# Patient Record
Sex: FEMALE | Race: BLACK OR AFRICAN AMERICAN | Employment: UNEMPLOYED | URBAN - METROPOLITAN AREA
[De-identification: names, ages, dates, MRNs, and addresses within clinical notes are randomized per-mention and may not be internally consistent; named-entity substitution may affect disease eponyms.]

---

## 2017-02-13 ENCOUNTER — TELEPHONE (OUTPATIENT)
Dept: FAMILY MEDICINE CLINIC | Age: 37
End: 2017-02-13

## 2017-02-13 NOTE — TELEPHONE ENCOUNTER
----- Message from Saloni Zhu sent at 2/13/2017  1:37 PM EST -----  Regarding: Dr. Parish Howell from [facility] would like a callback to check the status of an medical records request that was sent on 2/8/17. The best contact number is [phone] ext [phone].

## 2017-02-15 NOTE — TELEPHONE ENCOUNTER
Tried calling Luis Carlos Marte to inform her the information that she request have to come from the dept. Of medical records.  Which i have give to Yasmine #[phone]

## 2017-03-22 ENCOUNTER — TELEPHONE (OUTPATIENT)
Dept: FAMILY MEDICINE CLINIC | Age: 37
End: 2017-03-22

## 2017-03-24 NOTE — TELEPHONE ENCOUNTER
Spoke with patient. States her mother applied for life insurance and she was denied because of \"something\" from her visit on 9/16/17. Unsure why she would be denied. Vitamin D deficiency was only result of labs, unsure why she would have been denied. Patient to reach out to life insurance company for more clarification.

## 2017-04-13 ENCOUNTER — HOSPITAL ENCOUNTER (EMERGENCY)
Age: 37
Discharge: HOME | End: 2017-04-13
Attending: EMERGENCY MEDICINE

## 2017-04-13 ENCOUNTER — HOSPITAL ENCOUNTER (OUTPATIENT)
Dept: LAB | Age: 37
Discharge: HOME | End: 2017-04-13

## 2017-04-13 VITALS
HEIGHT: 67 IN | OXYGEN SATURATION: 99 % | BODY MASS INDEX: 25.11 KG/M2 | WEIGHT: 160 LBS | TEMPERATURE: 97.2 F | DIASTOLIC BLOOD PRESSURE: 102 MMHG | SYSTOLIC BLOOD PRESSURE: 176 MMHG | HEART RATE: 100 BPM | RESPIRATION RATE: 20 BRPM

## 2017-04-13 DIAGNOSIS — N30.01 ACUTE CYSTITIS WITH HEMATURIA: ICD-10-CM

## 2017-04-13 DIAGNOSIS — B96.89 BACTERIAL VAGINITIS: Primary | ICD-10-CM

## 2017-04-13 DIAGNOSIS — N76.0 BACTERIAL VAGINITIS: Primary | ICD-10-CM

## 2017-04-13 LAB
BILIRUB UR QL: NEGATIVE
CLUE CELLS VAG QL WET PREP: NORMAL
GLUCOSE UR QL STRIP.AUTO: NEGATIVE MG/DL
HCG UR QL: NEGATIVE
KETONES UR-MCNC: ABNORMAL MG/DL
KOH PREP SPEC: NORMAL
LEUKOCYTE ESTERASE UR QL STRIP: ABNORMAL
NITRITE UR QL: NEGATIVE
PH UR: 6 [PH] (ref 5–8)
PROT UR QL: 100 MG/DL
RBC # UR STRIP: ABNORMAL /UL
SERVICE CMNT-IMP: NORMAL
SP GR UR: 1.02 (ref 1–1.03)
T VAGINALIS VAG QL WET PREP: NORMAL
UROBILINOGEN UR QL: 0.2 EU/DL (ref 0.2–1)

## 2017-04-13 PROCEDURE — 87210 SMEAR WET MOUNT SALINE/INK: CPT | Performed by: EMERGENCY MEDICINE

## 2017-04-13 PROCEDURE — 87186 SC STD MICRODIL/AGAR DIL: CPT | Performed by: EMERGENCY MEDICINE

## 2017-04-13 PROCEDURE — 87086 URINE CULTURE/COLONY COUNT: CPT | Performed by: EMERGENCY MEDICINE

## 2017-04-13 PROCEDURE — 87491 CHLMYD TRACH DNA AMP PROBE: CPT | Performed by: EMERGENCY MEDICINE

## 2017-04-13 PROCEDURE — 87077 CULTURE AEROBIC IDENTIFY: CPT | Performed by: EMERGENCY MEDICINE

## 2017-04-13 RX ORDER — CEPHALEXIN 500 MG/1
500 CAPSULE ORAL 4 TIMES DAILY
Qty: 28 CAP | Refills: 0 | Status: SHIPPED | OUTPATIENT
Start: 2017-04-13 | End: 2017-04-20

## 2017-04-13 RX ORDER — METRONIDAZOLE 500 MG/1
500 TABLET ORAL 2 TIMES DAILY
Qty: 14 TAB | Refills: 0 | Status: SHIPPED | OUTPATIENT
Start: 2017-04-13 | End: 2017-04-20

## 2017-04-13 NOTE — DISCHARGE INSTRUCTIONS
Bacterial Vaginosis: Care Instructions  Your Care Instructions    Bacterial vaginosis is a type of vaginal infection. It is caused by excess growth of certain bacteria that are normally found in the vagina. Symptoms can include itching, swelling, pain when you urinate or have sex, and a gray or yellow discharge with a \"fishy\" odor. It is not considered an infection that is spread through sexual contact. Although symptoms can be annoying and uncomfortable, bacterial vaginosis does not usually cause other health problems. However, if you have it while you are pregnant, it can cause complications. While the infection may go away on its own, most doctors use antibiotics to treat it. You may have been prescribed pills or vaginal cream. With treatment, bacterial vaginosis usually clears up in 5 to 7 days. Follow-up care is a key part of your treatment and safety. Be sure to make and go to all appointments, and call your doctor if you are having problems. It's also a good idea to know your test results and keep a list of the medicines you take. How can you care for yourself at home? · Take your antibiotics as directed. Do not stop taking them just because you feel better. You need to take the full course of antibiotics. · Do not eat or drink anything that contains alcohol if you are taking metronidazole (Flagyl). · Keep using your medicine if you start your period. Use pads instead of tampons while using a vaginal cream or suppository. Tampons can absorb the medicine. · Wear loose cotton clothing. Do not wear nylon and other materials that hold body heat and moisture close to the skin. · Do not scratch. Relieve itching with a cold pack or a cool bath. · Do not wash your vaginal area more than once a day. Use plain water or a mild, unscented soap. Do not douche. When should you call for help?   Watch closely for changes in your health, and be sure to contact your doctor if:  · You have unexpected vaginal bleeding. · You have a fever. · You have new or increased pain in your vagina or pelvis. · You are not getting better after 1 week. · Your symptoms return after you finish the course of your medicine. Where can you learn more? Go to http://coleman-le.info/. Jordi Andrade in the search box to learn more about \"Bacterial Vaginosis: Care Instructions. \"  Current as of: October 13, 2016  Content Version: 11.2  © 1723-7546 [employer]. Care instructions adapted under license by [employer] (which disclaims liability or warranty for this information). If you have questions about a medical condition or this instruction, always ask your healthcare professional. [place] [address] any warranty or liability for your use of this information. Urinary Tract Infection in Women: Care Instructions  Your Care Instructions    A urinary tract infection, or UTI, is a general term for an infection anywhere between the kidneys and the urethra (where urine comes out). Most UTIs are bladder infections. They often cause pain or burning when you urinate. UTIs are caused by bacteria and can be cured with antibiotics. Be sure to complete your treatment so that the infection goes away. Follow-up care is a key part of your treatment and safety. Be sure to make and go to all appointments, and call your doctor if you are having problems. It's also a good idea to know your test results and keep a list of the medicines you take. How can you care for yourself at home? · Take your antibiotics as directed. Do not stop taking them just because you feel better. You need to take the full course of antibiotics. · Drink extra water and other fluids for the next day or two. This may help wash out the bacteria that are causing the infection.  (If you have kidney, heart, or liver disease and have to limit fluids, talk with your doctor before you increase your fluid intake.)  · Avoid drinks that are carbonated or have caffeine. They can irritate the bladder. · Urinate often. Try to empty your bladder each time. · To relieve pain, take a hot bath or lay a heating pad set on low over your lower belly or genital area. Never go to sleep with a heating pad in place. To prevent UTIs  · Drink plenty of water each day. This helps you urinate often, which clears bacteria from your system. (If you have kidney, heart, or liver disease and have to limit fluids, talk with your doctor before you increase your fluid intake.)  · Urinate when you need to. · Urinate right after you have sex. · Change sanitary pads often. · Avoid douches, bubble baths, feminine hygiene sprays, and other feminine hygiene products that have deodorants. · After going to the bathroom, wipe from front to back. When should you call for help? Call your doctor now or seek immediate medical care if:  · Symptoms such as fever, chills, nausea, or vomiting get worse or appear for the first time. · You have new pain in your back just below your rib cage. This is called flank pain. · There is new blood or pus in your urine. · You have any problems with your antibiotic medicine. Watch closely for changes in your health, and be sure to contact your doctor if:  · You are not getting better after taking an antibiotic for 2 days. · Your symptoms go away but then come back. Where can you learn more? Go to http://coleman-le.info/. Enter T643 in the search box to learn more about \"Urinary Tract Infection in Women: Care Instructions. \"  Current as of: November 28, 2016  Content Version: 11.2  © 3399-6166 [employer]. Care instructions adapted under license by [employer] (which disclaims liability or warranty for this information).  If you have questions about a medical condition or this instruction, always ask your healthcare professional. Kishor Vanegas disclaims any warranty or liability for your use of this information.

## 2017-04-13 NOTE — UC PROVIDER NOTE
Patient is a 29 y.o. female presenting with vaginal discharge. The history is provided by the patient. Vaginal Discharge    This is a new problem. The current episode started more than 1 week ago. The problem occurs daily. The problem has not changed since onset. The discharge occurs spontaneously. The discharge was thin. She is not pregnant. She has not missed her period. Associated symptoms include frequency and perineal odor. Pertinent negatives include no anorexia, no diaphoresis, no fever, no abdominal swelling, no abdominal pain, no constipation, no diarrhea, no nausea, no vomiting, no dyspareunia, no dysuria, no genital burning, no genital itching, no genital lesions, no perineal pain and no painful intercourse. She has tried nothing for the symptoms. The treatment provided no relief. Her past medical history does not include irregular periods, PID, STD, ectopic pregnancy, ovarian cysts or infertility. Past Medical History:   Diagnosis Date    Anemia     Asthma     Biliary colic 7/09/1274    Bulimia     Hypertension         Past Surgical History:   Procedure Laterality Date    HX CHOLECYSTECTOMY  2010    HX GYN  2010    vaginal delivery x1    HX WISDOM TEETH EXTRACTION           Family History   Problem Relation Age of Onset    Hypertension Mother     Cancer Mother      colon    Hypertension Father     Cancer Paternal Uncle     Cancer Paternal Uncle     Cancer Maternal Uncle     Stroke Paternal Aunt     Heart Disease Maternal Grandmother         Social History     Social History    Marital status: SINGLE     Spouse name: N/A    Number of children: N/A    Years of education: N/A     Occupational History    Not on file.      Social History Main Topics    Smoking status: Never Smoker    Smokeless tobacco: Never Used    Alcohol use Yes      Comment: socially    Drug use: No    Sexual activity: Yes     Partners: Male     Birth control/ protection: Condom     Other Topics Concern    Not on file     Social History Narrative                ALLERGIES: Amoxicillin    Review of Systems   Constitutional: Negative. Negative for diaphoresis and fever. Gastrointestinal: Negative. Negative for abdominal pain, anorexia, constipation, diarrhea, nausea and vomiting. Genitourinary: Positive for frequency and vaginal discharge. Negative for decreased urine volume, dyspareunia, dysuria, flank pain, genital sores, hematuria, pelvic pain, urgency and vaginal bleeding. Musculoskeletal: Negative. All other systems reviewed and are negative. Vitals:    04/13/17 1458 04/13/17 1459   BP:  (!) 176/102   Pulse:  100   Resp:  20   Temp:  97.2 °F (36.2 °C)   SpO2:  99%   Weight: 72.6 kg (160 lb)    Height: 5' 7\" (1.702 m)        Physical Exam   Constitutional: She is oriented to person, place, and time. She appears well-developed and well-nourished. HENT:   Head: Normocephalic and atraumatic. Mouth/Throat: Oropharynx is clear and moist. No oropharyngeal exudate. Eyes: Conjunctivae and EOM are normal. Pupils are equal, round, and reactive to light. Right eye exhibits no discharge. Left eye exhibits no discharge. No scleral icterus. Neck: Normal range of motion. No tracheal deviation present. No thyromegaly present. Cardiovascular: Normal rate, regular rhythm and normal heart sounds. No murmur heard. Pulmonary/Chest: Effort normal and breath sounds normal. No respiratory distress. She has no wheezes. She has no rales. She exhibits no tenderness. Abdominal: Soft. Bowel sounds are normal. She exhibits no distension. There is no tenderness. There is no rebound and no guarding. Genitourinary: Uterus normal. Vaginal discharge found. Genitourinary Comments: Vagina with thin grey copious discharge, cervix thick, os closed, no lesions, no cervical motion tenderness, no adnexal tenderness or fullness, uterus nontender   Musculoskeletal: Normal range of motion. She exhibits no edema or tenderness. Lymphadenopathy:     She has no cervical adenopathy. Neurological: She is alert and oriented to person, place, and time. No cranial nerve deficit. Coordination normal.   Skin: Skin is warm. No erythema. Psychiatric: She has a normal mood and affect. Her behavior is normal. Judgment and thought content normal.   Nursing note and vitals reviewed.       MDM     Differential Diagnosis; Clinical Impression; Plan:     Uti, bacterial vaginosis      Procedures

## 2017-04-14 LAB
C TRACH DNA SPEC QL NAA+PROBE: NEGATIVE
N GONORRHOEA DNA SPEC QL NAA+PROBE: NEGATIVE
SAMPLE TYPE: NORMAL
SERVICE CMNT-IMP: NORMAL
SPECIMEN SOURCE: NORMAL

## 2017-04-15 LAB
BACTERIA SPEC CULT: ABNORMAL
CC UR VC: ABNORMAL
SERVICE CMNT-IMP: ABNORMAL

## 2017-07-16 DIAGNOSIS — I10 BENIGN ESSENTIAL HTN: ICD-10-CM

## 2017-07-17 RX ORDER — LISINOPRIL 20 MG/1
TABLET ORAL
Qty: 30 TAB | Refills: 5 | Status: SHIPPED | OUTPATIENT
Start: 2017-07-17 | End: 2018-04-07

## 2018-04-07 ENCOUNTER — OFFICE VISIT (OUTPATIENT)
Dept: URGENT CARE | Age: 38
End: 2018-04-07

## 2018-04-07 VITALS
RESPIRATION RATE: 18 BRPM | HEIGHT: 67 IN | HEART RATE: 100 BPM | DIASTOLIC BLOOD PRESSURE: 102 MMHG | SYSTOLIC BLOOD PRESSURE: 140 MMHG | TEMPERATURE: 97.8 F | WEIGHT: 158 LBS | BODY MASS INDEX: 24.8 KG/M2 | OXYGEN SATURATION: 100 %

## 2018-04-07 DIAGNOSIS — R22.0 SWELLING OF UPPER LIP: Primary | ICD-10-CM

## 2018-04-07 RX ORDER — CETIRIZINE HCL 10 MG
10 TABLET ORAL 2 TIMES DAILY
Qty: 10 TAB | Refills: 0 | Status: SHIPPED | OUTPATIENT
Start: 2018-04-07 | End: 2018-04-12

## 2018-04-07 RX ORDER — HYDROCHLOROTHIAZIDE 12.5 MG/1
12.5 TABLET ORAL DAILY
Qty: 14 TAB | Refills: 0 | Status: SHIPPED | OUTPATIENT
Start: 2018-04-07 | End: 2018-04-21

## 2018-04-07 RX ORDER — SULFAMETHOXAZOLE AND TRIMETHOPRIM 800; 160 MG/1; MG/1
1 TABLET ORAL 2 TIMES DAILY
Qty: 14 TAB | Refills: 0 | Status: SHIPPED | OUTPATIENT
Start: 2018-04-07 | End: 2018-04-14

## 2018-04-07 RX ORDER — PREDNISONE 20 MG/1
TABLET ORAL
Qty: 8 TAB | Refills: 0 | Status: SHIPPED | OUTPATIENT
Start: 2018-04-07 | End: 2018-04-25 | Stop reason: ALTCHOICE

## 2018-04-07 NOTE — MR AVS SNAPSHOT
[facility] [address] 
[phone] Patient: Pricila Malik MRN: [id number] ADD:1/77/2359 Visit Information Date & Time Provider Department Dept. Phone Encounter #  
 4/7/2018  1:15 PM [facility] [phone] [id number] Upcoming Health Maintenance Date Due  
 PAP AKA CERVICAL CYTOLOGY 3/1/2017 Influenza Age 5 to Adult 8/1/2017 DTaP/Tdap/Td series (2 - Td) 9/16/2026 Allergies as of 4/7/2018  Review Complete On: 4/7/2018 By: Jessie Vaca RN Severity Noted Reaction Type Reactions Amoxicillin  05/21/2010    Hives Current Immunizations  Reviewed on 4/23/2015 No immunizations on file. Not reviewed this visit You Were Diagnosed With   
  
 Codes Comments Swelling of upper lip    -  Primary ICD-10-CM: R22.0 ICD-9-CM: 607. 2 Vitals BP Pulse Temp Resp Height(growth percentile) Weight(growth percentile) (!) 140/102 100 97.8 °F (36.6 °C) 18 5' 7\" (1.702 m) 158 lb (71.7 kg) SpO2 BMI OB Status Smoking Status 100% 24.75 kg/m2 Having regular periods Never Smoker BMI and BSA Data Body Mass Index Body Surface Area 24.75 kg/m 2 1.84 m 2 Preferred Pharmacy Pharmacy Name Phone [facility] . [address] [phone] Your Updated Medication List  
  
   
This list is accurate as of 4/7/18  3:00 PM.  Always use your most recent med list.  
  
  
  
  
 albuterol 90 mcg/actuation inhaler Commonly known as:  PROVENTIL HFA, VENTOLIN HFA, PROAIR HFA Take 2 Puffs by inhalation every four (4) hours as needed for Wheezing. cetirizine 10 mg tablet Commonly known as:  ZYRTEC Take 1 Tab by mouth two (2) times a day for 5 days. ferrous sulfate 325 mg (65 mg iron) tablet Take 325 mg by mouth Daily (before breakfast). Indications: IRON DEFICIENCY ANEMIA hydroCHLOROthiazide 12.5 mg tablet Commonly known as:  HYDRODIURIL Take 1 Tab by mouth daily for 14 days. mometasone 50 mcg/actuation nasal spray Commonly known as:  NASONEX  
2 Sprays by Both Nostrils route daily. predniSONE 20 mg tablet Commonly known as:  Leverne Eric Take 2 tabs by mouth one time daily with food for 4 days. trimethoprim-sulfamethoxazole 160-800 mg per tablet Commonly known as:  BACTRIM DS Take 1 Tab by mouth two (2) times a day for 7 days. valACYclovir 500 mg tablet Commonly known as:  VALTREX Prescriptions Sent to Pharmacy Refills  
 trimethoprim-sulfamethoxazole (BACTRIM DS) 160-800 mg per tablet 0 Sig: Take 1 Tab by mouth two (2) times a day for 7 days. Class: Normal  
 Pharmacy: [address] Ph #: [phone] Route: Oral  
 predniSONE (DELTASONE) 20 mg tablet 0 Sig: Take 2 tabs by mouth one time daily with food for 4 days. Class: Normal  
 Pharmacy: [facility] [id number] Ph #: [phone]  
 cetirizine (ZYRTEC) 10 mg tablet 0 Sig: Take 1 Tab by mouth two (2) times a day for 5 days. Class: Normal  
 Pharmacy: [address] Ph #: [phone] Route: Oral  
 hydroCHLOROthiazide (HYDRODIURIL) 12.5 mg tablet 0 Sig: Take 1 Tab by mouth daily for 14 days. Class: Normal  
 Pharmacy: [place] [address] [address] Ph #: [phone] Route: Oral  
  
Patient Instructions Stop your Lisinopril this could be cause of your lip swelling. I have sent you in a new BP medication that you should take daily. You need to see your PCP ASAP Monday or Tuesday. Angioedema: Care Instructions Your Care Instructions Angioedema is an allergic reaction.  It causes swelling and welts in the deep layers of the skin. Angioedema can sometimes occur along with hives. Hives are an allergic reaction in the outer layers of the skin. Angioedema can range from mild to severe. Painful welts can develop on the face. Angioedema can also occur on other parts of the body. In severe cases, the inside of the throat can swell and make it hard to breathe. Many things can cause this condition, including foods, insect bites, and medicines (such as aspirin and some blood pressure medicines). It also can run in families. Sometimes you may know what caused the reaction, but other times you may not know. Follow-up care is a key part of your treatment and safety. Be sure to make and go to all appointments, and call your doctor if you are having problems. It's also a good idea to know your test results and keep a list of the medicines you take. How can you care for yourself at home? · Take your medicines exactly as prescribed. Call your doctor if you think you are having a problem with your medicine. You will get more details on the specific medicines your doctor prescribes. Some medicines used to treat angioedema can make you too sleepy to drive safely. Do not drive if you take medicine that may make you sleepy. · Avoid foods or medicine that may have triggered the swelling. · For comfort: ¨ Try taking a cool bath. Or place a cool, wet towel on the swollen area. ¨ Avoid hot baths and showers. ¨ Wear loose clothing. · Your doctor may prescribe a shot of epinephrine to carry with you in case you have a severe reaction. Learn how to give yourself the shot and keep it with you at all times. Make sure it has not . When should you call for help? Give an epinephrine shot if: 
? · You think you are having a severe allergic reaction. ? After giving an epinephrine shot call 911, even if you feel better. ?Call 911 if: 
? · You have symptoms of a severe allergic reaction. These may include: ¨ Sudden raised, red areas (hives) all over your body. ¨ Swelling of the throat, mouth, lips, or tongue. ¨ Trouble breathing. ¨ Passing out (losing consciousness). Or you may feel very lightheaded or suddenly feel weak, confused, or restless. ? · You have been given an epinephrine shot, even if you feel better. ?Call your doctor now or seek immediate medical care if: 
? · You have symptoms of an allergic reaction, such as: ¨ A rash or hives (raised, red areas on the skin). ¨ Itching. ¨ Swelling. ¨ Belly pain, nausea, or vomiting. ? Watch closely for changes in your health, and be sure to contact your doctor if: 
? · You do not get better as expected. Where can you learn more? Go to http://coleman-le.info/. Enter Y922 in the search box to learn more about \"Angioedema: Care Instructions. \" Current as of: September 29, 2016 Content Version: 11.4 © 7784-0902 [employer]. Care instructions adapted under license by [email or web address] (which disclaims liability or warranty for this information). If you have questions about a medical condition or this instruction, always ask your healthcare professional. [place] [address] any warranty or liability for your use of this information. Introducing [facility]! Dear Татьяна Kothari: Thank you for requesting a Gazzang account. Our records indicate that you already have an active Gazzang account. You can access your account anytime at [email or web address] Did you know that you can access your hospital and ER discharge instructions at any time in Gazzang? You can also review all of your test results from your hospital stay or ER visit. Additional Information If you have questions, please visit the Frequently Asked Questions section of the Gazzang website at [email or web address] Remember, Gazzang is NOT to be used for urgent needs.  For medical emergencies, dial 911. Now available from your iPhone and Android! Please provide this summary of care documentation to your next provider. Your primary care clinician is listed as [address] Casper Rajan. If you have any questions after today's visit, please call [phone].

## 2018-04-07 NOTE — PATIENT INSTRUCTIONS
Stop your Lisinopril this could be cause of your lip swelling. I have sent you in a new BP medication that you should take daily. You need to see your PCP ASAP Monday or Tuesday         Angioedema: Care Instructions  Your Care Instructions    Angioedema is an allergic reaction. It causes swelling and welts in the deep layers of the skin. Angioedema can sometimes occur along with hives. Hives are an allergic reaction in the outer layers of the skin. Angioedema can range from mild to severe. Painful welts can develop on the face. Angioedema can also occur on other parts of the body. In severe cases, the inside of the throat can swell and make it hard to breathe. Many things can cause this condition, including foods, insect bites, and medicines (such as aspirin and some blood pressure medicines). It also can run in families. Sometimes you may know what caused the reaction, but other times you may not know. Follow-up care is a key part of your treatment and safety. Be sure to make and go to all appointments, and call your doctor if you are having problems. It's also a good idea to know your test results and keep a list of the medicines you take. How can you care for yourself at home? · Take your medicines exactly as prescribed. Call your doctor if you think you are having a problem with your medicine. You will get more details on the specific medicines your doctor prescribes. Some medicines used to treat angioedema can make you too sleepy to drive safely. Do not drive if you take medicine that may make you sleepy. · Avoid foods or medicine that may have triggered the swelling. · For comfort:  ¨ Try taking a cool bath. Or place a cool, wet towel on the swollen area. ¨ Avoid hot baths and showers. ¨ Wear loose clothing. · Your doctor may prescribe a shot of epinephrine to carry with you in case you have a severe reaction. Learn how to give yourself the shot and keep it with you at all times.  Make sure it has not . When should you call for help? Give an epinephrine shot if:  ? · You think you are having a severe allergic reaction. ? After giving an epinephrine shot call 911, even if you feel better. ?Call 911 if:  ? · You have symptoms of a severe allergic reaction. These may include:  ¨ Sudden raised, red areas (hives) all over your body. ¨ Swelling of the throat, mouth, lips, or tongue. ¨ Trouble breathing. ¨ Passing out (losing consciousness). Or you may feel very lightheaded or suddenly feel weak, confused, or restless. ? · You have been given an epinephrine shot, even if you feel better. ?Call your doctor now or seek immediate medical care if:  ? · You have symptoms of an allergic reaction, such as:  ¨ A rash or hives (raised, red areas on the skin). ¨ Itching. ¨ Swelling. ¨ Belly pain, nausea, or vomiting. ? Watch closely for changes in your health, and be sure to contact your doctor if:  ? · You do not get better as expected. Where can you learn more? Go to http://coleman-le.info/. Enter F193 in the search box to learn more about \"Angioedema: Care Instructions. \"  Current as of: 2016  Content Version: 11.4  © 3117-2829 [employer]. Care instructions adapted under license by [employer] (which disclaims liability or warranty for this information). If you have questions about a medical condition or this instruction, always ask your healthcare professional. [place] [address] any warranty or liability for your use of this information.

## 2018-04-07 NOTE — PROGRESS NOTES
HPI Comments:   Here for upper lip swelling that started yesterday without known exacerbating factor. Does mention that she had a bump inside her nose approx 1 month ago that she popped, unsure if it is related to symptoms today. Symptoms constant and a little worse today than yesterday. There has been no discharge. Lip is a little sore to touch. Hasnt tried any medications. No aggravating or alleviating factors. Denies any: SOB, globus sensation swallowing, throat tightening, tongue swelling, wheeze, SOB, nausea/vomiting/diarrhea. No oral lesions or known inset bites/sting      Patient is a 40 y.o. female presenting with skin problem. Skin Problem   Pertinent negatives include no headaches. Past Medical History:   Diagnosis Date    Anemia     Asthma     Biliary colic 7/52/2003    Bulimia     Hypertension         Past Surgical History:   Procedure Laterality Date    HX CHOLECYSTECTOMY  2010    HX GYN  2010    vaginal delivery x1    HX WISDOM TEETH EXTRACTION           Family History   Problem Relation Age of Onset    Hypertension Mother     Cancer Mother      colon    Hypertension Father     Cancer Paternal Uncle     Cancer Paternal Uncle     Cancer Maternal Uncle     Stroke Paternal Aunt     Heart Disease Maternal Grandmother         Social History     Social History    Marital status: SINGLE     Spouse name: N/A    Number of children: N/A    Years of education: N/A     Occupational History    Not on file. Social History Main Topics    Smoking status: Never Smoker    Smokeless tobacco: Never Used    Alcohol use Yes      Comment: socially    Drug use: No    Sexual activity: Yes     Partners: Male     Birth control/ protection: Condom     Other Topics Concern    Not on file     Social History Narrative                ALLERGIES: Amoxicillin    Review of Systems   Constitutional: Negative for chills, fatigue and fever. Eyes: Negative for visual disturbance. Gastrointestinal: Negative for nausea and vomiting. Neurological: Negative for dizziness, weakness and headaches. Vitals:    04/07/18 1418   BP: (!) 140/102   Pulse: 100   Resp: 18   Temp: 97.8 °F (36.6 °C)   SpO2: 100%   Weight: 158 lb (71.7 kg)   Height: 5' 7\" (1.702 m)       Physical Exam   Constitutional: She is oriented to person, place, and time. No distress. Appears well   HENT:   Head: Normocephalic and atraumatic. Nose: Nose normal.   Mouth/Throat: Oropharynx is clear and moist. Mucous membranes are not pale and not dry. No oral lesions. No trismus in the jaw. No dental abscesses, uvula swelling or dental caries. No oropharyngeal exudate or tonsillar abscesses. No dental abcess. No periorbital swelling. Eyes: Conjunctivae and EOM are normal. Pupils are equal, round, and reactive to light. Neck: Normal range of motion. Neck supple. No tracheal deviation present. No thyromegaly present. Cardiovascular: Normal rate, regular rhythm and normal heart sounds. Exam reveals no gallop and no friction rub. No murmur heard. Apical pulse 94bpm   Pulmonary/Chest: Effort normal and breath sounds normal. No stridor. No respiratory distress. She has no wheezes. She has no rales. No diminished breath sounds   Musculoskeletal:   No extremity edema   Lymphadenopathy:     She has no cervical adenopathy. Neurological: She is alert and oriented to person, place, and time. Skin:   See HENT face. Psychiatric: She has a normal mood and affect.  Her behavior is normal. Thought content normal.       [facility]     Differential Diagnosis; Clinical Impression; Plan:       CLINICAL IMPRESSION:  (R22.0) Swelling of upper lip  (primary encounter diagnosis)    Orders Placed This Encounter      trimethoprim-sulfamethoxazole (BACTRIM DS) 160-800 mg per tablet      predniSONE (DELTASONE) 20 mg tablet      cetirizine (ZYRTEC) 10 mg tablet      hydroCHLOROthiazide (HYDRODIURIL) 12.5 mg tablet      Plan:  Stop your Lisinopril this could be cause of your lip swelling; questionable cellulitis leaning more torward angioedema however promotes recent abscess in nose (not visible today)  I have sent you in a new BP medication that you should take daily. You need to see your PCP ASAP Monday or Tuesday  ED/911 immediately for any new or worsening    We have reviewed concerning signs/symptoms, normal vs abnormal progression of medical condition and when to seek immediate medical attention. Schedule with PCP or Urgent Care immediately for worsening or new symptoms.     Risk of Significant Complications, Morbidity, and/or Mortality:   Presenting problems:  Low  Diagnostic procedures:  Low  Management options:  Low  Progress:   Patient progress:  Stable      Procedures

## 2018-04-25 ENCOUNTER — OFFICE VISIT (OUTPATIENT)
Dept: FAMILY MEDICINE CLINIC | Age: 38
End: 2018-04-25

## 2018-04-25 VITALS
WEIGHT: 157.8 LBS | OXYGEN SATURATION: 98 % | HEIGHT: 67 IN | SYSTOLIC BLOOD PRESSURE: 150 MMHG | RESPIRATION RATE: 18 BRPM | DIASTOLIC BLOOD PRESSURE: 98 MMHG | HEART RATE: 104 BPM | BODY MASS INDEX: 24.77 KG/M2 | TEMPERATURE: 98.9 F

## 2018-04-25 DIAGNOSIS — E55.9 VITAMIN D DEFICIENCY: ICD-10-CM

## 2018-04-25 DIAGNOSIS — I10 BENIGN ESSENTIAL HTN: Primary | ICD-10-CM

## 2018-04-25 DIAGNOSIS — R00.2 HEART PALPITATIONS: ICD-10-CM

## 2018-04-25 RX ORDER — HYDROCHLOROTHIAZIDE 12.5 MG/1
12.5 TABLET ORAL DAILY
COMMUNITY
End: 2018-04-25 | Stop reason: ALTCHOICE

## 2018-04-25 RX ORDER — LISINOPRIL 20 MG/1
TABLET ORAL
Refills: 0 | COMMUNITY
Start: 2018-04-05 | End: 2018-04-25 | Stop reason: ALTCHOICE

## 2018-04-25 RX ORDER — METOPROLOL SUCCINATE 25 MG/1
25 TABLET, EXTENDED RELEASE ORAL DAILY
Qty: 30 TAB | Refills: 2 | Status: SHIPPED | OUTPATIENT
Start: 2018-04-25 | End: 2018-08-22 | Stop reason: SDUPTHER

## 2018-04-25 NOTE — PROGRESS NOTES
Chief Complaint   Patient presents with    Follow-up     Urgent care     Urgent care notes are in chart. The NP from urgent care advised pt to stop lisinopril immediately and start the hydrochlorazide.

## 2018-04-25 NOTE — MR AVS SNAPSHOT
[facility] 
 
 
 [address] [facility] [place] [id number] [phone] Patient: Migue Calvert MRN: [id number] Visit Information Date & Time Provider Department Dept. Phone Encounter #  
 2018  2:00 PM Hayley Heller [phone] [id number] Follow-up Instructions Return in about 6 weeks (around 2018). Upcoming Health Maintenance Date Due  
 PAP AKA CERVICAL CYTOLOGY 3/1/2017 Influenza Age 5 to Adult 2017 DTaP/Tdap/Td series (2 - Td) 2026 Allergies as of 2018  Review Complete On: 2018 By: Jamie Merchant LPN Severity Noted Reaction Type Reactions Amoxicillin  2010    Hives Current Immunizations  Reviewed on 2015 No immunizations on file. Not reviewed this visit You Were Diagnosed With   
  
 Codes Comments Benign essential HTN    -  Primary ICD-10-CM: I10 
ICD-9-CM: 401.1 Heart palpitations     ICD-10-CM: R00.2 ICD-9-CM: 785.1 Vitamin D deficiency     ICD-10-CM: E55.9 ICD-9-CM: 268.9 Vitals BP Pulse Temp Resp Height(growth percentile) Weight(growth percentile) (!) 161/103 (BP 1 Location: Right arm, BP Patient Position: Sitting) (!) 104 98.9 °F (37.2 °C) (Oral) 18 5' 7\" (1.702 m) 157 lb 12.8 oz (71.6 kg) LMP SpO2 BMI OB Status Smoking Status 04/10/2018 98% 24.71 kg/m2 Having regular periods Never Smoker BMI and BSA Data Body Mass Index Body Surface Area 24.71 kg/m 2 1.84 m 2 Preferred Pharmacy Pharmacy Name Phone [facility] [id number], [address] [phone] Your Updated Medication List  
  
   
This list is accurate as of 18  3:34 PM.  Always use your most recent med list.  
  
  
  
  
 albuterol 90 mcg/actuation inhaler Commonly known as:  PROVENTIL HFA, VENTOLIN HFA, PROAIR HFA  
 Take 2 Puffs by inhalation every four (4) hours as needed for Wheezing. metoprolol succinate 25 mg XL tablet Commonly known as:  TOPROL-XL Take 1 Tab by mouth daily. mometasone 50 mcg/actuation nasal spray Commonly known as:  NASONEX  
2 Sprays by Both Nostrils route daily. valACYclovir 500 mg tablet Commonly known as:  VALTREX Prescriptions Sent to Pharmacy Refills  
 metoprolol succinate (TOPROL-XL) 25 mg XL tablet 2 Sig: Take 1 Tab by mouth daily. Class: Normal  
 Pharmacy: [facility] [address] [place] [address] Ph #: [phone] Route: Oral  
  
We Performed the Following AMB POC EKG ROUTINE W/ 12 LEADS, INTER & REP [08224 CPT(R)] CBC WITH AUTOMATED DIFF [26488 CPT(R)] MAGNESIUM [id number] CPT(R)] METABOLIC PANEL, COMPREHENSIVE [40004 CPT(R)] TSH RFX ON ABNORMAL TO FREE T4 [[id number] Custom] VITAMIN D, 25 HYDROXY [id number] CPT(R)] Follow-up Instructions Return in about 6 weeks (around 6/6/2018). Patient Instructions DASH Diet: Care Instructions Your Care Instructions The DASH diet is an eating plan that can help lower your blood pressure. DASH stands for Dietary Approaches to Stop Hypertension. Hypertension is high blood pressure. The DASH diet focuses on eating foods that are high in calcium, potassium, and magnesium. These nutrients can lower blood pressure. The foods that are highest in these nutrients are fruits, vegetables, low-fat dairy products, nuts, seeds, and legumes. But taking calcium, potassium, and magnesium supplements instead of eating foods that are high in those nutrients does not have the same effect. The DASH diet also includes whole grains, fish, and poultry. The DASH diet is one of several lifestyle changes your doctor may recommend to lower your high blood pressure.  Your doctor may also want you to decrease the amount of sodium in your diet. Lowering sodium while following the DASH diet can lower blood pressure even further than just the DASH diet alone. Follow-up care is a key part of your treatment and safety. Be sure to make and go to all appointments, and call your doctor if you are having problems. It's also a good idea to know your test results and keep a list of the medicines you take. How can you care for yourself at home? Following the DASH diet · Eat 4 to 5 servings of fruit each day. A serving is 1 medium-sized piece of fruit, ½ cup chopped or canned fruit, 1/4 cup dried fruit, or 4 ounces (½ cup) of fruit juice. Choose fruit more often than fruit juice. · Eat 4 to 5 servings of vegetables each day. A serving is 1 cup of lettuce or raw leafy vegetables, ½ cup of chopped or cooked vegetables, or 4 ounces (½ cup) of vegetable juice. Choose vegetables more often than vegetable juice. · Get 2 to 3 servings of low-fat and fat-free dairy each day. A serving is 8 ounces of milk, 1 cup of yogurt, or 1 ½ ounces of cheese. · Eat 6 to 8 servings of grains each day. A serving is 1 slice of bread, 1 ounce of dry cereal, or ½ cup of cooked rice, pasta, or cooked cereal. Try to choose whole-grain products as much as possible. · Limit lean meat, poultry, and fish to 2 servings each day. A serving is 3 ounces, about the size of a deck of cards. · Eat 4 to 5 servings of nuts, seeds, and legumes (cooked dried beans, lentils, and split peas) each week. A serving is 1/3 cup of nuts, 2 tablespoons of seeds, or ½ cup of cooked beans or peas. · Limit fats and oils to 2 to 3 servings each day. A serving is 1 teaspoon of vegetable oil or 2 tablespoons of salad dressing. · Limit sweets and added sugars to 5 servings or less a week. A serving is 1 tablespoon jelly or jam, ½ cup sorbet, or 1 cup of lemonade. · Eat less than 2,300 milligrams (mg) of sodium a day.  If you limit your sodium to 1,500 mg a day, you can lower your blood pressure even more. Tips for success · Start small. Do not try to make dramatic changes to your diet all at once. You might feel that you are missing out on your favorite foods and then be more likely to not follow the plan. Make small changes, and stick with them. Once those changes become habit, add a few more changes. · Try some of the following: ¨ Make it a goal to eat a fruit or vegetable at every meal and at snacks. This will make it easy to get the recommended amount of fruits and vegetables each day. ¨ Try yogurt topped with fruit and nuts for a snack or healthy dessert. ¨ Add lettuce, tomato, cucumber, and onion to sandwiches. ¨ Combine a ready-made pizza crust with low-fat mozzarella cheese and lots of vegetable toppings. Try using tomatoes, squash, spinach, broccoli, carrots, cauliflower, and onions. ¨ Have a variety of cut-up vegetables with a low-fat dip as an appetizer instead of chips and dip. ¨ Sprinkle sunflower seeds or chopped almonds over salads. Or try adding chopped walnuts or almonds to cooked vegetables. ¨ Try some vegetarian meals using beans and peas. Add garbanzo or kidney beans to salads. Make burritos and tacos with mashed woodard beans or black beans. Where can you learn more? Go to http://coleman-le.info/. Enter U048 in the search box to learn more about \"DASH Diet: Care Instructions. \" Current as of: September 21, 2016 Content Version: 11.4 © 9786-6430 [employer]. Care instructions adapted under license by [employer] (which disclaims liability or warranty for this information). If you have questions about a medical condition or this instruction, always ask your healthcare professional. [place] [address] any warranty or liability for your use of this information. Low Sodium Diet (2,000 Milligram): Care Instructions Your Care Instructions Too much sodium causes your body to hold on to extra water. This can raise your blood pressure and force your heart and kidneys to work harder. In very serious cases, this could cause you to be put in the hospital. It might even be life-threatening. By limiting sodium, you will feel better and lower your risk of serious problems. The most common source of sodium is salt. People get most of the salt in their diet from canned, prepared, and packaged foods. Fast food and restaurant meals also are very high in sodium. Your doctor will probably limit your sodium to less than 2,000 milligrams (mg) a day. This limit counts all the sodium in prepared and packaged foods and any salt you add to your food. Follow-up care is a key part of your treatment and safety. Be sure to make and go to all appointments, and call your doctor if you are having problems. It's also a good idea to know your test results and keep a list of the medicines you take. How can you care for yourself at home? Read food labels · Read labels on cans and food packages. The labels tell you how much sodium is in each serving. Make sure that you look at the serving size. If you eat more than the serving size, you have eaten more sodium. · Food labels also tell you the Percent Daily Value for sodium. Choose products with low Percent Daily Values for sodium. · Be aware that sodium can come in forms other than salt, including monosodium glutamate (MSG), sodium citrate, and sodium bicarbonate (baking soda). MSG is often added to Asian food. When you eat out, you can sometimes ask for food without MSG or added salt. Buy low-sodium foods · Buy foods that are labeled \"unsalted\" (no salt added), \"sodium-free\" (less than 5 mg of sodium per serving), or \"low-sodium\" (less than 140 mg of sodium per serving). Foods labeled \"reduced-sodium\" and \"light sodium\" may still have too much sodium. Be sure to read the label to see how much sodium you are getting. · Buy fresh vegetables, or frozen vegetables without added sauces. Buy low-sodium versions of canned vegetables, soups, and other canned goods. Prepare low-sodium meals · Cut back on the amount of salt you use in cooking. This will help you adjust to the taste. Do not add salt after cooking. One teaspoon of salt has about 2,300 mg of sodium. · Take the salt shaker off the table. · Flavor your food with garlic, lemon juice, onion, vinegar, herbs, and spices. Do not use soy sauce, lite soy sauce, steak sauce, onion salt, garlic salt, celery salt, mustard, or ketchup on your food. · Use low-sodium salad dressings, sauces, and ketchup. Or make your own salad dressings and sauces without adding salt. · Use less salt (or none) when recipes call for it. You can often use half the salt a recipe calls for without losing flavor. Other foods such as rice, pasta, and grains do not need added salt. · Rinse canned vegetables, and cook them in fresh water. This removes some-but not all-of the salt. · Avoid water that is naturally high in sodium or that has been treated with water softeners, which add sodium. Call your local water company to find out the sodium content of your water supply. If you buy bottled water, read the label and choose a sodium-free brand. Avoid high-sodium foods · Avoid eating: ¨ Smoked, cured, salted, and canned meat, fish, and poultry. ¨ Ham, bennett, hot dogs, and luncheon meats. ¨ Regular, hard, and processed cheese and regular peanut butter. ¨ Crackers with salted tops, and other salted snack foods such as pretzels, chips, and salted popcorn. ¨ Frozen prepared meals, unless labeled low-sodium. ¨ Canned and dried soups, broths, and bouillon, unless labeled sodium-free or low-sodium. ¨ Canned vegetables, unless labeled sodium-free or low-sodium. ¨ [place] fries, pizza, tacos, and other fast foods.  
¨ Pickles, olives, ketchup, and other condiments, especially soy sauce, unless labeled sodium-free or low-sodium. Where can you learn more? Go to http://coleman-le.info/. Enter B946 in the search box to learn more about \"Low Sodium Diet (2,000 Milligram): Care Instructions. \" Current as of: May 12, 2017 Content Version: 11.4 © 6621-7601 [employer]. Care instructions adapted under license by [employer] (which disclaims liability or warranty for this information). If you have questions about a medical condition or this instruction, always ask your healthcare professional. [address] any warranty or liability for your use of this information. Introducing \[facility]\"" & HEALTH SERVICES! Dear Mima: Thank you for requesting a Vehrity account. Our records indicate that you already have an active Vehrity account. You can access your account anytime at [email or web address] Did you know that you can access your hospital and ER discharge instructions at any time in Vehrity? You can also review all of your test results from your hospital stay or ER visit. Additional Information If you have questions, please visit the Frequently Asked Questions section of the Vehrity website at [email or web address] Remember, Vehrity is NOT to be used for urgent needs. For medical emergencies, dial 911. Now available from your iPhone and Android! Please provide this summary of care documentation to your next provider. Your primary care clinician is listed as [address] [facility]per Rajan. If you have any questions after today's visit, please call [phone].

## 2018-04-25 NOTE — PROGRESS NOTES
HISTORY OF PRESENT ILLNESS  Kelsea Mcneal is a 40 y.o. female. HPI  Patient comes in today for follow up from urgent care. Was seen in urgent care for facial swelling. Thought related to lisinopril and angioedema, so patient was encouraged to stop lisinopril and was put on HCTZ. She was also given prednisone and placed on antibiotic to cover for infection since she had a nasal sore. Urgent care notes reviewed. States she only had 14d of medication of HCTZ, and she ran out 2 days ago. Complains of headaches, palpitations, dizziness. Denies chest pains, dyspnea, tingling in extremities, blurred vision. Urinating without difficulty. Noted an increase in urination with HCTZ. BMs normal..  Allergies   Allergen Reactions    Amoxicillin Hives       Past Medical History:   Diagnosis Date    Anemia     Asthma     Biliary colic 4/09/1877    Bulimia     Hypertension        Past Surgical History:   Procedure Laterality Date    HX CHOLECYSTECTOMY  2010    HX GYN  2010    vaginal delivery x1    HX WISDOM TEETH EXTRACTION         Social History     Social History    Marital status: SINGLE     Spouse name: N/A    Number of children: N/A    Years of education: N/A     Occupational History    Not on file.      Social History Main Topics    Smoking status: Never Smoker    Smokeless tobacco: Never Used    Alcohol use Yes      Comment: socially    Drug use: No    Sexual activity: Yes     Partners: Male     Birth control/ protection: Condom     Other Topics Concern    Not on file     Social History Narrative       Family History   Problem Relation Age of Onset    Hypertension Mother     Cancer Mother      colon    Hypertension Father     Cancer Paternal Uncle     Cancer Paternal Uncle     Cancer Maternal Uncle     Stroke Paternal Aunt     Heart Disease Maternal Grandmother        Current Outpatient Prescriptions   Medication Sig    hydroCHLOROthiazide (HYDRODIURIL) 12.5 mg tablet Take 12.5 mg by mouth daily.    valACYclovir (VALTREX) 500 mg tablet     mometasone (NASONEX) 50 mcg/actuation nasal spray 2 Sprays by Both Nostrils route daily.  albuterol (PROVENTIL HFA, VENTOLIN HFA, PROAIR HFA) 90 mcg/actuation inhaler Take 2 Puffs by inhalation every four (4) hours as needed for Wheezing.  lisinopril (PRINIVIL, ZESTRIL) 20 mg tablet     ferrous sulfate 325 mg (65 mg iron) tablet Take 325 mg by mouth Daily (before breakfast). Indications: IRON DEFICIENCY ANEMIA     No current facility-administered medications for this visit. Review of Systems   Constitutional: Negative for chills, diaphoresis, fever, malaise/fatigue and weight loss. HENT: Negative for congestion, ear pain, sore throat and tinnitus. Eyes: Negative for blurred vision and double vision. Respiratory: Negative for cough, sputum production, shortness of breath and wheezing. Cardiovascular: Positive for palpitations. Negative for chest pain and leg swelling. Gastrointestinal: Negative for abdominal pain, blood in stool, constipation, diarrhea, nausea and vomiting. Genitourinary: Negative for dysuria, flank pain, frequency, hematuria and urgency. Musculoskeletal: Negative for back pain, joint pain and myalgias. Skin: Negative. Neurological: Positive for dizziness and headaches. Negative for tingling, sensory change, speech change and focal weakness. Psychiatric/Behavioral: Negative for depression. The patient is not nervous/anxious and does not have insomnia. Vitals:    04/25/18 1420 04/25/18 1445   BP: (!) 161/103 (!) 150/98   Pulse: (!) 104    Resp: 18    Temp: 98.9 °F (37.2 °C)    TempSrc: Oral    SpO2: 98%    Weight: 157 lb 12.8 oz (71.6 kg)    Height: 5' 7\" (1.702 m)      Physical Exam   Constitutional: She is oriented to person, place, and time. Vital signs are normal. She appears well-developed and well-nourished. She is cooperative.    HENT:   Right Ear: Hearing, tympanic membrane, external ear and ear canal normal.   Left Ear: Hearing, tympanic membrane, external ear and ear canal normal.   Nose: Nose normal. Right sinus exhibits no maxillary sinus tenderness and no frontal sinus tenderness. Left sinus exhibits no maxillary sinus tenderness and no frontal sinus tenderness. Mouth/Throat: Uvula is midline, oropharynx is clear and moist and mucous membranes are normal. Mucous membranes are not pale and not dry. No oropharyngeal exudate, posterior oropharyngeal edema or posterior oropharyngeal erythema. Neck: No thyroid mass and no thyromegaly present. Cardiovascular: Normal rate, regular rhythm, S1 normal, S2 normal and normal heart sounds. No murmur heard. Pulses:       Radial pulses are 2+ on the right side, and 2+ on the left side. Dorsalis pedis pulses are 2+ on the right side, and 2+ on the left side. Posterior tibial pulses are 2+ on the right side, and 2+ on the left side. Pulmonary/Chest: Effort normal and breath sounds normal. She has no decreased breath sounds. She has no wheezes. She has no rhonchi. She has no rales. Abdominal: Soft. Normal appearance and bowel sounds are normal. There is no hepatosplenomegaly. There is no tenderness. There is no CVA tenderness. Lymphadenopathy:        Head (right side): No submental, no submandibular, no tonsillar, no preauricular and no posterior auricular adenopathy present. Head (left side): No submental, no submandibular, no tonsillar, no preauricular and no posterior auricular adenopathy present. She has no cervical adenopathy. Right: No supraclavicular adenopathy present. Left: No supraclavicular adenopathy present. Neurological: She is alert and oriented to person, place, and time. Skin: Skin is warm, dry and intact. Psychiatric: She has a normal mood and affect. Her speech is normal and behavior is normal. Thought content normal.   Vitals reviewed. ASSESSMENT and PLAN    ICD-10-CM ICD-9-CM    1.  Benign essential HTN I10 401.1 CBC WITH AUTOMATED DIFF      METABOLIC PANEL, COMPREHENSIVE      MAGNESIUM      metoprolol succinate (TOPROL-XL) 25 mg XL tablet   2. Heart palpitations R00.2 785.1 AMB POC EKG ROUTINE W/ 12 LEADS, INTER & REP      CBC WITH AUTOMATED DIFF      METABOLIC PANEL, COMPREHENSIVE      MAGNESIUM      TSH RFX ON ABNORMAL TO FREE T4      metoprolol succinate (TOPROL-XL) 25 mg XL tablet   3. Vitamin D deficiency E55.9 268.9 VITAMIN D, 25 HYDROXY     Encounter Diagnoses   Name Primary?  Benign essential HTN Yes    Heart palpitations     Vitamin D deficiency      Orders Placed This Encounter    CBC WITH AUTOMATED DIFF    METABOLIC PANEL, COMPREHENSIVE    MAGNESIUM    TSH RFX ON ABNORMAL TO FREE T4    VITAMIN D, 25 HYDROXY    AMB POC EKG ROUTINE W/ 12 LEADS, INTER & REP    DISCONTD: lisinopril (PRINIVIL, ZESTRIL) 20 mg tablet    DISCONTD: hydroCHLOROthiazide (HYDRODIURIL) 12.5 mg tablet    metoprolol succinate (TOPROL-XL) 25 mg XL tablet     Diagnoses and all orders for this visit:    1. Benign essential HTN - patient to stay off lisinopril, stop HCTZ, start metoprolol. Follow up in 6 weeks. -     CBC WITH AUTOMATED DIFF  -     METABOLIC PANEL, COMPREHENSIVE  -     MAGNESIUM  -     metoprolol succinate (TOPROL-XL) 25 mg XL tablet; Take 1 Tab by mouth daily. 2. Heart palpitations - EKG normal, ST. Will start metoprolol. Follow up in 6 weeks. -     AMB POC EKG ROUTINE W/ 12 LEADS, INTER & REP  -     CBC WITH AUTOMATED DIFF  -     METABOLIC PANEL, COMPREHENSIVE  -     MAGNESIUM  -     TSH RFX ON ABNORMAL TO FREE T4  -     metoprolol succinate (TOPROL-XL) 25 mg XL tablet; Take 1 Tab by mouth daily. 3. Vitamin D deficiency  -     VITAMIN D, 25 HYDROXY      Follow-up Disposition:  Return in about 6 weeks (around 6/6/2018).   lab results and schedule of future lab studies reviewed with patient  reviewed diet, exercise and weight control    I have reviewed the patient's allergies and made any necessary changes. Medical, procedural, social and family histories have been reviewed and updated as medically indicated. I have reconciled and/or revised patient medications in the EMR. I have discussed each diagnosis listed in this note with [place] Her and/or their family. I have discussed treatment options and the risk/benefit analysis of those options, including safe use of medications and possible medication side effects. Through the use of shared decision making we have agreed to the above plan. The patient has received an after-visit summary and questions were answered concerning future plans. Vale Jeffery, HERMELINDA-C    This note will not be viewable in [facility].

## 2018-04-25 NOTE — PATIENT INSTRUCTIONS
DASH Diet: Care Instructions  Your Care Instructions    The DASH diet is an eating plan that can help lower your blood pressure. DASH stands for Dietary Approaches to Stop Hypertension. Hypertension is high blood pressure. The DASH diet focuses on eating foods that are high in calcium, potassium, and magnesium. These nutrients can lower blood pressure. The foods that are highest in these nutrients are fruits, vegetables, low-fat dairy products, nuts, seeds, and legumes. But taking calcium, potassium, and magnesium supplements instead of eating foods that are high in those nutrients does not have the same effect. The DASH diet also includes whole grains, fish, and poultry. The DASH diet is one of several lifestyle changes your doctor may recommend to lower your high blood pressure. Your doctor may also want you to decrease the amount of sodium in your diet. Lowering sodium while following the DASH diet can lower blood pressure even further than just the DASH diet alone. Follow-up care is a key part of your treatment and safety. Be sure to make and go to all appointments, and call your doctor if you are having problems. It's also a good idea to know your test results and keep a list of the medicines you take. How can you care for yourself at home? Following the DASH diet  · Eat 4 to 5 servings of fruit each day. A serving is 1 medium-sized piece of fruit, ½ cup chopped or canned fruit, 1/4 cup dried fruit, or 4 ounces (½ cup) of fruit juice. Choose fruit more often than fruit juice. · Eat 4 to 5 servings of vegetables each day. A serving is 1 cup of lettuce or raw leafy vegetables, ½ cup of chopped or cooked vegetables, or 4 ounces (½ cup) of vegetable juice. Choose vegetables more often than vegetable juice. · Get 2 to 3 servings of low-fat and fat-free dairy each day. A serving is 8 ounces of milk, 1 cup of yogurt, or 1 ½ ounces of cheese. · Eat 6 to 8 servings of grains each day.  A serving is 1 slice of bread, 1 ounce of dry cereal, or ½ cup of cooked rice, pasta, or cooked cereal. Try to choose whole-grain products as much as possible. · Limit lean meat, poultry, and fish to 2 servings each day. A serving is 3 ounces, about the size of a deck of cards. · Eat 4 to 5 servings of nuts, seeds, and legumes (cooked dried beans, lentils, and split peas) each week. A serving is 1/3 cup of nuts, 2 tablespoons of seeds, or ½ cup of cooked beans or peas. · Limit fats and oils to 2 to 3 servings each day. A serving is 1 teaspoon of vegetable oil or 2 tablespoons of salad dressing. · Limit sweets and added sugars to 5 servings or less a week. A serving is 1 tablespoon jelly or jam, ½ cup sorbet, or 1 cup of lemonade. · Eat less than 2,300 milligrams (mg) of sodium a day. If you limit your sodium to 1,500 mg a day, you can lower your blood pressure even more. Tips for success  · Start small. Do not try to make dramatic changes to your diet all at once. You might feel that you are missing out on your favorite foods and then be more likely to not follow the plan. Make small changes, and stick with them. Once those changes become habit, add a few more changes. · Try some of the following:  ¨ Make it a goal to eat a fruit or vegetable at every meal and at snacks. This will make it easy to get the recommended amount of fruits and vegetables each day. ¨ Try yogurt topped with fruit and nuts for a snack or healthy dessert. ¨ Add lettuce, tomato, cucumber, and onion to sandwiches. ¨ Combine a ready-made pizza crust with low-fat mozzarella cheese and lots of vegetable toppings. Try using tomatoes, squash, spinach, broccoli, carrots, cauliflower, and onions. ¨ Have a variety of cut-up vegetables with a low-fat dip as an appetizer instead of chips and dip. ¨ Sprinkle sunflower seeds or chopped almonds over salads. Or try adding chopped walnuts or almonds to cooked vegetables.   ¨ Try some vegetarian meals using beans and peas. Add garbanzo or kidney beans to salads. Make burritos and tacos with mashed woodard beans or black beans. Where can you learn more? Go to http://coleman-le.info/. Enter S749 in the search box to learn more about \"DASH Diet: Care Instructions. \"  Current as of: September 21, 2016  Content Version: 11.4  © 1494-6143 [employer]. Care instructions adapted under license by [employer] (which disclaims liability or warranty for this information). If you have questions about a medical condition or this instruction, always ask your healthcare professional. [address] any warranty or liability for your use of this information. Low Sodium Diet (2,000 Milligram): Care Instructions  Your Care Instructions    Too much sodium causes your body to hold on to extra water. This can raise your blood pressure and force your heart and kidneys to work harder. In very serious cases, this could cause you to be put in the hospital. It might even be life-threatening. By limiting sodium, you will feel better and lower your risk of serious problems. The most common source of sodium is salt. People get most of the salt in their diet from canned, prepared, and packaged foods. Fast food and restaurant meals also are very high in sodium. Your doctor will probably limit your sodium to less than 2,000 milligrams (mg) a day. This limit counts all the sodium in prepared and packaged foods and any salt you add to your food. Follow-up care is a key part of your treatment and safety. Be sure to make and go to all appointments, and call your doctor if you are having problems. It's also a good idea to know your test results and keep a list of the medicines you take. How can you care for yourself at home? Read food labels  · Read labels on cans and food packages. The labels tell you how much sodium is in each serving. Make sure that you look at the serving size.  If you eat more than the serving size, you have eaten more sodium. · Food labels also tell you the Percent Daily Value for sodium. Choose products with low Percent Daily Values for sodium. · Be aware that sodium can come in forms other than salt, including monosodium glutamate (MSG), sodium citrate, and sodium bicarbonate (baking soda). MSG is often added to Asian food. When you eat out, you can sometimes ask for food without MSG or added salt. Buy low-sodium foods  · Buy foods that are labeled \"unsalted\" (no salt added), \"sodium-free\" (less than 5 mg of sodium per serving), or \"low-sodium\" (less than 140 mg of sodium per serving). Foods labeled \"reduced-sodium\" and \"light sodium\" may still have too much sodium. Be sure to read the label to see how much sodium you are getting. · Buy fresh vegetables, or frozen vegetables without added sauces. Buy low-sodium versions of canned vegetables, soups, and other canned goods. Prepare low-sodium meals  · Cut back on the amount of salt you use in cooking. This will help you adjust to the taste. Do not add salt after cooking. One teaspoon of salt has about 2,300 mg of sodium. · Take the salt shaker off the table. · Flavor your food with garlic, lemon juice, onion, vinegar, herbs, and spices. Do not use soy sauce, lite soy sauce, steak sauce, onion salt, garlic salt, celery salt, mustard, or ketchup on your food. · Use low-sodium salad dressings, sauces, and ketchup. Or make your own salad dressings and sauces without adding salt. · Use less salt (or none) when recipes call for it. You can often use half the salt a recipe calls for without losing flavor. Other foods such as rice, pasta, and grains do not need added salt. · Rinse canned vegetables, and cook them in fresh water. This removes some-but not all-of the salt. · Avoid water that is naturally high in sodium or that has been treated with water softeners, which add sodium.  Call your local water company to find out the sodium content of your water supply. If you buy bottled water, read the label and choose a sodium-free brand. Avoid high-sodium foods  · Avoid eating:  ¨ Smoked, cured, salted, and canned meat, fish, and poultry. ¨ Ham, bennett, hot dogs, and luncheon meats. ¨ Regular, hard, and processed cheese and regular peanut butter. ¨ Crackers with salted tops, and other salted snack foods such as pretzels, chips, and salted popcorn. ¨ Frozen prepared meals, unless labeled low-sodium. ¨ Canned and dried soups, broths, and bouillon, unless labeled sodium-free or low-sodium. ¨ Canned vegetables, unless labeled sodium-free or low-sodium. ¨ [place] fries, pizza, tacos, and other fast foods. ¨ Pickles, olives, ketchup, and other condiments, especially soy sauce, unless labeled sodium-free or low-sodium. Where can you learn more? Go to http://coleman-le.info/. Enter Z426 in the search box to learn more about \"Low Sodium Diet (2,000 Milligram): Care Instructions. \"  Current as of: May 12, 2017  Content Version: 11.4  © 2222-9872 [employer]. Care instructions adapted under license by [employer] (which disclaims liability or warranty for this information). If you have questions about a medical condition or this instruction, always ask your healthcare professional. [place] [address] any warranty or liability for your use of this information.

## 2018-04-26 LAB
25(OH)D3+25(OH)D2 SERPL-MCNC: NORMAL NG/ML
ALBUMIN SERPL-MCNC: 4.2 G/DL (ref 3.5–5.5)
ALBUMIN/GLOB SERPL: 1.7 {RATIO} (ref 1.2–2.2)
ALP SERPL-CCNC: 94 IU/L (ref 39–117)
ALT SERPL-CCNC: 23 IU/L (ref 0–32)
AST SERPL-CCNC: 39 IU/L (ref 0–40)
BASOPHILS # BLD AUTO: 0 X10E3/UL (ref 0–0.2)
BASOPHILS NFR BLD AUTO: 0 %
BILIRUB SERPL-MCNC: <0.2 MG/DL (ref 0–1.2)
BUN SERPL-MCNC: 10 MG/DL (ref 6–20)
BUN/CREAT SERPL: 13 (ref 9–23)
CALCIUM SERPL-MCNC: 9 MG/DL (ref 8.7–10.2)
CHLORIDE SERPL-SCNC: 98 MMOL/L (ref 96–106)
CO2 SERPL-SCNC: 23 MMOL/L (ref 18–29)
CREAT SERPL-MCNC: 0.75 MG/DL (ref 0.57–1)
EOSINOPHIL # BLD AUTO: 0.3 X10E3/UL (ref 0–0.4)
EOSINOPHIL NFR BLD AUTO: 3 %
ERYTHROCYTE [DISTWIDTH] IN BLOOD BY AUTOMATED COUNT: 19.1 % (ref 12.3–15.4)
GFR SERPLBLD CREATININE-BSD FMLA CKD-EPI: 103 ML/MIN/1.73
GFR SERPLBLD CREATININE-BSD FMLA CKD-EPI: 119 ML/MIN/1.73
GLOBULIN SER CALC-MCNC: 2.5 G/DL (ref 1.5–4.5)
GLUCOSE SERPL-MCNC: 82 MG/DL (ref 65–99)
HCT VFR BLD AUTO: 35 % (ref 34–46.6)
HGB BLD-MCNC: 12.2 G/DL (ref 11.1–15.9)
IMM GRANULOCYTES # BLD: 0 X10E3/UL (ref 0–0.1)
IMM GRANULOCYTES NFR BLD: 0 %
LYMPHOCYTES # BLD AUTO: 2.5 X10E3/UL (ref 0.7–3.1)
LYMPHOCYTES NFR BLD AUTO: 31 %
MAGNESIUM SERPL-MCNC: 2.2 MG/DL (ref 1.6–2.3)
MCH RBC QN AUTO: 31.3 PG (ref 26.6–33)
MCHC RBC AUTO-ENTMCNC: 34.9 G/DL (ref 31.5–35.7)
MCV RBC AUTO: 90 FL (ref 79–97)
MONOCYTES # BLD AUTO: 0.5 X10E3/UL (ref 0.1–0.9)
MONOCYTES NFR BLD AUTO: 6 %
NEUTROPHILS # BLD AUTO: 4.8 X10E3/UL (ref 1.4–7)
NEUTROPHILS NFR BLD AUTO: 60 %
PLATELET # BLD AUTO: 234 X10E3/UL (ref 150–379)
POTASSIUM SERPL-SCNC: 3.9 MMOL/L (ref 3.5–5.2)
PROT SERPL-MCNC: 6.7 G/DL (ref 6–8.5)
RBC # BLD AUTO: 3.9 X10E6/UL (ref 3.77–5.28)
SODIUM SERPL-SCNC: 138 MMOL/L (ref 134–144)
TSH SERPL DL<=0.005 MIU/L-ACNC: 1.06 UIU/ML (ref 0.45–4.5)
WBC # BLD AUTO: 8.1 X10E3/UL (ref 3.4–10.8)

## 2018-07-25 ENCOUNTER — LAB ONLY (OUTPATIENT)
Dept: FAMILY MEDICINE CLINIC | Age: 38
End: 2018-07-25

## 2018-07-25 DIAGNOSIS — I10 BENIGN ESSENTIAL HTN: ICD-10-CM

## 2018-07-25 DIAGNOSIS — E55.9 VITAMIN D DEFICIENCY: Primary | ICD-10-CM

## 2018-07-26 LAB
25(OH)D3+25(OH)D2 SERPL-MCNC: 22.7 NG/ML (ref 30–100)
CHOLEST SERPL-MCNC: 152 MG/DL (ref 100–199)
HDLC SERPL-MCNC: 58 MG/DL
INTERPRETATION, 910389: NORMAL
LDLC SERPL CALC-MCNC: ABNORMAL MG/DL (ref 0–99)
PDF IMAGE, 910387: NORMAL
TRIGL SERPL-MCNC: 437 MG/DL (ref 0–149)
VLDLC SERPL CALC-MCNC: ABNORMAL MG/DL (ref 5–40)

## 2018-08-01 NOTE — PROGRESS NOTES
RECOMMENDATIONS:  Your fasting cholesterol still shows elevated triglycerides. I would like for you to take a fish oil supplement daily to help lower your triglyceride level. Elevated triglycerides increases your risk of cardiovascular disease (stroke and heart attack), but also increases your risk of pancreatitis (inflammation of pancreas). Taking a fish oil supplement daily can lower the serum triglyceride concentration by as much as 50 percent or more. Plan to recheck in 6 weeks. Make sure you also make dietary adjustments (decrease alcohol, decrease sweets, decrease carbohydrates) and increase exercise (such as walking for 30 minutes daily 5 days per week). Vitamin D is a little low. You can take an over-the-counter Vitamin D supplement 1,000 units daily. I have enclosed information about Vitamin D deficiency for you to review.

## 2018-08-22 DIAGNOSIS — I10 BENIGN ESSENTIAL HTN: ICD-10-CM

## 2018-08-22 DIAGNOSIS — R00.2 HEART PALPITATIONS: ICD-10-CM

## 2018-08-23 RX ORDER — METOPROLOL SUCCINATE 25 MG/1
TABLET, EXTENDED RELEASE ORAL
Qty: 30 TAB | Refills: 0 | Status: SHIPPED | OUTPATIENT
Start: 2018-08-23 | End: 2018-08-23

## 2018-09-24 DIAGNOSIS — R00.2 HEART PALPITATIONS: ICD-10-CM

## 2018-09-24 DIAGNOSIS — I10 BENIGN ESSENTIAL HTN: ICD-10-CM

## 2018-09-24 RX ORDER — METOPROLOL SUCCINATE 25 MG/1
TABLET, EXTENDED RELEASE ORAL
Qty: 30 TAB | Refills: 0 | OUTPATIENT
Start: 2018-09-24

## 2018-09-25 RX ORDER — METOPROLOL SUCCINATE 25 MG/1
25 TABLET, EXTENDED RELEASE ORAL DAILY
Qty: 90 TAB | Refills: 2 | Status: SHIPPED | OUTPATIENT
Start: 2018-09-25 | End: 2019-05-23 | Stop reason: SDUPTHER

## 2018-11-01 ENCOUNTER — OFFICE VISIT (OUTPATIENT)
Dept: FAMILY MEDICINE CLINIC | Age: 38
End: 2018-11-01

## 2018-11-01 VITALS
HEART RATE: 88 BPM | DIASTOLIC BLOOD PRESSURE: 90 MMHG | RESPIRATION RATE: 18 BRPM | WEIGHT: 164.2 LBS | HEIGHT: 67 IN | BODY MASS INDEX: 25.77 KG/M2 | TEMPERATURE: 96.6 F | OXYGEN SATURATION: 98 % | SYSTOLIC BLOOD PRESSURE: 138 MMHG

## 2018-11-01 DIAGNOSIS — J45.21 MILD INTERMITTENT ASTHMATIC BRONCHITIS WITH ACUTE EXACERBATION: Primary | ICD-10-CM

## 2018-11-01 RX ORDER — ALBUTEROL SULFATE 90 UG/1
2 AEROSOL, METERED RESPIRATORY (INHALATION)
Qty: 1 INHALER | Refills: 11 | Status: SHIPPED | OUTPATIENT
Start: 2018-11-01 | End: 2020-02-28 | Stop reason: SDUPTHER

## 2018-11-01 RX ORDER — PREDNISONE 10 MG/1
TABLET ORAL
Qty: 21 TAB | Refills: 0 | Status: SHIPPED | OUTPATIENT
Start: 2018-11-01 | End: 2018-11-19

## 2018-11-01 RX ORDER — PROMETHAZINE HYDROCHLORIDE AND CODEINE PHOSPHATE 6.25; 1 MG/5ML; MG/5ML
5 SOLUTION ORAL
Qty: 120 ML | Refills: 0 | Status: SHIPPED | OUTPATIENT
Start: 2018-11-01 | End: 2018-11-19

## 2018-11-01 RX ORDER — LEVOFLOXACIN 500 MG/1
500 TABLET, FILM COATED ORAL DAILY
Qty: 10 TAB | Refills: 0 | Status: SHIPPED | OUTPATIENT
Start: 2018-11-01 | End: 2018-11-11

## 2018-11-01 NOTE — PROGRESS NOTES
HISTORY OF PRESENT ILLNESS Carlene Frazier is a 41 y.o. female. HPI  Patient comes in today for cough. This morning, she coughed up a little blood. States nighttime is worse due to wheezing and dyspnea. Ran out of the albuterol, using an  inhaler though. No fever, some chills, hot sweats. Denies N/V/D. Did have on episode of post tussive vomiting. Right ear ache. Hx of allergies. Not taking any OTC medications. No sick contacts. Allergies Allergen Reactions  Amoxicillin Hives  Lisinopril Angioedema Past Medical History:  
Diagnosis Date  Anemia  Asthma  Biliary colic   Bulimia  Hypertension Past Surgical History:  
Procedure Laterality Date  HX CHOLECYSTECTOMY    HX GYN  2010  
 vaginal delivery x1  
 HX WISDOM TEETH EXTRACTION Social History Socioeconomic History  Marital status: SINGLE Spouse name: Not on file  Number of children: Not on file  Years of education: Not on file  Highest education level: Not on file Social Needs  Financial resource strain: Not on file  Food insecurity - worry: Not on file  Food insecurity - inability: Not on file  Transportation needs - medical: Not on file  Transportation needs - non-medical: Not on file Occupational History  Not on file Tobacco Use  Smoking status: Never Smoker  Smokeless tobacco: Never Used Substance and Sexual Activity  Alcohol use: Yes Comment: socially  Drug use: No  
 Sexual activity: Yes  
  Partners: Male Birth control/protection: Condom Other Topics Concern  Not on file Social History Narrative  Not on file Family History Problem Relation Age of Onset  Hypertension Mother  Cancer Mother   
     colon  Hypertension Father  Cancer Paternal Uncle  Cancer Paternal Uncle  Cancer Maternal Uncle  Stroke Paternal Aunt  Heart Disease Maternal Grandmother Current Outpatient Medications Medication Sig  TRINESSA LO 0.18/0.215/0.25 mg-25 mcg tab  metoprolol succinate (TOPROL-XL) 25 mg XL tablet Take 1 Tab by mouth daily.  valACYclovir (VALTREX) 500 mg tablet Take 500 mg by mouth daily.  albuterol (PROVENTIL HFA, VENTOLIN HFA, PROAIR HFA) 90 mcg/actuation inhaler Take 2 Puffs by inhalation every four (4) hours as needed for Wheezing.  mometasone (NASONEX) 50 mcg/actuation nasal spray 2 Sprays by Both Nostrils route daily. (Patient not taking: Reported on 11/1/2018) No current facility-administered medications for this visit. Review of Systems Constitutional: Positive for chills, diaphoresis and malaise/fatigue. Negative for fever. HENT: Positive for congestion and sore throat. Negative for ear pain and tinnitus. Respiratory: Positive for cough, sputum production, shortness of breath and wheezing. Cardiovascular: Positive for chest pain (tightness). Negative for palpitations. Gastrointestinal: Negative for diarrhea, nausea and vomiting. Genitourinary: Negative for dysuria, frequency and urgency. Musculoskeletal: Positive for myalgias. Skin: Negative. Neurological: Negative for dizziness and headaches. Endo/Heme/Allergies: Positive for environmental allergies. Vitals:  
 11/01/18 0957 BP: 138/90 Pulse: 88 Resp: 18 Temp: 96.6 °F (35.9 °C) TempSrc: Oral  
SpO2: 98% Weight: 164 lb 3.2 oz (74.5 kg) Height: 5' 7\" (1.702 m) Physical Exam  
Constitutional: She is oriented to person, place, and time. Vital signs are normal. She appears well-developed and well-nourished. She is cooperative. HENT:  
Right Ear: Hearing, tympanic membrane, external ear and ear canal normal.  
Left Ear: Hearing, tympanic membrane, external ear and ear canal normal.  
Nose: Mucosal edema and rhinorrhea present. Right sinus exhibits no maxillary sinus tenderness and no frontal sinus tenderness.  Left sinus exhibits no maxillary sinus tenderness and no frontal sinus tenderness. Mouth/Throat: Uvula is midline, oropharynx is clear and moist and mucous membranes are normal. Mucous membranes are not pale and not dry. No oropharyngeal exudate, posterior oropharyngeal edema or posterior oropharyngeal erythema. Cardiovascular: Normal rate, regular rhythm, S1 normal, S2 normal and normal heart sounds. Pulmonary/Chest: Effort normal. She has no decreased breath sounds. She has wheezes (end exipratory wheezes). She has no rhonchi. She has no rales. Bronchospastic cough Lymphadenopathy:  
     Head (right side): No submental, no submandibular, no tonsillar, no preauricular and no posterior auricular adenopathy present. Head (left side): No submental, no submandibular, no tonsillar, no preauricular and no posterior auricular adenopathy present. She has no cervical adenopathy. Right: No supraclavicular adenopathy present. Left: No supraclavicular adenopathy present. Neurological: She is alert and oriented to person, place, and time. Skin: Skin is warm and dry. Vitals reviewed. ASSESSMENT and PLAN 
  ICD-10-CM ICD-9-CM 1. Mild intermittent asthmatic bronchitis with acute exacerbation J45.21 493.92 albuterol (PROVENTIL HFA, VENTOLIN HFA, PROAIR HFA) 90 mcg/actuation inhaler  
   levoFLOXacin (LEVAQUIN) 500 mg tablet  
   predniSONE (STERAPRED DS) 10 mg dose pack  
   promethazine-codeine (PHENERGAN WITH CODEINE) 6.25-10 mg/5 mL syrup Encounter Diagnoses Name Primary?  Mild intermittent asthmatic bronchitis with acute exacerbation Yes Orders Placed This Encounter  TRINESSA LO 0.18/0.215/0.25 mg-25 mcg tab  albuterol (PROVENTIL HFA, VENTOLIN HFA, PROAIR HFA) 90 mcg/actuation inhaler  levoFLOXacin (LEVAQUIN) 500 mg tablet  predniSONE (STERAPRED DS) 10 mg dose pack  promethazine-codeine (PHENERGAN WITH CODEINE) 6.25-10 mg/5 mL syrup Diagnoses and all orders for this visit: 
 
1. Mild intermittent asthmatic bronchitis with acute exacerbation - given Duoneb in office with good results. Will give abx, steroid taper, refill albuterol, cough suppressant. If no improvement in 3-4 days, will check CXR 
-     albuterol (PROVENTIL HFA, VENTOLIN HFA, PROAIR HFA) 90 mcg/actuation inhaler; Take 2 Puffs by inhalation every four (4) hours as needed for Wheezing. 
-     levoFLOXacin (LEVAQUIN) 500 mg tablet; Take 1 Tab by mouth daily for 10 days. -     predniSONE (STERAPRED DS) 10 mg dose pack; See administration instruction per 10mg dose pack -     promethazine-codeine (PHENERGAN WITH CODEINE) 6.25-10 mg/5 mL syrup; Take 5 mL by mouth four (4) times daily as needed for Cough. Max Daily Amount: 20 mL. Follow-up Disposition: 
Return if symptoms worsen or fail to improve. I have reviewed the patient's allergies and made any necessary changes. Medical, procedural, social and family histories have been reviewed and updated as medically indicated. I have reconciled and/or revised patient medications in the EMR. I have discussed each diagnosis listed in this note with Melanie Haas and/or their family. I have discussed treatment options and the risk/benefit analysis of those options, including safe use of medications and possible medication side effects. Through the use of shared decision making we have agreed to the above plan. The patient has received an after-visit summary and questions were answered concerning future plans. Vale Jeffery, HERMELINDA-C This note will not be viewable in [address]

## 2018-11-01 NOTE — PATIENT INSTRUCTIONS
Bronchitis: Care Instructions Your Care Instructions Bronchitis is inflammation of the bronchial tubes, which carry air to the lungs. The tubes swell and produce mucus, or phlegm. The mucus and inflamed bronchial tubes make you cough. You may have trouble breathing. Most cases of bronchitis are caused by viruses like those that cause colds. Antibiotics usually do not help and they may be harmful. Bronchitis usually develops rapidly and lasts about 2 to 3 weeks in otherwise healthy people. Follow-up care is a key part of your treatment and safety. Be sure to make and go to all appointments, and call your doctor if you are having problems. It's also a good idea to know your test results and keep a list of the medicines you take. How can you care for yourself at home? · Take all medicines exactly as prescribed. Call your doctor if you think you are having a problem with your medicine. · Get some extra rest. 
· Take an over-the-counter pain medicine, such as acetaminophen (Tylenol), ibuprofen (Advil, Motrin), or naproxen (Aleve) to reduce fever and relieve body aches. Read and follow all instructions on the label. · Do not take two or more pain medicines at the same time unless the doctor told you to. Many pain medicines have acetaminophen, which is Tylenol. Too much acetaminophen (Tylenol) can be harmful. · Take an over-the-counter cough medicine that contains dextromethorphan to help quiet a dry, hacking cough so that you can sleep. Avoid cough medicines that have more than one active ingredient. Read and follow all instructions on the label. · Breathe moist air from a humidifier, hot shower, or sink filled with hot water. The heat and moisture will thin mucus so you can cough it out. · Do not smoke. Smoking can make bronchitis worse. If you need help quitting, talk to your doctor about stop-smoking programs and medicines. These can increase your chances of quitting for good. When should you call for help? Call 911 anytime you think you may need emergency care. For example, call if: 
  · You have severe trouble breathing.  
 Call your doctor now or seek immediate medical care if: 
  · You have new or worse trouble breathing.  
  · You cough up dark brown or bloody mucus (sputum).  
  · You have a new or higher fever.  
  · You have a new rash.  
 Watch closely for changes in your health, and be sure to contact your doctor if: 
  · You cough more deeply or more often, especially if you notice more mucus or a change in the color of your mucus.  
  · You are not getting better as expected. Where can you learn more? Go to http://coleman-le.info/. Enter H333 in the search box to learn more about \"Bronchitis: Care Instructions. \" Current as of: December 6, 2017 Content Version: 11.8 © 9469-5856 Healthwise, [employer]. Care instructions adapted under license by [employer] (which disclaims liability or warranty for this information). If you have questions about a medical condition or this instruction, always ask your healthcare professional. [address] any warranty or liability for your use of this information.

## 2018-11-01 NOTE — PROGRESS NOTES
Chief Complaint Patient presents with  Cold Symptoms  
  x 1 & 1/2 week Pt states coughed up mucus with blood this morning and right ear started hurting last night. Pt states has used Mucinex DM. 1. Have you been to the ER, urgent care clinic since your last visit? Hospitalized since your last visit? No 
 
2. Have you seen or consulted any other health care providers outside of the [facility] since your last visit? Include any pap smears or colon screening. No 
 
Pap - 10/24/2018 Health Maintenance Due Topic Date Due  
 PAP AKA CERVICAL CYTOLOGY  03/01/2017  Influenza Age 5 to Adult  08/01/2018

## 2018-11-19 ENCOUNTER — HOSPITAL ENCOUNTER (OUTPATIENT)
Dept: PREADMISSION TESTING | Age: 38
Discharge: HOME | End: 2018-11-19
Payer: MEDICAID

## 2018-11-19 VITALS
WEIGHT: 164.9 LBS | DIASTOLIC BLOOD PRESSURE: 90 MMHG | BODY MASS INDEX: 25.88 KG/M2 | RESPIRATION RATE: 18 BRPM | OXYGEN SATURATION: 100 % | HEART RATE: 98 BPM | TEMPERATURE: 98.2 F | HEIGHT: 67 IN | SYSTOLIC BLOOD PRESSURE: 148 MMHG

## 2018-11-19 LAB
ABO + RH BLD: NORMAL
APPEARANCE UR: CLEAR
ATRIAL RATE: 88 BPM
BACTERIA URNS QL MICRO: ABNORMAL /HPF
BILIRUB UR QL: NEGATIVE
BLOOD GROUP ANTIBODIES SERPL: NORMAL
CALCULATED P AXIS, ECG09: 47 DEGREES
CALCULATED R AXIS, ECG10: 20 DEGREES
CALCULATED T AXIS, ECG11: 20 DEGREES
COLOR UR: ABNORMAL
DIAGNOSIS, 93000: NORMAL
EPITH CASTS URNS QL MICRO: ABNORMAL /LPF
ERYTHROCYTE [DISTWIDTH] IN BLOOD BY AUTOMATED COUNT: 15.7 % (ref 11.5–14.5)
GLUCOSE UR STRIP.AUTO-MCNC: NEGATIVE MG/DL
HCT VFR BLD AUTO: 34 % (ref 35–47)
HGB BLD-MCNC: 10.7 G/DL (ref 11.5–16)
HGB UR QL STRIP: ABNORMAL
KETONES UR QL STRIP.AUTO: NEGATIVE MG/DL
LEUKOCYTE ESTERASE UR QL STRIP.AUTO: NEGATIVE
MCH RBC QN AUTO: 28.2 PG (ref 26–34)
MCHC RBC AUTO-ENTMCNC: 31.5 G/DL (ref 30–36.5)
MCV RBC AUTO: 89.7 FL (ref 80–99)
NITRITE UR QL STRIP.AUTO: NEGATIVE
NRBC # BLD: 0 K/UL (ref 0–0.01)
NRBC BLD-RTO: 0 PER 100 WBC
P-R INTERVAL, ECG05: 154 MS
PH UR STRIP: 6 [PH] (ref 5–8)
PLATELET # BLD AUTO: 208 K/UL (ref 150–400)
PMV BLD AUTO: 10.6 FL (ref 8.9–12.9)
PROT UR STRIP-MCNC: NEGATIVE MG/DL
Q-T INTERVAL, ECG07: 394 MS
QRS DURATION, ECG06: 84 MS
QTC CALCULATION (BEZET), ECG08: 476 MS
RBC # BLD AUTO: 3.79 M/UL (ref 3.8–5.2)
RBC #/AREA URNS HPF: ABNORMAL /HPF (ref 0–5)
SP GR UR REFRACTOMETRY: 1.03 (ref 1–1.03)
SPECIMEN EXP DATE BLD: NORMAL
UROBILINOGEN UR QL STRIP.AUTO: 1 EU/DL (ref 0.2–1)
VENTRICULAR RATE, ECG03: 88 BPM
WBC # BLD AUTO: 7.6 K/UL (ref 3.6–11)
WBC URNS QL MICRO: ABNORMAL /HPF (ref 0–4)

## 2018-11-19 PROCEDURE — 93005 ELECTROCARDIOGRAM TRACING: CPT

## 2018-11-19 PROCEDURE — 87086 URINE CULTURE/COLONY COUNT: CPT

## 2018-11-19 PROCEDURE — 85027 COMPLETE CBC AUTOMATED: CPT

## 2018-11-19 PROCEDURE — 86901 BLOOD TYPING SEROLOGIC RH(D): CPT

## 2018-11-19 PROCEDURE — 81001 URINALYSIS AUTO W/SCOPE: CPT

## 2018-11-19 PROCEDURE — 36415 COLL VENOUS BLD VENIPUNCTURE: CPT

## 2018-11-19 RX ORDER — SODIUM CHLORIDE, SODIUM LACTATE, POTASSIUM CHLORIDE, CALCIUM CHLORIDE 600; 310; 30; 20 MG/100ML; MG/100ML; MG/100ML; MG/100ML
25 INJECTION, SOLUTION INTRAVENOUS CONTINUOUS
Status: CANCELLED | OUTPATIENT
Start: 2018-11-27

## 2018-11-19 RX ORDER — CLINDAMYCIN PHOSPHATE 900 MG/50ML
900 INJECTION INTRAVENOUS ONCE
Status: CANCELLED | OUTPATIENT
Start: 2018-11-27 | End: 2018-11-27

## 2018-11-19 RX ORDER — GABAPENTIN 300 MG/1
600 CAPSULE ORAL ONCE
Status: CANCELLED | OUTPATIENT
Start: 2018-11-27 | End: 2018-11-27

## 2018-11-19 RX ORDER — MELATONIN
1000 DAILY
COMMUNITY
End: 2019-01-16

## 2018-11-19 RX ORDER — ACETAMINOPHEN 500 MG
1000 TABLET ORAL ONCE
Status: CANCELLED | OUTPATIENT
Start: 2018-11-27 | End: 2018-11-27

## 2018-11-19 NOTE — PERIOP NOTES
[facility] Preoperative Instructions Surgery Date: Tuesday 11/27/18          Time of Arrival: 6:30 a.m. 
 
1. On the day of your surgery, please report to the Surgical Services Registration Desk and sign in at your designated time. The Surgery Center is located to the right of the Emergency Room. 2. You must have someone with you to drive you home. You should not drive a car for 24 hours following surgery. Please make arrangements for a friend or family member to stay with you for the first 24 hours after your surgery. 3. Do not have anything to eat or drink (including water, gum, mints, coffee, juice) after midnight. ?This may not apply to medications prescribed by your physician. ?(Please note below the special instructions with medications to take the morning of your procedure.) 4. We recommend you do not drink any alcoholic beverages for 24 hours before and after your surgery. 5. Contact your surgeons office for instructions on the following medications: non-steroidal anti-inflammatory drugs (i.e. Advil, Aleve), vitamins, and supplements. (Some surgeons will want you to stop these medications prior to surgery and others may allow you to take them) **If you are currently taking Plavix, Coumadin, Aspirin and/or other blood-thinning agents, contact your surgeon for instructions. ** Your surgeon will partner with the physician prescribing these medications to determine if it is safe to stop or if you need to continue taking. Please do not stop taking these medications without instructions from your surgeon 6. Wear comfortable clothes. Wear glasses instead of contacts. Do not bring any money or jewelry. Please bring picture ID, insurance card, and any prearranged co-payment or hospital payment. Do not wear make-up, particularly mascara the morning of your surgery. Do not wear nail polish, particularly if you are having foot /hand surgery.   Wear your hair loose or down, no ponytails, buns, inderjit pins or clips. All body piercings must be removed. Please shower with antibacterial soap for three consecutive days before and on the morning of surgery, but do not apply any lotions, powders or deodorants after the shower on the day of surgery. Please use a fresh towels after each shower. Please sleep in clean clothes and change bed linens the night before surgery. Please do not shave for 48 hours prior to surgery. Shaving of the face is acceptable. 7. You should understand that if you do not follow these instructions your surgery may be cancelled. If your physical condition changes (I.e. fever, cold or flu) please contact your surgeon as soon as possible. 8. It is important that you be on time. If a situation occurs where you may be late, please call [phone] (OR Holding Area). 9. If you have any questions and or problems, please call [phone] (Pre-admission Testing). 10. Your surgery time may be subject to change. You will receive a phone call the evening prior if your time changes. 11.  If having outpatient surgery, you must have someone to drive you here, stay with you during the duration of your stay, and to drive you home at time of discharge. 12.   In an effort to improve the efficiency, privacy, and safety for all of our Pre-op patients visitors are not allowed in the Holding area. Once you arrive and are registered your family/visitors will be asked to remain in the waiting room. The Pre-op staff will get you from the Surgical Waiting Area and will explain to you and your family/visitors that the Pre-op phase is beginning. The staff will answer any questions and provide instructions for tracking of the patient, by use of the existing tracking number and color-coded status board in the waiting room.   At this time the staff will also ask for your designated spokesperson information in the event that the physician or staff need to provide an update or obtain any pertinent information. The designated spokesperson will be notified if the physician needs to speak to family during the pre-operative phase. If at any time your family/visitors has questions or concerns they may approach the volunteer desk in the waiting area for assistance. Special Instructions: Use inhaler the morning of surgery and bring with you to the hospital. 
 
MEDICATIONS TO TAKE THE MORNING OF SURGERY WITH A SIP OF WATER: Albuterol inhaler. I understand a pre-operative phone call will be made to verify my surgery time. In the event that I am not available, I give permission for a message to be left on my answering service and/or with another person? Yes [phone] 
 
 
 ___________________      __________   _________ 
  (Signature of Patient)             (Witness)                (Date and Time)

## 2018-11-21 LAB
BACTERIA SPEC CULT: NORMAL
CC UR VC: NORMAL
SERVICE CMNT-IMP: NORMAL

## 2018-11-27 ENCOUNTER — ANESTHESIA (OUTPATIENT)
Dept: SURGERY | Age: 38
End: 2018-11-27
Payer: MEDICAID

## 2018-11-27 ENCOUNTER — HOSPITAL ENCOUNTER (OUTPATIENT)
Age: 38
Setting detail: OUTPATIENT SURGERY
Discharge: HOME | End: 2018-11-27
Attending: OBSTETRICS & GYNECOLOGY | Admitting: OBSTETRICS & GYNECOLOGY
Payer: MEDICAID

## 2018-11-27 ENCOUNTER — ANESTHESIA EVENT (OUTPATIENT)
Dept: SURGERY | Age: 38
End: 2018-11-27
Payer: MEDICAID

## 2018-11-27 VITALS
OXYGEN SATURATION: 95 % | TEMPERATURE: 98.5 F | SYSTOLIC BLOOD PRESSURE: 126 MMHG | BODY MASS INDEX: 25.72 KG/M2 | WEIGHT: 164.24 LBS | DIASTOLIC BLOOD PRESSURE: 79 MMHG | HEART RATE: 87 BPM | RESPIRATION RATE: 16 BRPM

## 2018-11-27 DIAGNOSIS — N83.201 CYST OF RIGHT OVARY: Primary | ICD-10-CM

## 2018-11-27 LAB — HCG UR QL: NEGATIVE

## 2018-11-27 PROCEDURE — 77030011640 HC PAD GRND REM COVD -A: Performed by: OBSTETRICS & GYNECOLOGY

## 2018-11-27 PROCEDURE — 77030019908 HC STETH ESOPH SIMS -A: Performed by: NURSE ANESTHETIST, CERTIFIED REGISTERED

## 2018-11-27 PROCEDURE — 74011000250 HC RX REV CODE- 250

## 2018-11-27 PROCEDURE — 74011250636 HC RX REV CODE- 250/636: Performed by: OBSTETRICS & GYNECOLOGY

## 2018-11-27 PROCEDURE — 77030034849: Performed by: OBSTETRICS & GYNECOLOGY

## 2018-11-27 PROCEDURE — 81025 URINE PREGNANCY TEST: CPT

## 2018-11-27 PROCEDURE — 77030003581 HC NDL INSUF VERES COVD -B: Performed by: OBSTETRICS & GYNECOLOGY

## 2018-11-27 PROCEDURE — 77030008756 HC TU IRR SUC STRY -B: Performed by: OBSTETRICS & GYNECOLOGY

## 2018-11-27 PROCEDURE — 76210000000 HC OR PH I REC 2 TO 2.5 HR: Performed by: OBSTETRICS & GYNECOLOGY

## 2018-11-27 PROCEDURE — 74011000258 HC RX REV CODE- 258: Performed by: OBSTETRICS & GYNECOLOGY

## 2018-11-27 PROCEDURE — 77030031492 HC PRT ACC BLNT AIRSEAL CNMD -B: Performed by: OBSTETRICS & GYNECOLOGY

## 2018-11-27 PROCEDURE — 77030027744 HC PWDR HEMSTAT ARISTA ABSRB 5GM BARD -D: Performed by: OBSTETRICS & GYNECOLOGY

## 2018-11-27 PROCEDURE — 76060000034 HC ANESTHESIA 1.5 TO 2 HR: Performed by: OBSTETRICS & GYNECOLOGY

## 2018-11-27 PROCEDURE — 77030032490 HC SLV COMPR SCD KNE COVD -B: Performed by: OBSTETRICS & GYNECOLOGY

## 2018-11-27 PROCEDURE — 77030009852 HC PCH RTVR ENDOSC COVD -B: Performed by: OBSTETRICS & GYNECOLOGY

## 2018-11-27 PROCEDURE — 76210000021 HC REC RM PH II 0.5 TO 1 HR: Performed by: OBSTETRICS & GYNECOLOGY

## 2018-11-27 PROCEDURE — 77030035029 HC NDL INSUF VERES DISP COVD -B: Performed by: OBSTETRICS & GYNECOLOGY

## 2018-11-27 PROCEDURE — 77030035277 HC OBTRTR BLDELSS DISP INTU -B: Performed by: OBSTETRICS & GYNECOLOGY

## 2018-11-27 PROCEDURE — 74011250636 HC RX REV CODE- 250/636: Performed by: ANESTHESIOLOGY

## 2018-11-27 PROCEDURE — 77030013079 HC BLNKT BAIR HGGR 3M -A: Performed by: NURSE ANESTHETIST, CERTIFIED REGISTERED

## 2018-11-27 PROCEDURE — 77030031139 HC SUT VCRL2 J&J -A: Performed by: OBSTETRICS & GYNECOLOGY

## 2018-11-27 PROCEDURE — 77030018836 HC SOL IRR NACL ICUM -A: Performed by: OBSTETRICS & GYNECOLOGY

## 2018-11-27 PROCEDURE — 74011250636 HC RX REV CODE- 250/636

## 2018-11-27 PROCEDURE — C1765 ADHESION BARRIER: HCPCS | Performed by: OBSTETRICS & GYNECOLOGY

## 2018-11-27 PROCEDURE — 74011000250 HC RX REV CODE- 250: Performed by: OBSTETRICS & GYNECOLOGY

## 2018-11-27 PROCEDURE — 77030026243 HC MANIP UTER VCAR LSIS -B: Performed by: OBSTETRICS & GYNECOLOGY

## 2018-11-27 PROCEDURE — 77030008684 HC TU ET CUF COVD -B: Performed by: NURSE ANESTHETIST, CERTIFIED REGISTERED

## 2018-11-27 PROCEDURE — 88307 TISSUE EXAM BY PATHOLOGIST: CPT

## 2018-11-27 PROCEDURE — 77030002933 HC SUT MCRYL J&J -A: Performed by: OBSTETRICS & GYNECOLOGY

## 2018-11-27 PROCEDURE — 76010000875 HC OR TIME 1.5 TO 2HR INTENSV - TIER 2: Performed by: OBSTETRICS & GYNECOLOGY

## 2018-11-27 PROCEDURE — 77030008771 HC TU NG SALEM SUMP -A: Performed by: NURSE ANESTHETIST, CERTIFIED REGISTERED

## 2018-11-27 PROCEDURE — 77030027743 HC APPL F/HEMSTAT BARD -B: Performed by: OBSTETRICS & GYNECOLOGY

## 2018-11-27 PROCEDURE — 74011250637 HC RX REV CODE- 250/637: Performed by: OBSTETRICS & GYNECOLOGY

## 2018-11-27 PROCEDURE — 77030029357 HC DEV CLSR FAC SYS EFX TELE -C: Performed by: OBSTETRICS & GYNECOLOGY

## 2018-11-27 PROCEDURE — 77030026438 HC STYL ET INTUB CARD -A: Performed by: NURSE ANESTHETIST, CERTIFIED REGISTERED

## 2018-11-27 PROCEDURE — 77030016151 HC PROTCTR LNS DFOG COVD -B: Performed by: OBSTETRICS & GYNECOLOGY

## 2018-11-27 PROCEDURE — 77030020703 HC SEAL CANN DISP INTU -B: Performed by: OBSTETRICS & GYNECOLOGY

## 2018-11-27 RX ORDER — CLINDAMYCIN PHOSPHATE 900 MG/50ML
INJECTION INTRAVENOUS AS NEEDED
Status: DISCONTINUED | OUTPATIENT
Start: 2018-11-27 | End: 2018-11-27 | Stop reason: HOSPADM

## 2018-11-27 RX ORDER — MIDAZOLAM HYDROCHLORIDE 1 MG/ML
INJECTION, SOLUTION INTRAMUSCULAR; INTRAVENOUS AS NEEDED
Status: DISCONTINUED | OUTPATIENT
Start: 2018-11-27 | End: 2018-11-27 | Stop reason: HOSPADM

## 2018-11-27 RX ORDER — SODIUM CHLORIDE, SODIUM LACTATE, POTASSIUM CHLORIDE, CALCIUM CHLORIDE 600; 310; 30; 20 MG/100ML; MG/100ML; MG/100ML; MG/100ML
25 INJECTION, SOLUTION INTRAVENOUS CONTINUOUS
Status: DISCONTINUED | OUTPATIENT
Start: 2018-11-27 | End: 2018-11-27 | Stop reason: HOSPADM

## 2018-11-27 RX ORDER — SUCCINYLCHOLINE CHLORIDE 20 MG/ML
INJECTION INTRAMUSCULAR; INTRAVENOUS AS NEEDED
Status: DISCONTINUED | OUTPATIENT
Start: 2018-11-27 | End: 2018-11-27 | Stop reason: HOSPADM

## 2018-11-27 RX ORDER — MORPHINE SULFATE 10 MG/ML
2 INJECTION, SOLUTION INTRAMUSCULAR; INTRAVENOUS
Status: DISCONTINUED | OUTPATIENT
Start: 2018-11-27 | End: 2018-11-27 | Stop reason: HOSPADM

## 2018-11-27 RX ORDER — FENTANYL CITRATE 50 UG/ML
INJECTION, SOLUTION INTRAMUSCULAR; INTRAVENOUS AS NEEDED
Status: DISCONTINUED | OUTPATIENT
Start: 2018-11-27 | End: 2018-11-27 | Stop reason: HOSPADM

## 2018-11-27 RX ORDER — BUPIVACAINE HYDROCHLORIDE AND EPINEPHRINE 5; 5 MG/ML; UG/ML
INJECTION, SOLUTION EPIDURAL; INTRACAUDAL; PERINEURAL AS NEEDED
Status: DISCONTINUED | OUTPATIENT
Start: 2018-11-27 | End: 2018-11-27 | Stop reason: HOSPADM

## 2018-11-27 RX ORDER — DEXAMETHASONE SODIUM PHOSPHATE 4 MG/ML
INJECTION, SOLUTION INTRA-ARTICULAR; INTRALESIONAL; INTRAMUSCULAR; INTRAVENOUS; SOFT TISSUE AS NEEDED
Status: DISCONTINUED | OUTPATIENT
Start: 2018-11-27 | End: 2018-11-27 | Stop reason: HOSPADM

## 2018-11-27 RX ORDER — ONDANSETRON 2 MG/ML
INJECTION INTRAMUSCULAR; INTRAVENOUS AS NEEDED
Status: DISCONTINUED | OUTPATIENT
Start: 2018-11-27 | End: 2018-11-27 | Stop reason: HOSPADM

## 2018-11-27 RX ORDER — HYDROMORPHONE HYDROCHLORIDE 1 MG/ML
.2-.5 INJECTION, SOLUTION INTRAMUSCULAR; INTRAVENOUS; SUBCUTANEOUS
Status: DISCONTINUED | OUTPATIENT
Start: 2018-11-27 | End: 2018-11-27 | Stop reason: HOSPADM

## 2018-11-27 RX ORDER — PROPOFOL 10 MG/ML
INJECTION, EMULSION INTRAVENOUS AS NEEDED
Status: DISCONTINUED | OUTPATIENT
Start: 2018-11-27 | End: 2018-11-27 | Stop reason: HOSPADM

## 2018-11-27 RX ORDER — KETOROLAC TROMETHAMINE 30 MG/ML
INJECTION, SOLUTION INTRAMUSCULAR; INTRAVENOUS AS NEEDED
Status: DISCONTINUED | OUTPATIENT
Start: 2018-11-27 | End: 2018-11-27 | Stop reason: HOSPADM

## 2018-11-27 RX ORDER — IBUPROFEN 200 MG
800 TABLET ORAL EVERY 6 HOURS
Qty: 60 TAB | Refills: 2 | Status: SHIPPED | OUTPATIENT
Start: 2018-11-27 | End: 2019-01-16

## 2018-11-27 RX ORDER — ACETAMINOPHEN 10 MG/ML
INJECTION, SOLUTION INTRAVENOUS AS NEEDED
Status: DISCONTINUED | OUTPATIENT
Start: 2018-11-27 | End: 2018-11-27 | Stop reason: HOSPADM

## 2018-11-27 RX ORDER — FENTANYL CITRATE 50 UG/ML
25 INJECTION, SOLUTION INTRAMUSCULAR; INTRAVENOUS
Status: DISCONTINUED | OUTPATIENT
Start: 2018-11-27 | End: 2018-11-27 | Stop reason: HOSPADM

## 2018-11-27 RX ORDER — SODIUM CHLORIDE 0.9 % (FLUSH) 0.9 %
5-10 SYRINGE (ML) INJECTION AS NEEDED
Status: DISCONTINUED | OUTPATIENT
Start: 2018-11-27 | End: 2018-11-27 | Stop reason: HOSPADM

## 2018-11-27 RX ORDER — SENNOSIDES 8.6 MG/1
2 TABLET ORAL DAILY
Qty: 30 TAB | Refills: 2 | Status: SHIPPED | OUTPATIENT
Start: 2018-11-27 | End: 2019-01-16

## 2018-11-27 RX ORDER — SODIUM CHLORIDE 0.9 % (FLUSH) 0.9 %
5-10 SYRINGE (ML) INJECTION EVERY 8 HOURS
Status: DISCONTINUED | OUTPATIENT
Start: 2018-11-27 | End: 2018-11-27 | Stop reason: HOSPADM

## 2018-11-27 RX ORDER — OXYCODONE AND ACETAMINOPHEN 5; 325 MG/1; MG/1
1 TABLET ORAL
Qty: 15 TAB | Refills: 0 | Status: SHIPPED | OUTPATIENT
Start: 2018-11-27 | End: 2019-01-16

## 2018-11-27 RX ORDER — OXYCODONE AND ACETAMINOPHEN 5; 325 MG/1; MG/1
1 TABLET ORAL ONCE
Status: COMPLETED | OUTPATIENT
Start: 2018-11-27 | End: 2018-11-27

## 2018-11-27 RX ORDER — SODIUM CHLORIDE, SODIUM LACTATE, POTASSIUM CHLORIDE, CALCIUM CHLORIDE 600; 310; 30; 20 MG/100ML; MG/100ML; MG/100ML; MG/100ML
INJECTION, SOLUTION INTRAVENOUS
Status: DISCONTINUED | OUTPATIENT
Start: 2018-11-27 | End: 2018-11-27 | Stop reason: HOSPADM

## 2018-11-27 RX ORDER — HYDROMORPHONE HYDROCHLORIDE 2 MG/ML
INJECTION, SOLUTION INTRAMUSCULAR; INTRAVENOUS; SUBCUTANEOUS AS NEEDED
Status: DISCONTINUED | OUTPATIENT
Start: 2018-11-27 | End: 2018-11-27 | Stop reason: HOSPADM

## 2018-11-27 RX ORDER — DIPHENHYDRAMINE HYDROCHLORIDE 50 MG/ML
12.5 INJECTION, SOLUTION INTRAMUSCULAR; INTRAVENOUS AS NEEDED
Status: DISCONTINUED | OUTPATIENT
Start: 2018-11-27 | End: 2018-11-27 | Stop reason: HOSPADM

## 2018-11-27 RX ORDER — ONDANSETRON 2 MG/ML
4 INJECTION INTRAMUSCULAR; INTRAVENOUS AS NEEDED
Status: DISCONTINUED | OUTPATIENT
Start: 2018-11-27 | End: 2018-11-27 | Stop reason: HOSPADM

## 2018-11-27 RX ORDER — LIDOCAINE HYDROCHLORIDE 20 MG/ML
INJECTION, SOLUTION EPIDURAL; INFILTRATION; INTRACAUDAL; PERINEURAL AS NEEDED
Status: DISCONTINUED | OUTPATIENT
Start: 2018-11-27 | End: 2018-11-27 | Stop reason: HOSPADM

## 2018-11-27 RX ORDER — ROCURONIUM BROMIDE 10 MG/ML
INJECTION, SOLUTION INTRAVENOUS AS NEEDED
Status: DISCONTINUED | OUTPATIENT
Start: 2018-11-27 | End: 2018-11-27 | Stop reason: HOSPADM

## 2018-11-27 RX ORDER — OXYCODONE AND ACETAMINOPHEN 5; 325 MG/1; MG/1
1 TABLET ORAL
Status: DISCONTINUED | OUTPATIENT
Start: 2018-11-27 | End: 2018-11-27 | Stop reason: HOSPADM

## 2018-11-27 RX ADMIN — HYDROMORPHONE HYDROCHLORIDE 0.4 MG: 2 INJECTION, SOLUTION INTRAMUSCULAR; INTRAVENOUS; SUBCUTANEOUS at 09:13

## 2018-11-27 RX ADMIN — CLINDAMYCIN PHOSPHATE 900 MG: 900 INJECTION INTRAVENOUS at 08:55

## 2018-11-27 RX ADMIN — LIDOCAINE HYDROCHLORIDE 80 MG: 20 INJECTION, SOLUTION EPIDURAL; INFILTRATION; INTRACAUDAL; PERINEURAL at 08:28

## 2018-11-27 RX ADMIN — ROCURONIUM BROMIDE 45 MG: 10 INJECTION, SOLUTION INTRAVENOUS at 08:39

## 2018-11-27 RX ADMIN — DEXAMETHASONE SODIUM PHOSPHATE 10 MG: 4 INJECTION, SOLUTION INTRA-ARTICULAR; INTRALESIONAL; INTRAMUSCULAR; INTRAVENOUS; SOFT TISSUE at 09:01

## 2018-11-27 RX ADMIN — FENTANYL CITRATE 50 MCG: 50 INJECTION, SOLUTION INTRAMUSCULAR; INTRAVENOUS at 08:56

## 2018-11-27 RX ADMIN — PROPOFOL 200 MG: 10 INJECTION, EMULSION INTRAVENOUS at 08:28

## 2018-11-27 RX ADMIN — FENTANYL CITRATE 25 MCG: 50 INJECTION, SOLUTION INTRAMUSCULAR; INTRAVENOUS at 10:45

## 2018-11-27 RX ADMIN — ACETAMINOPHEN 1000 MG: 10 INJECTION, SOLUTION INTRAVENOUS at 09:15

## 2018-11-27 RX ADMIN — ONDANSETRON 4 MG: 2 INJECTION INTRAMUSCULAR; INTRAVENOUS at 09:40

## 2018-11-27 RX ADMIN — FENTANYL CITRATE 25 MCG: 50 INJECTION, SOLUTION INTRAMUSCULAR; INTRAVENOUS at 10:29

## 2018-11-27 RX ADMIN — SUCCINYLCHOLINE CHLORIDE 140 MG: 20 INJECTION INTRAMUSCULAR; INTRAVENOUS at 08:28

## 2018-11-27 RX ADMIN — MIDAZOLAM HYDROCHLORIDE 2 MG: 1 INJECTION, SOLUTION INTRAMUSCULAR; INTRAVENOUS at 08:19

## 2018-11-27 RX ADMIN — SODIUM CHLORIDE, SODIUM LACTATE, POTASSIUM CHLORIDE, CALCIUM CHLORIDE: 600; 310; 30; 20 INJECTION, SOLUTION INTRAVENOUS at 08:05

## 2018-11-27 RX ADMIN — KETOROLAC TROMETHAMINE 30 MG: 30 INJECTION, SOLUTION INTRAMUSCULAR; INTRAVENOUS at 09:40

## 2018-11-27 RX ADMIN — OXYCODONE HYDROCHLORIDE AND ACETAMINOPHEN 1 TABLET: 5; 325 TABLET ORAL at 11:50

## 2018-11-27 RX ADMIN — FENTANYL CITRATE 50 MCG: 50 INJECTION, SOLUTION INTRAMUSCULAR; INTRAVENOUS at 08:28

## 2018-11-27 RX ADMIN — GENTAMICIN SULFATE 330 MG: 40 INJECTION, SOLUTION INTRAMUSCULAR; INTRAVENOUS at 08:45

## 2018-11-27 RX ADMIN — FENTANYL CITRATE 25 MCG: 50 INJECTION, SOLUTION INTRAMUSCULAR; INTRAVENOUS at 10:41

## 2018-11-27 RX ADMIN — ROCURONIUM BROMIDE 5 MG: 10 INJECTION, SOLUTION INTRAVENOUS at 08:28

## 2018-11-27 NOTE — PERIOP NOTES
For dc home. Reports pain as 4/10 which is tolerable per pt. Denies nausea. dsgs to abd d&i. Went over [employer] instructions w/pt and mother including Rx and f/up. Verbalized understanding. dc'd home.

## 2018-11-27 NOTE — PERIOP NOTES
Handoff Report from Operating Room to PACU Report received from Hailee Howell RN and Gale Louis CRNA regarding Royer Montelongo. Surgeon(s): 
Stephanie Murphy MD  And Procedure(s) (LRB): 
ROBOTIC RIGHT OVARIAN CYSTECTOMY (Right)  confirmed  
with allergies and dressings discussed. Anesthesia type, drugs, patient history, complications, estimated blood loss, vital signs, intake and output, and last pain medication, lines, reversal medications and temperature were reviewed.

## 2018-11-27 NOTE — ANESTHESIA POSTPROCEDURE EVALUATION
Procedure(s): 
ROBOTIC RIGHT OVARIAN CYSTECTOMY. Anesthesia Post Evaluation Patient location during evaluation: PACU Note status: Adequate. Level of consciousness: responsive to verbal stimuli and sleepy but conscious Pain management: satisfactory to patient Airway patency: patent Anesthetic complications: no 
Cardiovascular status: acceptable Respiratory status: acceptable Hydration status: acceptable Comments: +Post-Anesthesia Evaluation and Assessment Patient: Reema Peralta MRN: [de-identified]  SSN: [id number] YOB: 1980  Age: 41 y.o. Sex: female Cardiovascular Function/Vital Signs /83 (BP 1 Location: Right arm, BP Patient Position: At rest)   Pulse 89   Temp 36.9 °C (98.4 °F)   Resp 15   Wt 74.5 kg (164 lb 3.9 oz)   SpO2 93%   BMI 25.72 kg/m² Patient is status post Procedure(s): 
ROBOTIC RIGHT OVARIAN CYSTECTOMY. Nausea/Vomiting: Controlled. Postoperative hydration reviewed and adequate. Pain: 
Pain Scale 1: Numeric (0 - 10) (11/27/18 1100) Pain Intensity 1: 3 (11/27/18 1100) Managed. Neurological Status:  
Neuro (WDL): Exceptions to WDL (11/27/18 1000) At baseline. Mental Status and Level of Consciousness: Arousable. Pulmonary Status:  
O2 Device: Room air (11/27/18 1100) Adequate oxygenation and airway patent. Complications related to anesthesia: None Post-anesthesia assessment completed. No concerns. Signed By: Debby Salguero MD  
 11/27/2018 Post anesthesia nausea and vomiting:  controlled Visit Vitals /83 (BP 1 Location: Right arm, BP Patient Position: At rest) Pulse 89 Temp 36.9 °C (98.4 °F) Resp 15 Wt 74.5 kg (164 lb 3.9 oz) SpO2 93% BMI 25.72 kg/m²

## 2018-11-27 NOTE — BRIEF OP NOTE
BRIEF OPERATIVE NOTE Date of Procedure: 11/27/2018 Preoperative Diagnosis: RIGHT OVARIAN CYST Postoperative Diagnosis: RIGHT OVARIAN CYST Procedure(s): 
ROBOTIC RIGHT OVARIAN CYSTECTOMY Surgeon(s) and Role: * Van Reynolds MD - Primary Surgical Assistant: none Surgical Staff: 
Circ-1: Yrn Marmolejo Scrub Tech-1: Foirella Lute Surg Asst-1: Merari Nuñez Float Staff: Romilda Denver Event Time In Time Out Incision Start 5667 Incision Close 8915 Anesthesia: General  
Estimated Blood Loss: 10 mL Specimens:  
ID Type Source Tests Collected by Time Destination 1 : RIGHT OVARIAN CYST Preservative Ovary  Van Reynolds MD 11/27/2018 4140 Pathology Findings: 4cm and 2cm simple cysts in right ovary. Otherwise normal gyn anatomy. No pelvic adhesions Complications: none Implants: * No implants in log *

## 2018-11-27 NOTE — OP NOTES
[address] REPORT    Nasim Evans  MR#: [de-identified]  : 1980  ACCOUNT #: [de-identified]   DATE OF SERVICE: 2018    PREOPERATIVE DIAGNOSIS:  Persistent 6 cm right ovarian cystic mass. POSTOPERATIVE DIAGNOSIS:  Persistent 6 cm right ovarian cystic mass. PROCEDURE PERFORMED:  Robotic-assisted right ovarian cystectomy. SURGEON:  Ancelmo Buchanan MD    ASSISTANT:  None. ANESTHESIA:  General endotracheal.    ESTIMATED BLOOD LOSS:  10 mL. COMPLICATIONS:  none    IV FLUIDS:  See anesthesia record. URINE OUTPUT:  See anesthesia record. SPECIMENS REMOVED:  Right ovarian cyst    IMPLANTS:  none    INDICATIONS:  The patient is a 64-year-old who desires future fertility who has a persistent 6 cm right ovarian cystic mass despite being on oral contraceptives and despite conservative observation. FINDINGS:  Exam under anesthesia revealed normal vulva, vagina and cervix. Intraoperative findings revealed a normal-appearing uterus with some small subserosal fibroids. Left ovary and fallopian tube appeared normal.  The right ovary had a 4 cm and 2 cm separate cystic masses which were easily removed and have simple appearance. The right fallopian tube appeared normal.  There was no adhesive disease in the pelvis. SPECIMENS REMOVED:  Right ovarian cyst.    COMPLICATIONS:  None. IMPLANTS:  None. CONDITION:  Stable to PACU. PROCEDURE:  The patient was taken to the operating room where she was placed under general anesthesia without difficulty. She was then prepared and draped in the normal sterile fashion in the dorsal lithotomy position. A Valerio catheter was placed in the bladder. A bivalve speculum was placed in the vagina. The anterior lip of the cervix was grasped with a single tooth tenaculum. The cervix was dilated and the diagnostic VCare uterine manipulator was inserted. Attention was then turned to the abdomen.   After injection of local anesthesia, an 8 mm supraumbilical skin incision was made in the midline. The Veress needle was introduced. Proper placement was confirmed. The abdomen was insufflated to a pressure of 15 mmHg. The Veress needle was removed and an 8 mm robotic trocar was inserted. Proper placement was confirmed. After injection of local anesthesia, 8 mm skin incisions were made in the left upper quadrant and right lateral abdomen and a 12 mm skin incision was made in the right upper quadrant for the 12 AirSeal assistant trocar. All these trocars were placed under direct visualization. The patient was placed in Trendelenburg. The bowel was retracted out of the pelvis. The [employer] robot was docked and the instruments were introduced. The right ovary was elevated. The ovarian stroma was scored and incised and the 4 cm and 2 cm simple cysts were easily extracted from the surrounding ovarian stroma, placed in EndoCatch bag and removed through the assistant trocar. The base of the ovarian stroma was coagulated with good hemostasis. Karsten was placed at the bed of the ovarian stroma and Interceed was placed around the right adnexa. Adequate hemostasis was confirmed. The instruments were removed. The [employer] robot was undocked. Using the Weck fascial closure device, a right upper quadrant fascial incision was closed with an interrupted suture of 0 Vicryl under direct laparoscopic visualization. Insufflation was then removed from the abdomen. All trocars were removed from the abdomen. All skin incisions were closed with 4-0 Monocryl and a subcuticular stitch. Sponge, lap and needle counts were correct x2. The patient tolerated the procedure well and was taken to the PACU in stable condition. Shoulder Tap.  Carlos Sen MD       [address] [place] [address] / [place]  D: 11/27/2018 09:57     T: 11/27/2018 12:18  JOB #: [id number]  CC: Davis Hicks MD  CC: Brandon Austin NP

## 2018-11-27 NOTE — ANESTHESIA PREPROCEDURE EVALUATION
Anesthetic History No history of anesthetic complications Review of Systems / Medical History Patient summary reviewed, nursing notes reviewed and pertinent labs reviewed Pulmonary Asthma Neuro/Psych Within defined limits Cardiovascular Hypertension Exercise tolerance: >4 METS 
  
GI/Hepatic/Renal 
Within defined limits Endo/Other Within defined limits Other Findings Comments: Bulimia (F50.2) Physical Exam 
 
Airway Mallampati: I 
TM Distance: 4 - 6 cm Neck ROM: normal range of motion Mouth opening: Normal 
 
 Cardiovascular Regular rate and rhythm,  S1 and S2 normal,  no murmur, click, rub, or gallop Dental 
No notable dental hx Pulmonary Breath sounds clear to auscultation Abdominal 
GI exam deferred Other Findings Anesthetic Plan ASA: 2 Anesthesia type: general 
 
 
 
 
 
Anesthetic plan and risks discussed with: Patient Neg preg

## 2018-11-27 NOTE — H&P
Paper H&P updated, signed, and placed in chart. Briefly, 40 yo with persistent 6cm right ovarian cystic mass, desiring future fertility, admitted for scheduled robotic right ovarian cystectomy.   
 
Chata Mcduffie MD

## 2018-11-27 NOTE — DISCHARGE INSTRUCTIONS
No lifting >10 lbs x 6 weeks  Nothing in the vagina x 6 weeks  No driving for 8-23 days or until off narcotic pain meds and can hit the brakes in an emergency  Maryanne Anchors to shower tomorrow, no baths until incisions have healed  Take off band-aids tomorrow, keep steri-strips on 7-10 days (until they start to peel off)  Take motrin 800 mg every 6 hrs and tylenol 650 mg every 6 hrs (both over the counter). Take percocet additionally if needed for pain. To treat or prevent constipation take (over the counter) senna 2 tabs each night, and additionally milk of magnesia if needed. FOLLOW UP VISIT Appointment in: One Week      DISCHARGE SUMMARY from Nurse    PATIENT INSTRUCTIONS:    After general anesthesia or intravenous sedation, for 24 hours or while taking prescription Narcotics:  · Limit your activities  · Do not drive and operate hazardous machinery  · Do not make important personal or business decisions  · Do  not drink alcoholic beverages  · If you have not urinated within 8 hours after discharge, please contact your surgeon on call. Report the following to your surgeon:  · Excessive pain, swelling, redness or odor of or around the surgical area  · Temperature over 100.5  · Nausea and vomiting lasting longer than 4 hours or if unable to take medications  · Any signs of decreased circulation or nerve impairment to extremity: change in color, persistent  numbness, tingling, coldness or increase pain  · Any questions    What to do at Home:    *  Please give a list of your current medications to your Primary Care Provider. *  Please update this list whenever your medications are discontinued, doses are      changed, or new medications (including over-the-counter products) are added. *  Please carry medication information at all times in case of emergency situations.     These are general instructions for a healthy lifestyle:    No smoking/ No tobacco products/ Avoid exposure to second hand smoke  Surgeon Veronica Calvert Warning:  Quitting smoking now greatly reduces serious risk to your health. Obesity, smoking, and sedentary lifestyle greatly increases your risk for illness    A healthy diet, regular physical exercise & weight monitoring are important for maintaining a healthy lifestyle    You may be retaining fluid if you have a history of heart failure or if you experience any of the following symptoms:  Weight gain of 3 pounds or more overnight or 5 pounds in a week, increased swelling in our hands or feet or shortness of breath while lying flat in bed. Please call your doctor as soon as you notice any of these symptoms; do not wait until your next office visit. Recognize signs and symptoms of STROKE:    F-face looks uneven    A-arms unable to move or move unevenly    S-speech slurred or non-existent    T-time-call 911 as soon as signs and symptoms begin-DO NOT go       Back to bed or wait to see if you get better-TIME IS BRAIN. Warning Signs of HEART ATTACK     Call 911 if you have these symptoms:   Chest discomfort. Most heart attacks involve discomfort in the center of the chest that lasts more than a few minutes, or that goes away and comes back. It can feel like uncomfortable pressure, squeezing, fullness, or pain.  Discomfort in other areas of the upper body. Symptoms can include pain or discomfort in one or both arms, the back, neck, jaw, or stomach.  Shortness of breath with or without chest discomfort.  Other signs may include breaking out in a cold sweat, nausea, or lightheadedness. Don't wait more than five minutes to call 911 - MINUTES MATTER! Fast action can save your life. Calling 911 is almost always the fastest way to get lifesaving treatment. Emergency Medical Services staff can begin treatment when they arrive -- up to an hour sooner than if someone gets to the hospital by car. The discharge information has been reviewed with the patient and caregiver.   The patient and caregiver verbalized understanding. Discharge medications reviewed with the patient and caregiver and appropriate educational materials and side effects teaching were provided. ___________________________________________________________________________________________________________________________________    A common side effect of anesthesia following surgery is nausea and/or vomiting. In order to decrease symptoms, it is wise to avoid foods that are high in fat, greasy foods, milk products, and spicy foods for the first 24 hours. Acceptable foods for the first 24 hours following surgery include but are not limited to:     soup   broth    toast    crackers    applesauce    bananas    mashed potatoes,   soft or scrambled eggs   oatmeal    jello    It is important to eat when taking your pain medication. This will help to prevent nausea. If possible, please try to time your meals with your medications. It is very important to stay hydrated following surgery. Sip fluids frequently while awake. Avoid acidic drinks such as citrus juices and soda for 24 hours. Carbonated beverages may cause bloating and gas. Acceptable fluids include:    - water (flavor packets may add variety)  - coffee or tea (in moderation)  - Gatorade  - Stas-aid  - apple juice  - cranberry juice    You are encouraged to cough and deep breathe every hour when awake. This will help to prevent respiratory complications following anesthesia. You may want to hug a pillow when coughing and sneezing to add additional support to the surgical area and to decrease discomfort if you had abdominal or chest surgery. If you are discharged home with support stockings, you may remove them after 24 hours. Support stockings are used to help prevent blood clots in the legs following surgery. Please take time to review all of your Home Care Instructions and Medication Information sheets provided in your discharge packet.  If you have any questions, please contact your surgeons office. Thank you. Narcotic-Analgesic/Acetaminophen (By mouth)   Relieves pain. Brand Name(s): Capital w/Codeine, [place], [place], [place], Lorcet HD, Lorcet Plus, Lortab 10/325, Lortab 5/325, Lortab 7.5/325, Lortab Elixir, [place], [place], Cox, Trezix, Tylenol With Codeine No. 4   There may be other brand names for this medicine. When This Medicine Should Not Be Used: You should not use this medicine if you have had an allergic reaction to acetaminophen, codeine, hydrocodone, propoxyphene, or sulfites. You should not use this medicine if you have had an allergic reaction to any other opioid pain medicine. How to Use This Medicine:   Liquid, Tablet, Capsule  · Your doctor will tell you how much medicine to use. Do not use more than directed. · This medicine contains acetaminophen. Read the labels of all other medicines you are using to see if they also contain acetaminophen, or ask your doctor or pharmacist. Sherren Littleeda not use more than 4 grams (4,000 milligrams) total of acetaminophen in one day. · Drink plenty of liquids to help avoid constipation. If a dose is missed:   · Some of these medicines need to be used on a fixed schedule. If you miss a dose or forget to use your medicine, call your doctor pharmacist for instructions. Do not use extra medicine to make up for a missed dose. How to Store and Dispose of This Medicine:   · Store the medicine in a closed container at room temperature, away from heat, moisture, and direct light. Do not refrigerate or freeze the medicine. · Ask your pharmacist, doctor, or health caregiver about the best way to dispose of any outdated medicine or medicine no longer needed. · Keep all medicine out of the reach of children. Never share your medicine with anyone. Drugs and Foods to Avoid:   Ask your doctor or pharmacist before using any other medicine, including over-the-counter medicines, vitamins, and herbal products.   · Make sure your doctor knows if you are using a monoamine oxidase inhibitor (MAOI) medicine, such as Eldepryl®, Marplan®, [place], or Parnate®. Make sure your doctor knows if you are also using a medicine to treat depression, such as amitriptyline, doxepin, nortriptyline, Elavil®, Pamelor®, or Sinequan®. Make sure your doctor knows if you are taking an anticholinergic medicine, such as atropine, methscopolamine, or scopolamine. · Tell your doctor if you use anything else that makes you sleepy. Some examples are allergy medicine, narcotic pain medicine, and alcohol. · Do not drink alcohol while you are using this medicine. Warnings While Using This Medicine:   · Make sure your doctor knows if you are pregnant or breast feeding, or if you have a head injury, or other conditions that may cause an increase in intercranial pressure (pressure inside your head). Make sure your doctor knows if you have severe kidney problems or severe liver problems, or if you have hypothyroidism (lack of thyroid function). Make sure your doctor knows if you have Elliott's disease (adrenal gland disease), or if you have enlarged prostate or urethral stricture. Make sure your doctor knows if you have any abdominal problems, or if you have lung disease or asthma. · This medicine may make you dizzy or drowsy. Avoid driving, using machines, or anything else that could be dangerous if you are not alert. · This medicine can be habit-forming. Do not use more than your prescribed dose. Call your doctor if you think your medicine is not working. · Tell any doctor or dentist who treats you that you are using this medicine. This medicine may affect certain medical test results. · This medicine may cause constipation, especially with long-term use. Ask your doctor if you should use a laxative to prevent and treat constipation.   · When a mother is breastfeeding and takes codeine, there is a very small chance that this medicine could cause serious side effects in the baby. This is because codeine works differently in a few women, so their breast milk contains too much medicine. If you take codeine, be alert for these signs of overdose in your nursing baby: sleeping more than usual, trouble breastfeeding, trouble breathing, or being limp and weak. Call the baby's doctor right away if you think there is a problem. If you cannot talk to the doctor, take the baby to the emergency room or call 911. Possible Side Effects While Using This Medicine:   Call your doctor right away if you notice any of these side effects:  · Allergic reaction: Itching or hives, swelling in your face or hands, swelling or tingling in your mouth or throat, chest tightness, trouble breathing  · Dizziness. · Seeing or hearing things that are not there. · Very slow heartbeat. If you notice these less serious side effects, talk with your doctor:   · Change in how much or how often you urinate. · Cold, clammy skin. · Feeling unusually anxious, excited, fearful, or tired. · Nausea, vomiting, constipation, stomach pain or upset, or heartburn. · Skin rash. · Vision changes. If you notice other side effects that you think are caused by this medicine, tell your doctor. Call your doctor for medical advice about side effects. You may report side effects to FDA at 0-922-ZLV-4653  © 2017 [facility] Information is for End User's use only and may not be sold, redistributed or otherwise used for commercial purposes. The above information is an  only. It is not intended as medical advice for individual conditions or treatments. Talk to your doctor, nurse or pharmacist before following any medical regimen to see if it is safe and effective for you. Laxative, Stimulant Combination (By mouth)   Treats constipation by helping you have a bowel movement.    Brand Name(s): Colace, Doc-Q-Lax, Dok Plus, [facility] Stool Softener & Laxative, Good Sense Stimulant Laxative Plus, Laxacin, Medi-Laxx, Tigist-Colace, [employer] Laxative and Stool Softener, [employer] P Col-Rite, Senexon-S, Senna-S, Senna-Time S, SennaLax-S, SennaPrompt   There may be other brand names for this medicine. When This Medicine Should Not Be Used: You should not use this medicine if you have had an allergic reaction to senna, sennosides, docusate, casanthranol, or psyllium. Some brand names for these medicines are Correctol® stool softener, Ex-Lax®, Colace®, or Metamucil®. Make sure your doctor knows if you are allergic to any other laxative medicines. How to Use This Medicine:   Tablet, Liquid Filled Capsule, Liquid, Granule, Capsule, Powder for Suspension  · Your doctor will tell you how much medicine to use. Do not use more than directed. · If you have had a sudden change in your bowel movements in the past two weeks, ask your doctor before using this medicine. · Follow the instructions on the medicine label if you are using this medicine without a prescription. · Drink a full glass of water when you take this medicine. One full glass of water is about 8 ounces or 1 cup. Drinking 6 to 8 full glasses of water every day will help keep your bowel movements soft. · You might need to mix the granules or powder with water before you take each dose. Drink this mixture right away. Do not swallow the granules or powder dry unless the directions say you can. · Measure the oral liquid medicine with a marked measuring spoon, oral syringe, or medicine cup. · Use this medicine at bedtime, unless your doctor tells you otherwise. If a dose is missed:   · Take a dose as soon as you remember. If it is almost time for your next dose, wait until then and take a regular dose. Do not take extra medicine to make up for a missed dose. How to Store and Dispose of This Medicine:   · Store the medicine in a closed container at room temperature, away from heat, moisture, and direct light.   · Ask your pharmacist, doctor, or health caregiver about the best way to dispose of any outdated medicine or medicine no longer needed. · Keep all medicine out of the reach of children. Never share your medicine with anyone. Drugs and Foods to Avoid:   Ask your doctor or pharmacist before using any other medicine, including over-the-counter medicines, vitamins, and herbal products. · Make sure your doctor knows if you are also using mineral oil. · Some laxatives need to be taken 2 hours before or 2 hours after other medicines. If you need to take any other medicine, ask your health caregiver if you need to follow a special schedule. Warnings While Using This Medicine:   · Make sure your doctor knows if you are pregnant or breast feeding. · Do not use this medicine if you have stomach pain, nausea, or vomiting, unless your health caregiver tells you to use it. · If you do not have a bowel movement after using this medicine, talk to your doctor. Most people will have a bowel movement within 6 to 12 hours after using this laxative. · You should not use this laxative for more than 1 week unless your doctor says it is okay. Laxatives may be habit-forming and can harm your bowels if you use them too long. Possible Side Effects While Using This Medicine:   Call your doctor right away if you notice any of these side effects:  · Bleeding from your rectum. · Skin rash. · Urine turns a different color. If you notice these less serious side effects, talk with your doctor:   · Diarrhea, cramps, nausea, burping. If you notice other side effects that you think are caused by this medicine, tell your doctor. Call your doctor for medical advice about side effects. You may report side effects to FDA at 5-971-FDA-3630  © [facility] Information is for End User's use only and may not be sold, redistributed or otherwise used for commercial purposes. The above information is an  only.  It is not intended as medical advice for individual conditions or treatments. Talk to your doctor, nurse or pharmacist before following any medical regimen to see if it is safe and effective for you. Ibuprofen (By mouth)   Ibuprofen (eye-bue-PROE-fen)  Treats pain and fever. This medicine is an NSAID. Brand Name(s): Advil, Advil Children's, Advil Liqui-Gels, Advil Migraine, All-Purpose First Aid Kit, Children's Ibuprofen, Children's Motrin, Comfort Pac, Concentrated Motrin Infants' Drops, Equate Ibuprofen Jd Strength, Genpril, Good Neighbor Ibuprofen Infants', [facility] Children's Ibuprofen, [facility] Ibuprofen, [facility] Ibuprofen Jd Strength   There may be other brand names for this medicine. When This Medicine Should Not Be Used: This medicine is not right for everyone. Do not use if you had an allergic reaction (including asthma) to ibuprofen, aspirin, or another NSAID, or right before or after heart surgery. How to Use This Medicine:   Capsule, Liquid Filled Capsule, Suspension, Tablet, Chewable Tablet  · Your doctor will tell you how much medicine to use. Do not use more than directed. · Prescription ibuprofen should come with a Medication Guide. Ask your pharmacist for the Medication Guide if you do not have one. · Follow the instructions on the medicine label if you are using this medicine without a prescription. · Take this medicine with food or milk if it upsets your stomach. · Oral liquid: Shake well just before using. Measure with a marked measuring spoon, oral syringe, or medicine cup. · Chewable tablet: Chew completely before you swallow it. Then drink some water to make sure you swallow all of the medicine. · For Children: Ask your pharmacist if you are not sure how much medicine to give a child. The dose is usually based on weight, not age. Never give more medicine than directed.   · For Adults: Do not take more than 6 pills in 1 day (24 hours) unless your doctor tells you to.  · Missed dose: If you take this medicine on a regular basis and miss a dose, take it as soon as you can. If it is almost time for your next dose, wait until then to use the medicine and skip the missed dose. Do not use extra medicine to make up for a missed dose. · Store the medicine in a closed container at room temperature, away from heat, moisture, and direct light. Do not freeze the oral liquid. Drugs and Foods to Avoid:   Ask your doctor or pharmacist before using any other medicine, including over-the-counter medicines, vitamins, and herbal products. · Some foods and medicine can affect how ibuprofen works. Tell your doctor if you are also using lithium, methotrexate, a blood thinner (such as warfarin), a steroid medicine (such as hydrocortisone, prednisolone, prednisone), a diuretic (water pill), or an ACE inhibitor blood pressure medicine. · Do not use any other NSAID medicine unless your doctor says it is okay. Some other NSAIDs are aspirin, diclofenac, naproxen, or celecoxib. · Do not drink alcohol while you are using this medicine. Warnings While Using This Medicine:   · Tell your doctor if you are pregnant or breastfeeding. Do not use this medicine during the later part of pregnancy. · Tell your doctor if you have kidney disease, liver disease, asthma, lupus or a similar connective tissue disease, or a history of ulcers or other digestion problems. Tell your doctor if you smoke or have heart or blood circulation problems, including high blood pressure, heart failure (CHF), or bleeding problems. · This medicine may cause the following problems:  ¨ Bleeding and ulcers in the stomach or intestines  ¨ Higher risk of heart attack or stroke  ¨ Liver damage  ¨ Kidney damage  ¨ Vision problems  · Call your doctor if symptoms get worse, pain lasts more than 10 days, or fever lasts more than 3 days. · This medicine might contain sugar or phenylalanine (aspartame).   · Tell any doctor or dentist who treats you that you are using this medicine. · Keep all medicine out of the reach of children. Never share your medicine with anyone. Possible Side Effects While Using This Medicine:   Call your doctor right away if you notice any of these side effects:  · Allergic reaction: Itching or hives, swelling in your face or hands, swelling or tingling in your mouth or throat, chest tightness, trouble breathing  · Blistering, peeling, or red skin rash  · Change in how much or how often you urinate  · Chest pain that may spread to your arms, jaw, back, or neck, trouble breathing, nausea, unusual sweating, faintness  · Chest pain, trouble breathing, weakness on one side of your body, severe headache, trouble seeing or talking, pain in your lower leg  · Dark urine or pale stools, nausea, vomiting, loss of appetite, stomach pain, yellow skin or eyes  · Fever, neck pain, stiff neck  · Severe stomach pain, vomiting blood, bloody or black, tarry stools  · Swelling in your hands, ankles, or feet, rapid weight gain  · Trouble seeing, blind spots, change in how you see colors  · Unusual bleeding, bruising, or weakness  If you notice these less serious side effects, talk with your doctor:   · Constipation, diarrhea, gas, mild upset stomach  · Dizziness, headache, ringing in the ears  If you notice other side effects that you think are caused by this medicine, tell your doctor. Call your doctor for medical advice about side effects. You may report side effects to FDA at 5-300-SNQ-7911  © 2017 [facility] Information is for End User's use only and may not be sold, redistributed or otherwise used for commercial purposes. The above information is an  only. It is not intended as medical advice for individual conditions or treatments. Talk to your doctor, nurse or pharmacist before following any medical regimen to see if it is safe and effective for you.

## 2018-11-27 NOTE — PERIOP NOTES
TRANSFER - OUT REPORT: 
 
Verbal report given to Ester Hill RN (name) on [place]  being transferred to Phase II (unit) for routine progression of care Report consisted of patients Situation, Background, Assessment and  
Recommendations(SBAR). Information from the following report(s) SBAR, OR Summary, MAR and Cardiac Rhythm NSR was reviewed with the receiving nurse. Opportunity for questions and clarification was provided. Patient transported with: 
 Registered Nurse

## 2019-01-16 ENCOUNTER — HOSPITAL ENCOUNTER (OUTPATIENT)
Dept: PREADMISSION TESTING | Age: 39
Discharge: HOME | End: 2019-01-16
Payer: MEDICAID

## 2019-01-16 VITALS
OXYGEN SATURATION: 100 % | WEIGHT: 157.19 LBS | HEIGHT: 67 IN | RESPIRATION RATE: 16 BRPM | TEMPERATURE: 98.4 F | DIASTOLIC BLOOD PRESSURE: 86 MMHG | BODY MASS INDEX: 24.67 KG/M2 | HEART RATE: 94 BPM | SYSTOLIC BLOOD PRESSURE: 156 MMHG

## 2019-01-16 LAB
APPEARANCE UR: CLEAR
BACTERIA URNS QL MICRO: NEGATIVE /HPF
BILIRUB UR QL: NEGATIVE
COLOR UR: ABNORMAL
EPITH CASTS URNS QL MICRO: ABNORMAL /LPF
ERYTHROCYTE [DISTWIDTH] IN BLOOD BY AUTOMATED COUNT: 17 % (ref 11.5–14.5)
GLUCOSE UR STRIP.AUTO-MCNC: NEGATIVE MG/DL
HCT VFR BLD AUTO: 35.8 % (ref 35–47)
HGB BLD-MCNC: 11.6 G/DL (ref 11.5–16)
HGB UR QL STRIP: NEGATIVE
HYALINE CASTS URNS QL MICRO: ABNORMAL /LPF (ref 0–5)
KETONES UR QL STRIP.AUTO: ABNORMAL MG/DL
LEUKOCYTE ESTERASE UR QL STRIP.AUTO: NEGATIVE
MCH RBC QN AUTO: 27.4 PG (ref 26–34)
MCHC RBC AUTO-ENTMCNC: 32.4 G/DL (ref 30–36.5)
MCV RBC AUTO: 84.6 FL (ref 80–99)
NITRITE UR QL STRIP.AUTO: NEGATIVE
NRBC # BLD: 0 K/UL (ref 0–0.01)
NRBC BLD-RTO: 0 PER 100 WBC
PH UR STRIP: 5.5 [PH] (ref 5–8)
PLATELET # BLD AUTO: 198 K/UL (ref 150–400)
PMV BLD AUTO: 9.9 FL (ref 8.9–12.9)
PROT UR STRIP-MCNC: NEGATIVE MG/DL
RBC # BLD AUTO: 4.23 M/UL (ref 3.8–5.2)
RBC #/AREA URNS HPF: ABNORMAL /HPF (ref 0–5)
SP GR UR REFRACTOMETRY: 1.03 (ref 1–1.03)
UROBILINOGEN UR QL STRIP.AUTO: 0.2 EU/DL (ref 0.2–1)
WBC # BLD AUTO: 4.4 K/UL (ref 3.6–11)
WBC URNS QL MICRO: ABNORMAL /HPF (ref 0–4)

## 2019-01-16 PROCEDURE — 87186 SC STD MICRODIL/AGAR DIL: CPT

## 2019-01-16 PROCEDURE — 86905 BLOOD TYPING RBC ANTIGENS: CPT

## 2019-01-16 PROCEDURE — 86921 COMPATIBILITY TEST INCUBATE: CPT

## 2019-01-16 PROCEDURE — 86922 COMPATIBILITY TEST ANTIGLOB: CPT

## 2019-01-16 PROCEDURE — 87086 URINE CULTURE/COLONY COUNT: CPT

## 2019-01-16 PROCEDURE — 85027 COMPLETE CBC AUTOMATED: CPT

## 2019-01-16 PROCEDURE — 86900 BLOOD TYPING SEROLOGIC ABO: CPT

## 2019-01-16 PROCEDURE — 81001 URINALYSIS AUTO W/SCOPE: CPT

## 2019-01-16 PROCEDURE — 86902 BLOOD TYPE ANTIGEN DONOR EA: CPT

## 2019-01-16 PROCEDURE — 36415 COLL VENOUS BLD VENIPUNCTURE: CPT

## 2019-01-16 PROCEDURE — 86870 RBC ANTIBODY IDENTIFICATION: CPT

## 2019-01-16 PROCEDURE — 86920 COMPATIBILITY TEST SPIN: CPT

## 2019-01-16 PROCEDURE — 87077 CULTURE AEROBIC IDENTIFY: CPT

## 2019-01-16 RX ORDER — GABAPENTIN 300 MG/1
600 CAPSULE ORAL ONCE
Status: CANCELLED | OUTPATIENT
Start: 2019-01-16 | End: 2019-01-17

## 2019-01-16 RX ORDER — ACETAMINOPHEN 325 MG/1
TABLET ORAL
COMMUNITY
End: 2020-02-19

## 2019-01-16 RX ORDER — CLINDAMYCIN PHOSPHATE 900 MG/50ML
900 INJECTION INTRAVENOUS ONCE
Status: CANCELLED | OUTPATIENT
Start: 2019-01-22 | End: 2019-01-22

## 2019-01-16 RX ORDER — ACETAMINOPHEN 500 MG
1000 TABLET ORAL ONCE
Status: CANCELLED | OUTPATIENT
Start: 2019-01-22 | End: 2019-01-22

## 2019-01-16 RX ORDER — SODIUM CHLORIDE, SODIUM LACTATE, POTASSIUM CHLORIDE, CALCIUM CHLORIDE 600; 310; 30; 20 MG/100ML; MG/100ML; MG/100ML; MG/100ML
25 INJECTION, SOLUTION INTRAVENOUS CONTINUOUS
Status: CANCELLED | OUTPATIENT
Start: 2019-01-22

## 2019-01-16 NOTE — PERIOP NOTES
[facility] Preoperative Instructions Surgery Date 1/22/19          Time of Arrival 0545 am   Contact # [phone] 1. On the day of your surgery, please report to the Surgical Services Registration Desk and sign in at your designated time. The Surgery Center is located to the right of the Emergency Room. 2. You must have someone with you to drive you home. You should not drive a car for 24 hours following surgery. Please make arrangements for a friend or family member to stay with you for the first 24 hours after your surgery. 3. Do not have anything to eat or drink (including water, gum, mints, coffee, juice) after midnight ?1/21/19 . ? This may not apply to medications prescribed by your physician. ?(Please note below the special instructions with medications to take the morning of your procedure.) 4. We recommend you do not drink any alcoholic beverages for 24 hours before and after your surgery. 5. Contact your surgeons office for instructions on the following medications: non-steroidal anti-inflammatory drugs (i.e. Advil, Aleve), vitamins, and supplements. (Some surgeons will want you to stop these medications prior to surgery and others may allow you to take them) **If you are currently taking Plavix, Coumadin, Aspirin and/or other blood-thinning agents, contact your surgeon for instructions. ** Your surgeon will partner with the physician prescribing these medications to determine if it is safe to stop or if you need to continue taking. Please do not stop taking these medications without instructions from your surgeon 6. Wear comfortable clothes. Wear glasses instead of contacts. Do not bring any money or jewelry. Please bring picture ID, insurance card, and any prearranged co-payment or hospital payment. Do not wear make-up, particularly mascara the morning of your surgery.   Do not wear nail polish, particularly if you are having foot /hand surgery. Wear your hair loose or down, no ponytails, buns, inderjit pins or clips. All body piercings must be removed. Please shower with antibacterial soap for three consecutive days before and on the morning of surgery, but do not apply any lotions, powders or deodorants after the shower on the day of surgery. Please use a fresh towels after each shower. Please sleep in clean clothes and change bed linens the night before surgery. Please do not shave for 48 hours prior to surgery. Shaving of the face is acceptable. 7. You should understand that if you do not follow these instructions your surgery may be cancelled. If your physical condition changes (I.e. fever, cold or flu) please contact your surgeon as soon as possible. 8. It is important that you be on time. If a situation occurs where you may be late, please call [phone] (OR Holding Area). 9. If you have any questions and or problems, please call [phone] (Pre-admission Testing). 10. Your surgery time may be subject to change. You will receive a phone call the evening prior if your time changes. 11.  If having outpatient surgery, you must have someone to drive you here, stay with you during the duration of your stay, and to drive you home at time of discharge. 12.   In an effort to improve the efficiency, privacy, and safety for all of our Pre-op patients visitors are not allowed in the Holding area. Once you arrive and are registered your family/visitors will be asked to remain in the waiting room. The Pre-op staff will get you from the Surgical Waiting Area and will explain to you and your family/visitors that the Pre-op phase is beginning. The staff will answer any questions and provide instructions for tracking of the patient, by use of the existing tracking number and color-coded status board in the waiting room.   At this time the staff will also ask for your designated spokesperson information in the event that the physician or staff need to provide an update or obtain any pertinent information. The designated spokesperson will be notified if the physician needs to speak to family during the pre-operative phase. If at any time your family/visitors has questions or concerns they may approach the volunteer desk in the waiting area for assistance. Special Instructions: MEDICATIONS TO TAKE THE MORNING OF SURGERY WITH A SIP OF WATER: Inhaler if needed, Tylenol if needed I understand a pre-operative phone call will be made to verify my surgery time. In the event that I am not available, I give permission for a message to be left on my answering service and/or with another person? yes 
 
 
 ___________________      __________   _________ 
  (Signature of Patient)             (Witness)                (Date and Time)

## 2019-01-19 LAB
BACTERIA SPEC CULT: ABNORMAL
BACTERIA SPEC CULT: ABNORMAL
CC UR VC: ABNORMAL
SERVICE CMNT-IMP: ABNORMAL

## 2019-01-21 NOTE — PERIOP NOTES
Called and spoke to [facility] in the pharmacy to release the Gentamicin order.

## 2019-01-21 NOTE — PERIOP NOTES
Faxed urine culture results to Dr. Lydia Zarate office for review and treatment. Fax confirmed. Follow-up call to Shea Robertson at 1200 pm and Ana Luisa at [phone]. Left messages with call back requested. [phone] Refaxed to [phone]. Fax confirmed.

## 2019-01-21 NOTE — PERIOP NOTES
Paged and spoke to Dr. Ofelia Smiley regarding urine culture results. No treatment noted. Will re-collect another urine specimen via martinez in OR per Dr. Ofelia Smiley. Order placed in computer.

## 2019-01-22 ENCOUNTER — ANESTHESIA EVENT (OUTPATIENT)
Dept: SURGERY | Age: 39
End: 2019-01-22
Payer: MEDICAID

## 2019-01-22 ENCOUNTER — ANESTHESIA (OUTPATIENT)
Dept: SURGERY | Age: 39
End: 2019-01-22
Payer: MEDICAID

## 2019-01-22 ENCOUNTER — HOSPITAL ENCOUNTER (OUTPATIENT)
Age: 39
Setting detail: OUTPATIENT SURGERY
Discharge: HOME | End: 2019-01-22
Attending: OBSTETRICS & GYNECOLOGY | Admitting: OBSTETRICS & GYNECOLOGY
Payer: MEDICAID

## 2019-01-22 VITALS
DIASTOLIC BLOOD PRESSURE: 73 MMHG | BODY MASS INDEX: 24.53 KG/M2 | TEMPERATURE: 97.7 F | SYSTOLIC BLOOD PRESSURE: 131 MMHG | HEART RATE: 82 BPM | HEIGHT: 67 IN | WEIGHT: 156.31 LBS | RESPIRATION RATE: 18 BRPM | OXYGEN SATURATION: 100 %

## 2019-01-22 DIAGNOSIS — D39.10 BORDERLINE EPITHELIAL NEOPLASM OF OVARY: Primary | ICD-10-CM

## 2019-01-22 LAB
APPEARANCE UR: CLEAR
BACTERIA URNS QL MICRO: NEGATIVE /HPF
BILIRUB UR QL: NEGATIVE
COLOR UR: ABNORMAL
EPITH CASTS URNS QL MICRO: ABNORMAL /LPF
GLUCOSE UR STRIP.AUTO-MCNC: NEGATIVE MG/DL
HCG UR QL: NEGATIVE
HGB UR QL STRIP: NEGATIVE
HYALINE CASTS URNS QL MICRO: ABNORMAL /LPF (ref 0–5)
KETONES UR QL STRIP.AUTO: NEGATIVE MG/DL
LEUKOCYTE ESTERASE UR QL STRIP.AUTO: NEGATIVE
NITRITE UR QL STRIP.AUTO: NEGATIVE
PH UR STRIP: 7 [PH] (ref 5–8)
PROT UR STRIP-MCNC: ABNORMAL MG/DL
RBC #/AREA URNS HPF: ABNORMAL /HPF (ref 0–5)
SP GR UR REFRACTOMETRY: 1.02 (ref 1–1.03)
UA: UC IF INDICATED,UAUC: ABNORMAL
UROBILINOGEN UR QL STRIP.AUTO: 1 EU/DL (ref 0.2–1)
WBC URNS QL MICRO: ABNORMAL /HPF (ref 0–4)

## 2019-01-22 PROCEDURE — 88307 TISSUE EXAM BY PATHOLOGIST: CPT

## 2019-01-22 PROCEDURE — 74011000258 HC RX REV CODE- 258: Performed by: OBSTETRICS & GYNECOLOGY

## 2019-01-22 PROCEDURE — 76060000036 HC ANESTHESIA 2.5 TO 3 HR: Performed by: OBSTETRICS & GYNECOLOGY

## 2019-01-22 PROCEDURE — 77030019908 HC STETH ESOPH SIMS -A: Performed by: NURSE ANESTHETIST, CERTIFIED REGISTERED

## 2019-01-22 PROCEDURE — 77030034849: Performed by: OBSTETRICS & GYNECOLOGY

## 2019-01-22 PROCEDURE — 77030037878 HC DRSG MEPILEX >48IN BORD MOLN -B

## 2019-01-22 PROCEDURE — 77030032490 HC SLV COMPR SCD KNE COVD -B: Performed by: OBSTETRICS & GYNECOLOGY

## 2019-01-22 PROCEDURE — 77030035277 HC OBTRTR BLDELSS DISP INTU -B: Performed by: OBSTETRICS & GYNECOLOGY

## 2019-01-22 PROCEDURE — 88309 TISSUE EXAM BY PATHOLOGIST: CPT

## 2019-01-22 PROCEDURE — 74011250636 HC RX REV CODE- 250/636

## 2019-01-22 PROCEDURE — 74011250636 HC RX REV CODE- 250/636: Performed by: ANESTHESIOLOGY

## 2019-01-22 PROCEDURE — 81025 URINE PREGNANCY TEST: CPT

## 2019-01-22 PROCEDURE — 77030029357 HC DEV CLSR FAC SYS EFX TELE -C: Performed by: OBSTETRICS & GYNECOLOGY

## 2019-01-22 PROCEDURE — 74011000250 HC RX REV CODE- 250: Performed by: OBSTETRICS & GYNECOLOGY

## 2019-01-22 PROCEDURE — 77030018836 HC SOL IRR NACL ICUM -A: Performed by: OBSTETRICS & GYNECOLOGY

## 2019-01-22 PROCEDURE — 76210000016 HC OR PH I REC 1 TO 1.5 HR: Performed by: OBSTETRICS & GYNECOLOGY

## 2019-01-22 PROCEDURE — 74011000258 HC RX REV CODE- 258

## 2019-01-22 PROCEDURE — 76010000172 HC OR TIME 2.5 TO 3 HR INTENSV-TIER 1: Performed by: OBSTETRICS & GYNECOLOGY

## 2019-01-22 PROCEDURE — 77030013079 HC BLNKT BAIR HGGR 3M -A: Performed by: NURSE ANESTHETIST, CERTIFIED REGISTERED

## 2019-01-22 PROCEDURE — 77030031139 HC SUT VCRL2 J&J -A: Performed by: OBSTETRICS & GYNECOLOGY

## 2019-01-22 PROCEDURE — 77030035029 HC NDL INSUF VERES DISP COVD -B: Performed by: OBSTETRICS & GYNECOLOGY

## 2019-01-22 PROCEDURE — 77030026438 HC STYL ET INTUB CARD -A: Performed by: NURSE ANESTHETIST, CERTIFIED REGISTERED

## 2019-01-22 PROCEDURE — 77030009852 HC PCH RTVR ENDOSC COVD -B: Performed by: OBSTETRICS & GYNECOLOGY

## 2019-01-22 PROCEDURE — 77030003581 HC NDL INSUF VERES COVD -B: Performed by: OBSTETRICS & GYNECOLOGY

## 2019-01-22 PROCEDURE — 74011250636 HC RX REV CODE- 250/636: Performed by: OBSTETRICS & GYNECOLOGY

## 2019-01-22 PROCEDURE — 87086 URINE CULTURE/COLONY COUNT: CPT

## 2019-01-22 PROCEDURE — 74011000250 HC RX REV CODE- 250

## 2019-01-22 PROCEDURE — 88112 CYTOPATH CELL ENHANCE TECH: CPT

## 2019-01-22 PROCEDURE — 76010000877 HC OR TIME 2.5 TO 3HR INTENSV - TIER 2: Performed by: OBSTETRICS & GYNECOLOGY

## 2019-01-22 PROCEDURE — 77030031492 HC PRT ACC BLNT AIRSEAL CNMD -B: Performed by: OBSTETRICS & GYNECOLOGY

## 2019-01-22 PROCEDURE — 76210000021 HC REC RM PH II 0.5 TO 1 HR: Performed by: OBSTETRICS & GYNECOLOGY

## 2019-01-22 PROCEDURE — 77030008756 HC TU IRR SUC STRY -B: Performed by: OBSTETRICS & GYNECOLOGY

## 2019-01-22 PROCEDURE — 77030016151 HC PROTCTR LNS DFOG COVD -B: Performed by: OBSTETRICS & GYNECOLOGY

## 2019-01-22 PROCEDURE — 77030020782 HC GWN BAIR PAWS FLX 3M -B

## 2019-01-22 PROCEDURE — 77030020703 HC SEAL CANN DISP INTU -B: Performed by: OBSTETRICS & GYNECOLOGY

## 2019-01-22 PROCEDURE — 77030002933 HC SUT MCRYL J&J -A: Performed by: OBSTETRICS & GYNECOLOGY

## 2019-01-22 PROCEDURE — 77030008684 HC TU ET CUF COVD -B: Performed by: NURSE ANESTHETIST, CERTIFIED REGISTERED

## 2019-01-22 PROCEDURE — 88305 TISSUE EXAM BY PATHOLOGIST: CPT

## 2019-01-22 PROCEDURE — 81001 URINALYSIS AUTO W/SCOPE: CPT

## 2019-01-22 PROCEDURE — 77030011640 HC PAD GRND REM COVD -A: Performed by: OBSTETRICS & GYNECOLOGY

## 2019-01-22 PROCEDURE — 77030008771 HC TU NG SALEM SUMP -A: Performed by: NURSE ANESTHETIST, CERTIFIED REGISTERED

## 2019-01-22 PROCEDURE — 74011250637 HC RX REV CODE- 250/637: Performed by: OBSTETRICS & GYNECOLOGY

## 2019-01-22 RX ORDER — GLYCOPYRROLATE 0.2 MG/ML
INJECTION INTRAMUSCULAR; INTRAVENOUS AS NEEDED
Status: DISCONTINUED | OUTPATIENT
Start: 2019-01-22 | End: 2019-01-22 | Stop reason: HOSPADM

## 2019-01-22 RX ORDER — FENTANYL CITRATE 50 UG/ML
50 INJECTION, SOLUTION INTRAMUSCULAR; INTRAVENOUS AS NEEDED
Status: DISCONTINUED | OUTPATIENT
Start: 2019-01-22 | End: 2019-01-22 | Stop reason: HOSPADM

## 2019-01-22 RX ORDER — DEXAMETHASONE SODIUM PHOSPHATE 4 MG/ML
INJECTION, SOLUTION INTRA-ARTICULAR; INTRALESIONAL; INTRAMUSCULAR; INTRAVENOUS; SOFT TISSUE AS NEEDED
Status: DISCONTINUED | OUTPATIENT
Start: 2019-01-22 | End: 2019-01-22 | Stop reason: HOSPADM

## 2019-01-22 RX ORDER — DIPHENHYDRAMINE HYDROCHLORIDE 50 MG/ML
12.5 INJECTION, SOLUTION INTRAMUSCULAR; INTRAVENOUS AS NEEDED
Status: DISCONTINUED | OUTPATIENT
Start: 2019-01-22 | End: 2019-01-22 | Stop reason: HOSPADM

## 2019-01-22 RX ORDER — LIDOCAINE HYDROCHLORIDE 20 MG/ML
INJECTION, SOLUTION EPIDURAL; INFILTRATION; INTRACAUDAL; PERINEURAL AS NEEDED
Status: DISCONTINUED | OUTPATIENT
Start: 2019-01-22 | End: 2019-01-22 | Stop reason: HOSPADM

## 2019-01-22 RX ORDER — SODIUM CHLORIDE 0.9 % (FLUSH) 0.9 %
5-40 SYRINGE (ML) INJECTION EVERY 8 HOURS
Status: DISCONTINUED | OUTPATIENT
Start: 2019-01-22 | End: 2019-01-22 | Stop reason: HOSPADM

## 2019-01-22 RX ORDER — GABAPENTIN 300 MG/1
600 CAPSULE ORAL ONCE
Status: COMPLETED | OUTPATIENT
Start: 2019-01-22 | End: 2019-01-22

## 2019-01-22 RX ORDER — IBUPROFEN 200 MG
800 TABLET ORAL EVERY 6 HOURS
Qty: 60 TAB | Refills: 2 | Status: SHIPPED | OUTPATIENT
Start: 2019-01-22 | End: 2019-05-23 | Stop reason: ALTCHOICE

## 2019-01-22 RX ORDER — FENTANYL CITRATE 50 UG/ML
INJECTION, SOLUTION INTRAMUSCULAR; INTRAVENOUS AS NEEDED
Status: DISCONTINUED | OUTPATIENT
Start: 2019-01-22 | End: 2019-01-22 | Stop reason: HOSPADM

## 2019-01-22 RX ORDER — KETOROLAC TROMETHAMINE 30 MG/ML
INJECTION, SOLUTION INTRAMUSCULAR; INTRAVENOUS AS NEEDED
Status: DISCONTINUED | OUTPATIENT
Start: 2019-01-22 | End: 2019-01-22 | Stop reason: HOSPADM

## 2019-01-22 RX ORDER — ACETAMINOPHEN 10 MG/ML
INJECTION, SOLUTION INTRAVENOUS AS NEEDED
Status: DISCONTINUED | OUTPATIENT
Start: 2019-01-22 | End: 2019-01-22 | Stop reason: HOSPADM

## 2019-01-22 RX ORDER — OXYCODONE HYDROCHLORIDE 5 MG/1
5 TABLET ORAL
Status: COMPLETED | OUTPATIENT
Start: 2019-01-22 | End: 2019-01-22

## 2019-01-22 RX ORDER — HYDROMORPHONE HYDROCHLORIDE 1 MG/ML
.2-.5 INJECTION, SOLUTION INTRAMUSCULAR; INTRAVENOUS; SUBCUTANEOUS
Status: DISCONTINUED | OUTPATIENT
Start: 2019-01-22 | End: 2019-01-22 | Stop reason: HOSPADM

## 2019-01-22 RX ORDER — HYDROMORPHONE HYDROCHLORIDE 2 MG/ML
INJECTION, SOLUTION INTRAMUSCULAR; INTRAVENOUS; SUBCUTANEOUS AS NEEDED
Status: DISCONTINUED | OUTPATIENT
Start: 2019-01-22 | End: 2019-01-22 | Stop reason: HOSPADM

## 2019-01-22 RX ORDER — MORPHINE SULFATE 10 MG/ML
2 INJECTION, SOLUTION INTRAMUSCULAR; INTRAVENOUS
Status: DISCONTINUED | OUTPATIENT
Start: 2019-01-22 | End: 2019-01-22 | Stop reason: HOSPADM

## 2019-01-22 RX ORDER — SODIUM CHLORIDE, SODIUM LACTATE, POTASSIUM CHLORIDE, CALCIUM CHLORIDE 600; 310; 30; 20 MG/100ML; MG/100ML; MG/100ML; MG/100ML
25 INJECTION, SOLUTION INTRAVENOUS CONTINUOUS
Status: DISCONTINUED | OUTPATIENT
Start: 2019-01-22 | End: 2019-01-22 | Stop reason: HOSPADM

## 2019-01-22 RX ORDER — CLINDAMYCIN PHOSPHATE 900 MG/50ML
900 INJECTION INTRAVENOUS ONCE
Status: COMPLETED | OUTPATIENT
Start: 2019-01-22 | End: 2019-01-22

## 2019-01-22 RX ORDER — MIDAZOLAM HYDROCHLORIDE 1 MG/ML
INJECTION, SOLUTION INTRAMUSCULAR; INTRAVENOUS AS NEEDED
Status: DISCONTINUED | OUTPATIENT
Start: 2019-01-22 | End: 2019-01-22 | Stop reason: HOSPADM

## 2019-01-22 RX ORDER — ACETAMINOPHEN 500 MG
1000 TABLET ORAL ONCE
Status: COMPLETED | OUTPATIENT
Start: 2019-01-22 | End: 2019-01-22

## 2019-01-22 RX ORDER — FENTANYL CITRATE 50 UG/ML
25 INJECTION, SOLUTION INTRAMUSCULAR; INTRAVENOUS
Status: DISCONTINUED | OUTPATIENT
Start: 2019-01-22 | End: 2019-01-22 | Stop reason: HOSPADM

## 2019-01-22 RX ORDER — ONDANSETRON 2 MG/ML
INJECTION INTRAMUSCULAR; INTRAVENOUS AS NEEDED
Status: DISCONTINUED | OUTPATIENT
Start: 2019-01-22 | End: 2019-01-22 | Stop reason: HOSPADM

## 2019-01-22 RX ORDER — SODIUM CHLORIDE 0.9 % (FLUSH) 0.9 %
5-40 SYRINGE (ML) INJECTION AS NEEDED
Status: DISCONTINUED | OUTPATIENT
Start: 2019-01-22 | End: 2019-01-22 | Stop reason: HOSPADM

## 2019-01-22 RX ORDER — OXYCODONE AND ACETAMINOPHEN 5; 325 MG/1; MG/1
1 TABLET ORAL
Qty: 20 TAB | Refills: 0 | Status: SHIPPED | OUTPATIENT
Start: 2019-01-22 | End: 2019-05-23 | Stop reason: ALTCHOICE

## 2019-01-22 RX ORDER — MIDAZOLAM HYDROCHLORIDE 1 MG/ML
0.5 INJECTION, SOLUTION INTRAMUSCULAR; INTRAVENOUS
Status: DISCONTINUED | OUTPATIENT
Start: 2019-01-22 | End: 2019-01-22 | Stop reason: HOSPADM

## 2019-01-22 RX ORDER — ROCURONIUM BROMIDE 10 MG/ML
INJECTION, SOLUTION INTRAVENOUS AS NEEDED
Status: DISCONTINUED | OUTPATIENT
Start: 2019-01-22 | End: 2019-01-22 | Stop reason: HOSPADM

## 2019-01-22 RX ORDER — PHENYLEPHRINE HCL IN 0.9% NACL 0.4MG/10ML
SYRINGE (ML) INTRAVENOUS AS NEEDED
Status: DISCONTINUED | OUTPATIENT
Start: 2019-01-22 | End: 2019-01-22 | Stop reason: HOSPADM

## 2019-01-22 RX ORDER — SENNOSIDES 8.6 MG/1
2 TABLET ORAL DAILY
Qty: 30 TAB | Refills: 2 | Status: SHIPPED | OUTPATIENT
Start: 2019-01-22 | End: 2019-05-23 | Stop reason: ALTCHOICE

## 2019-01-22 RX ORDER — PROPOFOL 10 MG/ML
INJECTION, EMULSION INTRAVENOUS AS NEEDED
Status: DISCONTINUED | OUTPATIENT
Start: 2019-01-22 | End: 2019-01-22 | Stop reason: HOSPADM

## 2019-01-22 RX ORDER — LIDOCAINE HYDROCHLORIDE 10 MG/ML
0.1 INJECTION, SOLUTION EPIDURAL; INFILTRATION; INTRACAUDAL; PERINEURAL AS NEEDED
Status: DISCONTINUED | OUTPATIENT
Start: 2019-01-22 | End: 2019-01-22 | Stop reason: HOSPADM

## 2019-01-22 RX ORDER — ONDANSETRON 2 MG/ML
4 INJECTION INTRAMUSCULAR; INTRAVENOUS AS NEEDED
Status: DISCONTINUED | OUTPATIENT
Start: 2019-01-22 | End: 2019-01-22 | Stop reason: HOSPADM

## 2019-01-22 RX ORDER — NEOSTIGMINE METHYLSULFATE 1 MG/ML
INJECTION INTRAVENOUS AS NEEDED
Status: DISCONTINUED | OUTPATIENT
Start: 2019-01-22 | End: 2019-01-22 | Stop reason: HOSPADM

## 2019-01-22 RX ORDER — EPHEDRINE SULFATE 50 MG/ML
INJECTION, SOLUTION INTRAVENOUS AS NEEDED
Status: DISCONTINUED | OUTPATIENT
Start: 2019-01-22 | End: 2019-01-22 | Stop reason: HOSPADM

## 2019-01-22 RX ORDER — BUPIVACAINE HYDROCHLORIDE AND EPINEPHRINE 5; 5 MG/ML; UG/ML
INJECTION, SOLUTION EPIDURAL; INTRACAUDAL; PERINEURAL AS NEEDED
Status: DISCONTINUED | OUTPATIENT
Start: 2019-01-22 | End: 2019-01-22 | Stop reason: HOSPADM

## 2019-01-22 RX ADMIN — ROCURONIUM BROMIDE 50 MG: 10 INJECTION, SOLUTION INTRAVENOUS at 07:32

## 2019-01-22 RX ADMIN — Medication 120 MCG: at 09:19

## 2019-01-22 RX ADMIN — OXYCODONE HYDROCHLORIDE 5 MG: 5 TABLET ORAL at 11:30

## 2019-01-22 RX ADMIN — Medication 120 MCG: at 08:14

## 2019-01-22 RX ADMIN — HYDROMORPHONE HYDROCHLORIDE 0.5 MG: 2 INJECTION, SOLUTION INTRAMUSCULAR; INTRAVENOUS; SUBCUTANEOUS at 09:08

## 2019-01-22 RX ADMIN — ACETAMINOPHEN 1000 MG: 500 TABLET ORAL at 06:50

## 2019-01-22 RX ADMIN — EPHEDRINE SULFATE 10 MG: 50 INJECTION, SOLUTION INTRAVENOUS at 09:53

## 2019-01-22 RX ADMIN — MIDAZOLAM HYDROCHLORIDE 2 MG: 1 INJECTION, SOLUTION INTRAMUSCULAR; INTRAVENOUS at 07:28

## 2019-01-22 RX ADMIN — Medication 80 MCG: at 08:00

## 2019-01-22 RX ADMIN — PROPOFOL 200 MG: 10 INJECTION, EMULSION INTRAVENOUS at 07:32

## 2019-01-22 RX ADMIN — Medication 80 MCG: at 07:58

## 2019-01-22 RX ADMIN — ONDANSETRON 4 MG: 2 INJECTION INTRAMUSCULAR; INTRAVENOUS at 09:54

## 2019-01-22 RX ADMIN — KETOROLAC TROMETHAMINE 30 MG: 30 INJECTION, SOLUTION INTRAMUSCULAR; INTRAVENOUS at 09:54

## 2019-01-22 RX ADMIN — FENTANYL CITRATE 100 MCG: 50 INJECTION, SOLUTION INTRAMUSCULAR; INTRAVENOUS at 07:32

## 2019-01-22 RX ADMIN — Medication 120 MCG: at 09:53

## 2019-01-22 RX ADMIN — NEOSTIGMINE METHYLSULFATE 3 MG: 1 INJECTION INTRAVENOUS at 10:03

## 2019-01-22 RX ADMIN — GLYCOPYRROLATE 0.6 MG: 0.2 INJECTION INTRAMUSCULAR; INTRAVENOUS at 10:03

## 2019-01-22 RX ADMIN — DEXAMETHASONE SODIUM PHOSPHATE 6 MG: 4 INJECTION, SOLUTION INTRA-ARTICULAR; INTRALESIONAL; INTRAMUSCULAR; INTRAVENOUS; SOFT TISSUE at 07:57

## 2019-01-22 RX ADMIN — SODIUM CHLORIDE, SODIUM LACTATE, POTASSIUM CHLORIDE, AND CALCIUM CHLORIDE 25 ML/HR: 600; 310; 30; 20 INJECTION, SOLUTION INTRAVENOUS at 06:59

## 2019-01-22 RX ADMIN — GABAPENTIN 600 MG: 300 CAPSULE ORAL at 06:50

## 2019-01-22 RX ADMIN — LIDOCAINE HYDROCHLORIDE 60 MG: 20 INJECTION, SOLUTION EPIDURAL; INFILTRATION; INTRACAUDAL; PERINEURAL at 07:32

## 2019-01-22 RX ADMIN — ACETAMINOPHEN 1000 MG: 10 INJECTION, SOLUTION INTRAVENOUS at 08:29

## 2019-01-22 RX ADMIN — CLINDAMYCIN PHOSPHATE 900 MG: 18 INJECTION, SOLUTION INTRAVENOUS at 07:51

## 2019-01-22 RX ADMIN — GENTAMICIN SULFATE 327.6 MG: 40 INJECTION, SOLUTION INTRAMUSCULAR; INTRAVENOUS at 07:38

## 2019-01-22 RX ADMIN — Medication 120 MCG: at 09:29

## 2019-01-22 RX ADMIN — SODIUM CHLORIDE, SODIUM LACTATE, POTASSIUM CHLORIDE, AND CALCIUM CHLORIDE: 600; 310; 30; 20 INJECTION, SOLUTION INTRAVENOUS at 09:10

## 2019-01-22 NOTE — DISCHARGE INSTRUCTIONS
No lifting >10 lbs x 6 weeks  Nothing in the vagina x 6 weeks  No driving for 7-27 days or until off narcotic pain meds and can hit the brakes in an emergency  [facility] to shower tomorrow, no baths until incisions have healed  Can take off band-aids tomorrow, keep steri-strips on 7-10 days (until they start to peel off)  Take motrin 800 mg every 6 hrs and tylenol 650 mg every 6 hrs (both over the counter). Take percocet additionally if needed for pain. To treat or prevent constipation take (over the counter) senna 2 tabs each night, and additionally milk of magnesia if needed. FOLLOW UP VISIT Appointment in: One Week      DISCHARGE SUMMARY from Nurse    PATIENT INSTRUCTIONS:    After general anesthesia or intravenous sedation, for 24 hours or while taking prescription Narcotics:  · Limit your activities  · Do not drive and operate hazardous machinery  · Do not make important personal or business decisions  · Do  not drink alcoholic beverages  · If you have not urinated within 8 hours after discharge, please contact your surgeon on call. Report the following to your surgeon:  · Excessive pain, swelling, redness or odor of or around the surgical area  · Temperature over 100.5  · Nausea and vomiting lasting longer than 4 hours or if unable to take medications  · Any signs of decreased circulation or nerve impairment to extremity: change in color, persistent  numbness, tingling, coldness or increase pain  · Any questions    What to do at Home:    *  Please give a list of your current medications to your Primary Care Provider. *  Please update this list whenever your medications are discontinued, doses are      changed, or new medications (including over-the-counter products) are added. *  Please carry medication information at all times in case of emergency situations.     These are general instructions for a healthy lifestyle:    No smoking/ No tobacco products/ Avoid exposure to second hand smoke  Surgeon General's Warning:  Quitting smoking now greatly reduces serious risk to your health. Obesity, smoking, and sedentary lifestyle greatly increases your risk for illness    A healthy diet, regular physical exercise & weight monitoring are important for maintaining a healthy lifestyle    You may be retaining fluid if you have a history of heart failure or if you experience any of the following symptoms:  Weight gain of 3 pounds or more overnight or 5 pounds in a week, increased swelling in our hands or feet or shortness of breath while lying flat in bed. Please call your doctor as soon as you notice any of these symptoms; do not wait until your next office visit. Recognize signs and symptoms of STROKE:    F-face looks uneven    A-arms unable to move or move unevenly    S-speech slurred or non-existent    T-time-call 911 as soon as signs and symptoms begin-DO NOT go       Back to bed or wait to see if you get better-TIME IS BRAIN. Warning Signs of HEART ATTACK     Call 911 if you have these symptoms:   Chest discomfort. Most heart attacks involve discomfort in the center of the chest that lasts more than a few minutes, or that goes away and comes back. It can feel like uncomfortable pressure, squeezing, fullness, or pain.  Discomfort in other areas of the upper body. Symptoms can include pain or discomfort in one or both arms, the back, neck, jaw, or stomach.  Shortness of breath with or without chest discomfort.  Other signs may include breaking out in a cold sweat, nausea, or lightheadedness. Don't wait more than five minutes to call 911 - MINUTES MATTER! Fast action can save your life. Calling 911 is almost always the fastest way to get lifesaving treatment. Emergency Medical Services staff can begin treatment when they arrive -- up to an hour sooner than if someone gets to the hospital by car. The discharge information has been reviewed with the patient and caregiver.   The patient and caregiver verbalized understanding. Discharge medications reviewed with the patient and caregiver and appropriate educational materials and side effects teaching were provided. ___________________________________________________________________________________________________________________________________    A common side effect of anesthesia following surgery is nausea and/or vomiting. In order to decrease symptoms, it is wise to avoid foods that are high in fat, greasy foods, milk products, and spicy foods for the first 24 hours. Acceptable foods for the first 24 hours following surgery include but are not limited to:     soup   broth    toast    crackers    applesauce    bananas    mashed potatoes,   soft or scrambled eggs   oatmeal    jello    It is important to eat when taking your pain medication. This will help to prevent nausea. If possible, please try to time your meals with your medications. It is very important to stay hydrated following surgery. Sip fluids frequently while awake. Avoid acidic drinks such as citrus juices and soda for 24 hours. Carbonated beverages may cause bloating and gas. Acceptable fluids include:    - water (flavor packets may add variety)  - coffee or tea (in moderation)  - Gatorade  - Stas-aid  - apple juice  - cranberry juice    You are encouraged to cough and deep breathe every hour when awake. This will help to prevent respiratory complications following anesthesia. You may want to hug a pillow when coughing and sneezing to add additional support to the surgical area and to decrease discomfort if you had abdominal or chest surgery. If you are discharged home with support stockings, you may remove them after 24 hours. Support stockings are used to help prevent blood clots in the legs following surgery. Please take time to review all of your Home Care Instructions and Medication Information sheets provided in your discharge packet.  If you have any questions, please contact your surgeons office. Thank you. Patient Education   Ibuprofen (By mouth)   Ibuprofen (eye-bue-PROE-fen)  Treats pain and fever. This medicine is an NSAID. Brand Name(s): Advil, Advil Children's, Advil Liqui-Gels, Advil Migraine, All-Purpose First Aid Kit, Children's Ibuprofen, Children's Motrin, Comfort Pac, Concentrated Motrin Infants' Drops, Equate Ibuprofen Jd Strength, Genpril, [facility] Ibuprofen Infants', [facility] Children's Ibuprofen, [facility] Ibuprofen, [facility] Ibuprofen Jd Strength   There may be other brand names for this medicine. When This Medicine Should Not Be Used: This medicine is not right for everyone. Do not use if you had an allergic reaction (including asthma) to ibuprofen, aspirin, or another NSAID, or right before or after heart surgery. How to Use This Medicine:   Capsule, Liquid Filled Capsule, Suspension, Tablet, Chewable Tablet  · Your doctor will tell you how much medicine to use. Do not use more than directed. · Prescription ibuprofen should come with a Medication Guide. Ask your pharmacist for the Medication Guide if you do not have one. · Follow the instructions on the medicine label if you are using this medicine without a prescription. · Take this medicine with food or milk if it upsets your stomach. · Oral liquid: Shake well just before using. Measure with a marked measuring spoon, oral syringe, or medicine cup. · Chewable tablet: Chew completely before you swallow it. Then drink some water to make sure you swallow all of the medicine. · For Children: Ask your pharmacist if you are not sure how much medicine to give a child. The dose is usually based on weight, not age. Never give more medicine than directed. · For Adults: Do not take more than 6 pills in 1 day (24 hours) unless your doctor tells you to. · Missed dose:  If you take this medicine on a regular basis and miss a dose, take it as soon as you can. If it is almost time for your next dose, wait until then to use the medicine and skip the missed dose. Do not use extra medicine to make up for a missed dose. · Store the medicine in a closed container at room temperature, away from heat, moisture, and direct light. Do not freeze the oral liquid. Drugs and Foods to Avoid:   Ask your doctor or pharmacist before using any other medicine, including over-the-counter medicines, vitamins, and herbal products. · Some foods and medicine can affect how ibuprofen works. Tell your doctor if you are also using lithium, methotrexate, a blood thinner (such as warfarin), a steroid medicine (such as hydrocortisone, prednisolone, prednisone), a diuretic (water pill), or an ACE inhibitor blood pressure medicine. · Do not use any other NSAID medicine unless your doctor says it is okay. Some other NSAIDs are aspirin, diclofenac, naproxen, or celecoxib. · Do not drink alcohol while you are using this medicine. Warnings While Using This Medicine:   · Tell your doctor if you are pregnant or breastfeeding. Do not use this medicine during the later part of pregnancy. · Tell your doctor if you have kidney disease, liver disease, asthma, lupus or a similar connective tissue disease, or a history of ulcers or other digestion problems. Tell your doctor if you smoke or have heart or blood circulation problems, including high blood pressure, heart failure (CHF), or bleeding problems. · This medicine may cause the following problems:  ¨ Bleeding and ulcers in the stomach or intestines  ¨ Higher risk of heart attack or stroke  ¨ Liver damage  ¨ Kidney damage  ¨ Vision problems  · Call your doctor if symptoms get worse, pain lasts more than 10 days, or fever lasts more than 3 days. · This medicine might contain sugar or phenylalanine (aspartame). · Tell any doctor or dentist who treats you that you are using this medicine. · Keep all medicine out of the reach of children. Never share your medicine with anyone. Possible Side Effects While Using This Medicine:   Call your doctor right away if you notice any of these side effects:  · Allergic reaction: Itching or hives, swelling in your face or hands, swelling or tingling in your mouth or throat, chest tightness, trouble breathing  · Blistering, peeling, or red skin rash  · Change in how much or how often you urinate  · Chest pain that may spread to your arms, jaw, back, or neck, trouble breathing, nausea, unusual sweating, faintness  · Chest pain, trouble breathing, weakness on one side of your body, severe headache, trouble seeing or talking, pain in your lower leg  · Dark urine or pale stools, nausea, vomiting, loss of appetite, stomach pain, yellow skin or eyes  · Fever, neck pain, stiff neck  · Severe stomach pain, vomiting blood, bloody or black, tarry stools  · Swelling in your hands, ankles, or feet, rapid weight gain  · Trouble seeing, blind spots, change in how you see colors  · Unusual bleeding, bruising, or weakness  If you notice these less serious side effects, talk with your doctor:   · Constipation, diarrhea, gas, mild upset stomach  · Dizziness, headache, ringing in the ears  If you notice other side effects that you think are caused by this medicine, tell your doctor. Call your doctor for medical advice about side effects. You may report side effects to FDA at 8-921-FDA-1838  © 2017 [facility] Information is for End User's use only and may not be sold, redistributed or otherwise used for commercial purposes. The above information is an  only. It is not intended as medical advice for individual conditions or treatments. Talk to your doctor, nurse or pharmacist before following any medical regimen to see if it is safe and effective for you. Patient Education   Senna (By mouth)   Senna (SEN-a)  Relieves occasional constipation.    Brand Name(s):   There may be other brand names for this medicine. When This Medicine Should Not Be Used: This medicine is generally considered safe for most people. Talk to your doctor if you have concerns. How to Use This Medicine:   Liquid, Powder, Tablet, Chewable Tablet  · Your doctor will tell you how much medicine to use. Do not use more than directed. This medicine causes bowel movement in 6 to 12 hours. · Oral liquid: Measure the oral liquid medicine with a marked measuring spoon, oral syringe, or medicine cup. · Follow the instructions on the medicine label if you are using this medicine without a prescription. · Store the medicine in a closed container at room temperature, away from heat, moisture, and direct light. Drugs and Foods to Avoid:      Ask your doctor or pharmacist before using any other medicine, including over-the-counter medicines, vitamins, and herbal products. Warnings While Using This Medicine:   · Tell your doctor if you are pregnant or breastfeeding, or if you have kidney disease or liver disease. · Before you use this medicine, tell your doctor if you have stomach pain, nausea, vomiting. Tell him if you have already used a laxative for more than 1 week, or if you have had changes in your bowel movements recently. · Keep all medicine out of the reach of children. Never share your medicine with anyone. Possible Side Effects While Using This Medicine:   Call your doctor right away if you notice any of these side effects:  · Allergic reaction: Itching or hives, swelling in your face or hands, swelling or tingling in your mouth or throat, chest tightness, trouble breathing  · Black, tarry stools  · Stomach pain, nausea or vomiting  If you notice other side effects that you think are caused by this medicine, tell your doctor. Call your doctor for medical advice about side effects.  You may report side effects to FDA at 2-446-FDA-4825  © 2017 [address] Information is for End User's use only and may not be sold, redistributed or otherwise used for commercial purposes. The above information is an  only. It is not intended as medical advice for individual conditions or treatments. Talk to your doctor, nurse or pharmacist before following any medical regimen to see if it is safe and effective for you. Patient Education   Narcotic-Analgesic/Acetaminophen (By mouth)   Relieves pain. Brand Name(s): Capital w/Codeine, [place], [place], [place], Lorcet HD, Lorcet Plus, Lortab 10/325, Lortab 5/325, Lortab 7.5/325, Lortab Elixir, [place], [place], Cox, Trezix, Tylenol With Codeine No. 4   There may be other brand names for this medicine. When This Medicine Should Not Be Used: You should not use this medicine if you have had an allergic reaction to acetaminophen, codeine, hydrocodone, propoxyphene, or sulfites. You should not use this medicine if you have had an allergic reaction to any other opioid pain medicine. How to Use This Medicine:   Liquid, Tablet, Capsule  · Your doctor will tell you how much medicine to use. Do not use more than directed. · This medicine contains acetaminophen. Read the labels of all other medicines you are using to see if they also contain acetaminophen, or ask your doctor or pharmacist. Tammy Yolette not use more than 4 grams (4,000 milligrams) total of acetaminophen in one day. · Drink plenty of liquids to help avoid constipation. If a dose is missed:   · Some of these medicines need to be used on a fixed schedule. If you miss a dose or forget to use your medicine, call your doctor pharmacist for instructions. Do not use extra medicine to make up for a missed dose. How to Store and Dispose of This Medicine:   · Store the medicine in a closed container at room temperature, away from heat, moisture, and direct light. Do not refrigerate or freeze the medicine.   · Ask your pharmacist, doctor, or health caregiver about the best way to dispose of any outdated medicine or medicine no longer needed. · Keep all medicine out of the reach of children. Never share your medicine with anyone. Drugs and Foods to Avoid:   Ask your doctor or pharmacist before using any other medicine, including over-the-counter medicines, vitamins, and herbal products. · Make sure your doctor knows if you are using a monoamine oxidase inhibitor (MAOI) medicine, such as Eldepryl®, Marplan®, [place], or Parnate®. Make sure your doctor knows if you are also using a medicine to treat depression, such as amitriptyline, doxepin, nortriptyline, Elavil®, Pamelor®, or Sinequan®. Make sure your doctor knows if you are taking an anticholinergic medicine, such as atropine, methscopolamine, or scopolamine. · Tell your doctor if you use anything else that makes you sleepy. Some examples are allergy medicine, narcotic pain medicine, and alcohol. · Do not drink alcohol while you are using this medicine. Warnings While Using This Medicine:   · Make sure your doctor knows if you are pregnant or breast feeding, or if you have a head injury, or other conditions that may cause an increase in intercranial pressure (pressure inside your head). Make sure your doctor knows if you have severe kidney problems or severe liver problems, or if you have hypothyroidism (lack of thyroid function). Make sure your doctor knows if you have Louisa's disease (adrenal gland disease), or if you have enlarged prostate or urethral stricture. Make sure your doctor knows if you have any abdominal problems, or if you have lung disease or asthma. · This medicine may make you dizzy or drowsy. Avoid driving, using machines, or anything else that could be dangerous if you are not alert. · This medicine can be habit-forming. Do not use more than your prescribed dose. Call your doctor if you think your medicine is not working. · Tell any doctor or dentist who treats you that you are using this medicine.  This medicine may affect certain medical test results. · This medicine may cause constipation, especially with long-term use. Ask your doctor if you should use a laxative to prevent and treat constipation. · When a mother is breastfeeding and takes codeine, there is a very small chance that this medicine could cause serious side effects in the baby. This is because codeine works differently in a few women, so their breast milk contains too much medicine. If you take codeine, be alert for these signs of overdose in your nursing baby: sleeping more than usual, trouble breastfeeding, trouble breathing, or being limp and weak. Call the baby's doctor right away if you think there is a problem. If you cannot talk to the doctor, take the baby to the emergency room or call 911. Possible Side Effects While Using This Medicine:   Call your doctor right away if you notice any of these side effects:  · Allergic reaction: Itching or hives, swelling in your face or hands, swelling or tingling in your mouth or throat, chest tightness, trouble breathing  · Dizziness. · Seeing or hearing things that are not there. · Very slow heartbeat. If you notice these less serious side effects, talk with your doctor:   · Change in how much or how often you urinate. · Cold, clammy skin. · Feeling unusually anxious, excited, fearful, or tired. · Nausea, vomiting, constipation, stomach pain or upset, or heartburn. · Skin rash. · Vision changes. If you notice other side effects that you think are caused by this medicine, tell your doctor. Call your doctor for medical advice about side effects. You may report side effects to FDA at 3-160-OWH-2237  © 2017 [facility] Information is for End User's use only and may not be sold, redistributed or otherwise used for commercial purposes. The above information is an  only. It is not intended as medical advice for individual conditions or treatments.  Talk to your doctor, nurse or pharmacist before following any medical regimen to see if it is safe and effective for you.

## 2019-01-22 NOTE — PERIOP NOTES
For dc home. Vswnl. Reports pain as 3/10 which is tolerable per pt.  vomited small amt bile colored fluid. After resting x 15 minutes w/out further nausea, pt requested dc. Denies nausea at this time. dsgs to abd d&i. Went over [employer] instructions w/pt and mother including Rx and f/up. Verbalized understanding. dc'd home.

## 2019-01-22 NOTE — OP NOTES
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 PROCEDURE PERFORMED:  Robotic-assisted right salpingo-oophorectomy, bilateral pelvic and periaortic lymph node dissection, peritoneal biopsies and omentectomy for staging. SURGEON:  Eli Edmond MD    ASSISTANT:  Cecilia Rutherford    ANESTHESIA:  General endotracheal.    IMPLANTS:  none    ESTIMATED BLOOD LOSS:  20 mL. IV FLUIDS:  See anesthesia record. URINE OUTPUT:  See anesthesia record. INDICATIONS: The patient is a 36-year-old who had a persistent right ovarian cyst who underwent a robotic right ovarian cystectomy in 2018. Unfortunately, final pathology revealed a borderline serous tumor of the ovary and she required at least a salpingo-oophorectomy and staging at the same time. FINDINGS:  Exam under anesthesia revealed a normal vulva, vagina and cervix. Intraoperative findings revealed a normal uterus with some small subserosal fibroids. Normal appearing left ovary and fallopian tube, grossly normal appearing right ovary and fallopian tube with adhesions to the sigmoid colon. The peritoneum was smooth. The upper abdomen including the liver edge and bilateral hemidiaphragms were smooth. The omentum appeared normal.  There was no gross lymphadenopathy. Overall, there were no concerning findings. SPECIMENS REMOVED:  1. Pelvic washings. 2.  Peritoneal biopsies of the bladder, cul-de-sac, bilateral pelvis, bilateral pericolic gutters, right iliac lymph nodes. 3.  Right obturator lymph nodes. 4.  Right perirectal lymph nodes. 5.  Left iliac lymph nodes. 6.  Left obturator lymph nodes. 7.  Omentum.   8.  Right ovary and fallopian tubes. COMPLICATIONS:  None. CONDITION:  Stable to PACU. PROCEDURE:  The patient was taken to the operating room. She was placed under general anesthesia without difficulty. She was then prepared and draped in normal sterile fashion in the dorsal lithotomy position. A Valerio catheter was placed in the bladder. A sponge stick was placed in the vagina and attention was then turned to the abdomen. After injection of local anesthesia at the site of her prior scars with an additional incision at the left lateral abdomen. Local anesthesia was injected. An 8 mm supraumbilical skin incision was made in the midline. The Veress needle was introduced. Proper placement was confirmed. The abdomen was insufflated to a pressure of 15 mmHg and the Veress needle was removed and the robotic camera trocar was inserted. Proper placement was confirmed. Pelvic washings were obtained and sent for cytology. The patient was placed in steep Trendelenburg. The [employer] robot was docked and instruments were introduced. After a right upper quadrant skin incision and 12 AirSeal assistant trocar was placed, the right lateral abdomen had an 8 robotic trocar, a left upper quadrant was an 8 robotic trocar and the left lateral abdomen was an 8 robotic trocar. Attention was turned to the peritoneal biopsies which were obtained  over the bladder, the cul-de-sac, the bilateral pelvis and the bilateral pericolic gutters. These were all handed off and labeled as separate specimens. Attention was then turned to the lymph node dissection along the right side the peritoneum along the right lateral pelvic sidewall was incised, a retroperitoneum was developed. The right obturator space was developed. The superior vesicle artery was retracted medially.   The lymph nodes overlying the right external iliac vessels were dissected between the borders of the right circumflex iliac vein caudad, the bifurcation of the right common iliac artery cephalad, the mid portion of the psoas muscle laterally where the genitofemoral nerve was identified and spared and the ureter medially. These lymph nodes were placed in EndoCatch bag and handed off for pathology. In the obturator space the obturator nerve was easily identified and the lymph nodes overlying this were dissected out,   placed in EndoCatch bag and handed off for pathology. Attention was then turned to the periaortic region. The peritoneum overlying the right common iliac artery was incised. The right ureter was identified and traced cephalad and retracted laterally out of the area of dissection. The lymph nodes overlying the inferior vena cava between the borders of the midportion of the right common iliac artery, caudad and the inferior mesenteric artery cephalad were carefully dissected out with adequate hemostasis,  placed in EndoCatch bag and handed off for pathology. The retroperitoneal dissection along the left side for the periaortic region proved difficult. The left ureter could not easily be identified. The benefits of this dissection  proved minimal due to her pathology and cyst being on the right side; therefore, the left periaortic lymph nodes were not dissected out. Along the left pelvic region the peritoneum was incised along the left external iliac vessels. The left obturator space was developed. The superior vesicle artery was retracted medially, the ureter was retracted medially. The lymph nodes overlying the left external iliac vessels were dissected between the borders of the left common iliac artery caudad. The bifurcation of the left common iliac artery cephalad, the mid portion of the psoas muscle laterally where the genitofemoral nerve was identified and spared and the ureter medially. These lymph nodes were placed in EndoCatch bag and handed off for pathology. The left obturator space was developed.   The obturator nerve was easily identified. The lymph nodes overlying this were dissected out, placed in EndoCatch bag and a handed off for pathology. Adequate hemostasis was confirmed. Attention was then turned to the right   salpingo-oophorectomy. The peritoneum of the posterior leaf of the broad ligament was incised after clear identification of the right ureter. The right ovarian vessels were then isolated, coagulated and transected. Along the right uterine cornua, the fallopian tube and the uteroovarian ligaments were isolated, coagulated and transected with adequate hemostasis. The right ovary, fallopian tube placed in EndoCatch bag and removed through the right upper quadrant assistant port, completely within the bag and handed off for pathology. The port was then reintroduced. The omentum was brought down to the pelvis. An infracolic omentectomy was then performed, keeping the transverse colon into view the whole time. The vascular pedicles and omentum were coagulated and transected with the bipolar cautery. The omentum was then brought down into the pelvis. The abdomen was then carefully explored. Hemostasis was confirmed of all the lymph node dissection. Karsten was placed in the beds of the lymph node dissection. At this point, then the omentum was brought into an EndoCatch bag. The [employer] were removed. The [employer] robot was undocked. The right upper quadrant port was removed. The bag was brought up to the skin incision and the omentum was brought out entirely within the bag the whole time. At this point, the laparoscope was reintroduced after gas was reintroduced into the abdomen and the Weck fascial closure device was used to close the right upper quadrant fascial incision with a single interrupted suture of 0 Vicryl. Insufflation was then removed from the abdomen. All trocars were removed from the abdomen. All skin incisions were closed with 4-0 Monocryl subcuticular stitch.   The sponge stick was removed from the vagina. The Valerio catheter was removed from the bladder. Patient was awakened and taken to the PACU in stable condition. Joi An.  MD Kim Moore / ARTURO  D: 01/22/2019 10:28     T: 01/22/2019 11:14  JOB #: [id number]  CC: Carlie Jacobson MD  CC: Danyelle Dodd NP

## 2019-01-22 NOTE — PERIOP NOTES
Handoff Report from Operating Room to PACU Report received from [facility] Dwayne Lunavard JAYME regarding Racheal Teixeira. Surgeon(s): 
Mimi Wood MD  And Procedure(s) (LRB): 
ROBOTIC RIGHT SALPINGO OOPHORECTOMY STAGING (REQUEST XI) (Right)  confirmed  
with allergies, dressings and local anesthetic discussed. Anesthesia type, drugs, patient history, complications, estimated blood loss, vital signs, intake and output, and last pain medication, lines, reversal medications and temperature were reviewed.

## 2019-01-22 NOTE — ANESTHESIA POSTPROCEDURE EVALUATION
Procedure(s): 
ROBOTIC RIGHT SALPINGO OOPHORECTOMY STAGING (REQUEST XI). Anesthesia Post Evaluation Patient location during evaluation: PACU Note status: Adequate. Level of consciousness: responsive to verbal stimuli and sleepy but conscious Pain management: satisfactory to patient Airway patency: patent Anesthetic complications: no 
Cardiovascular status: acceptable Respiratory status: acceptable Hydration status: acceptable Comments: +Post-Anesthesia Evaluation and Assessment Patient: Adilson Jaffe MRN: [de-identified]  SSN: [id number] YOB: 1980  Age: 41 y.o. Sex: female Cardiovascular Function/Vital Signs /87   Pulse 83   Temp 36.4 °C (97.6 °F)   Resp 23   Ht 5' 7\" (1.702 m)   Wt 70.9 kg (156 lb 4.9 oz)   SpO2 99%   BMI 24.48 kg/m² Patient is status post Procedure(s): 
ROBOTIC RIGHT SALPINGO OOPHORECTOMY STAGING (REQUEST XI). Nausea/Vomiting: Controlled. Postoperative hydration reviewed and adequate. Pain: 
Pain Scale 1: Numeric (0 - 10) (01/22/19 1120) Pain Intensity 1: 2 (01/22/19 1120) Managed. Neurological Status:  
Neuro (WDL): Exceptions to WDL (01/22/19 1045) At baseline. Mental Status and Level of Consciousness: Arousable. Pulmonary Status:  
O2 Device: Room air (01/22/19 1120) Adequate oxygenation and airway patent. Complications related to anesthesia: None Post-anesthesia assessment completed. No concerns. Signed By: Fidelia Jimenez MD  
 1/22/2019 Post anesthesia nausea and vomiting:  controlled Visit Vitals /87 Pulse 83 Temp 36.4 °C (97.6 °F) Resp 23 Ht 5' 7\" (1.702 m) Wt 70.9 kg (156 lb 4.9 oz) SpO2 99% BMI 24.48 kg/m²

## 2019-01-22 NOTE — H&P
History and Physical 
 
Patient: Donal Orellana MRN: [de-identified]  SSN: [id number] YOB: 1980  Age: 41 y.o. Sex: female Subjective:  
  
Donal Orellana is a 41 y.o. female who has borderline serous tumor of right ovary, s/p robotic right ovarian cystectomy in 2018. She is scheduled to undergo completion oophorectomy and staging today. She is overall feeling well. Has a change in OCP and hasn't been on OCP x 2 weeks. Otherwise no changes since my office with her in Dec 2018. Past Medical History:  
Diagnosis Date  Anemia  Asthma   
 uses inhaler 2-3 times per week  Biliary colic   Hypertension  Ill-defined condition   
 murmur Past Surgical History:  
Procedure Laterality Date  HX CHOLECYSTECTOMY    HX GYN    
 vaginal delivery x1  
 HX GYN  2018  
 cyst removal  
 HX GYN    
   HX OTHER SURGICAL    
 lipoma removal from right shoulder  HX WISDOM TEETH EXTRACTION Family History Problem Relation Age of Onset  Hypertension Mother  Cancer Mother   
     colon, in remission  Clotting Disorder Mother   
     blood clot post surgery  Hypertension Father  Colon Cancer Paternal Uncle  Pancreatic Cancer Paternal Uncle  Cancer Paternal Uncle  Cancer Maternal Uncle  Stroke Paternal Aunt  Heart Disease Maternal Grandmother Social History Tobacco Use  Smoking status: Never Smoker  Smokeless tobacco: Never Used Substance Use Topics  Alcohol use: Yes Alcohol/week: 1.8 oz Types: 3 Cans of beer per week Comment: socially Prior to Admission medications Medication Sig Start Date End Date Taking? Authorizing Provider  
acetaminophen (TYLENOL) 325 mg tablet Take  by mouth every four (4) hours as needed for Pain.    Yes Provider, Historical  
albuterol (PROVENTIL HFA, VENTOLIN HFA, PROAIR HFA) 90 mcg/actuation inhaler Take 2 Puffs by inhalation every four (4) hours as needed for Wheezing. 11/1/18  Yes Rachael Jeffery NP  
metoprolol succinate (TOPROL-XL) 25 mg XL tablet Take 1 Tab by mouth daily. Patient taking differently: Take 25 mg by mouth nightly. 9/25/18  Yes Rachael Jeffery NP  
valACYclovir (VALTREX) 500 mg tablet Take 500 mg by mouth every evening. 2/4/16  Yes Provider, Historical  
TRINESSA LO 0.18/0.215/0.25 mg-25 mcg tab Take 1 Tab by mouth nightly. 11/27/18   Laura Safe., MD  
  
 
Allergies Allergen Reactions  Amoxicillin Hives  Chlorhexidine Rash  Lisinopril Angioedema Review of Systems: A comprehensive review of systems was negative. Objective:  
 
Vitals:  
 01/22/19 2686 BP: (!) 167/108 Pulse: 94 Resp: 20 Temp: 97.8 °F (36.6 °C) SpO2: 99% Weight: 70.9 kg (156 lb 4.9 oz) Height: 5' 7\" (1.702 m) Physical Exam: 
GENERAL: alert, cooperative, no distress, appears stated age LUNG: clear to auscultation bilaterally HEART: regular rate and rhythm, S1, S2 normal, no murmur, click, rub or gallop ABDOMEN: soft, non-tender. Bowel sounds normal. No masses,  no organomegaly EXTREMITIES:  extremities normal, atraumatic, no cyanosis or edema SKIN: Normal. 
NEUROLOGIC: negative PSYCHIATRIC: non focal 
 
Assessment:  
 
Hospital Problems  Date Reviewed: 1/22/2019 None Plan:  
 
Borderline serous tumor of right ovary. 
-To OR for robotic right salpingo-oophorectomy and staging (washings, peritoneal biopsies, lymph node dissection) -Will recheck UCx with martinez sample in OR 
-Same day discharge 
-Restart OCP in 2 weeks. Signed By: Laureen Arnett MD   
 January 22, 2019

## 2019-01-22 NOTE — DISCHARGE SUMMARY
Discharge Summary     Patient: Luis Gonzalez MRN: [de-identified]  SSN: [id number]    YOB: 1980  Age: 41 y.o. Sex: female       Admit Date: 1/22/2019    Discharge Date: 1/22/2019      Admission Diagnoses: OVARIAN CYST, BORDERLINE OVARIAN TUMOR OF RIGHT OVARY     Discharge Diagnoses: Same  Problem List as of 1/22/2019 Date Reviewed: 1/22/2019          Codes Class Noted - Resolved    Vitamin D deficiency ICD-10-CM: E55.9  ICD-9-CM: 268.9  9/20/2016 - Present        Lump of skin ICD-10-CM: R22.9  ICD-9-CM: 782.2  1/2/2014 - Present        Cellulitis of great toe ICD-10-CM: L03.039  ICD-9-CM: 681.10  1/2/2014 - Present        Benign essential HTN ICD-10-CM: I10  ICD-9-CM: 401.1  2/21/2013 - Present        HSV (herpes simplex virus) infection ICD-10-CM: B00.9  ICD-9-CM: 054.9  2/21/2013 - Present        Acute bronchitis ICD-10-CM: J20.9  ICD-9-CM: 466.0  11/23/2011 - Present        Biliary colic HBQ-34-GE: H17.44  ICD-9-CM: 574.20  5/21/2010 - Present        RESOLVED: Elevated blood pressure ICD-10-CM: MTB7580  ICD-9-CM: Drew Khannaenson  1/25/2013 - 2/21/2013               Discharge Condition: Good    Hospital Course: 42 yo with borderline serous tumor of the right ovary s/p right ovarian cystectomy in 11/2018, admitted for scheduled completion right salpingo-oophorectomy and staging. Procedure and recovery were uncomplicated. She was discharged home in stable condition. Consults: None    Significant Diagnostic Studies: none    Disposition: home    Discharge Medications:   Current Discharge Medication List      START taking these medications    Details   oxyCODONE-acetaminophen (PERCOCET) 5-325 mg per tablet Take 1 Tab by mouth every four (4) hours as needed for Pain. Max Daily Amount: 6 Tabs. Qty: 20 Tab, Refills: 0    Associated Diagnoses: Borderline epithelial neoplasm of ovary      ibuprofen (MOTRIN IB) 200 mg tablet Take 4 Tabs by mouth every six (6) hours.   Qty: 60 Tab, Refills: 2      senna (SENNA) 8.6 mg tablet Take 2 Tabs by mouth daily. To treat/prevent constipation  Qty: 30 Tab, Refills: 2         CONTINUE these medications which have NOT CHANGED    Details   acetaminophen (TYLENOL) 325 mg tablet Take  by mouth every four (4) hours as needed for Pain. albuterol (PROVENTIL HFA, VENTOLIN HFA, PROAIR HFA) 90 mcg/actuation inhaler Take 2 Puffs by inhalation every four (4) hours as needed for Wheezing. Qty: 1 Inhaler, Refills: 11    Associated Diagnoses: Mild intermittent asthmatic bronchitis with acute exacerbation      metoprolol succinate (TOPROL-XL) 25 mg XL tablet Take 1 Tab by mouth daily. Qty: 90 Tab, Refills: 2    Associated Diagnoses: Benign essential HTN; Heart palpitations      valACYclovir (VALTREX) 500 mg tablet Take 500 mg by mouth every evening. Refills: 1      TRINESSA LO 0.18/0.215/0.25 mg-25 mcg tab Take 1 Tab by mouth nightly. Qty: 1 Package, Refills: 11             Activity: See surgical instructions  Diet: Regular Diet  Wound Care: see surgical instructions    Follow-up Appointments   Procedures    FOLLOW UP VISIT Appointment in: One Week     Standing Status:   Standing     Number of Occurrences:   1     Order Specific Question:   Appointment in     Answer:    One Week       Signed By: Angel Salgado MD     January 22, 2019

## 2019-01-22 NOTE — PERIOP NOTES
06:00= states she had a rash last time she used CHG wipes for surgery, she itched and had a rash; wrote allergy to CHG on consent and placed on allergy list. 
 
06:35= Larisa waqasgger applied to pt for warmth. 06:47= mepilex dsg applied to sacrum; no erythema or skin breakdown noted.

## 2019-01-22 NOTE — ANESTHESIA PREPROCEDURE EVALUATION
Anesthetic History No history of anesthetic complications Review of Systems / Medical History Patient summary reviewed, nursing notes reviewed and pertinent labs reviewed Pulmonary Asthma Neuro/Psych Within defined limits Cardiovascular Hypertension Exercise tolerance: >4 METS 
  
GI/Hepatic/Renal 
Within defined limits Endo/Other Within defined limits Other Findings Comments: OVARIAN CYST Hx Bulimia Physical Exam 
 
Airway Mallampati: I 
TM Distance: 4 - 6 cm Neck ROM: normal range of motion Mouth opening: Normal 
 
 Cardiovascular Regular rate and rhythm,  S1 and S2 normal,  no murmur, click, rub, or gallop Dental 
No notable dental hx Pulmonary Breath sounds clear to auscultation Abdominal 
GI exam deferred Other Findings Anesthetic Plan ASA: 2 Anesthesia type: general 
 
Monitoring Plan: BIS Induction: Intravenous Anesthetic plan and risks discussed with: Patient

## 2019-01-22 NOTE — BRIEF OP NOTE
BRIEF OPERATIVE NOTE Date of Procedure: 1/22/2019 Preoperative Diagnosis: BORDERLINE TUMOR OF THE RIGHT OVARY Postoperative Diagnosis:    BORDERLINE TUMOR OF THE RIGHT OVARY Procedure(s): 
ROBOTIC RIGHT SALPINGO OOPHORECTOMY STAGING (REQUEST XI) Surgeon(s) and Role: * Hilaria Zhou MD - Primary Surgical Assistant: Lott Aase Surgical Staff: 
Circ-1: Guera Huntley Scrub Tech-1: Fitz Antonio Surg Asst-1: Oz Feldman Float Staff: Ellis Hannon RN Event Time In Time Out Incision Start 9018 Incision Close 1009 Anesthesia: General  
Estimated Blood Loss: 20 
Specimens:  
ID Type Source Tests Collected by Time Destination 1 : BLADDER PERITONEUM Preservative Pelvis  Hilaria MD Abelardo 1/22/2019 5877 Pathology 2 : RIGHT PELVIC PERITONEUM Preservative Pelvis  Hilaria MD Abelardo 1/22/2019 4145 Pathology 3 : RIGHT PERICOLIC GUTTER Preservative Pelvis  Hilaria MD Abelardo 1/22/2019 1762 Pathology 4 : LEFT PERICOLIC GUTTER Preservative Pelvis  Hilaria MD Abelardo 1/22/2019 0524 Pathology 5 : LEFT PELVIC PERITONEUM  Preservative Pelvis  Hilaria MD Abelardo 1/22/2019 6068 Pathology 6 : [address] Pelvis  Hilaria MD Abelardo 1/22/2019 5742 Pathology 7 : SIGMOID ADHESIONS Preservative Pelvis  Hilaria MD Abelardo 1/22/2019 1473 Pathology 8 : RIGHT ILIAC LYMPH NODES Preservative Pelvis  Hilaria MD Abelardo 1/22/2019 0845 Pathology 9 : RIGHT OBTURATOR LYMPH NODES Preservative Pelvis  Hilaria MD Abelardo 1/22/2019 4652 Pathology 10 : RIGHT PERIAORTIC LYMPH NODES Preservative Pelvis  Hilaria MD Abelardo 1/22/2019 7126 Pathology 11 : LEFT ILIAC LYMPH NODES Preservative Pelvis  Hilaria MD Abelardo 1/22/2019 5553 Pathology 12 : LEFT OBTURATOR LYMPH NODES Preservative Pelvis  Hilaria MD Abelardo 1/22/2019 5814 Pathology 13 : RIGHT OVARY AND FALLOPIAN TUBE Preservative Ovary  Hilaria MD Abelardo 1/22/2019 4541 Pathology 14 : OMENTUM Preservative Abdomen  Henrietta Cruz MD 1/22/2019 7034 Pathology 1 : URINE Urine Valerio Specimen URINE C&S Henrietta Cruz MD 1/22/2019 1798 Microbiology 1 : PELVIC WASHINGS Fresh Pelvis  Henrietta Cruz MD 1/22/2019 1927 Cytology Findings: normal findings, including peritoneum, upper abdomen, bilateral hemidiaphragms, omentum, no lymphadenopathy, right ovary with adhesions to sigmoid Complications: none Implants: * No implants in log *

## 2019-01-24 LAB
BACTERIA SPEC CULT: NORMAL
CC UR VC: NORMAL
SERVICE CMNT-IMP: NORMAL

## 2019-01-26 LAB
ABO + RH BLD: NORMAL
ANTIGENS PRESENT BLD: NORMAL
ANTIGENS PRESENT RBC DONR: NORMAL
ANTIGENS PRESENT RBC DONR: NORMAL
BLD PROD TYP BPU: NORMAL
BLD PROD TYP BPU: NORMAL
BLOOD GROUP ANTIBODIES SERPL: NORMAL
BLOOD GROUP ANTIBODIES SERPL: NORMAL
BPU ID: NORMAL
BPU ID: NORMAL
CROSSMATCH RESULT,%XM: NORMAL
CROSSMATCH RESULT,%XM: NORMAL
SPECIMEN EXP DATE BLD: NORMAL
STATUS OF UNIT,%ST: NORMAL
STATUS OF UNIT,%ST: NORMAL
UNIT DIVISION, %UDIV: 0
UNIT DIVISION, %UDIV: 0

## 2019-05-22 ENCOUNTER — TELEPHONE (OUTPATIENT)
Dept: FAMILY MEDICINE CLINIC | Age: 39
End: 2019-05-22

## 2019-05-22 NOTE — TELEPHONE ENCOUNTER
Spoke with pt and informed to call back for same day to be seen by Julio Bustamante NP. Pt verbalized understanding.

## 2019-05-22 NOTE — TELEPHONE ENCOUNTER
----- Message from Tamica Steel sent at 5/22/2019  7:26 AM EDT -----  Regarding: Np, KENISHA/ [address]: [phone]  Patient is requesting to come in this afternoon because her hair is falling out. In the front of her head the scalp is very dry and spots are thing and falling out. She would like to come in this afternoon to see Estefany Jeffery, if possible. Patient needs a call this morning regarding and appointment.  Patient's best contact #[phone]

## 2019-05-23 ENCOUNTER — OFFICE VISIT (OUTPATIENT)
Dept: FAMILY MEDICINE CLINIC | Age: 39
End: 2019-05-23

## 2019-05-23 VITALS
HEART RATE: 96 BPM | WEIGHT: 150.2 LBS | HEIGHT: 67 IN | SYSTOLIC BLOOD PRESSURE: 147 MMHG | TEMPERATURE: 97.3 F | BODY MASS INDEX: 23.57 KG/M2 | DIASTOLIC BLOOD PRESSURE: 97 MMHG | RESPIRATION RATE: 18 BRPM | OXYGEN SATURATION: 100 %

## 2019-05-23 DIAGNOSIS — L65.9 PATCHY LOSS OF HAIR: Primary | ICD-10-CM

## 2019-05-23 DIAGNOSIS — R25.2 MUSCLE CRAMPS: ICD-10-CM

## 2019-05-23 DIAGNOSIS — R23.3 EASY BRUISING: ICD-10-CM

## 2019-05-23 DIAGNOSIS — I10 BENIGN ESSENTIAL HTN: ICD-10-CM

## 2019-05-23 LAB — HBA1C MFR BLD HPLC: 5 %

## 2019-05-23 RX ORDER — METOPROLOL SUCCINATE 50 MG/1
50 TABLET, EXTENDED RELEASE ORAL DAILY
Qty: 90 TAB | Refills: 1 | Status: SHIPPED | OUTPATIENT
Start: 2019-05-23 | End: 2019-11-25 | Stop reason: SDUPTHER

## 2019-05-23 NOTE — PATIENT INSTRUCTIONS
Protein is important to strengthen hair and promote growth. The recommended daily amount is about two to three 3-ounce servings of meat or a combination of four to five servings of dairy and beans - include nuts and seeds, eggs, and fish in diet. All are important sources of omega-3 fatty acids, which help lower inflammation and create a healthier scalp. It's best to avoid a rut; eat a variety of foods every day - eat six to 10 servings of various vegetables daily, two to four fruits, and an assortment of grains and legumes and lean meat products. Rubber bands, dyes, perms, straightening irons, and curling wands can be hard on your hair. If your hair is thinning, you don't want it to break as well. Be gentle with your hair -- don't overdo brushing or washing Stress (physical and emotional) can sometimes increase hair loss - make sure you are exercising, may try meditation as well to help with stress levels. You can try zinc and biotin supplements as well. Muscle Cramps: Care Instructions Your Care Instructions A muscle cramp occurs when a muscle tightens up suddenly. A cramp often happens in the legs. A muscle cramp is also called a muscle spasm or a charley horse. Muscle cramps usually last less than a minute. However, the pain may last for several minutes. Leg cramps that occur at night may wake you up. Heavy exercise, dehydration, and being overweight can increase your risk of getting cramps. An imbalance of certain chemicals in your blood, called electrolytes, can also lead to muscle cramps. Pregnant women sometimes get muscle cramps during sleep. Muscle cramps can be treated by stretching and massaging the muscle. If cramps keep coming back, your doctor may prescribe medicine that relaxes your muscles. Follow-up care is a key part of your treatment and safety.  Be sure to make and go to all appointments, and call your doctor if you are having problems. It's also a good idea to know your test results and keep a list of the medicines you take. How can you care for yourself at home? · Drink plenty of fluids to prevent dehydration. Choose water and other caffeine-free clear liquids until you feel better. If you have kidney, heart, or liver disease and have to limit fluids, talk with your doctor before you increase the amount of fluids you drink. · Stretch your muscles every day, especially before and after exercise and at bedtime. Regular stretching can relax your muscles and may prevent cramps. · Do not suddenly increase the amount of exercise you get. Increase your exercise a little each week. · When you get a cramp, stretch and massage the muscle. You can also take a warm shower or bath to relax the muscle. A heating pad placed on the muscle can also help. · Take a daily multivitamin supplement. · Ask your doctor if you can take an over-the-counter pain medicine, such as acetaminophen (Tylenol), ibuprofen (Advil, Motrin), or naproxen (Aleve). Be safe with medicines. Read and follow all instructions on the label. When should you call for help? Watch closely for changes in your health, and be sure to contact your doctor if: 
  · You get muscle cramps often that do not go away after home treatment.  
  · Your muscle cramps often wake you up at night.  
  · You do not get better as expected. Where can you learn more? Go to http://coleman-le.info/. Enter F874 in the search box to learn more about \"Muscle Cramps: Care Instructions. \" Current as of: September 20, 2018 Content Version: 11.9 © 1456-5604 [employer]. Care instructions adapted under license by [employer] (which disclaims liability or warranty for this information).  If you have questions about a medical condition or this instruction, always ask your healthcare professional. [employer] disclaims any warranty or liability for your use of this information.
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
## 2019-05-23 NOTE — PROGRESS NOTES
Chief Complaint   Patient presents with    Alopecia    Hair/Scalp Problem     1. Have you been to the ER, urgent care clinic since your last visit? Hospitalized since your last visit? No    2. Have you seen or consulted any other health care providers outside of the [address] [place] [address] since your last visit? Include any pap smears or colon screening.  No    Health Maintenance Due   Topic Date Due    Pneumococcal 0-64 years (1 of 1 - PPSV23) 05/31/1987    PAP AKA CERVICAL CYTOLOGY  03/01/2017

## 2019-05-24 LAB
25(OH)D3+25(OH)D2 SERPL-MCNC: NORMAL NG/ML
ALBUMIN SERPL-MCNC: 4.4 G/DL (ref 3.5–5.5)
ALBUMIN/GLOB SERPL: 1.7 {RATIO} (ref 1.2–2.2)
ALP SERPL-CCNC: 169 IU/L (ref 39–117)
ALT SERPL-CCNC: 87 IU/L (ref 0–32)
APTT PPP: 25 SEC (ref 24–33)
AST SERPL-CCNC: 161 IU/L (ref 0–40)
BASOPHILS # BLD AUTO: 0 X10E3/UL (ref 0–0.2)
BASOPHILS NFR BLD AUTO: 1 %
BILIRUB SERPL-MCNC: 0.3 MG/DL (ref 0–1.2)
BUN SERPL-MCNC: 9 MG/DL (ref 6–20)
BUN/CREAT SERPL: 12 (ref 9–23)
CALCIUM SERPL-MCNC: 9.9 MG/DL (ref 8.7–10.2)
CHLORIDE SERPL-SCNC: 101 MMOL/L (ref 96–106)
CO2 SERPL-SCNC: 20 MMOL/L (ref 20–29)
CREAT SERPL-MCNC: 0.75 MG/DL (ref 0.57–1)
EOSINOPHIL # BLD AUTO: 0.3 X10E3/UL (ref 0–0.4)
EOSINOPHIL NFR BLD AUTO: 4 %
ERYTHROCYTE [DISTWIDTH] IN BLOOD BY AUTOMATED COUNT: 20.9 % (ref 12.3–15.4)
FERRITIN SERPL-MCNC: 42 NG/ML (ref 15–150)
FOLATE SERPL-MCNC: 8.4 NG/ML
GLOBULIN SER CALC-MCNC: 2.6 G/DL (ref 1.5–4.5)
GLUCOSE SERPL-MCNC: 96 MG/DL (ref 65–99)
HCT VFR BLD AUTO: 34.1 % (ref 34–46.6)
HGB BLD-MCNC: 11.6 G/DL (ref 11.1–15.9)
IMM GRANULOCYTES # BLD AUTO: 0 X10E3/UL (ref 0–0.1)
IMM GRANULOCYTES NFR BLD AUTO: 0 %
INR PPP: 1 (ref 0.8–1.2)
IRON SATN MFR SERPL: 11 % (ref 15–55)
IRON SERPL-MCNC: 40 UG/DL (ref 27–159)
LYMPHOCYTES # BLD AUTO: 1.6 X10E3/UL (ref 0.7–3.1)
LYMPHOCYTES NFR BLD AUTO: 25 %
MAGNESIUM SERPL-MCNC: 2.1 MG/DL (ref 1.6–2.3)
MCH RBC QN AUTO: 29.4 PG (ref 26.6–33)
MCHC RBC AUTO-ENTMCNC: 34 G/DL (ref 31.5–35.7)
MCV RBC AUTO: 87 FL (ref 79–97)
MONOCYTES # BLD AUTO: 0.3 X10E3/UL (ref 0.1–0.9)
MONOCYTES NFR BLD AUTO: 5 %
NEUTROPHILS # BLD AUTO: 4 X10E3/UL (ref 1.4–7)
NEUTROPHILS NFR BLD AUTO: 65 %
PLATELET # BLD AUTO: 148 X10E3/UL (ref 150–450)
POTASSIUM SERPL-SCNC: 4.6 MMOL/L (ref 3.5–5.2)
PROT SERPL-MCNC: 7 G/DL (ref 6–8.5)
PROTHROMBIN TIME: 10.4 SEC (ref 9.1–12)
RBC # BLD AUTO: 3.94 X10E6/UL (ref 3.77–5.28)
RETICS/RBC NFR AUTO: 2.3 % (ref 0.6–2.6)
SODIUM SERPL-SCNC: 138 MMOL/L (ref 134–144)
TIBC SERPL-MCNC: 362 UG/DL (ref 250–450)
TSH SERPL DL<=0.005 MIU/L-ACNC: 1.14 UIU/ML (ref 0.45–4.5)
UIBC SERPL-MCNC: 322 UG/DL (ref 131–425)
VIT B12 SERPL-MCNC: 316 PG/ML (ref 232–1245)
WBC # BLD AUTO: 6.2 X10E3/UL (ref 3.4–10.8)

## 2019-05-28 NOTE — PROGRESS NOTES
RECOMMENDATIONS:  Thyroid and diabetes screening normal.  Kidney function normal.  Iron is a little low, you can take a prenatal vitamin with iron every day. Vitamin D could not be calculated because your blood was high in cholesterol. We need to repeat your vitamin D and cholesterol when you are fasting (no food, only water) for 8-10 hours before labs. Your liver function tests (AST, ALT and alkaline phophatase) are elevated. I need to check couple other labs to see what is causing this. Please schedule a lab only visit this week to recheck. Come fasting so I can check your cholesterol and vitamin D too.

## 2019-06-04 ENCOUNTER — LAB ONLY (OUTPATIENT)
Dept: FAMILY MEDICINE CLINIC | Age: 39
End: 2019-06-04

## 2019-06-04 DIAGNOSIS — E55.9 VITAMIN D DEFICIENCY: ICD-10-CM

## 2019-06-04 DIAGNOSIS — R79.89 ELEVATED LFTS: ICD-10-CM

## 2019-06-04 DIAGNOSIS — E78.1 HYPERTRIGLYCERIDEMIA: Primary | ICD-10-CM

## 2019-06-04 NOTE — PROGRESS NOTES
Patient comes in today for lab only      ICD-10-CM ICD-9-CM    1. Hypertriglyceridemia E78.1 272.1 LIPID PANEL   2. Elevated LFTs R94.5 790.6 HEPATIC FUNCTION PANEL      HEPATITIS PANEL, ACUTE   3.  Vitamin D deficiency E55.9 268.9 VITAMIN D, 25 HYDROXY     Orders Placed This Encounter    VITAMIN D, 25 HYDROXY    LIPID PANEL    HEPATIC FUNCTION PANEL    HEPATITIS PANEL, ACUTE

## 2019-06-05 ENCOUNTER — PATIENT MESSAGE (OUTPATIENT)
Dept: FAMILY MEDICINE CLINIC | Age: 39
End: 2019-06-05

## 2019-06-05 DIAGNOSIS — E78.1 HYPERTRIGLYCERIDEMIA: Primary | ICD-10-CM

## 2019-06-05 DIAGNOSIS — E55.9 VITAMIN D DEFICIENCY: ICD-10-CM

## 2019-06-05 DIAGNOSIS — L65.9 HAIR LOSS: Primary | ICD-10-CM

## 2019-06-05 LAB
25(OH)D3+25(OH)D2 SERPL-MCNC: 15.8 NG/ML (ref 30–100)
ALBUMIN SERPL-MCNC: 4.4 G/DL (ref 3.5–5.5)
ALP SERPL-CCNC: 124 IU/L (ref 39–117)
ALT SERPL-CCNC: 29 IU/L (ref 0–32)
AST SERPL-CCNC: 42 IU/L (ref 0–40)
BILIRUB DIRECT SERPL-MCNC: 0.09 MG/DL (ref 0–0.4)
BILIRUB SERPL-MCNC: 0.2 MG/DL (ref 0–1.2)
CHOLEST SERPL-MCNC: 184 MG/DL (ref 100–199)
HAV IGM SERPL QL IA: NEGATIVE
HBV CORE IGM SERPL QL IA: NEGATIVE
HBV SURFACE AG SERPL QL IA: NEGATIVE
HCV AB S/CO SERPL IA: <0.1 S/CO RATIO (ref 0–0.9)
HDLC SERPL-MCNC: 44 MG/DL
INTERPRETATION, 910389: NORMAL
LDLC SERPL CALC-MCNC: ABNORMAL MG/DL (ref 0–99)
PDF IMAGE, 910387: NORMAL
PROT SERPL-MCNC: 6.8 G/DL (ref 6–8.5)
TRIGL SERPL-MCNC: 774 MG/DL (ref 0–149)
VLDLC SERPL CALC-MCNC: ABNORMAL MG/DL (ref 5–40)

## 2019-06-05 RX ORDER — FENOFIBRATE 145 MG/1
145 TABLET, COATED ORAL DAILY
Qty: 30 TAB | Refills: 5 | Status: SHIPPED | OUTPATIENT
Start: 2019-06-05 | End: 2020-02-26

## 2019-06-05 RX ORDER — ERGOCALCIFEROL 1.25 MG/1
50000 CAPSULE ORAL
Qty: 12 CAP | Refills: 0 | Status: SHIPPED | OUTPATIENT
Start: 2019-06-05 | End: 2019-08-13 | Stop reason: SDUPTHER

## 2019-06-05 NOTE — PROGRESS NOTES
RECOMMENDATIONS:  Vitamin D is low. Please fill the enclosed prescription for Vitamin D to take once weekly for 12 weeks. Once you have completed prescription, take an over-the-counter Vitamin D supplement 1,000 units daily. I have enclosed some information on Vitamin D deficiency. Triglycerides are still too high. Would like for you to start fenofibrate to help lower this number. Plan to recheck in 2-3 months    Liver function studies has normalized. Hepatitis screening negative.

## 2019-06-06 NOTE — TELEPHONE ENCOUNTER
From: Chapis Ann Board  To: Robert Forte  Sent: 6/5/2019 10:22 AM EDT  Subject: Lab Results    Good Morning Juan José Duarte has received your most recent labs her review is as stated;     Vitamin D is low. Please fill the enclosed prescription for Vitamin D to take once weekly for 12 weeks. Once you have completed prescription, take an over-the-counter Vitamin D supplement 1,000 units daily. I have enclosed some information on Vitamin D deficiency.     Triglycerides are still too high. Would like for you to start fenofibrate to help lower this number.      Liver function studies has normalized. Hepatitis screening negative.       Both prescriptions have been sent to your pharmacy.

## 2019-07-12 DIAGNOSIS — E78.1 HYPERTRIGLYCERIDEMIA: ICD-10-CM

## 2019-07-15 RX ORDER — FENOFIBRATE 145 MG/1
145 TABLET, COATED ORAL DAILY
Qty: 30 TAB | Refills: 5 | OUTPATIENT
Start: 2019-07-15

## 2019-08-13 DIAGNOSIS — E55.9 VITAMIN D DEFICIENCY: ICD-10-CM

## 2019-08-13 RX ORDER — ERGOCALCIFEROL 1.25 MG/1
50000 CAPSULE ORAL
Qty: 12 CAP | Refills: 0 | Status: SHIPPED | OUTPATIENT
Start: 2019-08-13 | End: 2019-12-04 | Stop reason: SDUPTHER

## 2019-08-13 NOTE — TELEPHONE ENCOUNTER
Last Visit: 05/23/2019 with ANDRIY Jeffery  Next Appointment: none  Previous Refill Encounter(s): 06/05/2019 per NP Jose D #[id number]    Requested Prescriptions     Pending Prescriptions Disp Refills    ergocalciferol (ERGOCALCIFEROL) 50,000 unit capsule 12 Cap 0     Sig: Take 1 Cap by mouth every seven (7) days.

## 2019-11-25 DIAGNOSIS — I10 BENIGN ESSENTIAL HTN: ICD-10-CM

## 2019-11-25 RX ORDER — METOPROLOL SUCCINATE 50 MG/1
50 TABLET, EXTENDED RELEASE ORAL DAILY
Qty: 90 TAB | Refills: 2 | Status: SHIPPED | OUTPATIENT
Start: 2019-11-25 | End: 2020-03-03

## 2019-11-25 NOTE — TELEPHONE ENCOUNTER
Last visit:5/23/19  Next visit: not scheduled at this time  Previous refill: 5/23/19( 90 +1R)    Requested Prescriptions     Pending Prescriptions Disp Refills    metoprolol succinate (TOPROL-XL) 50 mg XL tablet 90 Tab 1     Sig: Take 1 Tab by mouth daily.

## 2019-12-04 DIAGNOSIS — E55.9 VITAMIN D DEFICIENCY: ICD-10-CM

## 2019-12-04 RX ORDER — ERGOCALCIFEROL 1.25 MG/1
50000 CAPSULE ORAL
Qty: 12 CAP | Refills: 2 | Status: SHIPPED | OUTPATIENT
Start: 2019-12-04 | End: 2020-12-08 | Stop reason: SDUPTHER

## 2019-12-04 NOTE — TELEPHONE ENCOUNTER
Last visit:5/23/19  Next visit:not scheduled at this time  Previous refill 8/13/19(12cap+0R)    Requested Prescriptions     Pending Prescriptions Disp Refills    ergocalciferol (ERGOCALCIFEROL) 50,000 unit capsule 12 Cap 0     Sig: Take 1 Cap by mouth every seven (7) days.

## 2020-02-17 RX ORDER — AZITHROMYCIN 250 MG/1
TABLET, FILM COATED ORAL
Qty: 6 TAB | Refills: 0 | Status: SHIPPED | OUTPATIENT
Start: 2020-02-17 | End: 2020-02-19

## 2020-02-17 NOTE — TELEPHONE ENCOUNTER
Called patient, no answer, c/o coughing, chills, sweating, hard to breathe. left a messsage asking her if she was using her inhaler.    She has used Zithromax

## 2020-02-17 NOTE — TELEPHONE ENCOUNTER
----- Message from Viet Callejas sent at 2/17/2020  3:05 PM EST -----  Regarding: NP Jose D/Telephone    Level 1/Escalated Issue      Caller's first and last name and relationship (if not the patient):      Best contact number(s):  [phone]    What are the symptoms:  Cold sweats, cough,breathing hard, sore     Transfer successful - yes/no (include outcome):  No, no answer     Transfer declined - yes/no (include reason):  No, no answer     Was caller advised to seek appropriate level of care - yes/no:  Yes    Details to clarify the request:  Fears she may have bronchitis, and states this is affecting her asthma.         Viet Callejas

## 2020-02-19 ENCOUNTER — APPOINTMENT (OUTPATIENT)
Dept: CT IMAGING | Age: 40
DRG: 720 | End: 2020-02-19
Attending: EMERGENCY MEDICINE
Payer: MEDICAID

## 2020-02-19 ENCOUNTER — APPOINTMENT (OUTPATIENT)
Dept: GENERAL RADIOLOGY | Age: 40
DRG: 720 | End: 2020-02-19
Attending: EMERGENCY MEDICINE
Payer: MEDICAID

## 2020-02-19 ENCOUNTER — OFFICE VISIT (OUTPATIENT)
Dept: URGENT CARE | Age: 40
End: 2020-02-19

## 2020-02-19 ENCOUNTER — APPOINTMENT (OUTPATIENT)
Dept: VASCULAR SURGERY | Age: 40
DRG: 720 | End: 2020-02-19
Attending: EMERGENCY MEDICINE
Payer: MEDICAID

## 2020-02-19 ENCOUNTER — HOSPITAL ENCOUNTER (INPATIENT)
Age: 40
LOS: 7 days | Discharge: HOME | DRG: 720 | End: 2020-02-26
Attending: EMERGENCY MEDICINE | Admitting: INTERNAL MEDICINE
Payer: MEDICAID

## 2020-02-19 ENCOUNTER — APPOINTMENT (OUTPATIENT)
Dept: ULTRASOUND IMAGING | Age: 40
DRG: 720 | End: 2020-02-19
Attending: INTERNAL MEDICINE
Payer: MEDICAID

## 2020-02-19 VITALS
BODY MASS INDEX: 24.8 KG/M2 | OXYGEN SATURATION: 100 % | DIASTOLIC BLOOD PRESSURE: 50 MMHG | WEIGHT: 158 LBS | RESPIRATION RATE: 18 BRPM | HEART RATE: 71 BPM | HEIGHT: 67 IN | TEMPERATURE: 97.8 F | SYSTOLIC BLOOD PRESSURE: 79 MMHG

## 2020-02-19 DIAGNOSIS — R10.84 GENERALIZED ABDOMINAL PAIN: ICD-10-CM

## 2020-02-19 DIAGNOSIS — R65.21: ICD-10-CM

## 2020-02-19 DIAGNOSIS — K72.00: ICD-10-CM

## 2020-02-19 DIAGNOSIS — R06.09 DYSPNEA ON EXERTION: ICD-10-CM

## 2020-02-19 DIAGNOSIS — R74.8 ELEVATED LIVER ENZYMES: ICD-10-CM

## 2020-02-19 DIAGNOSIS — R20.2 NUMBNESS AND TINGLING IN BOTH HANDS: ICD-10-CM

## 2020-02-19 DIAGNOSIS — D69.6 THROMBOCYTOPENIA (HCC): ICD-10-CM

## 2020-02-19 DIAGNOSIS — D64.9 ANEMIA, UNSPECIFIED TYPE: ICD-10-CM

## 2020-02-19 DIAGNOSIS — N17.9 ACUTE RENAL FAILURE, UNSPECIFIED ACUTE RENAL FAILURE TYPE (HCC): Primary | ICD-10-CM

## 2020-02-19 DIAGNOSIS — K72.00 SHOCK LIVER: ICD-10-CM

## 2020-02-19 DIAGNOSIS — D68.9 COAGULOPATHY (HCC): ICD-10-CM

## 2020-02-19 DIAGNOSIS — K75.9 HEPATITIS: ICD-10-CM

## 2020-02-19 DIAGNOSIS — I95.9 HYPOTENSION, UNSPECIFIED HYPOTENSION TYPE: Primary | ICD-10-CM

## 2020-02-19 DIAGNOSIS — R06.02 SOB (SHORTNESS OF BREATH): ICD-10-CM

## 2020-02-19 DIAGNOSIS — R65.10 SIRS (SYSTEMIC INFLAMMATORY RESPONSE SYNDROME) (HCC): ICD-10-CM

## 2020-02-19 DIAGNOSIS — A40.9: ICD-10-CM

## 2020-02-19 DIAGNOSIS — R20.0 NUMBNESS AND TINGLING IN BOTH HANDS: ICD-10-CM

## 2020-02-19 DIAGNOSIS — D65 DIC (DISSEMINATED INTRAVASCULAR COAGULATION) (HCC): ICD-10-CM

## 2020-02-19 DIAGNOSIS — I95.9 HYPOTENSION, UNSPECIFIED HYPOTENSION TYPE: ICD-10-CM

## 2020-02-19 PROBLEM — K72.90 LIVER FAILURE (HCC): Status: ACTIVE | Noted: 2020-02-19

## 2020-02-19 LAB
ALBUMIN SERPL-MCNC: 3.1 G/DL (ref 3.5–5)
ALBUMIN SERPL-MCNC: 3.4 G/DL (ref 3.5–5)
ALBUMIN/GLOB SERPL: 1.1 {RATIO} (ref 1.1–2.2)
ALBUMIN/GLOB SERPL: 1.5 {RATIO} (ref 1.1–2.2)
ALP SERPL-CCNC: 184 U/L (ref 45–117)
ALP SERPL-CCNC: 230 U/L (ref 45–117)
ALT SERPL-CCNC: 3244 U/L (ref 12–78)
ALT SERPL-CCNC: >3500 U/L (ref 12–78)
AMMONIA PLAS-SCNC: 32 UMOL/L
ANION GAP SERPL CALC-SCNC: 16 MMOL/L (ref 5–15)
ANION GAP SERPL CALC-SCNC: 16 MMOL/L (ref 5–15)
APAP SERPL-MCNC: 3 UG/ML (ref 10–30)
APPEARANCE UR: CLEAR
ARTERIAL PATENCY WRIST A: YES
AST SERPL-CCNC: >2000 U/L (ref 15–37)
AST SERPL-CCNC: >2000 U/L (ref 15–37)
BACTERIA URNS QL MICRO: ABNORMAL /HPF
BASE DEFICIT BLD-SCNC: 14 MMOL/L
BASOPHILS # BLD: 0 K/UL (ref 0–0.1)
BASOPHILS NFR BLD: 0 % (ref 0–1)
BDY SITE: ABNORMAL
BILIRUB SERPL-MCNC: 5.9 MG/DL (ref 0.2–1)
BILIRUB SERPL-MCNC: 7.3 MG/DL (ref 0.2–1)
BILIRUB UR QL CFM: POSITIVE
BUN SERPL-MCNC: 34 MG/DL (ref 6–20)
BUN SERPL-MCNC: 34 MG/DL (ref 6–20)
BUN/CREAT SERPL: 7 (ref 12–20)
BUN/CREAT SERPL: 7 (ref 12–20)
CA-I BLD-SCNC: 1.12 MMOL/L (ref 1.12–1.32)
CALCIUM SERPL-MCNC: 8 MG/DL (ref 8.5–10.1)
CALCIUM SERPL-MCNC: 9.1 MG/DL (ref 8.5–10.1)
CHLORIDE SERPL-SCNC: 100 MMOL/L (ref 97–108)
CHLORIDE SERPL-SCNC: 98 MMOL/L (ref 97–108)
CO2 SERPL-SCNC: 15 MMOL/L (ref 21–32)
CO2 SERPL-SCNC: 15 MMOL/L (ref 21–32)
COLOR UR: ABNORMAL
COMMENT, HOLDF: NORMAL
CREAT SERPL-MCNC: 4.62 MG/DL (ref 0.55–1.02)
CREAT SERPL-MCNC: 4.94 MG/DL (ref 0.55–1.02)
CRP SERPL-MCNC: 9.95 MG/DL (ref 0–0.6)
D DIMER PPP FEU-MCNC: <0.19 MG/L FEU (ref 0–0.65)
DIFFERENTIAL METHOD BLD: ABNORMAL
EOSINOPHIL # BLD: 0.1 K/UL (ref 0–0.4)
EOSINOPHIL NFR BLD: 1 % (ref 0–7)
EPITH CASTS URNS QL MICRO: ABNORMAL /LPF
ERYTHROCYTE [DISTWIDTH] IN BLOOD BY AUTOMATED COUNT: 14.7 % (ref 11.5–14.5)
ERYTHROCYTE [SEDIMENTATION RATE] IN BLOOD: 15 MM/HR (ref 0–20)
ETHANOL SERPL-MCNC: <10 MG/DL
FIBRINOGEN PPP-MCNC: 330 MG/DL (ref 200–475)
GAS FLOW.O2 O2 DELIVERY SYS: ABNORMAL L/MIN
GLOBULIN SER CALC-MCNC: 2.1 G/DL (ref 2–4)
GLOBULIN SER CALC-MCNC: 3.1 G/DL (ref 2–4)
GLUCOSE SERPL-MCNC: 68 MG/DL (ref 65–100)
GLUCOSE SERPL-MCNC: 77 MG/DL (ref 65–100)
GLUCOSE UR STRIP.AUTO-MCNC: 100 MG/DL
HAPTOGLOB SERPL-MCNC: 29 MG/DL (ref 30–200)
HAV IGM SER QL: NONREACTIVE
HBV CORE IGM SER QL: NONREACTIVE
HBV SURFACE AG SER QL: <0.1 INDEX
HBV SURFACE AG SER QL: NEGATIVE
HCG SERPL QL: NEGATIVE
HCO3 BLD-SCNC: 12.9 MMOL/L (ref 22–26)
HCT VFR BLD AUTO: 39.2 % (ref 35–47)
HCV AB SERPL QL IA: NONREACTIVE
HCV COMMENT,HCGAC: NORMAL
HGB BLD-MCNC: 12.7 G/DL (ref 11.5–16)
HGB UR QL STRIP: ABNORMAL
IMM GRANULOCYTES # BLD AUTO: 0 K/UL
IMM GRANULOCYTES NFR BLD AUTO: 0 %
INR PPP: 1 (ref 0.9–1.1)
INR PPP: 2 (ref 0.9–1.1)
IRON SATN MFR SERPL: 95 % (ref 20–50)
IRON SERPL-MCNC: 242 UG/DL (ref 35–150)
KETONES UR QL STRIP.AUTO: 15 MG/DL
LACTATE SERPL-SCNC: 6 MMOL/L (ref 0.4–2)
LACTATE SERPL-SCNC: 7.1 MMOL/L (ref 0.4–2)
LEUKOCYTE ESTERASE UR QL STRIP.AUTO: ABNORMAL
LYMPHOCYTES # BLD: 0.4 K/UL (ref 0.8–3.5)
LYMPHOCYTES NFR BLD: 8 % (ref 12–49)
MAGNESIUM SERPL-MCNC: 2 MG/DL (ref 1.6–2.4)
MCH RBC QN AUTO: 32.2 PG (ref 26–34)
MCHC RBC AUTO-ENTMCNC: 32.4 G/DL (ref 30–36.5)
MCV RBC AUTO: 99.2 FL (ref 80–99)
MONOCYTES # BLD: 0.3 K/UL (ref 0–1)
MONOCYTES NFR BLD: 5 % (ref 5–13)
NEUTS BAND NFR BLD MANUAL: 1 % (ref 0–6)
NEUTS SEG # BLD: 4.2 K/UL (ref 1.8–8)
NEUTS SEG NFR BLD: 85 % (ref 32–75)
NITRITE UR QL STRIP.AUTO: POSITIVE
NRBC # BLD: 0.04 K/UL (ref 0–0.01)
NRBC BLD-RTO: 0.8 PER 100 WBC
O2/TOTAL GAS SETTING VFR VENT: 21 %
PCO2 BLD: 26.7 MMHG (ref 35–45)
PH BLD: 7.29 [PH] (ref 7.35–7.45)
PH UR STRIP: 6.5 [PH] (ref 5–8)
PLATELET # BLD AUTO: 89 K/UL (ref 150–400)
PLATELET COMMENTS,PCOM: ABNORMAL
PO2 BLD: 77 MMHG (ref 80–100)
POTASSIUM SERPL-SCNC: 4.5 MMOL/L (ref 3.5–5.1)
POTASSIUM SERPL-SCNC: 4.9 MMOL/L (ref 3.5–5.1)
PROT SERPL-MCNC: 5.2 G/DL (ref 6.4–8.2)
PROT SERPL-MCNC: 6.5 G/DL (ref 6.4–8.2)
PROT UR STRIP-MCNC: 100 MG/DL
PROTHROMBIN TIME: 19.4 SEC (ref 9–11.1)
PROTHROMBIN TIME: 9.7 SEC (ref 9–11.1)
RBC # BLD AUTO: 3.95 M/UL (ref 3.8–5.2)
RBC #/AREA URNS HPF: ABNORMAL /HPF (ref 0–5)
RBC MORPH BLD: ABNORMAL
SALICYLATES SERPL-MCNC: <1.7 MG/DL (ref 2.8–20)
SAMPLES BEING HELD,HOLD: NORMAL
SAO2 % BLD: 94 % (ref 92–97)
SODIUM SERPL-SCNC: 129 MMOL/L (ref 136–145)
SODIUM SERPL-SCNC: 131 MMOL/L (ref 136–145)
SP GR UR REFRACTOMETRY: 1.02 (ref 1–1.03)
SP1: NORMAL
SP2: NORMAL
SP3: NORMAL
SPECIMEN TYPE: ABNORMAL
TIBC SERPL-MCNC: 254 UG/DL (ref 250–450)
TOTAL RESP. RATE, ITRR: 19
TROPONIN I SERPL-MCNC: <0.05 NG/ML
TSH SERPL DL<=0.05 MIU/L-ACNC: 0.19 UIU/ML (ref 0.36–3.74)
UROBILINOGEN UR QL STRIP.AUTO: 2 EU/DL (ref 0.2–1)
WBC # BLD AUTO: 5 K/UL (ref 3.6–11)
WBC MORPH BLD: ABNORMAL
WBC URNS QL MICRO: ABNORMAL /HPF (ref 0–4)
YEAST BUDDING URNS QL: PRESENT

## 2020-02-19 PROCEDURE — 82803 BLOOD GASES ANY COMBINATION: CPT

## 2020-02-19 PROCEDURE — 36415 COLL VENOUS BLD VENIPUNCTURE: CPT

## 2020-02-19 PROCEDURE — 83735 ASSAY OF MAGNESIUM: CPT

## 2020-02-19 PROCEDURE — 84439 ASSAY OF FREE THYROXINE: CPT

## 2020-02-19 PROCEDURE — 87040 BLOOD CULTURE FOR BACTERIA: CPT

## 2020-02-19 PROCEDURE — 84703 CHORIONIC GONADOTROPIN ASSAY: CPT

## 2020-02-19 PROCEDURE — 36600 WITHDRAWAL OF ARTERIAL BLOOD: CPT

## 2020-02-19 PROCEDURE — 0100U RESPIRATORY PANEL,PCR,NASOPHARYNGEAL: CPT

## 2020-02-19 PROCEDURE — 94664 DEMO&/EVAL PT USE INHALER: CPT

## 2020-02-19 PROCEDURE — 80320 DRUG SCREEN QUANTALCOHOLS: CPT

## 2020-02-19 PROCEDURE — 82140 ASSAY OF AMMONIA: CPT

## 2020-02-19 PROCEDURE — 74011250636 HC RX REV CODE- 250/636: Performed by: EMERGENCY MEDICINE

## 2020-02-19 PROCEDURE — 74011000258 HC RX REV CODE- 258: Performed by: INTERNAL MEDICINE

## 2020-02-19 PROCEDURE — 87186 SC STD MICRODIL/AGAR DIL: CPT

## 2020-02-19 PROCEDURE — 74011000250 HC RX REV CODE- 250: Performed by: EMERGENCY MEDICINE

## 2020-02-19 PROCEDURE — 74011250637 HC RX REV CODE- 250/637: Performed by: EMERGENCY MEDICINE

## 2020-02-19 PROCEDURE — 74011250636 HC RX REV CODE- 250/636: Performed by: INTERNAL MEDICINE

## 2020-02-19 PROCEDURE — 74176 CT ABD & PELVIS W/O CONTRAST: CPT

## 2020-02-19 PROCEDURE — 85384 FIBRINOGEN ACTIVITY: CPT

## 2020-02-19 PROCEDURE — 83540 ASSAY OF IRON: CPT

## 2020-02-19 PROCEDURE — 83605 ASSAY OF LACTIC ACID: CPT

## 2020-02-19 PROCEDURE — 84443 ASSAY THYROID STIM HORMONE: CPT

## 2020-02-19 PROCEDURE — 99285 EMERGENCY DEPT VISIT HI MDM: CPT

## 2020-02-19 PROCEDURE — 85025 COMPLETE CBC W/AUTO DIFF WBC: CPT

## 2020-02-19 PROCEDURE — 94640 AIRWAY INHALATION TREATMENT: CPT

## 2020-02-19 PROCEDURE — 80074 ACUTE HEPATITIS PANEL: CPT

## 2020-02-19 PROCEDURE — 81001 URINALYSIS AUTO W/SCOPE: CPT

## 2020-02-19 PROCEDURE — 86235 NUCLEAR ANTIGEN ANTIBODY: CPT

## 2020-02-19 PROCEDURE — 71046 X-RAY EXAM CHEST 2 VIEWS: CPT

## 2020-02-19 PROCEDURE — 85379 FIBRIN DEGRADATION QUANT: CPT

## 2020-02-19 PROCEDURE — 82525 ASSAY OF COPPER: CPT

## 2020-02-19 PROCEDURE — 85652 RBC SED RATE AUTOMATED: CPT

## 2020-02-19 PROCEDURE — 65610000006 HC RM INTENSIVE CARE

## 2020-02-19 PROCEDURE — 76705 ECHO EXAM OF ABDOMEN: CPT

## 2020-02-19 PROCEDURE — 93005 ELECTROCARDIOGRAM TRACING: CPT

## 2020-02-19 PROCEDURE — 80053 COMPREHEN METABOLIC PANEL: CPT

## 2020-02-19 PROCEDURE — 86140 C-REACTIVE PROTEIN: CPT

## 2020-02-19 PROCEDURE — 85610 PROTHROMBIN TIME: CPT

## 2020-02-19 PROCEDURE — 93970 EXTREMITY STUDY: CPT

## 2020-02-19 PROCEDURE — 80307 DRUG TEST PRSMV CHEM ANLYZR: CPT

## 2020-02-19 PROCEDURE — 83010 ASSAY OF HAPTOGLOBIN QUANT: CPT

## 2020-02-19 PROCEDURE — 84484 ASSAY OF TROPONIN QUANT: CPT

## 2020-02-19 RX ORDER — SODIUM CHLORIDE 9 MG/ML
150 INJECTION, SOLUTION INTRAVENOUS CONTINUOUS
Status: DISCONTINUED | OUTPATIENT
Start: 2020-02-19 | End: 2020-02-20

## 2020-02-19 RX ORDER — SODIUM CHLORIDE 0.9 % (FLUSH) 0.9 %
5-40 SYRINGE (ML) INJECTION AS NEEDED
Status: DISCONTINUED | OUTPATIENT
Start: 2020-02-19 | End: 2020-02-26 | Stop reason: HOSPADM

## 2020-02-19 RX ORDER — ONDANSETRON 2 MG/ML
4 INJECTION INTRAMUSCULAR; INTRAVENOUS
Status: DISCONTINUED | OUTPATIENT
Start: 2020-02-19 | End: 2020-02-26 | Stop reason: HOSPADM

## 2020-02-19 RX ORDER — IPRATROPIUM BROMIDE AND ALBUTEROL SULFATE 2.5; .5 MG/3ML; MG/3ML
3 SOLUTION RESPIRATORY (INHALATION)
Status: COMPLETED | OUTPATIENT
Start: 2020-02-19 | End: 2020-02-19

## 2020-02-19 RX ORDER — SODIUM CHLORIDE 0.9 % (FLUSH) 0.9 %
5-10 SYRINGE (ML) INJECTION AS NEEDED
Status: DISCONTINUED | OUTPATIENT
Start: 2020-02-19 | End: 2020-02-26 | Stop reason: HOSPADM

## 2020-02-19 RX ORDER — SODIUM CHLORIDE 0.9 % (FLUSH) 0.9 %
5-40 SYRINGE (ML) INJECTION EVERY 8 HOURS
Status: DISCONTINUED | OUTPATIENT
Start: 2020-02-19 | End: 2020-02-26 | Stop reason: HOSPADM

## 2020-02-19 RX ORDER — OXYCODONE HYDROCHLORIDE 5 MG/1
5 TABLET ORAL
Status: COMPLETED | OUTPATIENT
Start: 2020-02-19 | End: 2020-02-19

## 2020-02-19 RX ORDER — AZITHROMYCIN 250 MG/1
250 TABLET, FILM COATED ORAL DAILY
COMMUNITY
End: 2020-02-26

## 2020-02-19 RX ORDER — PREDNISOLONE SODIUM PHOSPHATE 15 MG/5ML
40 SOLUTION ORAL DAILY
Status: DISCONTINUED | OUTPATIENT
Start: 2020-02-20 | End: 2020-02-23

## 2020-02-19 RX ORDER — SODIUM CHLORIDE 0.9 % (FLUSH) 0.9 %
10 SYRINGE (ML) INJECTION
Status: DISCONTINUED | OUTPATIENT
Start: 2020-02-19 | End: 2020-02-19

## 2020-02-19 RX ADMIN — ACETYLCYSTEINE 3580 MG: 200 INJECTION, SOLUTION INTRAVENOUS at 23:59

## 2020-02-19 RX ADMIN — SODIUM CHLORIDE 1000 ML: 900 INJECTION, SOLUTION INTRAVENOUS at 17:02

## 2020-02-19 RX ADMIN — OXYCODONE 5 MG: 5 TABLET ORAL at 20:22

## 2020-02-19 RX ADMIN — IPRATROPIUM BROMIDE AND ALBUTEROL SULFATE 3 ML: .5; 3 SOLUTION RESPIRATORY (INHALATION) at 17:19

## 2020-02-19 RX ADMIN — ACETYLCYSTEINE 10760 MG: 200 INJECTION, SOLUTION INTRAVENOUS at 21:59

## 2020-02-19 RX ADMIN — SODIUM CHLORIDE 150 ML/HR: 900 INJECTION, SOLUTION INTRAVENOUS at 21:58

## 2020-02-19 RX ADMIN — METHYLPREDNISOLONE SODIUM SUCCINATE 125 MG: 125 INJECTION, POWDER, FOR SOLUTION INTRAMUSCULAR; INTRAVENOUS at 17:02

## 2020-02-19 NOTE — PROGRESS NOTES
45 yo female here for dizziness  Feels like she is going to faint in triage and having trouble walking  She feels weak. Having trouble breathing. Preceding this notes SOB, cough, abdominal pain over past couple days and episode of vomiting. Denies illicit drug use or ingestion of any substances recently  Dana Aguayo any recent bleeding  Has a past medical history of Anemia, Asthma, Biliary colic (), Hypertension, and Ill-defined condition. Past Medical History:   Diagnosis Date    Anemia     Asthma     uses inhaler 2-3 times per week    Biliary colic     Hypertension     Ill-defined condition     murmur        Past Surgical History:   Procedure Laterality Date    HX CHOLECYSTECTOMY      HX GYN      vaginal delivery x1    HX GYN  2018    cyst removal    HX GYN          HX OTHER SURGICAL      lipoma removal from right shoulder    HX WISDOM TEETH EXTRACTION           Family History   Problem Relation Age of Onset    Hypertension Mother     Cancer Mother         colon, in remission    Clotting Disorder Mother         blood clot post surgery    Hypertension Father     Colon Cancer Paternal Uncle     Pancreatic Cancer Paternal Uncle     Cancer Paternal Uncle     Cancer Maternal Uncle     Stroke Paternal Aunt     Heart Disease Maternal Grandmother         Social History     Socioeconomic History    Marital status: SINGLE     Spouse name: Not on file    Number of children: Not on file    Years of education: Not on file    Highest education level: Not on file   Occupational History    Not on file   Social Needs    Financial resource strain: Not on file    Food insecurity:     Worry: Not on file     Inability: Not on file    Transportation needs:     Medical: Not on file     Non-medical: Not on file   Tobacco Use    Smoking status: Never Smoker    Smokeless tobacco: Never Used   Substance and Sexual Activity    Alcohol use:  Yes     Alcohol/week: 3.0 standard drinks     Types: 3 Cans of beer per week    Drug use: No    Sexual activity: Yes     Partners: Male     Birth control/protection: Condom   Lifestyle    Physical activity:     Days per week: Not on file     Minutes per session: Not on file    Stress: Not on file   Relationships    Social connections:     Talks on phone: Not on file     Gets together: Not on file     Attends Mormon service: Not on file     Active member of club or organization: Not on file     Attends meetings of clubs or organizations: Not on file     Relationship status: Not on file    Intimate partner violence:     Fear of current or ex partner: Not on file     Emotionally abused: Not on file     Physically abused: Not on file     Forced sexual activity: Not on file   Other Topics Concern    Not on file   Social History Narrative    Not on file                ALLERGIES: Amoxicillin; Chlorhexidine; and Lisinopril    Review of Systems   Respiratory: Positive for shortness of breath. Gastrointestinal: Positive for abdominal pain and vomiting. Neurological: Positive for dizziness and weakness. All other systems reviewed and are negative. Vitals:    02/19/20 1504 02/19/20 1532   BP: (!) 61/43 (!) 79/50   Pulse: 92 71   Resp: 18    Temp: 97.8 °F (36.6 °C)    SpO2: 98% 100%   Weight: 158 lb (71.7 kg)    Height: 5' 7\" (1.702 m)        Physical Exam  Vitals signs reviewed. HENT:      Head: Normocephalic. Mouth/Throat:      Mouth: Mucous membranes are moist.   Eyes:      General: Scleral icterus present. Cardiovascular:      Rate and Rhythm: Normal rate and regular rhythm. Pulmonary:      Effort: Pulmonary effort is normal. No respiratory distress. Breath sounds: Normal breath sounds. No stridor. No wheezing or rhonchi. Abdominal:       Skin:     Capillary Refill: Capillary refill takes less than 2 seconds. Coloration: Skin is pale. Comments: Poor coloration. Lips look bluish grey. Hands cold. Neurological:      Mental Status: She is alert and oriented to person, place, and time. Motor: Weakness present. Gait: Gait abnormal.   Psychiatric:      Comments: + confusion         MDM     Differential Diagnosis; Clinical Impression; Plan:       CLINICAL IMPRESSION:  (I95.9) Hypotension, unspecified hypotension type  (primary encounter diagnosis)  (R06.02) SOB (shortness of breath)  (R10.84) Generalized abdominal pain  (R20.0,  R20.2) Numbness and tingling in both hands    Orders Placed This Encounter      sodium chloride 0.9 % bolus infusion 1,000 mL      Plan:  PB 61/43 triage, NS bolus initiated. EMS activated for SOB, hypotension, weakness.   + tender abdomen with guarding  ddx includes but not limited to: GI bleed, sepsis, PE, pneumonia, drug overdose                Procedures

## 2020-02-19 NOTE — ED TRIAGE NOTES
TRIAGE NOTE:  Patient arrives by EMS from urgent care for low BP. Patient went to urgent care for generalized body aches, cough, wheezing.   Patient received 1 L at urgent care, and is on 2nd Liter on arrival.

## 2020-02-19 NOTE — ED PROVIDER NOTES
46 y.o. female with past medical history significant for HTN, biliary colic, anemia, and asthma who presents from urgent care via EMS with chief complaint of low blood pressure. Per EMS, pt is coming into the ED from urgent care with low blood pressure. Pt states she had the onset of a cough on Thursday night (2020). Then, pt says she woke up the next morning and noticed whenever she would stand up she would get lightheaded, feel pressure in her ears, and overall just felt disoriented like she was going to pass out. She shares that she usually suffers from insomnia but as of lately she has felt so fatigued that she closes her eyes and goes to sleep with no problem. In addition, she also notes she has had an intermittent fever and says it comes and goes, she says on  it was 103.5 degrees. Pt also adds she's had \"sharp\" abdominal pain, \"cottonmouth\", and 2-3 episodes of diarrhea a day. Pt specifically affirms: SOB, cough, fatigue, dry mouth, fever, lightheadedness, abdominal pain, and diarrhea. There are no other acute medical concerns at this time. Social hx: Active alcohol use. PCP: Sorin Lawrence NP      Note written by Jai Brasher, as dictated by Naz Mccann MD 4:18 PM       The history is provided by the patient. No  was used.         Past Medical History:   Diagnosis Date    Anemia     Asthma     uses inhaler 2-3 times per week    Biliary colic     Hypertension     Ill-defined condition     murmur       Past Surgical History:   Procedure Laterality Date    HX CHOLECYSTECTOMY      HX GYN      vaginal delivery x1    HX GYN  2018    cyst removal    HX GYN          HX OTHER SURGICAL      lipoma removal from right shoulder    HX WISDOM TEETH EXTRACTION           Family History:   Problem Relation Age of Onset    Hypertension Mother     Cancer Mother         colon, in remission    Clotting Disorder Mother         blood clot post surgery    Hypertension Father     Colon Cancer Paternal Uncle     Pancreatic Cancer Paternal Uncle     Cancer Paternal Uncle     Cancer Maternal Uncle     Stroke Paternal Aunt     Heart Disease Maternal Grandmother        Social History     Socioeconomic History    Marital status: SINGLE     Spouse name: Not on file    Number of children: Not on file    Years of education: Not on file    Highest education level: Not on file   Occupational History    Not on file   Social Needs    Financial resource strain: Not on file    Food insecurity:     Worry: Not on file     Inability: Not on file    Transportation needs:     Medical: Not on file     Non-medical: Not on file   Tobacco Use    Smoking status: Never Smoker    Smokeless tobacco: Never Used   Substance and Sexual Activity    Alcohol use: Yes     Alcohol/week: 3.0 standard drinks     Types: 3 Cans of beer per week    Drug use: No    Sexual activity: Yes     Partners: Male     Birth control/protection: Condom   Lifestyle    Physical activity:     Days per week: Not on file     Minutes per session: Not on file    Stress: Not on file   Relationships    Social connections:     Talks on phone: Not on file     Gets together: Not on file     Attends Advent service: Not on file     Active member of club or organization: Not on file     Attends meetings of clubs or organizations: Not on file     Relationship status: Not on file    Intimate partner violence:     Fear of current or ex partner: Not on file     Emotionally abused: Not on file     Physically abused: Not on file     Forced sexual activity: Not on file   Other Topics Concern    Not on file   Social History Narrative    Not on file         ALLERGIES: Amoxicillin; Chlorhexidine; and Lisinopril    Review of Systems   Constitutional: Positive for fatigue and fever. Negative for chills. HENT:        Dry mouth. Ear pressure. Respiratory: Positive for cough and shortness of breath. Cardiovascular: Negative for chest pain. Gastrointestinal: Positive for abdominal pain and diarrhea. Negative for constipation and vomiting. Neurological: Positive for light-headedness. Negative for dizziness. All other systems reviewed and are negative. Vitals:    02/19/20 1620   BP: (!) 88/73   Temp: 97.8 °F (36.6 °C)            Physical Exam  Vitals signs and nursing note reviewed. Constitutional:       Appearance: She is well-developed. HENT:      Head: Normocephalic and atraumatic. Eyes:      Pupils: Pupils are equal, round, and reactive to light. Neck:      Musculoskeletal: Normal range of motion and neck supple. Cardiovascular:      Rate and Rhythm: Normal rate and regular rhythm. Pulmonary:      Effort: Pulmonary effort is normal.      Breath sounds: Examination of the left-upper field reveals rhonchi. Examination of the left-middle field reveals rhonchi. Examination of the left-lower field reveals rhonchi. Wheezing (diffuse) and rhonchi present. Abdominal:      General: There is no distension. Palpations: Abdomen is soft. Tenderness: There is no abdominal tenderness. Skin:     General: Skin is warm and dry. Capillary Refill: Capillary refill takes less than 2 seconds. Neurological:      Mental Status: She is alert and oriented to person, place, and time.    Psychiatric:         Behavior: Behavior normal.     Note written by Jai Valdez, as dictated by Kvng Mcnamara MD 4:18 PM       MDM  Number of Diagnoses or Management Options  Acute renal failure, unspecified acute renal failure type [facility]): new and requires workup  Dyspnea on exertion: new and requires workup  Hepatitis: new and requires workup  Thrombocytopenia [facility]): new and requires workup  Diagnosis management comments: DDX: toxic alcohol, tylenol poisoning, hepatorenal syndrome, sepsis, PNA, DIC, HUS, PE, CHF, pulmonary edema, liver failure, kidney failure, shock         Procedures    ED EKG interpretation:  Rhythm: normal sinus rhythm; and regular . Rate (approx.): 90; Prolonged QT interval; ST/T wave: No ST wave changes and non-specificT wave abnormalities. Note written by Jai Hartman, as dictated by Guillermo Martino MD 5:08 PM      PROGRESS NOTE:  6:24 PM  Spoke with intensivist from ICU who says he will come evaluate pt for admission. He recommended the pt have a lower extremity doppler, a VQ scan, and pregnancy test. If pregnancy test positive then follow with a pelvic ultrasound. If negative then CT of abdomen. Otherwise he agrees that maybe the pt is in DIC. PROGRESS NOTE:  6:58 PM  Dr. Hang Garland approved the echo. Evelio Bonilla MD has spent 60 minutes of critical care time involved in lab review, consultations with specialist, family decision-making, and documentation. During this entire length of time I was immediately available to the patient. Critical Care: The reason for providing this level of medical care for this critically ill patient was due a critical illness that impaired one or more vital organ systems such that there was a high probability of imminent or life threatening deterioration in the patients condition. This care involved high complexity decision making to assess, manipulate, and support vital system functions, to treat this degreee vital organ system failure and to prevent further life threatening deterioration of the patients condition.

## 2020-02-19 NOTE — ED NOTES
Patient attempted to give urine sample. Patient reports unable to void at this time. Fluids now infusing through 18 IV to left AC.     2145-Patient attempted to use bathroom again. Patient reports voided 3 times, but still reports \"I feel like I need to have a bowel movement\". 2210-Patient reports numbness and tingling to fingers gone, sensation intact and reports \"I have feeling in my fingertips again\".

## 2020-02-20 ENCOUNTER — APPOINTMENT (OUTPATIENT)
Dept: NON INVASIVE DIAGNOSTICS | Age: 40
DRG: 720 | End: 2020-02-20
Attending: EMERGENCY MEDICINE
Payer: MEDICAID

## 2020-02-20 LAB
ACETONE,ACETX: NEGATIVE MG/L
ALBUMIN SERPL-MCNC: 2.2 G/DL (ref 3.5–5)
ALBUMIN SERPL-MCNC: 2.2 G/DL (ref 3.5–5)
ALBUMIN SERPL-MCNC: 2.3 G/DL (ref 3.5–5)
ALBUMIN SERPL-MCNC: 2.6 G/DL (ref 3.5–5)
ALBUMIN SERPL-MCNC: 2.7 G/DL (ref 3.5–5)
ALBUMIN/GLOB SERPL: 0.8 {RATIO} (ref 1.1–2.2)
ALBUMIN/GLOB SERPL: 0.9 {RATIO} (ref 1.1–2.2)
ALBUMIN/GLOB SERPL: 0.9 {RATIO} (ref 1.1–2.2)
ALBUMIN/GLOB SERPL: 1 {RATIO} (ref 1.1–2.2)
ALP SERPL-CCNC: 123 U/L (ref 45–117)
ALP SERPL-CCNC: 123 U/L (ref 45–117)
ALP SERPL-CCNC: 128 U/L (ref 45–117)
ALP SERPL-CCNC: 142 U/L (ref 45–117)
ALT SERPL-CCNC: 2049 U/L (ref 12–78)
ALT SERPL-CCNC: 2056 U/L (ref 12–78)
ALT SERPL-CCNC: 2229 U/L (ref 12–78)
ALT SERPL-CCNC: 2985 U/L (ref 12–78)
AMPHET UR QL SCN: NEGATIVE
ANION GAP SERPL CALC-SCNC: 14 MMOL/L (ref 5–15)
ANION GAP SERPL CALC-SCNC: 14 MMOL/L (ref 5–15)
ANION GAP SERPL CALC-SCNC: 15 MMOL/L (ref 5–15)
ANION GAP SERPL CALC-SCNC: 16 MMOL/L (ref 5–15)
AST SERPL-CCNC: >2000 U/L (ref 15–37)
ATRIAL RATE: 90 BPM
AV PEAK GRADIENT: 86.08 MMHG
AV VELOCITY RATIO: 0.65
B PERT DNA SPEC QL NAA+PROBE: NOT DETECTED
BARBITURATES UR QL SCN: NEGATIVE
BASOPHILS # BLD: 0 K/UL (ref 0–0.1)
BASOPHILS NFR BLD: 1 % (ref 0–1)
BENZODIAZ UR QL: NEGATIVE
BILIRUB DIRECT SERPL-MCNC: 4.1 MG/DL (ref 0–0.2)
BILIRUB SERPL-MCNC: 6.5 MG/DL (ref 0.2–1)
BILIRUB SERPL-MCNC: 7 MG/DL (ref 0.2–1)
BILIRUB SERPL-MCNC: 7.5 MG/DL (ref 0.2–1)
BILIRUB SERPL-MCNC: 7.6 MG/DL (ref 0.2–1)
BORDETELLA PARAPERTUSSIS PCR, BORPAR: NOT DETECTED
BUN SERPL-MCNC: 39 MG/DL (ref 6–20)
BUN SERPL-MCNC: 43 MG/DL (ref 6–20)
BUN SERPL-MCNC: 44 MG/DL (ref 6–20)
BUN SERPL-MCNC: 44 MG/DL (ref 6–20)
BUN/CREAT SERPL: 11 (ref 12–20)
BUN/CREAT SERPL: 13 (ref 12–20)
BUN/CREAT SERPL: 14 (ref 12–20)
BUN/CREAT SERPL: 8 (ref 12–20)
C PNEUM DNA SPEC QL NAA+PROBE: NOT DETECTED
CALCIUM SERPL-MCNC: 7.4 MG/DL (ref 8.5–10.1)
CALCIUM SERPL-MCNC: 7.6 MG/DL (ref 8.5–10.1)
CALCIUM SERPL-MCNC: 7.8 MG/DL (ref 8.5–10.1)
CALCIUM SERPL-MCNC: 8 MG/DL (ref 8.5–10.1)
CALCULATED P AXIS, ECG09: 45 DEGREES
CALCULATED R AXIS, ECG10: 29 DEGREES
CALCULATED T AXIS, ECG11: 21 DEGREES
CANNABINOIDS UR QL SCN: NEGATIVE
CHAIN OF CUSTODY,CHC: NO
CHLORIDE SERPL-SCNC: 103 MMOL/L (ref 97–108)
CHLORIDE SERPL-SCNC: 98 MMOL/L (ref 97–108)
CK SERPL-CCNC: 182 U/L (ref 26–192)
CO2 SERPL-SCNC: 14 MMOL/L (ref 21–32)
CO2 SERPL-SCNC: 16 MMOL/L (ref 21–32)
CO2 SERPL-SCNC: 19 MMOL/L (ref 21–32)
CO2 SERPL-SCNC: 20 MMOL/L (ref 21–32)
COCAINE UR QL SCN: NEGATIVE
CREAT SERPL-MCNC: 3.25 MG/DL (ref 0.55–1.02)
CREAT SERPL-MCNC: 3.33 MG/DL (ref 0.55–1.02)
CREAT SERPL-MCNC: 3.79 MG/DL (ref 0.55–1.02)
CREAT SERPL-MCNC: 4.63 MG/DL (ref 0.55–1.02)
D DIMER PPP FEU-MCNC: 10.85 MG/L FEU (ref 0–0.65)
DIAGNOSIS, 93000: NORMAL
DIFFERENTIAL METHOD BLD: ABNORMAL
DRUG SCRN COMMENT,DRGCM: NORMAL
ECHO AV AREA PEAK VELOCITY: 2.1 CM2
ECHO AV CUSP MM: 0 CM
ECHO AV PEAK GRADIENT: 14.1 MMHG
ECHO AV PEAK VELOCITY: 187.53 CM/S
ECHO AV REGURGITANT PHT: 348.3 CM
ECHO LA AREA 4C: 15.6 CM2
ECHO LA MAJOR AXIS: 2.97 CM
ECHO LA VOL 4C: 35.02 ML (ref 22–52)
ECHO LA VOLUME INDEX A4C: 18.83 ML/M2 (ref 16–28)
ECHO LV E' LATERAL VELOCITY: 8.14 CM/S
ECHO LV E' SEPTAL VELOCITY: 8.03 CM/S
ECHO LV INTERNAL DIMENSION DIASTOLIC: 4.7 CM (ref 3.9–5.3)
ECHO LV INTERNAL DIMENSION SYSTOLIC: 3.34 CM
ECHO LV IVSD: 1.21 CM (ref 0.6–0.9)
ECHO LV MASS 2D: 241.3 G (ref 67–162)
ECHO LV MASS INDEX 2D: 129.8 G/M2 (ref 43–95)
ECHO LV POSTERIOR WALL DIASTOLIC: 1.12 CM (ref 0.6–0.9)
ECHO LVOT DIAM: 2.05 CM
ECHO LVOT PEAK GRADIENT: 5.9 MMHG
ECHO LVOT PEAK VELOCITY: 121.69 CM/S
ECHO MV A VELOCITY: 91.2 CM/S
ECHO MV E VELOCITY: 73.52 CM/S
ECHO MV E/A RATIO: 0.81
ECHO MV E/E' LATERAL: 9.03
ECHO MV E/E' RATIO (AVERAGED): 9.09
ECHO MV E/E' SEPTAL: 9.16
ECHO MV REGURGITANT RADIUS PISA: 0.52 CM
ECHO PULMONARY ARTERY SYSTOLIC PRESSURE (PASP): 35 MMHG
ECHO PV MAX VELOCITY: 257.57 CM/S
ECHO PV PEAK GRADIENT: 26.5 MMHG
ECHO RV INTERNAL DIMENSION: 4.21 CM
ECHO RV TAPSE: 2.73 CM (ref 1.5–2)
ECHO RVOT PEAK VELOCITY: 85.36 CM/S
ECHO TV REGURGITANT MAX VELOCITY: 262.9 CM/S
ECHO TV REGURGITANT PEAK GRADIENT: 27.6 MMHG
EOSINOPHIL # BLD: 0 K/UL (ref 0–0.4)
EOSINOPHIL NFR BLD: 1 % (ref 0–7)
ERYTHROCYTE [DISTWIDTH] IN BLOOD BY AUTOMATED COUNT: 14.8 % (ref 11.5–14.5)
ETHANOL,ETHX: NEGATIVE MG/L
FERRITIN SERPL-MCNC: 2358 NG/ML (ref 26–388)
FIBRINOGEN PPP-MCNC: 286 MG/DL (ref 200–475)
FLUAV H1 2009 PAND RNA SPEC QL NAA+PROBE: NOT DETECTED
FLUAV H1 RNA SPEC QL NAA+PROBE: NOT DETECTED
FLUAV H3 RNA SPEC QL NAA+PROBE: NOT DETECTED
FLUAV SUBTYP SPEC NAA+PROBE: NOT DETECTED
FLUBV RNA SPEC QL NAA+PROBE: DETECTED
GLOBULIN SER CALC-MCNC: 2.5 G/DL (ref 2–4)
GLOBULIN SER CALC-MCNC: 2.5 G/DL (ref 2–4)
GLOBULIN SER CALC-MCNC: 2.6 G/DL (ref 2–4)
GLOBULIN SER CALC-MCNC: 2.7 G/DL (ref 2–4)
GLUCOSE SERPL-MCNC: 157 MG/DL (ref 65–100)
GLUCOSE SERPL-MCNC: 172 MG/DL (ref 65–100)
GLUCOSE SERPL-MCNC: 200 MG/DL (ref 65–100)
GLUCOSE SERPL-MCNC: 203 MG/DL (ref 65–100)
HADV DNA SPEC QL NAA+PROBE: NOT DETECTED
HCOV 229E RNA SPEC QL NAA+PROBE: NOT DETECTED
HCOV HKU1 RNA SPEC QL NAA+PROBE: NOT DETECTED
HCOV NL63 RNA SPEC QL NAA+PROBE: NOT DETECTED
HCOV OC43 RNA SPEC QL NAA+PROBE: NOT DETECTED
HCT VFR BLD AUTO: 33.3 % (ref 35–47)
HGB BLD-MCNC: 11.2 G/DL (ref 11.5–16)
HMPV RNA SPEC QL NAA+PROBE: NOT DETECTED
HPIV1 RNA SPEC QL NAA+PROBE: NOT DETECTED
HPIV2 RNA SPEC QL NAA+PROBE: NOT DETECTED
HPIV3 RNA SPEC QL NAA+PROBE: NOT DETECTED
HPIV4 RNA SPEC QL NAA+PROBE: NOT DETECTED
IMM GRANULOCYTES # BLD AUTO: 0 K/UL
IMM GRANULOCYTES NFR BLD AUTO: 0 %
INR PPP: 1.5 (ref 0.9–1.1)
INR PPP: 1.8 (ref 0.9–1.1)
INR PPP: 2 (ref 0.9–1.1)
ISOPROPANOL,ISOPX: NEGATIVE MG/L
LACTATE SERPL-SCNC: 2.7 MMOL/L (ref 0.4–2)
LACTATE SERPL-SCNC: 3.1 MMOL/L (ref 0.4–2)
LACTATE SERPL-SCNC: 3.7 MMOL/L (ref 0.4–2)
LDH SERPL L TO P-CCNC: 717 U/L (ref 81–246)
LVFS 2D: 29 %
LYMPHOCYTES # BLD: 0.7 K/UL (ref 0.8–3.5)
LYMPHOCYTES NFR BLD: 17 % (ref 12–49)
M PNEUMO DNA SPEC QL NAA+PROBE: NOT DETECTED
MCH RBC QN AUTO: 32.9 PG (ref 26–34)
MCHC RBC AUTO-ENTMCNC: 33.6 G/DL (ref 30–36.5)
MCV RBC AUTO: 97.9 FL (ref 80–99)
METAMYELOCYTES NFR BLD MANUAL: 1 %
METHADONE UR QL: NEGATIVE
METHANOL,METHX: NEGATIVE MG/L
MONOCYTES # BLD: 0.1 K/UL (ref 0–1)
MONOCYTES NFR BLD: 3 % (ref 5–13)
MYELOCYTES NFR BLD MANUAL: 1 %
NEUTS BAND NFR BLD MANUAL: 6 % (ref 0–6)
NEUTS SEG # BLD: 3 K/UL (ref 1.8–8)
NEUTS SEG NFR BLD: 70 % (ref 32–75)
NRBC # BLD: 0.03 K/UL (ref 0–0.01)
NRBC BLD-RTO: 0.8 PER 100 WBC
OPIATES UR QL: NEGATIVE
P-R INTERVAL, ECG05: 130 MS
PCP UR QL: NEGATIVE
PERIPHERAL SMEAR,PSM: NORMAL
PHOSPHATE SERPL-MCNC: 3 MG/DL (ref 2.6–4.7)
PISA AR MAX VEL: 463.9 CM/S
PLATELET # BLD AUTO: 105 K/UL (ref 150–400)
PLATELET COMMENTS,PCOM: ABNORMAL
PMV BLD AUTO: 11.3 FL (ref 8.9–12.9)
POTASSIUM SERPL-SCNC: 3 MMOL/L (ref 3.5–5.1)
POTASSIUM SERPL-SCNC: 3.1 MMOL/L (ref 3.5–5.1)
POTASSIUM SERPL-SCNC: 3.6 MMOL/L (ref 3.5–5.1)
POTASSIUM SERPL-SCNC: 4 MMOL/L (ref 3.5–5.1)
PROCALCITONIN SERPL-MCNC: 192.41 NG/ML
PROT SERPL-MCNC: 4.7 G/DL (ref 6.4–8.2)
PROT SERPL-MCNC: 4.8 G/DL (ref 6.4–8.2)
PROT SERPL-MCNC: 4.8 G/DL (ref 6.4–8.2)
PROT SERPL-MCNC: 5.4 G/DL (ref 6.4–8.2)
PROTHROMBIN TIME: 14.9 SEC (ref 9–11.1)
PROTHROMBIN TIME: 17.4 SEC (ref 9–11.1)
PROTHROMBIN TIME: 19.8 SEC (ref 9–11.1)
Q-T INTERVAL, ECG07: 410 MS
QRS DURATION, ECG06: 98 MS
QTC CALCULATION (BEZET), ECG08: 501 MS
RBC # BLD AUTO: 3.4 M/UL (ref 3.8–5.2)
RBC MORPH BLD: ABNORMAL
REPORT STATUS,RSTSX: NORMAL
RSV RNA SPEC QL NAA+PROBE: NOT DETECTED
RV+EV RNA SPEC QL NAA+PROBE: NOT DETECTED
SODIUM SERPL-SCNC: 130 MMOL/L (ref 136–145)
SODIUM SERPL-SCNC: 131 MMOL/L (ref 136–145)
SODIUM SERPL-SCNC: 132 MMOL/L (ref 136–145)
SODIUM SERPL-SCNC: 132 MMOL/L (ref 136–145)
SPECIMEN SOURCE: NORMAL
T4 FREE SERPL-MCNC: 1.3 NG/DL (ref 0.8–1.5)
TROPONIN I SERPL-MCNC: <0.05 NG/ML
VENTRICULAR RATE, ECG03: 90 BPM
WBC # BLD AUTO: 4 K/UL (ref 3.6–11)

## 2020-02-20 PROCEDURE — 80307 DRUG TEST PRSMV CHEM ANLYZR: CPT

## 2020-02-20 PROCEDURE — 85025 COMPLETE CBC W/AUTO DIFF WBC: CPT

## 2020-02-20 PROCEDURE — 74011250636 HC RX REV CODE- 250/636: Performed by: INTERNAL MEDICINE

## 2020-02-20 PROCEDURE — 74011250637 HC RX REV CODE- 250/637: Performed by: HOSPITALIST

## 2020-02-20 PROCEDURE — 94640 AIRWAY INHALATION TREATMENT: CPT

## 2020-02-20 PROCEDURE — 74011250637 HC RX REV CODE- 250/637: Performed by: NURSE PRACTITIONER

## 2020-02-20 PROCEDURE — 86038 ANTINUCLEAR ANTIBODIES: CPT

## 2020-02-20 PROCEDURE — 80069 RENAL FUNCTION PANEL: CPT

## 2020-02-20 PROCEDURE — 83615 LACTATE (LD) (LDH) ENZYME: CPT

## 2020-02-20 PROCEDURE — 74011000250 HC RX REV CODE- 250: Performed by: HOSPITALIST

## 2020-02-20 PROCEDURE — 83520 IMMUNOASSAY QUANT NOS NONAB: CPT

## 2020-02-20 PROCEDURE — 84145 PROCALCITONIN (PCT): CPT

## 2020-02-20 PROCEDURE — 65610000006 HC RM INTENSIVE CARE

## 2020-02-20 PROCEDURE — 74011000258 HC RX REV CODE- 258: Performed by: HOSPITALIST

## 2020-02-20 PROCEDURE — 85384 FIBRINOGEN ACTIVITY: CPT

## 2020-02-20 PROCEDURE — 85610 PROTHROMBIN TIME: CPT

## 2020-02-20 PROCEDURE — 82525 ASSAY OF COPPER: CPT

## 2020-02-20 PROCEDURE — 87389 HIV-1 AG W/HIV-1&-2 AB AG IA: CPT

## 2020-02-20 PROCEDURE — 83605 ASSAY OF LACTIC ACID: CPT

## 2020-02-20 PROCEDURE — 83516 IMMUNOASSAY NONANTIBODY: CPT

## 2020-02-20 PROCEDURE — 87899 AGENT NOS ASSAY W/OPTIC: CPT

## 2020-02-20 PROCEDURE — 74011000258 HC RX REV CODE- 258: Performed by: INTERNAL MEDICINE

## 2020-02-20 PROCEDURE — 87040 BLOOD CULTURE FOR BACTERIA: CPT

## 2020-02-20 PROCEDURE — 87449 NOS EACH ORGANISM AG IA: CPT

## 2020-02-20 PROCEDURE — 74011000250 HC RX REV CODE- 250: Performed by: INTERNAL MEDICINE

## 2020-02-20 PROCEDURE — 86160 COMPLEMENT ANTIGEN: CPT

## 2020-02-20 PROCEDURE — 86225 DNA ANTIBODY NATIVE: CPT

## 2020-02-20 PROCEDURE — 84484 ASSAY OF TROPONIN QUANT: CPT

## 2020-02-20 PROCEDURE — 85379 FIBRIN DEGRADATION QUANT: CPT

## 2020-02-20 PROCEDURE — 93306 TTE W/DOPPLER COMPLETE: CPT

## 2020-02-20 PROCEDURE — 87086 URINE CULTURE/COLONY COUNT: CPT

## 2020-02-20 PROCEDURE — 36415 COLL VENOUS BLD VENIPUNCTURE: CPT

## 2020-02-20 PROCEDURE — 80076 HEPATIC FUNCTION PANEL: CPT

## 2020-02-20 PROCEDURE — 82550 ASSAY OF CK (CPK): CPT

## 2020-02-20 PROCEDURE — 86162 COMPLEMENT TOTAL (CH50): CPT

## 2020-02-20 PROCEDURE — 74011000250 HC RX REV CODE- 250: Performed by: NURSE PRACTITIONER

## 2020-02-20 PROCEDURE — 94664 DEMO&/EVAL PT USE INHALER: CPT

## 2020-02-20 PROCEDURE — 74011250636 HC RX REV CODE- 250/636: Performed by: HOSPITALIST

## 2020-02-20 PROCEDURE — 85613 RUSSELL VIPER VENOM DILUTED: CPT

## 2020-02-20 PROCEDURE — 74011250636 HC RX REV CODE- 250/636: Performed by: NURSE PRACTITIONER

## 2020-02-20 PROCEDURE — 82390 ASSAY OF CERULOPLASMIN: CPT

## 2020-02-20 PROCEDURE — 80053 COMPREHEN METABOLIC PANEL: CPT

## 2020-02-20 PROCEDURE — 87106 FUNGI IDENTIFICATION YEAST: CPT

## 2020-02-20 PROCEDURE — 82728 ASSAY OF FERRITIN: CPT

## 2020-02-20 PROCEDURE — 74011636637 HC RX REV CODE- 636/637: Performed by: NURSE PRACTITIONER

## 2020-02-20 PROCEDURE — 74011000258 HC RX REV CODE- 258: Performed by: NURSE PRACTITIONER

## 2020-02-20 RX ORDER — FOLIC ACID 1 MG/1
1 TABLET ORAL DAILY
Status: DISCONTINUED | OUTPATIENT
Start: 2020-02-20 | End: 2020-02-26 | Stop reason: HOSPADM

## 2020-02-20 RX ORDER — FAMOTIDINE 20 MG/1
20 TABLET, FILM COATED ORAL DAILY
Status: DISCONTINUED | OUTPATIENT
Start: 2020-02-21 | End: 2020-02-23 | Stop reason: DRUGHIGH

## 2020-02-20 RX ORDER — SODIUM BICARBONATE 1 MEQ/ML
100 SYRINGE (ML) INTRAVENOUS ONCE
Status: COMPLETED | OUTPATIENT
Start: 2020-02-20 | End: 2020-02-20

## 2020-02-20 RX ORDER — THERA TABS 400 MCG
1 TAB ORAL DAILY
Status: DISCONTINUED | OUTPATIENT
Start: 2020-02-20 | End: 2020-02-26 | Stop reason: HOSPADM

## 2020-02-20 RX ORDER — SODIUM BICARBONATE IN D5W 150/1000ML
PLASTIC BAG, INJECTION (ML) INTRAVENOUS CONTINUOUS
Status: DISCONTINUED | OUTPATIENT
Start: 2020-02-20 | End: 2020-02-21

## 2020-02-20 RX ORDER — OSELTAMIVIR PHOSPHATE 30 MG/1
30 CAPSULE ORAL DAILY
Status: DISCONTINUED | OUTPATIENT
Start: 2020-02-20 | End: 2020-02-21

## 2020-02-20 RX ORDER — ALBUTEROL SULFATE 0.83 MG/ML
2.5 SOLUTION RESPIRATORY (INHALATION)
Status: DISCONTINUED | OUTPATIENT
Start: 2020-02-20 | End: 2020-02-20

## 2020-02-20 RX ORDER — DOCUSATE SODIUM 50 MG/5ML
100 LIQUID ORAL
Status: DISCONTINUED | OUTPATIENT
Start: 2020-02-20 | End: 2020-02-26 | Stop reason: HOSPADM

## 2020-02-20 RX ORDER — VANCOMYCIN 1.75 GRAM/500 ML IN 0.9 % SODIUM CHLORIDE INTRAVENOUS
1750 ONCE
Status: COMPLETED | OUTPATIENT
Start: 2020-02-20 | End: 2020-02-20

## 2020-02-20 RX ORDER — BUDESONIDE 0.5 MG/2ML
500 INHALANT ORAL
Status: DISCONTINUED | OUTPATIENT
Start: 2020-02-20 | End: 2020-02-26 | Stop reason: HOSPADM

## 2020-02-20 RX ORDER — POTASSIUM CHLORIDE 750 MG/1
40 TABLET, FILM COATED, EXTENDED RELEASE ORAL
Status: COMPLETED | OUTPATIENT
Start: 2020-02-20 | End: 2020-02-20

## 2020-02-20 RX ORDER — LANOLIN ALCOHOL/MO/W.PET/CERES
100 CREAM (GRAM) TOPICAL DAILY
Status: DISCONTINUED | OUTPATIENT
Start: 2020-02-20 | End: 2020-02-26 | Stop reason: HOSPADM

## 2020-02-20 RX ORDER — IPRATROPIUM BROMIDE AND ALBUTEROL SULFATE 2.5; .5 MG/3ML; MG/3ML
3 SOLUTION RESPIRATORY (INHALATION)
Status: DISCONTINUED | OUTPATIENT
Start: 2020-02-20 | End: 2020-02-24

## 2020-02-20 RX ADMIN — ACETYLCYSTEINE 7460 MG: 200 INJECTION, SOLUTION INTRAVENOUS at 21:38

## 2020-02-20 RX ADMIN — PREDNISOLONE SODIUM PHOSPHATE 40 MG: 15 SOLUTION ORAL at 09:21

## 2020-02-20 RX ADMIN — Medication 10 ML: at 00:01

## 2020-02-20 RX ADMIN — ACETYLCYSTEINE 7180 MG: 200 INJECTION, SOLUTION INTRAVENOUS at 04:13

## 2020-02-20 RX ADMIN — SODIUM CHLORIDE 150 ML/HR: 900 INJECTION, SOLUTION INTRAVENOUS at 05:29

## 2020-02-20 RX ADMIN — POTASSIUM CHLORIDE 40 MEQ: 750 TABLET, FILM COATED, EXTENDED RELEASE ORAL at 20:00

## 2020-02-20 RX ADMIN — THERA TABS 1 TABLET: TAB at 11:53

## 2020-02-20 RX ADMIN — Medication 10 ML: at 22:00

## 2020-02-20 RX ADMIN — VANCOMYCIN HYDROCHLORIDE 1750 MG: 10 INJECTION, POWDER, LYOPHILIZED, FOR SOLUTION INTRAVENOUS at 04:15

## 2020-02-20 RX ADMIN — PHYTONADIONE 10 MG: 10 INJECTION, EMULSION INTRAMUSCULAR; INTRAVENOUS; SUBCUTANEOUS at 00:22

## 2020-02-20 RX ADMIN — BUDESONIDE INHALATION 500 MCG: 0.5 SUSPENSION RESPIRATORY (INHALATION) at 21:29

## 2020-02-20 RX ADMIN — FAMOTIDINE 20 MG: 10 INJECTION, SOLUTION INTRAVENOUS at 02:00

## 2020-02-20 RX ADMIN — ONDANSETRON 4 MG: 2 INJECTION INTRAMUSCULAR; INTRAVENOUS at 20:00

## 2020-02-20 RX ADMIN — AZITHROMYCIN MONOHYDRATE 500 MG: 500 INJECTION, POWDER, LYOPHILIZED, FOR SOLUTION INTRAVENOUS at 18:11

## 2020-02-20 RX ADMIN — CEFTRIAXONE 2 G: 2 INJECTION, POWDER, FOR SOLUTION INTRAMUSCULAR; INTRAVENOUS at 17:35

## 2020-02-20 RX ADMIN — FOLIC ACID 1 MG: 1 TABLET ORAL at 11:53

## 2020-02-20 RX ADMIN — PHYTONADIONE 10 MG: 10 INJECTION, EMULSION INTRAMUSCULAR; INTRAVENOUS; SUBCUTANEOUS at 08:57

## 2020-02-20 RX ADMIN — Medication 10 ML: at 14:00

## 2020-02-20 RX ADMIN — OSELTAMIVIR PHOSPHATE 30 MG: 30 CAPSULE ORAL at 04:12

## 2020-02-20 RX ADMIN — FAMOTIDINE 20 MG: 10 INJECTION, SOLUTION INTRAVENOUS at 09:19

## 2020-02-20 RX ADMIN — Medication 100 MG: at 11:53

## 2020-02-20 RX ADMIN — SODIUM BICARBONATE 100 MEQ: 84 INJECTION INTRAVENOUS at 17:29

## 2020-02-20 RX ADMIN — SODIUM BICARBONATE 150 MEQ/1,000 ML IN DEXTROSE 5 % INTRAVENOUS: SOLUTION at 10:50

## 2020-02-20 RX ADMIN — SODIUM BICARBONATE 150 MEQ/1,000 ML IN DEXTROSE 5 % INTRAVENOUS: SOLUTION at 18:12

## 2020-02-20 RX ADMIN — IPRATROPIUM BROMIDE AND ALBUTEROL SULFATE 3 ML: .5; 3 SOLUTION RESPIRATORY (INHALATION) at 21:29

## 2020-02-20 NOTE — CONSULTS
..                     [address] 1937  YOB: 1980     Assessment & Plan:   1. JANICE  - IN SETTING OF HEMODYNAMIC COMPROMISE => IN SETTING OF SEPSIS/PNA/INFLUENZA B  - NO INDICATION FOR RRT AT THIS TIME  - REPEATING LABS NOW  - NO OBSTRUCTION. PT HAS A WORRELL WHICH CAN LIKELY BE REMOVED SOON  - I WOULD CHECK A CPK LEVEL AS SHE MAY HAVE RABDO (UA NOTED)  - OTHER SEROLOGIES HAVE BEEN SENT  2. ANION GAP METABOLIC ACIDOSIS  - REPEAT BICARB NOW  - NO RESP DISTRESS   - IF NOT IMPROVING ADD BICARB GTT  3. INFLUENZA B WITH 2ND PNA  - NEED TO ADD VANCOMYCIN FOR STAPH/STREP COVERAGE  - NO CEFEPIME 2ND TO PCN ALLERGY WITH HIVES  - LEVOFLOXACIN FOR PSEUDOMONAS COVERAGE HOWEVER PATIENTS QTC IS PROLONGED AND HER LAST DOSE OF AZITHROMYCIN WAS YESTERDAY WHICH MEANS WE DEF COULD GET INTO TROUBLE. LOW SUSP FOR PSEUDOMONAL INFECTION  4. ACUTE LIVER DYSFUNCTION  - IS THIS JUST SHOCK LIVER IN SETTING OF SOMEONE WITH DIFFUSE HEPATIC STEATOSIS  - COULD BE MED INDUCED FROM DEC CLEARANCE   - FOLLOW LABS FOR NOW  - LIKELY ALL ASSOCIATED WITH HER PRIMARY ISSUE  5. HYPONATREMIA  6. ACUTE DELIRIUM: LIKELY METABOLIC INDUCED   7. MY COLLEAGUES WILL FOLLOW UP WITH PT IN AM. WE WILL DEFER FURTHER ABX DOSING TO THE CRITICAL CARE TEAM  THANK YOU FOR THIS CONSULT                    Subjective:   CHIEF COMPLAINT: JANICE  HPI:  MS. PEREZ IS A 47 YO AAF WITH A PMH SIG FOR HTN (NOT ON ACEI/ARB/MRB/DIURETIC) WHO WE ARE ASKED TO SEE FOR JANICE. NO PRIOR HX OF KIDNEY ISSUES. ONE WEEK AGO SHE HAD A \"COLD. \" SX IMPROVED. HOWEVER INTAKE WAS POOR OVER THE WEEKEND. ON Monday A ZPACK WAS ORDERED AND SHE STARTED TAKING IT. HOWEVER SHE STARTING GET WORSE AGAIN WITH PROGRESSIVE SOB, MYALGIAS, CP, FEVER, GI SX. NO HA, DYSPHAGIA. NO DYSURIA, HEMATURIA, DIFF PASSING URINE, DECREASED UO. SHE WAS L/D WITH LOW BP. SHE WENT TO URGENT CARE AND THEN ED. SHE HAD CT SCAN THAT SHOWED EVIDENCE TO SUPPORT PNA IN SETTING OF INFLUENZA B +.   HER LAST DOSE OF HER ZPACK WAS YESTERDAY. CURRENTLY SHE IS A BIT CONFUSED. HER MOTHER IS AT THE BEDSIDE. SHE IS ABLE TO TRY AND ANSWER QUESTIONS BUT HER MOTHER HAS TO ANSWER COMPLETE THEM BECAUSE HER MOTHER SAYS THEY AREN'T CORRECT. Review of Systems  SEE HPI. LIMITED 2ND TO PT DELIRIUM     Past Medical History:   Diagnosis Date    Anemia     Asthma     uses inhaler 2-3 times per week    Biliary colic 4373    Hypertension     Ill-defined condition     murmur      Past Surgical History:   Procedure Laterality Date    HX CHOLECYSTECTOMY      HX GYN      vaginal delivery x1    HX GYN  2018    cyst removal    HX GYN          HX OTHER SURGICAL      lipoma removal from right shoulder    HX WISDOM TEETH EXTRACTION         Social History     Socioeconomic History    Marital status: SINGLE     Spouse name: Not on file    Number of children: Not on file    Years of education: Not on file    Highest education level: Not on file   Occupational History    Not on file   Social Needs    Financial resource strain: Not on file    Food insecurity:     Worry: Not on file     Inability: Not on file    Transportation needs:     Medical: Not on file     Non-medical: Not on file   Tobacco Use    Smoking status: Never Smoker    Smokeless tobacco: Never Used   Substance and Sexual Activity    Alcohol use:  Yes     Alcohol/week: 3.0 standard drinks     Types: 3 Cans of beer per week    Drug use: No    Sexual activity: Yes     Partners: Male     Birth control/protection: Condom   Lifestyle    Physical activity:     Days per week: Not on file     Minutes per session: Not on file    Stress: Not on file   Relationships    Social connections:     Talks on phone: Not on file     Gets together: Not on file     Attends Roman Catholic service: Not on file     Active member of club or organization: Not on file     Attends meetings of clubs or organizations: Not on file     Relationship status: Not on file  Intimate partner violence:     Fear of current or ex partner: Not on file     Emotionally abused: Not on file     Physically abused: Not on file     Forced sexual activity: Not on file   Other Topics Concern    Not on file   Social History Narrative    Not on file      Family History   Problem Relation Age of Onset    Hypertension Mother     Cancer Mother         colon, in remission    Clotting Disorder Mother         blood clot post surgery    Hypertension Father     Colon Cancer Paternal Uncle     Pancreatic Cancer Paternal Uncle     Cancer Paternal Uncle     Cancer Maternal Uncle     Stroke Paternal Aunt     Heart Disease Maternal Grandmother       Prior to Admission medications    Medication Sig Start Date End Date Taking? Authorizing Provider   azithromycin (ZITHROMAX Z-PETER) 250 mg tablet Take 250 mg by mouth daily. 500 mg on day 1, followed by 250 mg daily for 4 days   Yes Provider, Historical   ergocalciferol (ERGOCALCIFEROL) 50,000 unit capsule Take 1 Cap by mouth every seven (7) days. 19  Yes Allgood, Corbett Siemens, ANDRIY   metoprolol succinate (TOPROL-XL) 50 mg XL tablet Take 1 Tab by mouth daily. 19  Yes Allgood, Corbett Siemens, NP   fenofibrate nanocrystallized (TRICOR) 145 mg tablet Take 1 Tab by mouth daily. 19  Yes Allgood, Corbett Siemens, NP   albuterol (PROVENTIL HFA, VENTOLIN HFA, PROAIR HFA) 90 mcg/actuation inhaler Take 2 Puffs by inhalation every four (4) hours as needed for Wheezing. 18  Yes Allgood, Corbett Siemens, NP   valACYclovir (VALTREX) 500 mg tablet Take 500 mg by mouth every evening. 16  Yes Provider, Historical     Allergies   Allergen Reactions    Amoxicillin Hives    Chlorhexidine Rash    Lisinopril Angioedema       Objective:     Vitals:  Blood pressure 127/76, pulse (!) 101, temperature 98.7 °F (37.1 °C), resp. rate 21, last menstrual period 02/15/2020, SpO2 96 %.   Temp (24hrs), Av.1 °F (36.7 °C), Min:97.8 °F (36.6 °C), Max:98.7 °F (37.1 °C)      Intake and Output:  02/19 1901 - 02/20 0700  In: 1301.5 [I.V.:1301.5]  Out: -   02/18 0701 - 02/19 1900  In: 1000 [I.V.:1000]  Out: -     Physical Exam:                Patient is intubated:  NO    Physical Examination:   GENERAL ASSESSMENT: NAD  SKIN: NO RASH  HEAD: NC  CHEST: SYMMETRIC EXP. NO DISTRESS  HEART: TACHYCARDIC   ABDOMEN:  SOFT, NO DIST  :Valerio: YES  EXTREMITY: NO EDEMA  NEURO: CONFUSED       ECG/rhythm[de-identified] Rev:YES  Xray/CT/US/MRI REV:YES  Data Review   Recent Results (from the past 72 hour(s))   CBC WITH AUTOMATED DIFF    Collection Time: 02/19/20  4:48 PM   Result Value Ref Range    WBC 5.0 3.6 - 11.0 K/uL    RBC 3.95 3.80 - 5.20 M/uL    HGB 12.7 11.5 - 16.0 g/dL    HCT 39.2 35.0 - 47.0 %    MCV 99.2 (H) 80.0 - 99.0 FL    MCH 32.2 26.0 - 34.0 PG    MCHC 32.4 30.0 - 36.5 g/dL    RDW 14.7 (H) 11.5 - 14.5 %    PLATELET 89 (L) 396 - 400 K/uL    NRBC 0.8 (H) 0  WBC    ABSOLUTE NRBC 0.04 (H) 0.00 - 0.01 K/uL    NEUTROPHILS 85 (H) 32 - 75 %    BAND NEUTROPHILS 1 0 - 6 %    LYMPHOCYTES 8 (L) 12 - 49 %    MONOCYTES 5 5 - 13 %    EOSINOPHILS 1 0 - 7 %    BASOPHILS 0 0 - 1 %    IMMATURE GRANULOCYTES 0 %    ABS. NEUTROPHILS 4.2 1.8 - 8.0 K/UL    ABS. LYMPHOCYTES 0.4 (L) 0.8 - 3.5 K/UL    ABS. MONOCYTES 0.3 0.0 - 1.0 K/UL    ABS. EOSINOPHILS 0.1 0.0 - 0.4 K/UL    ABS. BASOPHILS 0.0 0.0 - 0.1 K/UL    ABS. IMM.  GRANS. 0.0 K/UL    DF MANUAL      PLATELET COMMENTS Large Platelets      RBC COMMENTS ANISOCYTOSIS  1+        WBC COMMENTS REACTIVE LYMPHS     METABOLIC PANEL, COMPREHENSIVE    Collection Time: 02/19/20  4:48 PM   Result Value Ref Range    Sodium 129 (L) 136 - 145 mmol/L    Potassium 4.9 3.5 - 5.1 mmol/L    Chloride 98 97 - 108 mmol/L    CO2 15 (LL) 21 - 32 mmol/L    Anion gap 16 (H) 5 - 15 mmol/L    Glucose 68 65 - 100 mg/dL    BUN 34 (H) 6 - 20 MG/DL    Creatinine 4.94 (H) 0.55 - 1.02 MG/DL    BUN/Creatinine ratio 7 (L) 12 - 20      GFR est AA 12 (L) >60 ml/min/1.73m2    GFR est non-AA 10 (L) >60 ml/min/1.73m2 Calcium 9.1 8.5 - 10.1 MG/DL    Bilirubin, total 7.3 (H) 0.2 - 1.0 MG/DL    ALT (SGPT) >3,500 (H) 12 - 78 U/L    AST (SGOT) >2,000 (H) 15 - 37 U/L    Alk. phosphatase 230 (H) 45 - 117 U/L    Protein, total 6.5 6.4 - 8.2 g/dL    Albumin 3.4 (L) 3.5 - 5.0 g/dL    Globulin 3.1 2.0 - 4.0 g/dL    A-G Ratio 1.1 1.1 - 2.2     MAGNESIUM    Collection Time: 02/19/20  4:48 PM   Result Value Ref Range    Magnesium 2.0 1.6 - 2.4 mg/dL   LACTIC ACID    Collection Time: 02/19/20  4:48 PM   Result Value Ref Range    Lactic acid 7.1 (HH) 0.4 - 2.0 MMOL/L   SAMPLES BEING HELD    Collection Time: 02/19/20  4:48 PM   Result Value Ref Range    SAMPLES BEING HELD 1RED     COMMENT        Add-on orders for these samples will be processed based on acceptable specimen integrity and analyte stability, which may vary by analyte.    EKG, 12 LEAD, INITIAL    Collection Time: 02/19/20  4:59 PM   Result Value Ref Range    Ventricular Rate 90 BPM    Atrial Rate 90 BPM    P-R Interval 130 ms    QRS Duration 98 ms    Q-T Interval 410 ms    QTC Calculation (Bezet) 501 ms    Calculated P Axis 45 degrees    Calculated R Axis 29 degrees    Calculated T Axis 21 degrees    Diagnosis       Normal sinus rhythm  Prolonged QT  When compared with ECG of 19-NOV-2018 14:45,  No significant change was found     LACTIC ACID    Collection Time: 02/19/20  6:18 PM   Result Value Ref Range    Lactic acid 6.0 (HH) 0.4 - 2.0 MMOL/L   METABOLIC PANEL, COMPREHENSIVE    Collection Time: 02/19/20  6:19 PM   Result Value Ref Range    Sodium 131 (L) 136 - 145 mmol/L    Potassium 4.5 3.5 - 5.1 mmol/L    Chloride 100 97 - 108 mmol/L    CO2 15 (LL) 21 - 32 mmol/L    Anion gap 16 (H) 5 - 15 mmol/L    Glucose 77 65 - 100 mg/dL    BUN 34 (H) 6 - 20 MG/DL    Creatinine 4.62 (H) 0.55 - 1.02 MG/DL    BUN/Creatinine ratio 7 (L) 12 - 20      GFR est AA 13 (L) >60 ml/min/1.73m2    GFR est non-AA 11 (L) >60 ml/min/1.73m2    Calcium 8.0 (L) 8.5 - 10.1 MG/DL    Bilirubin, total 5.9 (H) 0.2 - 1.0 MG/DL    ALT (SGPT) 3,244 (H) 12 - 78 U/L    AST (SGOT) >2,000 (H) 15 - 37 U/L    Alk. phosphatase 184 (H) 45 - 117 U/L    Protein, total 5.2 (L) 6.4 - 8.2 g/dL    Albumin 3.1 (L) 3.5 - 5.0 g/dL    Globulin 2.1 2.0 - 4.0 g/dL    A-G Ratio 1.5 1.1 - 2.2     HCG QL SERUM    Collection Time: 02/19/20  6:30 PM   Result Value Ref Range    HCG, Ql. NEGATIVE  NEG     POC EG7    Collection Time: 02/19/20  7:32 PM   Result Value Ref Range    Calcium, ionized (POC) 1.12 1.12 - 1.32 mmol/L    FIO2 (POC) 21 %    pH (POC) 7.294 (L) 7.35 - 7.45      pCO2 (POC) 26.7 (L) 35.0 - 45.0 MMHG    pO2 (POC) 77 (L) 80 - 100 MMHG    HCO3 (POC) 12.9 (L) 22 - 26 MMOL/L    Base deficit (POC) 14 mmol/L    sO2 (POC) 94 92 - 97 %    Site LEFT RADIAL      Device: ROOM AIR      Allens test (POC) YES      Specimen type (POC) ARTERIAL      Total resp.  rate 19     FIBRINOGEN    Collection Time: 02/19/20  7:33 PM   Result Value Ref Range    Fibrinogen 330 200 - 475 mg/dL   D DIMER    Collection Time: 02/19/20  7:33 PM   Result Value Ref Range    D-dimer <0.19 0.00 - 0.65 mg/L FEU   PROTHROMBIN TIME + INR    Collection Time: 02/19/20  7:33 PM   Result Value Ref Range    INR 1.0 0.9 - 1.1      Prothrombin time 9.7 9.0 - 11.1 sec   PROTHROMBIN TIME + INR    Collection Time: 02/19/20  9:00 PM   Result Value Ref Range    INR 2.0 (H) 0.9 - 1.1      Prothrombin time 19.4 (H) 9.0 - 11.1 sec   SED RATE (ESR)    Collection Time: 02/19/20  9:00 PM   Result Value Ref Range    Sed rate, automated 15 0 - 20 mm/hr   C REACTIVE PROTEIN, QT    Collection Time: 02/19/20  9:00 PM   Result Value Ref Range    C-Reactive protein 9.95 (H) 0.00 - 0.60 mg/dL   TROPONIN I    Collection Time: 02/19/20  9:00 PM   Result Value Ref Range    Troponin-I, Qt. <0.05 <0.05 ng/mL   ACETAMINOPHEN    Collection Time: 02/19/20  9:00 PM   Result Value Ref Range    Acetaminophen level 3 (L) 10 - 30 ug/mL   IRON PROFILE    Collection Time: 02/19/20  9:00 PM   Result Value Ref Range Iron 242 (H) 35 - 150 ug/dL    TIBC 254 250 - 450 ug/dL    Iron % saturation 95 (H) 20 - 50 %   AMMONIA    Collection Time: 02/19/20  9:00 PM   Result Value Ref Range    Ammonia 32 (H) <32 UMOL/L   VOLATILES SCREEN    Collection Time: 02/19/20  9:00 PM   Result Value Ref Range    Specimen: BLOOD      Chain of custody? NO      REPORT STATUS FINAL REPORT      Methanol NEGATIVE  NEG mg/L    Ethanol NEGATIVE  NEG mg/L    Isopropanol NEGATIVE  NEG mg/L    Acetone NEGATIVE  NEG mg/L   ETHYL ALCOHOL    Collection Time: 02/19/20  9:00 PM   Result Value Ref Range    ALCOHOL(ETHYL),SERUM <78 <38 MG/DL   SALICYLATE    Collection Time: 02/19/20  9:00 PM   Result Value Ref Range    Salicylate level <1.3 (L) 2.8 - 20.0 MG/DL   HAPTOGLOBIN    Collection Time: 02/19/20  9:00 PM   Result Value Ref Range    Haptoglobin 29 (L) 30 - 200 mg/dL   HEPATITIS PANEL, ACUTE    Collection Time: 02/19/20  9:00 PM   Result Value Ref Range    Hepatitis A, IgM NONREACTIVE NR      __          Hepatitis B surface Ag <0.10 Index    Hep B surface Ag Interp. NEGATIVE  NEG      __          Hepatitis B core, IgM NONREACTIVE NR      __          Hep C  virus Ab Interp.  NONREACTIVE NR      Hep C  virus Ab comment Method used is Siemens Advia Centaur     TSH 3RD GENERATION    Collection Time: 02/19/20  9:00 PM   Result Value Ref Range    TSH 0.19 (L) 0.36 - 3.74 uIU/mL   URINALYSIS W/MICROSCOPIC    Collection Time: 02/19/20 10:11 PM   Result Value Ref Range    Color BROWN      Appearance CLEAR CLEAR      Specific gravity 1.025 1.003 - 1.030      pH (UA) 6.5 5.0 - 8.0      Protein 100 (A) NEG mg/dL    Glucose 100 (A) NEG mg/dL    Ketone 15 (A) NEG mg/dL    Blood LARGE (A) NEG      Urobilinogen 2.0 (H) 0.2 - 1.0 EU/dL    Nitrites POSITIVE (A) NEG      Leukocyte Esterase TRACE (A) NEG      WBC 0-4 0 - 4 /hpf    RBC 20-50 0 - 5 /hpf    Epithelial cells MODERATE (A) FEW /lpf    Bacteria 1+ (A) NEG /hpf    Budding yeast PRESENT (A) NEG     RESPIRATORY PANEL,PCR,NASOPHARYNGEAL    Collection Time: 02/19/20 10:11 PM   Result Value Ref Range    Adenovirus NOT DETECTED NOTD      Coronavirus 229E NOT DETECTED NOTD      Coronavirus HKU1 NOT DETECTED NOTD      Coronavirus CVNL63 NOT DETECTED NOTD      Coronavirus OC43 NOT DETECTED NOTD      Metapneumovirus NOT DETECTED NOTD      Rhinovirus and Enterovirus NOT DETECTED NOTD      Influenza A NOT DETECTED NOTD      Influenza A, subtype H1 NOT DETECTED NOTD      Influenza A, subtype H3 NOT DETECTED NOTD      INFLUENZA A H1N1 PCR NOT DETECTED NOTD      Influenza B DETECTED (A) NOTD      Parainfluenza 1 NOT DETECTED NOTD      Parainfluenza 2 NOT DETECTED NOTD      Parainfluenza 3 NOT DETECTED NOTD      Parainfluenza virus 4 NOT DETECTED NOTD      RSV by PCR NOT DETECTED NOTD      B. parapertussis, PCR NOT DETECTED NOTD      Bordetella pertussis - PCR NOT DETECTED NOTD      Chlamydophila pneumoniae DNA, QL, PCR NOT DETECTED NOTD      Mycoplasma pneumoniae DNA, QL, PCR NOT DETECTED NOTD     BILIRUBIN, CONFIRM    Collection Time: 02/19/20 10:11 PM   Result Value Ref Range    Bilirubin UA, confirm POSITIVE (A) NEG         Discussed with:    PT, MOTHER, CEASAR HIDALGO NP, AND NURSING   Thank you so much to allow us to participate in this patient's care. We will follow.  : Bronson Barragan MD  2/20/2020      [facility]:  [email or web address]  [place] office:  [address] [place] [address], [address] [place] [address] [address]  [place], [address] [place] [address]  Phone: [phone]  Fax :     [phone]    [place] office:  [address] [facility], [address]  Phone - [phone]  Fax - [phone]

## 2020-02-20 NOTE — PROGRESS NOTES
[id number] [facility] Adult  Hospitalist Group                                                                                          Critical Care Progress Note  Ester Carl MD  Answering service: [phone] from in house phone        Date of Service:  2020  NAME:  Alejandro Goncalves  :  1980  MRN:  [id number]      Interval history / Subjective:    Patient reports feeling the same as yesterday. Still reports pleuritic chest pain, SOB and cough. Assessment & Plan:     -Sever sepsis. From multifocal pneumonia. -Influenza B pneumonia. -?UTI.  -Acute liver failure. Unclear etiology. ? Shock liver, she had hypotension on presentation, did not require vasopressors. ? Alcoholic hepatitis - although patient denies heavy alcohol abuse. Viral hep panel neg.  -Acute renal failure. Likely pre renal/ATN in setting of sever sepsis. -Metabolic acidosis. -Coagulopathy. Likely from acute liver failure. Vit K given overnight      Plan:  -Started on vanc, rocephin and tamiflu empirically.  -On NAC therapy for acute hapatitis. -Patient's sister has h/o lupus, although ESR is normal. SWAPNIL, dsDNA, complement levels pending.  -Continue duoneb Q4hrs.  -She is on prednisone rx for suspected alcoholic hepatitis. -Monitor liver function.  -Continue bicarb gtt. -Monitor I/O. -Monitor coag panel.  -Monitor DIC labs. -Closely monitor hemodynamics.  -She is critically ill and has very high chances of decompensation.  -Plan discussed with patient in detail. Code status: Full  DVT prophylaxis: SCD. Care Plan discussed with: Patient/Family, Nurse and   Disposition: Continue care in ICU. 60 minutes of critical care time spent excluding procedures.        Hospital Problems  Date Reviewed: 2019          Codes Class Noted POA    Liver failure [facility]) ICD-10-CM: K72.90  ICD-9-CM: 572.8  2020 Unknown                Review of Systems:   A comprehensive review of systems was negative except for that written in the HPI. Vital Signs:    Last 24hrs VS reviewed since prior progress note. Most recent are:  Visit Vitals  /84   Pulse (!) 107   Temp 98.7 °F (37.1 °C)   Resp 29   Ht 5' 7\" (1.702 m)   Wt 74.5 kg (164 lb 3.9 oz)   SpO2 94%   BMI 25.72 kg/m²         Intake/Output Summary (Last 24 hours) at 2/20/2020 1642  Last data filed at 2/20/2020 1500  Gross per 24 hour   Intake 5506.69 ml   Output 818 ml   Net 4688.69 ml        Physical Examination:             Constitutional:  No acute distress, cooperative, pleasant    ENT:  Oral mucous moist, oropharynx benign. Resp:  wheezing and rhonchi kit. No accessory muscle use   CV:  Regular rhythm, normal rate, no murmurs, gallops, rubs    GI:  Soft, non distended, non tender. normoactive bowel sounds, no hepatosplenomegaly     Musculoskeletal:  No edema, warm, 2+ pulses throughout    Neurologic:  Moves all extremities.   AAOx3, CN II-XII reviewed           Data Review:    Review and/or order of clinical lab test      Labs:     Recent Labs     02/20/20  0208 02/19/20  1648   WBC 4.0 5.0   HGB 11.2* 12.7   HCT 33.3* 39.2   * 89*     Recent Labs     02/20/20  1028 02/20/20  0208 02/19/20  1819 02/19/20  1648   * 130* 131* 129*   K 3.6 4.0 4.5 4.9    98 100 98   CO2 14* 16* 15* 15*   BUN 43* 39* 34* 34*   CREA 3.79* 4.63* 4.62* 4.94*   * 157* 77 68   CA 7.4* 7.6* 8.0* 9.1   MG  --   --   --  2.0   PHOS  --  3.0  --   --      Recent Labs     02/20/20  1028 02/20/20  0208 02/19/20  1819   SGOT >2,000* >2,000* >2,000*   ALT 2,229* 2,985* 3,244*   * 142* 184*   TBILI 7.0* 6.5* 5.9*   TP 4.7* 5.4* 5.2*   ALB 2.2* 2.7*  2.6* 3.1*   GLOB 2.5 2.7 2.1     Recent Labs     02/20/20  1025 02/20/20  0209 02/19/20 2100   INR 1.8* 2.0* 2.0*   PTP 17.4* 19.8* 19.4*      Recent Labs     02/19/20 2100   TIBC 254   PSAT 95*      Lab Results   Component Value Date/Time    Folate 8.4 05/23/2019 04:05 PM      No results for input(s): PH, PCO2, PO2 in the last 72 hours.   Recent Labs     02/20/20  1028 02/20/20  0208 02/19/20  2100   CPK  --  182  --    TROIQ <0.05  --  <0.05     Lab Results   Component Value Date/Time    Cholesterol, total 184 06/04/2019 10:17 AM    HDL Cholesterol 44 06/04/2019 10:17 AM    LDL, calculated Comment 06/04/2019 10:17 AM    Triglyceride 774 (HH) 06/04/2019 10:17 AM     No results found for: [facility]  Lab Results   Component Value Date/Time    Color BROWN 02/19/2020 10:11 PM    Appearance CLEAR 02/19/2020 10:11 PM    Specific gravity 1.025 02/19/2020 10:11 PM    Specific gravity 1.023 01/22/2019 08:21 AM    pH (UA) 6.5 02/19/2020 10:11 PM    Protein 100 (A) 02/19/2020 10:11 PM    Glucose 100 (A) 02/19/2020 10:11 PM    Ketone 15 (A) 02/19/2020 10:11 PM    Bilirubin NEGATIVE  01/22/2019 08:21 AM    Urobilinogen 2.0 (H) 02/19/2020 10:11 PM    Nitrites POSITIVE (A) 02/19/2020 10:11 PM    Leukocyte Esterase TRACE (A) 02/19/2020 10:11 PM    Epithelial cells MODERATE (A) 02/19/2020 10:11 PM    Bacteria 1+ (A) 02/19/2020 10:11 PM    WBC 0-4 02/19/2020 10:11 PM    RBC 20-50 02/19/2020 10:11 PM         Medications Reviewed:     Current Facility-Administered Medications   Medication Dose Route Frequency    albuterol (PROVENTIL VENTOLIN) nebulizer solution 2.5 mg  2.5 mg Nebulization Q6H PRN    docusate (COLACE) 50 mg/5 mL oral liquid 100 mg  100 mg Oral BID PRN    oseltamivir (TAMIFLU) capsule 30 mg  30 mg Oral DAILY    Vancomycin Pharmacy Dosing   Other Rx Dosing/Monitoring    phytonadione (vitamin K1) (AQUA-MEPHYTON) 10 mg in 0.9% sodium chloride 50 mL IVPB  10 mg IntraVENous DAILY    sodium bicarbonate 150 mEq/1000 mL D5W (premix)   IntraVENous CONTINUOUS    folic acid (FOLVITE) tablet 1 mg  1 mg Oral DAILY    thiamine HCL (B-1) tablet 100 mg  100 mg Oral DAILY    therapeutic multivitamin (THERAGRAN) tablet 1 Tab  1 Tab Oral DAILY    [START ON 2/21/2020] famotidine (PEPCID) tablet 20 mg  20 mg Oral DAILY    [START ON 2/21/2020] Vancomycin 24-hr level - due 2/21 @ 0500. Thanks!    Other ONCE    cefTRIAXone (ROCEPHIN) 2 g in 0.9% sodium chloride (MBP/ADV) 50 mL  2 g IntraVENous Q24H    azithromycin (ZITHROMAX) 500 mg in 0.9% sodium chloride (MBP/ADV) 250 mL  500 mg IntraVENous Q24H    sodium bicarbonate 8.4 % (1 mEq/mL) injection 100 mEq  100 mEq IntraVENous ONCE    sodium chloride (NS) flush 5-10 mL  5-10 mL IntraVENous PRN    acetylcysteine (ACETADOTE) 7,180 mg in dextrose 5% 1,000 mL infusion  100 mg/kg IntraVENous ONCE    sodium chloride (NS) flush 5-40 mL  5-40 mL IntraVENous Q8H    sodium chloride (NS) flush 5-40 mL  5-40 mL IntraVENous PRN    ondansetron (ZOFRAN) injection 4 mg  4 mg IntraVENous Q4H PRN    prednisoLONE (ORAPRED) 15 mg/5 mL (3 mg/mL) solution 40 mg  40 mg Oral DAILY     ______________________________________________________________________  EXPECTED LENGTH OF STAY: 4d 19h  ACTUAL LENGTH OF STAY:          1                 Ester Carl MD

## 2020-02-20 NOTE — PROGRESS NOTES
Pharmacist Note - Vancomycin Dosing    Consult provided for this 46 y.o. female for indication of CAP. Antibiotic regimen(s): Vancomycin, Tamiflu  Patient on vancomycin PTA? NO     Recent Labs     20  0208 20  1819 20  1648   WBC 4.0  --  5.0   CREA 4.63* 4.62* 4.94*   BUN 39* 34* 34*     Frequency of BMP: daily  Height: 170 cm  Weight: 74.5 kg  Est CrCl: 17 ml/min  Temp (24hrs), Av.1 °F (36.7 °C), Min:97.8 °F (36.6 °C), Max:98.7 °F (37.1 °C)    Cultures: blood and respiratory      Goal trough = 15 - 20 mcg/mL    Therapy will be initiated with a loading dose of 1750 mg IV x 1. Pharmacy to follow patient daily and order levels / make dose adjustments as appropriate.

## 2020-02-20 NOTE — PROGRESS NOTES
Admission Medication Reconciliation:    Information obtained from:  Patient  RxQuery data available¹:  YES    Comments/Recommendations: Spoke with patient regarding use of PTA medications including prescription/OTC, vitamins/supplements, inhaled, topical, nasal, otic and ophthalmic medications. Updated PTA meds/reviewed patient's allergies. 1)  Of note, patient just started taking an azithromycin 250 mg dose peter (5 day course) on Monday. States she has taken the first three days as prescribed, most recent dose earlier today. Patient takes ergocalciferol every Wednesday. 2)  Medication changes (since last review): Added  - Azithromycin    Adjusted  - None    Removed  - Sodium chloride infusion     ¹RxQuery pharmacy benefit data reflects medications filled and processed through the patient's insurance, however   this data does NOT capture whether the medication was picked up or is currently being taken by the patient. Allergies:  Amoxicillin; Chlorhexidine; and Lisinopril    Significant PMH/Disease States:   Past Medical History:   Diagnosis Date    Anemia     Asthma     uses inhaler 2-3 times per week    Biliary colic 6/85/2505    Hypertension     Ill-defined condition     murmur     Chief Complaint for this Admission:    Chief Complaint   Patient presents with    Hypotension     Prior to Admission Medications:   Prior to Admission Medications   Prescriptions Last Dose Informant Taking? albuterol (PROVENTIL HFA, VENTOLIN HFA, PROAIR HFA) 90 mcg/actuation inhaler   Yes   Sig: Take 2 Puffs by inhalation every four (4) hours as needed for Wheezing. azithromycin (ZITHROMAX Z-PETER) 250 mg tablet 2/19/2020 at Unknown time  Yes   Sig: Take 250 mg by mouth daily. 500 mg on day 1, followed by 250 mg daily for 4 days   ergocalciferol (ERGOCALCIFEROL) 50,000 unit capsule 2/19/2020 at Unknown time  Yes   Sig: Take 1 Cap by mouth every seven (7) days.    fenofibrate nanocrystallized (TRICOR) 145 mg tablet 2/19/2020 at Unknown time  Yes   Sig: Take 1 Tab by mouth daily. metoprolol succinate (TOPROL-XL) 50 mg XL tablet 2/19/2020 at Unknown time  Yes   Sig: Take 1 Tab by mouth daily. valACYclovir (VALTREX) 500 mg tablet 2/19/2020 at Unknown time  Yes   Sig: Take 500 mg by mouth every evening. Facility-Administered Medications: None       Please contact the main inpatient pharmacy with any questions or concerns at [phone] and we will direct you to the clinical pharmacist covering this patient's care while in-house.    GERRY Love

## 2020-02-20 NOTE — PROGRESS NOTES
[facility]   [address] [facility], [address] [place] [address], [facility]  Phone: [phone]   [phone]       Nephrology Progress Note  Lucy Thorne     1980     [de-identified]  Date of Admission : 2/19/2020 02/20/20    CC: Follow up for ARF    Assessment and Plan   JANICE :  - AKIN stage III ==> likely has ATN. Not suspecting TTP   - Oliguric w/ severe degree ARF  - Microscopic Hematuria w/ Proteinuria, family hx of Lupus ==> r/o Acute GN   - Ordered SWAPNIL, ANCA, Complements. Negative Hepatitis serologies   - continue IVF -- changed to Bicarb gtt   - No emergent need for HD  - avoid all potential Nephrotoxins   - Keep Valerio for now     Combined AG+ NAG Metabolic acidosis   - volatile alcohol screen negative   - changed IVF to Bicarb gtt     Hyponatremia :  - 2/2 Chronic alcoholism vs Hypovolemia vs ARF   - urine lytes pending     Multilobar PNA   Influenza B +ve   - Legionella pending   - mx per CCM     Shock Liver   Hepatic Steatosis   Mild Thrombocytopenia   Anemia       D/w pt and ICU staff      Interval History:  Seen and examined   UOP 75 cc in the last hr   Somewhat confused, restless. Denies any joint pain, abdominal pain, nausea, vomiting     Review of Systems: Pertinent items are noted in HPI.     Current Medications:   Current Facility-Administered Medications   Medication Dose Route Frequency    albuterol (PROVENTIL VENTOLIN) nebulizer solution 2.5 mg  2.5 mg Nebulization Q6H PRN    famotidine (PF) (PEPCID) 20 mg in 0.9% sodium chloride 10 mL injection  20 mg IntraVENous Q12H    docusate (COLACE) 50 mg/5 mL oral liquid 100 mg  100 mg Oral BID PRN    oseltamivir (TAMIFLU) capsule 30 mg  30 mg Oral DAILY    Vancomycin Pharmacy Dosing   Other Rx Dosing/Monitoring    phytonadione (vitamin K1) (AQUA-MEPHYTON) 10 mg in 0.9% sodium chloride 50 mL IVPB  10 mg IntraVENous DAILY    sodium bicarbonate 150 mEq/1000 mL D5W (premix)   IntraVENous CONTINUOUS    sodium chloride (NS) flush 5-10 mL 5-10 mL IntraVENous PRN    acetylcysteine (ACETADOTE) 7,180 mg in dextrose 5% 1,000 mL infusion  100 mg/kg IntraVENous ONCE    sodium chloride (NS) flush 5-40 mL  5-40 mL IntraVENous Q8H    sodium chloride (NS) flush 5-40 mL  5-40 mL IntraVENous PRN    ondansetron (ZOFRAN) injection 4 mg  4 mg IntraVENous Q4H PRN    prednisoLONE (ORAPRED) 15 mg/5 mL (3 mg/mL) solution 40 mg  40 mg Oral DAILY      Allergies   Allergen Reactions    Amoxicillin Hives    Chlorhexidine Rash    Lisinopril Angioedema       Objective:  Vitals:    Vitals:    02/20/20 0630 02/20/20 0715 02/20/20 0800 02/20/20 0900   BP: 130/83 115/68 123/80 135/87   Pulse: 100 (!) 108 100 (!) 104   Resp: 28 21 30 27   Temp:   99.2 °F (37.3 °C)    SpO2: 94% 98% 94% 91%   Weight:         Intake and Output:  02/20 0701 - 02/20 1900  In: -   Out: 175 [Urine:175]  02/18 1901 - 02/20 0700  In: 2301.5 [I.V.:2301.5]  Out: 243 [Urine:243]    Physical Examination:  Pt intubated    no  General: restless  Neck:  Supple, no mass  Resp:  DB/L bronchial BS, rales +  CV:  Tachy, no murmur   GI:  Soft, NT, + BS  Neurologic:  Non focal  Psych:             Unable to assess  Skin:  No Rash  :  Valerio +    []    High complexity decision making was performed  []    Patient is at high-risk of decompensation with multiple organ involvement    Lab Data Personally Reviewed: I have reviewed all the pertinent labs, microbiology data and radiology studies during assessment.     Recent Labs     02/20/20  0209 02/20/20  0208 02/19/20  2100 02/19/20  1933 02/19/20  1819 02/19/20  1648   NA  --  130*  --   --  131* 129*   K  --  4.0  --   --  4.5 4.9   CL  --  98  --   --  100 98   CO2  --  16*  --   --  15* 15*   GLU  --  157*  --   --  77 68   BUN  --  39*  --   --  34* 34*   CREA  --  4.63*  --   --  4.62* 4.94*   CA  --  7.6*  --   --  8.0* 9.1   MG  --   --   --   --   --  2.0   PHOS  --  3.0  --   --   --   --    ALB  --  2.7*  2.6*  --   --  3.1* 3.4*   SGOT  --  >2,000*  -- --  >2,000* >2,000*   ALT  --  2,985*  --   --  3,244* >3,500*   INR 2.0*  --  2.0* 1.0  --   --      Recent Labs     02/20/20  0208 02/19/20  1648   WBC 4.0 5.0   HGB 11.2* 12.7   HCT 33.3* 39.2   * 89*     Lab Results   Component Value Date/Time    Specimen Description: URINE 04/09/2010 09:43 PM     Lab Results   Component Value Date/Time    Culture result: (A) 02/19/2020 04:48 PM     GRAM POSITIVE COCCI IN CHAINS GROWING IN 1 OF 2 BOTTLES DRAWN (SITE = [facility] )    Culture result:  02/19/2020 04:48 PM     CALLED TO AND READ BACK BY  CARLITOS Andres R.N. BY  02/20/20 AT 0701      Culture result: NO GROWTH AFTER 12 HOURS 02/19/2020 04:47 PM    Culture result: NO GROWTH 2 DAYS 01/22/2019 10:45 AM    Culture result: STREPTOCOCCUS BOVIS (A) 01/16/2019 03:08 PM    Culture result: MIXED UROGENITAL NASRIN ISOLATED 01/16/2019 03:08 PM     Recent Results (from the past 24 hour(s))   CULTURE, BLOOD    Collection Time: 02/19/20  4:47 PM   Result Value Ref Range    Special Requests: NO SPECIAL REQUESTS      Culture result: NO GROWTH AFTER 12 HOURS     CBC WITH AUTOMATED DIFF    Collection Time: 02/19/20  4:48 PM   Result Value Ref Range    WBC 5.0 3.6 - 11.0 K/uL    RBC 3.95 3.80 - 5.20 M/uL    HGB 12.7 11.5 - 16.0 g/dL    HCT 39.2 35.0 - 47.0 %    MCV 99.2 (H) 80.0 - 99.0 FL    MCH 32.2 26.0 - 34.0 PG    MCHC 32.4 30.0 - 36.5 g/dL    RDW 14.7 (H) 11.5 - 14.5 %    PLATELET 89 (L) 845 - 400 K/uL    NRBC 0.8 (H) 0  WBC    ABSOLUTE NRBC 0.04 (H) 0.00 - 0.01 K/uL    NEUTROPHILS 85 (H) 32 - 75 %    BAND NEUTROPHILS 1 0 - 6 %    LYMPHOCYTES 8 (L) 12 - 49 %    MONOCYTES 5 5 - 13 %    EOSINOPHILS 1 0 - 7 %    BASOPHILS 0 0 - 1 %    IMMATURE GRANULOCYTES 0 %    ABS. NEUTROPHILS 4.2 1.8 - 8.0 K/UL    ABS. LYMPHOCYTES 0.4 (L) 0.8 - 3.5 K/UL    ABS. MONOCYTES 0.3 0.0 - 1.0 K/UL    ABS. EOSINOPHILS 0.1 0.0 - 0.4 K/UL    ABS. BASOPHILS 0.0 0.0 - 0.1 K/UL    ABS. IMM.  GRANS. 0.0 K/UL    DF MANUAL      PLATELET COMMENTS Large Platelets      RBC COMMENTS ANISOCYTOSIS  1+        WBC COMMENTS REACTIVE LYMPHS     METABOLIC PANEL, COMPREHENSIVE    Collection Time: 02/19/20  4:48 PM   Result Value Ref Range    Sodium 129 (L) 136 - 145 mmol/L    Potassium 4.9 3.5 - 5.1 mmol/L    Chloride 98 97 - 108 mmol/L    CO2 15 (LL) 21 - 32 mmol/L    Anion gap 16 (H) 5 - 15 mmol/L    Glucose 68 65 - 100 mg/dL    BUN 34 (H) 6 - 20 MG/DL    Creatinine 4.94 (H) 0.55 - 1.02 MG/DL    BUN/Creatinine ratio 7 (L) 12 - 20      GFR est AA 12 (L) >60 ml/min/1.73m2    GFR est non-AA 10 (L) >60 ml/min/1.73m2    Calcium 9.1 8.5 - 10.1 MG/DL    Bilirubin, total 7.3 (H) 0.2 - 1.0 MG/DL    ALT (SGPT) >3,500 (H) 12 - 78 U/L    AST (SGOT) >2,000 (H) 15 - 37 U/L    Alk. phosphatase 230 (H) 45 - 117 U/L    Protein, total 6.5 6.4 - 8.2 g/dL    Albumin 3.4 (L) 3.5 - 5.0 g/dL    Globulin 3.1 2.0 - 4.0 g/dL    A-G Ratio 1.1 1.1 - 2.2     MAGNESIUM    Collection Time: 02/19/20  4:48 PM   Result Value Ref Range    Magnesium 2.0 1.6 - 2.4 mg/dL   CULTURE, BLOOD    Collection Time: 02/19/20  4:48 PM   Result Value Ref Range    Special Requests: NO SPECIAL REQUESTS      Culture result: (A)       GRAM POSITIVE COCCI IN CHAINS GROWING IN 1 OF 2 BOTTLES DRAWN (SITE = [facility] )    Culture result:        CALLED TO AND READ BACK BY  CARLITOS Andres R.N. BY HR 02/20/20 AT 0701     LACTIC ACID    Collection Time: 02/19/20  4:48 PM   Result Value Ref Range    Lactic acid 7.1 (HH) 0.4 - 2.0 MMOL/L   SAMPLES BEING HELD    Collection Time: 02/19/20  4:48 PM   Result Value Ref Range    SAMPLES BEING HELD 1RED     COMMENT        Add-on orders for these samples will be processed based on acceptable specimen integrity and analyte stability, which may vary by analyte.    EKG, 12 LEAD, INITIAL    Collection Time: 02/19/20  4:59 PM   Result Value Ref Range    Ventricular Rate 90 BPM    Atrial Rate 90 BPM    P-R Interval 130 ms    QRS Duration 98 ms    Q-T Interval 410 ms    QTC Calculation (Bezet) 501 ms Calculated P Axis 45 degrees    Calculated R Axis 29 degrees    Calculated T Axis 21 degrees    Diagnosis       Normal sinus rhythm  Nonspecific ST abnormality    When compared with ECG of 19-NOV-2018 14:45,  No significant change was found  Confirmed by Ryan Reyes M.D., Sakshi Childs ([id number]) on 2/20/2020 8:18:35 AM     LACTIC ACID    Collection Time: 02/19/20  6:18 PM   Result Value Ref Range    Lactic acid 6.0 (HH) 0.4 - 2.0 MMOL/L   METABOLIC PANEL, COMPREHENSIVE    Collection Time: 02/19/20  6:19 PM   Result Value Ref Range    Sodium 131 (L) 136 - 145 mmol/L    Potassium 4.5 3.5 - 5.1 mmol/L    Chloride 100 97 - 108 mmol/L    CO2 15 (LL) 21 - 32 mmol/L    Anion gap 16 (H) 5 - 15 mmol/L    Glucose 77 65 - 100 mg/dL    BUN 34 (H) 6 - 20 MG/DL    Creatinine 4.62 (H) 0.55 - 1.02 MG/DL    BUN/Creatinine ratio 7 (L) 12 - 20      GFR est AA 13 (L) >60 ml/min/1.73m2    GFR est non-AA 11 (L) >60 ml/min/1.73m2    Calcium 8.0 (L) 8.5 - 10.1 MG/DL    Bilirubin, total 5.9 (H) 0.2 - 1.0 MG/DL    ALT (SGPT) 3,244 (H) 12 - 78 U/L    AST (SGOT) >2,000 (H) 15 - 37 U/L    Alk. phosphatase 184 (H) 45 - 117 U/L    Protein, total 5.2 (L) 6.4 - 8.2 g/dL    Albumin 3.1 (L) 3.5 - 5.0 g/dL    Globulin 2.1 2.0 - 4.0 g/dL    A-G Ratio 1.5 1.1 - 2.2     HCG QL SERUM    Collection Time: 02/19/20  6:30 PM   Result Value Ref Range    HCG, Ql. NEGATIVE  NEG     POC EG7    Collection Time: 02/19/20  7:32 PM   Result Value Ref Range    Calcium, ionized (POC) 1.12 1.12 - 1.32 mmol/L    FIO2 (POC) 21 %    pH (POC) 7.294 (L) 7.35 - 7.45      pCO2 (POC) 26.7 (L) 35.0 - 45.0 MMHG    pO2 (POC) 77 (L) 80 - 100 MMHG    HCO3 (POC) 12.9 (L) 22 - 26 MMOL/L    Base deficit (POC) 14 mmol/L    sO2 (POC) 94 92 - 97 %    Site LEFT RADIAL      Device: ROOM AIR      Allens test (POC) YES      Specimen type (POC) ARTERIAL      Total resp.  rate 19     FIBRINOGEN    Collection Time: 02/19/20  7:33 PM   Result Value Ref Range    Fibrinogen 330 200 - 475 mg/dL   D DIMER Collection Time: 02/19/20  7:33 PM   Result Value Ref Range    D-dimer <0.19 0.00 - 0.65 mg/L FEU   PROTHROMBIN TIME + INR    Collection Time: 02/19/20  7:33 PM   Result Value Ref Range    INR 1.0 0.9 - 1.1      Prothrombin time 9.7 9.0 - 11.1 sec   PROTHROMBIN TIME + INR    Collection Time: 02/19/20  9:00 PM   Result Value Ref Range    INR 2.0 (H) 0.9 - 1.1      Prothrombin time 19.4 (H) 9.0 - 11.1 sec   SED RATE (ESR)    Collection Time: 02/19/20  9:00 PM   Result Value Ref Range    Sed rate, automated 15 0 - 20 mm/hr   C REACTIVE PROTEIN, QT    Collection Time: 02/19/20  9:00 PM   Result Value Ref Range    C-Reactive protein 9.95 (H) 0.00 - 0.60 mg/dL   TROPONIN I    Collection Time: 02/19/20  9:00 PM   Result Value Ref Range    Troponin-I, Qt. <0.05 <0.05 ng/mL   ACETAMINOPHEN    Collection Time: 02/19/20  9:00 PM   Result Value Ref Range    Acetaminophen level 3 (L) 10 - 30 ug/mL   IRON PROFILE    Collection Time: 02/19/20  9:00 PM   Result Value Ref Range    Iron 242 (H) 35 - 150 ug/dL    TIBC 254 250 - 450 ug/dL    Iron % saturation 95 (H) 20 - 50 %   AMMONIA    Collection Time: 02/19/20  9:00 PM   Result Value Ref Range    Ammonia 32 (H) <32 UMOL/L   VOLATILES SCREEN    Collection Time: 02/19/20  9:00 PM   Result Value Ref Range    Specimen: BLOOD      Chain of custody?  NO      REPORT STATUS FINAL REPORT      Methanol NEGATIVE  NEG mg/L    Ethanol NEGATIVE  NEG mg/L    Isopropanol NEGATIVE  NEG mg/L    Acetone NEGATIVE  NEG mg/L   ETHYL ALCOHOL    Collection Time: 02/19/20  9:00 PM   Result Value Ref Range    ALCOHOL(ETHYL),SERUM <29 <84 MG/DL   SALICYLATE    Collection Time: 02/19/20  9:00 PM   Result Value Ref Range    Salicylate level <0.5 (L) 2.8 - 20.0 MG/DL   HAPTOGLOBIN    Collection Time: 02/19/20  9:00 PM   Result Value Ref Range    Haptoglobin 29 (L) 30 - 200 mg/dL   HEPATITIS PANEL, ACUTE    Collection Time: 02/19/20  9:00 PM   Result Value Ref Range    Hepatitis A, IgM NONREACTIVE NR      __ Hepatitis B surface Ag <0.10 Index    Hep B surface Ag Interp. NEGATIVE  NEG      __          Hepatitis B core, IgM NONREACTIVE NR      __          Hep C  virus Ab Interp.  NONREACTIVE NR      Hep C  virus Ab comment Method used is Siemens Advia Centaur     TSH 3RD GENERATION    Collection Time: 02/19/20  9:00 PM   Result Value Ref Range    TSH 0.19 (L) 0.36 - 3.74 uIU/mL   T4, FREE    Collection Time: 02/19/20  9:00 PM   Result Value Ref Range    T4, Free 1.3 0.8 - 1.5 NG/DL   URINALYSIS W/MICROSCOPIC    Collection Time: 02/19/20 10:11 PM   Result Value Ref Range    Color BROWN      Appearance CLEAR CLEAR      Specific gravity 1.025 1.003 - 1.030      pH (UA) 6.5 5.0 - 8.0      Protein 100 (A) NEG mg/dL    Glucose 100 (A) NEG mg/dL    Ketone 15 (A) NEG mg/dL    Blood LARGE (A) NEG      Urobilinogen 2.0 (H) 0.2 - 1.0 EU/dL    Nitrites POSITIVE (A) NEG      Leukocyte Esterase TRACE (A) NEG      WBC 0-4 0 - 4 /hpf    RBC 20-50 0 - 5 /hpf    Epithelial cells MODERATE (A) FEW /lpf    Bacteria 1+ (A) NEG /hpf    Budding yeast PRESENT (A) NEG     RESPIRATORY PANEL,PCR,NASOPHARYNGEAL    Collection Time: 02/19/20 10:11 PM   Result Value Ref Range    Adenovirus NOT DETECTED NOTD      Coronavirus 229E NOT DETECTED NOTD      Coronavirus HKU1 NOT DETECTED NOTD      Coronavirus CVNL63 NOT DETECTED NOTD      Coronavirus OC43 NOT DETECTED NOTD      Metapneumovirus NOT DETECTED NOTD      Rhinovirus and Enterovirus NOT DETECTED NOTD      Influenza A NOT DETECTED NOTD      Influenza A, subtype H1 NOT DETECTED NOTD      Influenza A, subtype H3 NOT DETECTED NOTD      INFLUENZA A H1N1 PCR NOT DETECTED NOTD      Influenza B DETECTED (A) NOTD      Parainfluenza 1 NOT DETECTED NOTD      Parainfluenza 2 NOT DETECTED NOTD      Parainfluenza 3 NOT DETECTED NOTD      Parainfluenza virus 4 NOT DETECTED NOTD      RSV by PCR NOT DETECTED NOTD      B. parapertussis, PCR NOT DETECTED NOTD      Bordetella pertussis - PCR NOT DETECTED NOTD Chlamydophila pneumoniae DNA, QL, PCR NOT DETECTED NOTD      Mycoplasma pneumoniae DNA, QL, PCR NOT DETECTED NOTD     BILIRUBIN, CONFIRM    Collection Time: 02/19/20 10:11 PM   Result Value Ref Range    Bilirubin UA, confirm POSITIVE (A) NEG     CBC WITH AUTOMATED DIFF    Collection Time: 02/20/20  2:08 AM   Result Value Ref Range    WBC 4.0 3.6 - 11.0 K/uL    RBC 3.40 (L) 3.80 - 5.20 M/uL    HGB 11.2 (L) 11.5 - 16.0 g/dL    HCT 33.3 (L) 35.0 - 47.0 %    MCV 97.9 80.0 - 99.0 FL    MCH 32.9 26.0 - 34.0 PG    MCHC 33.6 30.0 - 36.5 g/dL    RDW 14.8 (H) 11.5 - 14.5 %    PLATELET 562 (L) 352 - 400 K/uL    MPV 11.3 8.9 - 12.9 FL    NRBC 0.8 (H) 0  WBC    ABSOLUTE NRBC 0.03 (H) 0.00 - 0.01 K/uL    NEUTROPHILS 70 32 - 75 %    BAND NEUTROPHILS 6 0 - 6 %    LYMPHOCYTES 17 12 - 49 %    MONOCYTES 3 (L) 5 - 13 %    EOSINOPHILS 1 0 - 7 %    BASOPHILS 1 0 - 1 %    METAMYELOCYTES 1 (H) 0 %    MYELOCYTES 1 (H) 0 %    IMMATURE GRANULOCYTES 0 %    ABS. NEUTROPHILS 3.0 1.8 - 8.0 K/UL    ABS. LYMPHOCYTES 0.7 (L) 0.8 - 3.5 K/UL    ABS. MONOCYTES 0.1 0.0 - 1.0 K/UL    ABS. EOSINOPHILS 0.0 0.0 - 0.4 K/UL    ABS. BASOPHILS 0.0 0.0 - 0.1 K/UL    ABS. IMM.  GRANS. 0.0 K/UL    DF MANUAL      PLATELET COMMENTS Large Platelets      RBC COMMENTS MACROCYTOSIS  1+        RBC COMMENTS MICROCYTOSIS  1+        RBC COMMENTS ANISOCYTOSIS  1+       RENAL FUNCTION PANEL    Collection Time: 02/20/20  2:08 AM   Result Value Ref Range    Sodium 130 (L) 136 - 145 mmol/L    Potassium 4.0 3.5 - 5.1 mmol/L    Chloride 98 97 - 108 mmol/L    CO2 16 (L) 21 - 32 mmol/L    Anion gap 16 (H) 5 - 15 mmol/L    Glucose 157 (H) 65 - 100 mg/dL    BUN 39 (H) 6 - 20 MG/DL    Creatinine 4.63 (H) 0.55 - 1.02 MG/DL    BUN/Creatinine ratio 8 (L) 12 - 20      GFR est AA 13 (L) >60 ml/min/1.73m2    GFR est non-AA 11 (L) >60 ml/min/1.73m2    Calcium 7.6 (L) 8.5 - 10.1 MG/DL    Phosphorus 3.0 2.6 - 4.7 MG/DL    Albumin 2.6 (L) 3.5 - 5.0 g/dL   CK    Collection Time: 02/20/20 2:08 AM   Result Value Ref Range     26 - 192 U/L   HEPATIC FUNCTION PANEL    Collection Time: 02/20/20  2:08 AM   Result Value Ref Range    Protein, total 5.4 (L) 6.4 - 8.2 g/dL    Albumin 2.7 (L) 3.5 - 5.0 g/dL    Globulin 2.7 2.0 - 4.0 g/dL    A-G Ratio 1.0 (L) 1.1 - 2.2      Bilirubin, total 6.5 (H) 0.2 - 1.0 MG/DL    Bilirubin, direct 4.1 (H) 0.0 - 0.2 MG/DL    Alk. phosphatase 142 (H) 45 - 117 U/L    AST (SGOT) >2,000 (H) 15 - 37 U/L    ALT (SGPT) 2,985 (H) 12 - 78 U/L   PROTHROMBIN TIME + INR    Collection Time: 02/20/20  2:09 AM   Result Value Ref Range    INR 2.0 (H) 0.9 - 1.1      Prothrombin time 19.8 (H) 9.0 - 11.1 sec   LACTIC ACID    Collection Time: 02/20/20  2:20 AM   Result Value Ref Range    Lactic acid 3.7 (HH) 0.4 - 2.0 MMOL/L           I have reviewed the flowsheets. Chart and Pertinent Notes have been reviewed. No change in PMH ,family and social history from Consult note.       Yrn Ahn MD

## 2020-02-20 NOTE — H&P
[facility]    ICU Team Consult Note    Name: Ashish Mariano   : 1980   MRN: [id number]   Date: 2020      Subjective:   Progress Note: 2020      Patient is asked to be seen by Dr. Ermias Freeman for acute multiorgan system failure, necessitating the need for possible ICU care. Reason for ICU Admission:  Acute multiorgan system failure    Patient is a 46 y.o. female with a past medical history of HTN, biliary colic, anemia and asthma who presents to [facility] ED with complaints of low blood pressure after being seen at a urgent care clinic. Patient stated that she had an onset of a cough on Thursday night last week and then woke up the next morning dizzy and light headed. Patient stated that she never passed out, but she felt really fatigued. Patient also has had intermittent fevers, worse on . Also complains of associated abdominal pain and dry mouth as well as diarrhea. Had started a Z-pack as outpatient started on Monday from her PCP office. Does admit to ETOH use occasionally. Admits to occasional smoking of \"black and milds\" and denies any Illicit drug use. Admits to asthma and has emergency inhaler only used once over the last few days. Patient also has a sister who has history of diagnosed Lupus. Patient stated she went to the urgent care after feeling worse despite taking Z-brendon for a few days, fatigue, cough, dizziness and abdominal pain,  and there they moira labs. When they resulted and her renal and liver enzymes was found to be elevated she was sent to the ED. In the ED patient was worked up for sepsis and sepsis bundle intiated. CMP showed marked elevated transaminates, lactic acidosis, JANICE. Elevated T-Bili of 7.3 and Hyponatremia 129. Patient also has some thrombocytopenia, platelet level of 89. Aleukocytosis. No temp at present. Hemodynamics are stable at this time. Patient still complaining of some abdominal pain. Was given roxicodone x1 in ED with relief.  Abdominal CT without contrast completed. Reports: Multifocal pulmonary consolidations. Diffuse hepatic steatosis. Status post cholecystectomy. Patient started on acetylcysteine infusion and given a dose of solumedrol in ED. Cardiology consulted for Stat ECHO. Lower extremity duplex ordered. [facility] was consulted for ICU admission due to multiorgan system failure. Heme/Onc, Hepatology, Nephrology consulted. POD:* No surgery found *    S/P: Na    Active Problem List:     Problem List  Date Reviewed: 5/23/2019          Codes Class    Liver failure ([address] ICD-10-CM: K72.90  ICD-9-CM: 572.8         Vitamin D deficiency ICD-10-CM: E55.9  ICD-9-CM: 268.9         Lump of skin ICD-10-CM: R22.9  ICD-9-CM: 299. 2         Cellulitis of great toe ICD-10-CM: L03.039  ICD-9-CM: 681.10         Benign essential HTN ICD-10-CM: I10  ICD-9-CM: 401.1         HSV (herpes simplex virus) infection ICD-10-CM: B00.9  ICD-9-CM: 054.9         Acute bronchitis ICD-10-CM: J20.9  ICD-9-CM: 466.0         Biliary colic CRF-42-JU: E11.77  ICD-9-CM: 574.20               Past Medical History:      has a past medical history of Anemia, Asthma, Biliary colic (6/74/0820), Hypertension, and Ill-defined condition. Past Surgical History:      has a past surgical history that includes hx wisdom teeth extraction; hx other surgical; hx cholecystectomy (2010); hx gyn (2010); hx gyn (11/2018); and hx gyn. Home Medications:     Prior to Admission medications    Medication Sig Start Date End Date Taking? Authorizing Provider   azithromycin (ZITHROMAX Z-PETER) 250 mg tablet Take 250 mg by mouth daily. 500 mg on day 1, followed by 250 mg daily for 4 days   Yes Provider, Historical   ergocalciferol (ERGOCALCIFEROL) 50,000 unit capsule Take 1 Cap by mouth every seven (7) days. 12/4/19  Yes Shanna Jeffery NP   metoprolol succinate (TOPROL-XL) 50 mg XL tablet Take 1 Tab by mouth daily.  11/25/19  Yes Shanna Jeffery NP   fenofibrate nanocrystallized (TRICOR) 145 mg tablet Take 1 Tab by mouth daily. 6/5/19  Yes Billy Jeffery NP   albuterol (PROVENTIL HFA, VENTOLIN HFA, PROAIR HFA) 90 mcg/actuation inhaler Take 2 Puffs by inhalation every four (4) hours as needed for Wheezing. 11/1/18  Yes Billy Jeffery NP   valACYclovir (VALTREX) 500 mg tablet Take 500 mg by mouth every evening. 2/4/16  Yes Provider, Historical   azithromycin (ZITHROMAX) 250 mg tablet Take 2 tablets today, then take 1 tablet daily 2/17/20 2/19/20  Stephie Mackey MD   acetaminophen (TYLENOL) 325 mg tablet Take  by mouth every four (4) hours as needed for Pain. 2/19/20  Provider, Historical       Allergies/Social/Family History: Allergies   Allergen Reactions    Amoxicillin Hives    Chlorhexidine Rash    Lisinopril Angioedema      Social History     Tobacco Use    Smoking status: Never Smoker    Smokeless tobacco: Never Used   Substance Use Topics    Alcohol use:  Yes     Alcohol/week: 3.0 standard drinks     Types: 3 Cans of beer per week      Family History   Problem Relation Age of Onset    Hypertension Mother     Cancer Mother         colon, in remission    Clotting Disorder Mother         blood clot post surgery    Hypertension Father     Colon Cancer Paternal Uncle     Pancreatic Cancer Paternal Uncle     Cancer Paternal Uncle     Cancer Maternal Uncle     Stroke Paternal Aunt     Heart Disease Maternal Grandmother        Review of Systems:     Constitutional: positive for fevers, fatigue, malaise and anorexia  Eyes: negative  Ears, nose, mouth, throat, and face: positive for dry mouth  Respiratory: positive for cough or non productive  Cardiovascular: positive for near-syncope, fatigue, dizziness  Gastrointestinal: positive for diarrhea and then constipation  Genitourinary:Negative, denies changes in urination, denies burning upon urination  Integument/breast: negative  Hematologic/lymphatic: negative  Musculoskeletal:negative  Neurological: positive for dizziness  Behavioral/Psych: negative  Endocrine: negative  Allergic/Immunologic: negative    Objective:   Vital Signs:  Visit Vitals  /57   Pulse 95   Temp 97.8 °F (36.6 °C)   LMP 02/15/2020   SpO2 95%      O2 Device: Room air Temp (24hrs), Av.8 °F (36.6 °C), Min:97.8 °F (36.6 °C), Max:97.8 °F (36.6 °C)           Intake/Output:   No intake or output data in the 24 hours ending 20    Physical Exam:    General:  alert, cooperative, no distress, appears stated age  Eye:  conjunctivae/corneas clear. PERRL, EOM's intact. Neurologic:  oriented, normal  Lymphatic:  Cervical, supraclavicular, and axillary nodes normal.   Neck:  normal and no erythema or exudates noted. Lungs:  clear to auscultation bilaterally  Heart:  regular rate and rhythm, S1, S2 normal, no murmur, click, rub or gallop  Abdomen:  soft, non-tender. Bowel sounds normal. No masses,  no organomegaly  Cardiovascular:  Regular rate and rhythm, S1S2 present, without murmur or extra heart sounds, pedal pulses normal and no edema  Skin:  Normal.    LABS AND  DATA: Personally reviewed  Recent Labs     20  1648   WBC 5.0   HGB 12.7   HCT 39.2   PLT 89*     Recent Labs     20  1648   * 129*   K 4.5 4.9    98   CO2 15* 15*   BUN 34* 34*   CREA 4.62* 4.94*   GLU 77 68   CA 8.0* 9.1   MG  --  2.0     Recent Labs     20  1648   SGOT >2,000* >2,000*   * 230*   TP 5.2* 6.5   ALB 3.1* 3.4*   GLOB 2.1 3.1     Recent Labs     20  1933   INR 1.0   PTP 9.7      Recent Labs     20   PHI 7.294*   PCO2I 26.7*   PO2I 77*   FIO2I 21     No results for input(s): CPK, CKMB, TROIQ, BNPP in the last 72 hours.     Hemodynamics:   PAP:   CO:     Wedge:   CI:     CVP:    SVR:       PVR:       Ventilator Settings:  Mode Rate Tidal Volume Pressure FiO2 PEEP                    Peak airway pressure:      Minute ventilation:          MEDS: Reviewed    CXR Results  (Last 48 hours) 02/19/20 1637  XR CHEST PA LAT Final result    Impression:  IMPRESSION: Trilobar peripheral wedge-shaped opacities could represent   multilobar pneumonia. Multiple pulmonary infarcts cannot be excluded. Clinical   correlation needed       Narrative:  EXAM: XR CHEST PA LAT       INDICATION: hypotension, cough       COMPARISON: 4/20/2015. FINDINGS: PA and lateral radiographs of the chest demonstrate 3 peripheral   wedge-shaped areas of opacity including the right upper lobe, right middle lobe,   and left lower lobe. The cardiac and mediastinal contours and pulmonary   vascularity are normal. The bones and soft tissues are within normal limits. CT Results  (Last 48 hours)               02/19/20 2014  CT ABD PELV WO CONT Final result    Impression:  IMPRESSION:       1. Multifocal pulmonary consolidations. 2. Diffuse hepatic steatosis. 3. Status post cholecystectomy. Narrative:  EXAM: CT ABD PELV WO CONT       INDICATION: acute renal failure, deranged LFTs       COMPARISON: Concurrent ultrasound. CONTRAST:  None. TECHNIQUE:    Thin axial images were obtained through the abdomen and pelvis. Coronal and   sagittal reconstructions were generated. Oral contrast was not administered. CT   dose reduction was achieved through use of a standardized protocol tailored for   this examination and automatic exposure control for dose modulation. The absence of intravenous enteral contrast material reduces the capacity of CT   to evaluate the bowel and solid organs. FINDINGS:    LUNG BASES: Multifocal consolidative opacifications with air bronchograms in   inferior lingula, basilar left lower lobe, and lateral basilar right lower lobe. INCIDENTALLY IMAGED HEART AND MEDIASTINUM: Unremarkable. LIVER: Diffusely diminished hepatic echotexture. No hepatic mass or biliary duct   dilation demonstrated. GALLBLADDER: Absent. SPLEEN: No mass.    PANCREAS: No mass or ductal dilatation. ADRENALS: Unremarkable. KIDNEYS/URETERS: No mass, calculus, or hydronephrosis. STOMACH: Small hiatal hernia. SMALL BOWEL: No dilatation or wall thickening. COLON: No dilation or mural thickening. APPENDIX: Not shown. PERITONEUM: No ascites or pneumoperitoneum. RETROPERITONEUM: No lymphadenopathy or aortic aneurysm. REPRODUCTIVE ORGANS: Small exophytic subserosal leiomyomas at uterine fundus   measuring up to 1.5 cm. URINARY BLADDER: No mass or calculus. BONES: No destructive bone lesion. ADDITIONAL COMMENTS: N/A               Abdominal US Right upper Quadrant 02/19/2020  INDICATION: liver injury     COMPARISON: Ultrasound 4/10/2010     EXAM: Real-time ultrasound of the right upper quadrant     FINDINGS: The patient is status post cholecystectomy. There is no intrahepatic  or extrahepatic biliary duct dilation. The common bile duct aorta measures 4 mm. Diffuse hepatic heterogeneous increased echotexture is shown. Portal venous flow  is toward the liver with Main portal vein diameter of 9 mm and portal vein  velocity of 30 cm/s. Flow is documented in the right and left portal veins as  well as within the hepatic veins. The appearance of the pancreatic head is  normal. The right kidney is normal vertebral 11.0 cm in length.       IMPRESSION: Status post cholecystectomy. Heterogeneous hepatic echotexture. No  hepatic mass or biliary duct dilation demonstrated. Assessment:     ICU Problems:  Acute Metabolic Acidosis with mild anion gap of 16,  2' to JANICE vs ?Sepsis - cultures pending  Multilobar pneumonia vs more likely multiple pulmonary infarcts on chest xray.   JANICE - admission BUN/Crit 34/4.94 and GFR 11  Acute elevated Transaminates - ALT >3.5K and AST > 2K on admission - concern for hepatitis labs pending  Thrombocytopenia 89 on admission - continue to trend  Elevated T-Bili - 7.3 on admission   Hyponatremia - likely 2' to dehydration and diarrhea  Coagulopathy - INR now 2.0  Hx HTN  Hx Asthma    ICU Comprehensive Plan of Care:   Plans for this Shift:   1. Transfer to ICU from ED. 2. Consult Hepatology, Heme/Onc and Nephrology. 3. Send off Sed rate, CRP, ETOH, salicylate and acetaminophen levels, Lupus panel, ANCA panel, SWAPNIL, Peripheral smear, LD, Haptoglobin, Acute hepatitis panel, iron profile, copper, ammonia, TSH, PT/INR, fibrinogen, d-dimer, Troponin level, UDS and U/A. Send volatiles screen,  Daily CMP and CBC. Blood Cx sent. Follow up labs and cultures. 4. Stat Echo ordered. Cardiology called and approved. 5. Stat lower ext PVLs negative for DVTs  6. VQ scan ordered  7. Abdominal CT - Multifocal pulmonary consolidations. Diffuse hepatic steatosis. Status post cholecystectomy. 8. Liquid Diet for tonight. PRN Zofran  9. Roxicodone for pain control, will order as needed. No tylenol  10. Patient currently on acetylcysteine infusion for liver injury. 11. Continue NS at 150 ml/hr   12. POC Pregnancy test - negative  13. Maddrey discriminant function for alcoholic hepatitis is score is 44. Started on prednisolone for 28 days. 14. INR now up to 2.0 will give a dose of vitamin K   15. Pepcid for SUP  16. SCDS for DVT prophylaxis  17. Rest of plan as below:    Multidisciplinary Rounds Completed:  Pending    ABCDEF Bundle/Checklist  Pain Medications: None will order intermittently as needed. Target RASS: N/A  Sedation Medications: None  CAM-ICU:  Negative  Mobility: Up to bedside commode with assist  PT/OT: Will consult as appropriate   Restraints: None needed at this time  Discussed Plan of Care (goals of care):  Yes  Addressed Code Status: Full Code    CARDIOVASCULAR  Cardiac Gtts: None  SBP Goal of: > 90 mmHg  MAP Goal of: > 65 mmHg  Transfusion Trigger (Hgb): <7 g/dL    RESPIRATORY  Vent Goals:   N/A  DVT Prophylaxis (if no, list reason): SCD's or Sequential Compression Device   SPO2 Goal: > 92%  Pulmonary toilet: Albuterol     GI/  Valerio Catheter Present: Yes  GI Prophylaxis: Pepcid (famotidine)   Nutrition: Yes liquid diet  IVFs: NS @ 150  Bowel Movement: Pending  Bowel Regimen: Docusate (Colace)  Insulin: NA    ANTIBIOTICS  Antibiotics:  None Will defer and will consider starting if patient has temp, leukocytosis develop or + cultures or clinically worsens. T/L/D  Tubes: None  Lines: Peripheral IV  Drains: Valerio Catheter    SPECIAL EQUIPMENT  None    DISPOSITION  Stay in ICU    CRITICAL CARE CONSULTANT NOTE  I had a face to face encounter with the patient, reviewed and interpreted patient data including clinical events, labs, images, vital signs, I/O's, and examined patient. I have discussed the case and the plan and management of the patient's care with the consulting services, the bedside nurses and the respiratory therapist.      NOTE OF PERSONAL INVOLVEMENT IN CARE   This patient has a high probability of imminent, clinically significant deterioration, which requires the highest level of preparedness to intervene urgently. I participated in the decision-making and personally managed or directed the management of the following life and organ supporting interventions that required my frequent assessment to treat or prevent imminent deterioration. I personally spent 60 minutes of critical care time. This is time spent at this critically ill patient's bedside actively involved in patient care as well as the coordination of care and discussions with the patient's family. This does not include any procedural time which has been billed separately.     [facility]     Critical Care Medicine  [facility]    2/19/2020

## 2020-02-20 NOTE — PROGRESS NOTES
0730: Bedside shift change report given to William Copeland RN (oncoming nurse) by Loida Houser RN (offgoing nurse). Report included the following information SBAR, Kardex, ED Summary, Procedure Summary, Intake/Output, MAR, Accordion, Recent Results and Med Rec Status.

## 2020-02-20 NOTE — PROGRESS NOTES
Primary Nurse Shilpi Martinez RN and Cal Low RN, RN performed a dual skin assessment on this patient No impairment noted  Otis score is 18

## 2020-02-20 NOTE — CONSULTS
[facility] [place] [address] [address]  W: [phone]  F: [phone]    Reason for Visit:   Giles Wright is a 46 y.o. female who is seen in hospital consultation at the request of Dr. Maurisio Dias for evaluation of concern for TTP, family hx lupus presening with multiorgan failure, possible PEs and thrombocytopenia. History of Present Illness:   Patient seen in the hospital after admission from the ED for low blood pressure. Patient had hx of cough, fatigue, fevers, abd pain and diarrhea. In the ED patient was worked up for sepsis and sepsis bundle intiated. CMP showed marked elevated transaminates, lactic acidosis, JANICE. Elevated T-Bili of 7.3 and Hyponatremia 129. Patient also has some thrombocytopenia, platelet level of 89. Consulted for concern for TTP. Patient is laying in bed, drowsy but conversant. She states she fears the diagnosis of sepsis as this is how her father . Past Medical History:   Diagnosis Date    Anemia     Asthma     uses inhaler 2-3 times per week    Biliary colic     Hypertension     Ill-defined condition     murmur      Past Surgical History:   Procedure Laterality Date    HX CHOLECYSTECTOMY      HX GYN      vaginal delivery x1    HX GYN  2018    cyst removal    HX GYN          HX OTHER SURGICAL      lipoma removal from right shoulder    HX WISDOM TEETH EXTRACTION        Social History     Tobacco Use    Smoking status: Never Smoker    Smokeless tobacco: Never Used   Substance Use Topics    Alcohol use:  Yes     Alcohol/week: 3.0 standard drinks     Types: 3 Cans of beer per week      Family History   Problem Relation Age of Onset    Hypertension Mother     Cancer Mother         colon, in remission    Clotting Disorder Mother         blood clot post surgery    Hypertension Father     Colon Cancer Paternal Uncle     Pancreatic Cancer Paternal Uncle     Cancer Paternal Uncle     Cancer Maternal Uncle     Stroke Paternal Aunt     Heart Disease Maternal Grandmother      Current Facility-Administered Medications   Medication Dose Route Frequency    albuterol (PROVENTIL VENTOLIN) nebulizer solution 2.5 mg  2.5 mg Nebulization Q6H PRN    docusate (COLACE) 50 mg/5 mL oral liquid 100 mg  100 mg Oral BID PRN    oseltamivir (TAMIFLU) capsule 30 mg  30 mg Oral DAILY    Vancomycin Pharmacy Dosing   Other Rx Dosing/Monitoring    phytonadione (vitamin K1) (AQUA-MEPHYTON) 10 mg in 0.9% sodium chloride 50 mL IVPB  10 mg IntraVENous DAILY    sodium bicarbonate 150 mEq/1000 mL D5W (premix)   IntraVENous CONTINUOUS    folic acid (FOLVITE) tablet 1 mg  1 mg Oral DAILY    thiamine HCL (B-1) tablet 100 mg  100 mg Oral DAILY    therapeutic multivitamin (THERAGRAN) tablet 1 Tab  1 Tab Oral DAILY    [START ON 2/21/2020] famotidine (PEPCID) tablet 20 mg  20 mg Oral DAILY    [START ON 2/21/2020] Vancomycin 24-hr level - due 2/21 @ 0500. Thanks! Other ONCE    sodium chloride (NS) flush 5-10 mL  5-10 mL IntraVENous PRN    acetylcysteine (ACETADOTE) 7,180 mg in dextrose 5% 1,000 mL infusion  100 mg/kg IntraVENous ONCE    sodium chloride (NS) flush 5-40 mL  5-40 mL IntraVENous Q8H    sodium chloride (NS) flush 5-40 mL  5-40 mL IntraVENous PRN    ondansetron (ZOFRAN) injection 4 mg  4 mg IntraVENous Q4H PRN    prednisoLONE (ORAPRED) 15 mg/5 mL (3 mg/mL) solution 40 mg  40 mg Oral DAILY      Allergies   Allergen Reactions    Amoxicillin Hives    Chlorhexidine Rash    Lisinopril Angioedema        Review of Systems: A complete review of systems was obtained, negative except as described above.     Physical Exam:     Visit Vitals  /89   Pulse (!) 108   Temp 98.7 °F (37.1 °C)   Resp (!) 34   Ht 5' 7\" (1.702 m)   Wt 164 lb 3.9 oz (74.5 kg)   LMP 02/15/2020   SpO2 95%   BMI 25.72 kg/m²     ECOG PS: 3-4  General: No distress, drowsy  Eyes: PERRLA, anicteric sclerae  HENT: Atraumatic, OP clear  Neck: Supple  Respiratory: rhonci, normal respiratory effort  CV: Normal rate, regular rhythm, no murmurs,1+ peripheral edema  GI: Soft, nontender, nondistended, no masses, no hepatomegaly, no splenomegaly  MS: moves all extremities, Digits without clubbing or cyanosis. Skin: No rashes, ecchymoses, or petechiae. Normal temperature, turgor, and texture. Psych: drowsy, conversant but falls asleep during conversation    Results:     Lab Results   Component Value Date/Time    WBC 4.0 02/20/2020 02:08 AM    HGB 11.2 (L) 02/20/2020 02:08 AM    HCT 33.3 (L) 02/20/2020 02:08 AM    PLATELET 351 (L) 97/72/5646 02:08 AM    MCV 97.9 02/20/2020 02:08 AM    ABS. NEUTROPHILS 3.0 02/20/2020 02:08 AM     Lab Results   Component Value Date/Time    Sodium 131 (L) 02/20/2020 10:28 AM    Potassium 3.6 02/20/2020 10:28 AM    Chloride 103 02/20/2020 10:28 AM    CO2 14 (LL) 02/20/2020 10:28 AM    Glucose 172 (H) 02/20/2020 10:28 AM    BUN 43 (H) 02/20/2020 10:28 AM    Creatinine 3.79 (H) 02/20/2020 10:28 AM    GFR est AA 16 (L) 02/20/2020 10:28 AM    GFR est non-AA 13 (L) 02/20/2020 10:28 AM    Calcium 7.4 (L) 02/20/2020 10:28 AM     Lab Results   Component Value Date/Time    Bilirubin, total 7.0 (H) 02/20/2020 10:28 AM    ALT (SGPT) 2,229 (H) 02/20/2020 10:28 AM    AST (SGOT) >2,000 (H) 02/20/2020 10:28 AM    Alk. phosphatase 128 (H) 02/20/2020 10:28 AM    Protein, total 4.7 (L) 02/20/2020 10:28 AM    Albumin 2.2 (L) 02/20/2020 10:28 AM    Globulin 2.5 02/20/2020 10:28 AM     Peripheral Smear 2/19/2020   FINAL PATHOLOGIC DIAGNOSIS   Peripheral blood, smear: Thrombocytopenia without platelet aggregation. Red blood cells and white blood cells within normal morphologic limits. CT A/P 2/19/2020  IMPRESSION:  1. Multifocal pulmonary consolidations. 2. Diffuse hepatic steatosis. 3. Status post cholecystectomy. Records reviewed and summarized above. Pathology report(s) reviewed above.   Radiology report(s) reviewed above.      Assessment/Plan:   1) acute Thrombocytopenia improving  Etiology most likely due to acute illness/ sepsis/ shock liver. Consulted for TTP but not c/w TTP given no anemia. Peripheral smear shows thrombocytopenia without platelet aggregation. Plt level improved from 89 to 105. coags improved. Fibrinogen normal.   Continue to monitor CBC. Should improve as pt improves. 2)JANICE due to sepsis. Creatinine 3.79  Nephrology following. 3) Shock Liver  Hepatology following    4)Coagulopathy  Most likely 2/2 #3. Treated with IV vitamin K. No evidence of bleeding.     5)? Sepsis  Elevated lactate and hypotension improving  Influenza B positive. Blood cultures pending. Pt seen today in conjunction with ANDRIY Desai  Will follow   Call for questions. I appreciate the opportunity to participate in Ms. Corwin Arias care.     Signed By: Ge Salazar NP

## 2020-02-20 NOTE — PROGRESS NOTES
On call HemOnc telephone note:    Was called by ICU nurse about possible TTP. I reviewed patient's chart and discussed that I do not feel patient has evidence of TTP given very mild anemia, moderate thrombocytopenia. I recommended labs to evaluate for hemolysis as well as peripheral smear to rule out schistocytes. Based on review of labs as of 2/19/20 at 10pm, it seems patient more likely has acute hepatitis due to EtOH, medication or other etiology.  HemOnc consult in am.

## 2020-02-20 NOTE — PROGRESS NOTES
Transitions of Care  Home     Reason for Admission:   From Urgent care with hypotension, positive for Flu                   RUR Score:   18 % / Low                  Plan for utilizing home health: NA                         Current Advanced Directive/Advance Care Plan: Not on file                         Care manager met with patient and her mother Rip Martin [phone] to introduce self and explain role. Patient lives independently with her mother and her son, no previous home health or equipment needs. She confirmed her PCP to be Kaylyn Edwards NP and sees her as needed. Confirmed her insurance to be [employer]. Care management will follow for transitions of care.  Latrice Rosales RN,CRM    Care Management Interventions  PCP Verified by CM: Yes(Kathryn Wen NP)  MyChart Signup: Yes  Discharge Durable Medical Equipment: No  Physical Therapy Consult: No  Occupational Therapy Consult: No  Speech Therapy Consult: No  Current Support Network: Relative's Home(Mother Deepak Estrella: [phone])  Confirm Follow Up Transport: Family

## 2020-02-21 ENCOUNTER — APPOINTMENT (OUTPATIENT)
Dept: GENERAL RADIOLOGY | Age: 40
DRG: 720 | End: 2020-02-21
Attending: INTERNAL MEDICINE
Payer: MEDICAID

## 2020-02-21 LAB
ACTIN IGG SERPL-ACNC: 7 UNITS (ref 0–19)
ALBUMIN SERPL-MCNC: 2.1 G/DL (ref 3.5–5)
ALBUMIN SERPL-MCNC: 2.1 G/DL (ref 3.5–5)
ALBUMIN SERPL-MCNC: 2.2 G/DL (ref 3.5–5)
ALBUMIN/GLOB SERPL: 0.7 {RATIO} (ref 1.1–2.2)
ALBUMIN/GLOB SERPL: 0.8 {RATIO} (ref 1.1–2.2)
ALBUMIN/GLOB SERPL: 0.9 {RATIO} (ref 1.1–2.2)
ALP SERPL-CCNC: 113 U/L (ref 45–117)
ALP SERPL-CCNC: 118 U/L (ref 45–117)
ALP SERPL-CCNC: 127 U/L (ref 45–117)
ALT SERPL-CCNC: 1206 U/L (ref 12–78)
ALT SERPL-CCNC: 1427 U/L (ref 12–78)
ALT SERPL-CCNC: 1600 U/L (ref 12–78)
ANA SER QL: NEGATIVE
ANA SER QL: NEGATIVE
ANION GAP SERPL CALC-SCNC: 10 MMOL/L (ref 5–15)
ANION GAP SERPL CALC-SCNC: 7 MMOL/L (ref 5–15)
ANION GAP SERPL CALC-SCNC: 9 MMOL/L (ref 5–15)
ANION GAP SERPL CALC-SCNC: 9 MMOL/L (ref 5–15)
AST SERPL-CCNC: 1098 U/L (ref 15–37)
AST SERPL-CCNC: 1516 U/L (ref 15–37)
AST SERPL-CCNC: >2000 U/L (ref 15–37)
BASOPHILS # BLD: 0 K/UL (ref 0–0.1)
BASOPHILS NFR BLD: 0 % (ref 0–1)
BILIRUB SERPL-MCNC: 3.7 MG/DL (ref 0.2–1)
BILIRUB SERPL-MCNC: 5.3 MG/DL (ref 0.2–1)
BILIRUB SERPL-MCNC: 6.6 MG/DL (ref 0.2–1)
BUN SERPL-MCNC: 28 MG/DL (ref 6–20)
BUN SERPL-MCNC: 33 MG/DL (ref 6–20)
BUN SERPL-MCNC: 38 MG/DL (ref 6–20)
BUN SERPL-MCNC: 41 MG/DL (ref 6–20)
BUN/CREAT SERPL: 15 (ref 12–20)
BUN/CREAT SERPL: 15 (ref 12–20)
BUN/CREAT SERPL: 16 (ref 12–20)
BUN/CREAT SERPL: 16 (ref 12–20)
C3 SERPL-MCNC: 33 MG/DL (ref 82–167)
C4 SERPL-MCNC: 3 MG/DL (ref 14–44)
CALCIUM SERPL-MCNC: 7.4 MG/DL (ref 8.5–10.1)
CALCIUM SERPL-MCNC: 7.8 MG/DL (ref 8.5–10.1)
CALCIUM SERPL-MCNC: 8 MG/DL (ref 8.5–10.1)
CALCIUM SERPL-MCNC: 8.2 MG/DL (ref 8.5–10.1)
CERULOPLASMIN SERPL-MCNC: 21.5 MG/DL (ref 19–39)
CHLORIDE SERPL-SCNC: 100 MMOL/L (ref 97–108)
CHLORIDE SERPL-SCNC: 103 MMOL/L (ref 97–108)
CHLORIDE SERPL-SCNC: 98 MMOL/L (ref 97–108)
CHLORIDE SERPL-SCNC: 99 MMOL/L (ref 97–108)
CO2 SERPL-SCNC: 28 MMOL/L (ref 21–32)
CO2 SERPL-SCNC: 28 MMOL/L (ref 21–32)
CO2 SERPL-SCNC: 29 MMOL/L (ref 21–32)
CO2 SERPL-SCNC: 29 MMOL/L (ref 21–32)
CREAT SERPL-MCNC: 1.79 MG/DL (ref 0.55–1.02)
CREAT SERPL-MCNC: 2.05 MG/DL (ref 0.55–1.02)
CREAT SERPL-MCNC: 2.46 MG/DL (ref 0.55–1.02)
CREAT SERPL-MCNC: 2.73 MG/DL (ref 0.55–1.02)
DIFFERENTIAL METHOD BLD: ABNORMAL
DSDNA AB SER-ACNC: <1 IU/ML (ref 0–9)
EOSINOPHIL # BLD: 0 K/UL (ref 0–0.4)
EOSINOPHIL NFR BLD: 0 % (ref 0–7)
ERYTHROCYTE [DISTWIDTH] IN BLOOD BY AUTOMATED COUNT: 13.9 % (ref 11.5–14.5)
GLOBULIN SER CALC-MCNC: 2.3 G/DL (ref 2–4)
GLOBULIN SER CALC-MCNC: 2.8 G/DL (ref 2–4)
GLOBULIN SER CALC-MCNC: 2.9 G/DL (ref 2–4)
GLUCOSE SERPL-MCNC: 168 MG/DL (ref 65–100)
GLUCOSE SERPL-MCNC: 174 MG/DL (ref 65–100)
GLUCOSE SERPL-MCNC: 180 MG/DL (ref 65–100)
GLUCOSE SERPL-MCNC: 233 MG/DL (ref 65–100)
HCT VFR BLD AUTO: 25.4 % (ref 35–47)
HGB BLD-MCNC: 9.1 G/DL (ref 11.5–16)
HIV 1+2 AB+HIV1 P24 AG SERPL QL IA: NONREACTIVE
HIV12 RESULT COMMENT, HHIVC: NORMAL
IMM GRANULOCYTES # BLD AUTO: 0 K/UL
IMM GRANULOCYTES NFR BLD AUTO: 0 %
INR PPP: 1.4 (ref 0.9–1.1)
INTERPRETATION, 117893: NORMAL
L PNEUMO1 AG UR QL IA: NEGATIVE
LACTATE SERPL-SCNC: 3.5 MMOL/L (ref 0.4–2)
LACTATE SERPL-SCNC: 4.2 MMOL/L (ref 0.4–2)
LACTATE SERPL-SCNC: 4.8 MMOL/L (ref 0.4–2)
LACTATE SERPL-SCNC: 5 MMOL/L (ref 0.4–2)
LYMPHOCYTES # BLD: 0.2 K/UL (ref 0.8–3.5)
LYMPHOCYTES NFR BLD: 4 % (ref 12–49)
MAGNESIUM SERPL-MCNC: 1.6 MG/DL (ref 1.6–2.4)
MCH RBC QN AUTO: 32.5 PG (ref 26–34)
MCHC RBC AUTO-ENTMCNC: 35.8 G/DL (ref 30–36.5)
MCV RBC AUTO: 90.7 FL (ref 80–99)
MONOCYTES # BLD: 0.3 K/UL (ref 0–1)
MONOCYTES NFR BLD: 5 % (ref 5–13)
MYELOCYTES NFR BLD MANUAL: 2 %
NEUTS SEG # BLD: 5.4 K/UL (ref 1.8–8)
NEUTS SEG NFR BLD: 89 % (ref 32–75)
NRBC # BLD: 0.13 K/UL (ref 0–0.01)
NRBC BLD-RTO: 2.1 PER 100 WBC
PHOSPHATE SERPL-MCNC: 1.3 MG/DL (ref 2.6–4.7)
PLATELET # BLD AUTO: 111 K/UL (ref 150–400)
PMV BLD AUTO: 10.9 FL (ref 8.9–12.9)
POTASSIUM SERPL-SCNC: 2.6 MMOL/L (ref 3.5–5.1)
POTASSIUM SERPL-SCNC: 3.1 MMOL/L (ref 3.5–5.1)
POTASSIUM SERPL-SCNC: 3.2 MMOL/L (ref 3.5–5.1)
POTASSIUM SERPL-SCNC: 4.1 MMOL/L (ref 3.5–5.1)
PROCALCITONIN SERPL-MCNC: 33.59 NG/ML
PROT SERPL-MCNC: 4.4 G/DL (ref 6.4–8.2)
PROT SERPL-MCNC: 5 G/DL (ref 6.4–8.2)
PROT SERPL-MCNC: 5 G/DL (ref 6.4–8.2)
PROTHROMBIN TIME: 13.7 SEC (ref 9–11.1)
RBC # BLD AUTO: 2.8 M/UL (ref 3.8–5.2)
RBC MORPH BLD: ABNORMAL
SCREEN APTT: 30.3 SEC (ref 0–51.9)
SCREEN DRVVT: 42.4 SEC (ref 0–47)
SODIUM SERPL-SCNC: 136 MMOL/L (ref 136–145)
SODIUM SERPL-SCNC: 137 MMOL/L (ref 136–145)
SODIUM SERPL-SCNC: 138 MMOL/L (ref 136–145)
SODIUM SERPL-SCNC: 138 MMOL/L (ref 136–145)
SPECIMEN SOURCE: NORMAL
VANCOMYCIN SERPL-MCNC: 10.5 UG/ML
WBC # BLD AUTO: 6.1 K/UL (ref 3.6–11)

## 2020-02-21 PROCEDURE — 87040 BLOOD CULTURE FOR BACTERIA: CPT

## 2020-02-21 PROCEDURE — 74011000250 HC RX REV CODE- 250: Performed by: INTERNAL MEDICINE

## 2020-02-21 PROCEDURE — 83605 ASSAY OF LACTIC ACID: CPT

## 2020-02-21 PROCEDURE — 74011250636 HC RX REV CODE- 250/636: Performed by: INTERNAL MEDICINE

## 2020-02-21 PROCEDURE — 74011000258 HC RX REV CODE- 258: Performed by: HOSPITALIST

## 2020-02-21 PROCEDURE — 74011250637 HC RX REV CODE- 250/637: Performed by: HOSPITALIST

## 2020-02-21 PROCEDURE — 74011250636 HC RX REV CODE- 250/636: Performed by: NURSE PRACTITIONER

## 2020-02-21 PROCEDURE — 74011250636 HC RX REV CODE- 250/636: Performed by: HOSPITALIST

## 2020-02-21 PROCEDURE — 80202 ASSAY OF VANCOMYCIN: CPT

## 2020-02-21 PROCEDURE — 80053 COMPREHEN METABOLIC PANEL: CPT

## 2020-02-21 PROCEDURE — 85025 COMPLETE CBC W/AUTO DIFF WBC: CPT

## 2020-02-21 PROCEDURE — 84100 ASSAY OF PHOSPHORUS: CPT

## 2020-02-21 PROCEDURE — 71045 X-RAY EXAM CHEST 1 VIEW: CPT

## 2020-02-21 PROCEDURE — 74011000258 HC RX REV CODE- 258: Performed by: INTERNAL MEDICINE

## 2020-02-21 PROCEDURE — 74011000250 HC RX REV CODE- 250: Performed by: NURSE PRACTITIONER

## 2020-02-21 PROCEDURE — 65610000006 HC RM INTENSIVE CARE

## 2020-02-21 PROCEDURE — 74011000250 HC RX REV CODE- 250: Performed by: HOSPITALIST

## 2020-02-21 PROCEDURE — 84145 PROCALCITONIN (PCT): CPT

## 2020-02-21 PROCEDURE — 74011636637 HC RX REV CODE- 636/637: Performed by: NURSE PRACTITIONER

## 2020-02-21 PROCEDURE — 94640 AIRWAY INHALATION TREATMENT: CPT

## 2020-02-21 PROCEDURE — 36415 COLL VENOUS BLD VENIPUNCTURE: CPT

## 2020-02-21 PROCEDURE — 74011250637 HC RX REV CODE- 250/637: Performed by: NURSE PRACTITIONER

## 2020-02-21 PROCEDURE — 83735 ASSAY OF MAGNESIUM: CPT

## 2020-02-21 PROCEDURE — 85610 PROTHROMBIN TIME: CPT

## 2020-02-21 RX ORDER — IBUPROFEN 400 MG/1
400 TABLET ORAL ONCE
Status: COMPLETED | OUTPATIENT
Start: 2020-02-21 | End: 2020-02-21

## 2020-02-21 RX ORDER — LANOLIN ALCOHOL/MO/W.PET/CERES
3 CREAM (GRAM) TOPICAL
Status: DISCONTINUED | OUTPATIENT
Start: 2020-02-21 | End: 2020-02-26 | Stop reason: HOSPADM

## 2020-02-21 RX ORDER — POTASSIUM CHLORIDE 750 MG/1
40 TABLET, FILM COATED, EXTENDED RELEASE ORAL
Status: COMPLETED | OUTPATIENT
Start: 2020-02-21 | End: 2020-02-21

## 2020-02-21 RX ORDER — OSELTAMIVIR PHOSPHATE 30 MG/1
30 CAPSULE ORAL EVERY 12 HOURS
Status: DISCONTINUED | OUTPATIENT
Start: 2020-02-21 | End: 2020-02-23 | Stop reason: DRUGHIGH

## 2020-02-21 RX ORDER — POTASSIUM CHLORIDE 7.45 MG/ML
10 INJECTION INTRAVENOUS
Status: COMPLETED | OUTPATIENT
Start: 2020-02-21 | End: 2020-02-21

## 2020-02-21 RX ORDER — GUAIFENESIN/DEXTROMETHORPHAN 100-10MG/5
10 SYRUP ORAL
Status: DISCONTINUED | OUTPATIENT
Start: 2020-02-21 | End: 2020-02-26 | Stop reason: HOSPADM

## 2020-02-21 RX ORDER — MAGNESIUM SULFATE HEPTAHYDRATE 40 MG/ML
2 INJECTION, SOLUTION INTRAVENOUS ONCE
Status: COMPLETED | OUTPATIENT
Start: 2020-02-21 | End: 2020-02-21

## 2020-02-21 RX ORDER — SODIUM CHLORIDE, SODIUM LACTATE, POTASSIUM CHLORIDE, CALCIUM CHLORIDE 600; 310; 30; 20 MG/100ML; MG/100ML; MG/100ML; MG/100ML
100 INJECTION, SOLUTION INTRAVENOUS CONTINUOUS
Status: DISCONTINUED | OUTPATIENT
Start: 2020-02-21 | End: 2020-02-23

## 2020-02-21 RX ADMIN — Medication 10 ML: at 13:51

## 2020-02-21 RX ADMIN — POTASSIUM BICARBONATE 40 MEQ: 782 TABLET, EFFERVESCENT ORAL at 10:59

## 2020-02-21 RX ADMIN — IPRATROPIUM BROMIDE AND ALBUTEROL SULFATE 3 ML: .5; 3 SOLUTION RESPIRATORY (INHALATION) at 07:07

## 2020-02-21 RX ADMIN — GUAIFENESIN AND DEXTROMETHORPHAN 10 ML: 100; 10 SYRUP ORAL at 21:31

## 2020-02-21 RX ADMIN — FAMOTIDINE 20 MG: 20 TABLET ORAL at 08:18

## 2020-02-21 RX ADMIN — OSELTAMIVIR PHOSPHATE 30 MG: 30 CAPSULE ORAL at 18:01

## 2020-02-21 RX ADMIN — SODIUM CHLORIDE: 900 INJECTION, SOLUTION INTRAVENOUS at 10:26

## 2020-02-21 RX ADMIN — IPRATROPIUM BROMIDE AND ALBUTEROL SULFATE 3 ML: .5; 3 SOLUTION RESPIRATORY (INHALATION) at 01:17

## 2020-02-21 RX ADMIN — BUDESONIDE INHALATION 500 MCG: 0.5 SUSPENSION RESPIRATORY (INHALATION) at 20:34

## 2020-02-21 RX ADMIN — CEFTRIAXONE 2 G: 2 INJECTION, POWDER, FOR SOLUTION INTRAMUSCULAR; INTRAVENOUS at 16:45

## 2020-02-21 RX ADMIN — POTASSIUM CHLORIDE 10 MEQ: 10 INJECTION, SOLUTION INTRAVENOUS at 03:50

## 2020-02-21 RX ADMIN — POTASSIUM CHLORIDE 40 MEQ: 750 TABLET, FILM COATED, EXTENDED RELEASE ORAL at 02:40

## 2020-02-21 RX ADMIN — SODIUM BICARBONATE 150 MEQ/1,000 ML IN DEXTROSE 5 % INTRAVENOUS: SOLUTION at 00:59

## 2020-02-21 RX ADMIN — THERA TABS 1 TABLET: TAB at 08:18

## 2020-02-21 RX ADMIN — POTASSIUM CHLORIDE 10 MEQ: 10 INJECTION, SOLUTION INTRAVENOUS at 06:30

## 2020-02-21 RX ADMIN — IPRATROPIUM BROMIDE AND ALBUTEROL SULFATE 3 ML: .5; 3 SOLUTION RESPIRATORY (INHALATION) at 20:34

## 2020-02-21 RX ADMIN — Medication 10 ML: at 21:32

## 2020-02-21 RX ADMIN — IBUPROFEN 400 MG: 400 TABLET, FILM COATED ORAL at 21:30

## 2020-02-21 RX ADMIN — FOLIC ACID 1 MG: 1 TABLET ORAL at 08:18

## 2020-02-21 RX ADMIN — IPRATROPIUM BROMIDE AND ALBUTEROL SULFATE 3 ML: .5; 3 SOLUTION RESPIRATORY (INHALATION) at 15:27

## 2020-02-21 RX ADMIN — BUDESONIDE INHALATION 500 MCG: 0.5 SUSPENSION RESPIRATORY (INHALATION) at 07:07

## 2020-02-21 RX ADMIN — AZITHROMYCIN MONOHYDRATE 500 MG: 500 INJECTION, POWDER, LYOPHILIZED, FOR SOLUTION INTRAVENOUS at 18:00

## 2020-02-21 RX ADMIN — SODIUM CHLORIDE, SODIUM LACTATE, POTASSIUM CHLORIDE, AND CALCIUM CHLORIDE 150 ML/HR: 600; 310; 30; 20 INJECTION, SOLUTION INTRAVENOUS at 13:50

## 2020-02-21 RX ADMIN — VANCOMYCIN HYDROCHLORIDE 1000 MG: 1 INJECTION, POWDER, LYOPHILIZED, FOR SOLUTION INTRAVENOUS at 11:00

## 2020-02-21 RX ADMIN — POTASSIUM BICARBONATE 40 MEQ: 782 TABLET, EFFERVESCENT ORAL at 16:44

## 2020-02-21 RX ADMIN — PHYTONADIONE 10 MG: 10 INJECTION, EMULSION INTRAMUSCULAR; INTRAVENOUS; SUBCUTANEOUS at 09:24

## 2020-02-21 RX ADMIN — SODIUM CHLORIDE, SODIUM LACTATE, POTASSIUM CHLORIDE, AND CALCIUM CHLORIDE 1000 ML: 600; 310; 30; 20 INJECTION, SOLUTION INTRAVENOUS at 07:04

## 2020-02-21 RX ADMIN — SODIUM CHLORIDE, SODIUM LACTATE, POTASSIUM CHLORIDE, AND CALCIUM CHLORIDE 100 ML/HR: 600; 310; 30; 20 INJECTION, SOLUTION INTRAVENOUS at 02:40

## 2020-02-21 RX ADMIN — MAGNESIUM SULFATE HEPTAHYDRATE 2 G: 40 INJECTION, SOLUTION INTRAVENOUS at 08:15

## 2020-02-21 RX ADMIN — IPRATROPIUM BROMIDE AND ALBUTEROL SULFATE 3 ML: .5; 3 SOLUTION RESPIRATORY (INHALATION) at 11:28

## 2020-02-21 RX ADMIN — SODIUM CHLORIDE, SODIUM LACTATE, POTASSIUM CHLORIDE, AND CALCIUM CHLORIDE 150 ML/HR: 600; 310; 30; 20 INJECTION, SOLUTION INTRAVENOUS at 20:52

## 2020-02-21 RX ADMIN — PREDNISOLONE SODIUM PHOSPHATE 40 MG: 15 SOLUTION ORAL at 08:19

## 2020-02-21 RX ADMIN — Medication 100 MG: at 08:18

## 2020-02-21 RX ADMIN — Medication 10 ML: at 05:42

## 2020-02-21 RX ADMIN — POTASSIUM CHLORIDE 10 MEQ: 10 INJECTION, SOLUTION INTRAVENOUS at 02:40

## 2020-02-21 RX ADMIN — OSELTAMIVIR PHOSPHATE 30 MG: 30 CAPSULE ORAL at 03:07

## 2020-02-21 RX ADMIN — POTASSIUM CHLORIDE 10 MEQ: 10 INJECTION, SOLUTION INTRAVENOUS at 04:58

## 2020-02-21 NOTE — PROGRESS NOTES
Pharmacist Note - Vancomycin Dosing  Therapy day 2  Indication: CAP  Current regimen: dosing by levels - 1750 mg received 2/20 @ 1785    A Random Level resulted at 10.5 mcg/mL which was obtained 25.5 hrs post-dose. Goal trough: 15 - 20 mcg/mL       Plan: Will order 1 time dose of 1000 mg. Pharmacy will continue to monitor this patient daily for changes in clinical status and renal function.

## 2020-02-21 NOTE — PROGRESS NOTES
Pharmacy Renal Dosing protocol  Medication: oseltamivir   Current regimen:  30 mg every 24 hr x5days  Recent Labs     02/21/20  0543 02/20/20  2322 02/20/20  1708   CREA 2.46* 2.73* 3.33*  3.25*   BUN 38* 41* 44*  44*     Estimated CrCl:  32 ml/min  Plan: Change to 30 mg q12h [id number] (to complete 5 day treatment course) for improved renal function with CrCl 31-60 ml/min

## 2020-02-21 NOTE — PROGRESS NOTES
0730: Bedside shift change report given to Rita Garcia RN (oncoming nurse) by Jonas Yarbrough RN (offgoing nurse). Report included the following information SBAR, Kardex, ED Summary, Procedure Summary, Intake/Output, MAR, Accordion and Recent Results. 0830: Nephrology at bedside. Valerio removed. VSS. No pain or complaints.

## 2020-02-21 NOTE — PROGRESS NOTES
[facility]   [address] [facility], [address] [place] [address] [address], [facility]  Phone: [phone]   [phone]       Nephrology Progress Note  Iris Newman     1980     [de-identified]  Date of Admission : 2/19/2020 02/21/20    CC: Follow up for ARF    Assessment and Plan   JANICE :  - resolving ATN. Non oliguric   - Microscopic Hematuria w/ Proteinuria, family hx of Lupus - serologies pending, Low C3, C4 so far    - continue LR  - Echo showed Moderate to severe MR   - remove martinez   - daily labs     Combined AG+ NAG Metabolic acidosis   - volatile alcohol screen negative. Lactic acidosis    - off Bicarb gtt     Multilobar PNA   Influenza B +ve   - mx per CCM     Shock Liver , DIC  Hepatic Steatosis   Mild Thrombocytopenia   Chronic Alcoholism : watch for withdrawal  Anemia     Echo : Moderate to Severe MR       D/w pt and ICU staff      Interval History:  Seen and examined   UOP > 3L   SOB worse overnight. No withdrawal s/s per nursing   Cr better  Electrolytes being replaced     Review of Systems: Pertinent items are noted in HPI.     Current Medications:   Current Facility-Administered Medications   Medication Dose Route Frequency    lactated Ringers infusion  100 mL/hr IntraVENous CONTINUOUS    potassium phosphate 15 mmol in 0.9% sodium chloride 250 mL infusion   IntraVENous ONCE    magnesium sulfate 2 g/50 ml IVPB (premix or compounded)  2 g IntraVENous ONCE    vancomycin (VANCOCIN) 1,000 mg in 0.9% sodium chloride (MBP/ADV) 250 mL  1,000 mg IntraVENous ONCE    oseltamivir (TAMIFLU) capsule 30 mg  30 mg Oral Q12H    docusate (COLACE) 50 mg/5 mL oral liquid 100 mg  100 mg Oral BID PRN    Vancomycin Pharmacy Dosing   Other Rx Dosing/Monitoring    phytonadione (vitamin K1) (AQUA-MEPHYTON) 10 mg in 0.9% sodium chloride 50 mL IVPB  10 mg IntraVENous DAILY    folic acid (FOLVITE) tablet 1 mg  1 mg Oral DAILY    thiamine HCL (B-1) tablet 100 mg  100 mg Oral DAILY    therapeutic multivitamin [facility]) tablet 1 Tab  1 Tab Oral DAILY    famotidine (PEPCID) tablet 20 mg  20 mg Oral DAILY    cefTRIAXone (ROCEPHIN) 2 g in 0.9% sodium chloride (MBP/ADV) 50 mL  2 g IntraVENous Q24H    azithromycin (ZITHROMAX) 500 mg in 0.9% sodium chloride (MBP/ADV) 250 mL  500 mg IntraVENous Q24H    albuterol-ipratropium (DUO-NEB) 2.5 MG-0.5 MG/3 ML  3 mL Nebulization Q4H RT    budesonide (PULMICORT) 500 mcg/2 ml nebulizer suspension  500 mcg Nebulization BID RT    acetylcysteine (ACETADOTE) 7,460 mg in dextrose 5% 1,000 mL infusion  100 mg/kg IntraVENous CONTINUOUS    sodium chloride (NS) flush 5-10 mL  5-10 mL IntraVENous PRN    sodium chloride (NS) flush 5-40 mL  5-40 mL IntraVENous Q8H    sodium chloride (NS) flush 5-40 mL  5-40 mL IntraVENous PRN    ondansetron (ZOFRAN) injection 4 mg  4 mg IntraVENous Q4H PRN    prednisoLONE (ORAPRED) 15 mg/5 mL (3 mg/mL) solution 40 mg  40 mg Oral DAILY      Allergies   Allergen Reactions    Amoxicillin Hives    Chlorhexidine Rash    Lisinopril Angioedema       Objective:  Vitals:    Vitals:    02/21/20 0600 02/21/20 0630 02/21/20 0700 02/21/20 0800   BP: 90/61  (!) 136/91 (!) 149/97   Pulse: (!) 114 (!) 120 (!) 114 (!) 128   Resp: 23 23 25 22   Temp:    99.1 °F (37.3 °C)   SpO2: 94%  95% 92%   Weight:       Height:         Intake and Output:  02/21 0701 - 02/21 1900  In: -   Out: 200 [Urine:200]  02/19 1901 - 02/21 0700  In: 7841.2 [I.V.:7841.2]  Out: 3353 [ORWDB:0055]    Physical Examination:  Pt intubated    no  General: restless  Neck:  Supple, no mass  Resp:  B/L bronchial BS, rales +  CV:  Tachy, no murmur   GI:  Soft, NT, + BS  Neurologic:  Non focal  Skin:  No Rash  :  Valerio +    []    High complexity decision making was performed  []    Patient is at high-risk of decompensation with multiple organ involvement    Lab Data Personally Reviewed: I have reviewed all the pertinent labs, microbiology data and radiology studies during assessment.     Recent Labs 02/21/20  0543 02/20/20  2322 02/20/20  1708 02/20/20  1028 02/20/20  1025 02/20/20  0209 02/20/20  0208 02/19/20  2100  02/19/20  1819 02/19/20  1648    136 132*  132* 131*  --   --  130*  --   --  131* 129*   K 3.1* 2.6* 3.1*  3.0* 3.6  --   --  4.0  --   --  4.5 4.9   CL 99 98 98  98 103  --   --  98  --   --  100 98   CO2 29 28 20*  19* 14*  --   --  16*  --   --  15* 15*   * 233* 203*  200* 172*  --   --  157*  --   --  77 68   BUN 38* 41* 44*  44* 43*  --   --  39*  --   --  34* 34*   CREA 2.46* 2.73* 3.33*  3.25* 3.79*  --   --  4.63*  --   --  4.62* 4.94*   CA 7.8* 7.4* 8.0*  7.8* 7.4*  --   --  7.6*  --   --  8.0* 9.1   MG 1.6  --   --   --   --   --   --   --   --   --  2.0   PHOS 1.3*  --   --   --   --   --  3.0  --   --   --   --    ALB  --  2.1* 2.2*  2.3* 2.2*  --   --  2.7*  2.6*  --   --  3.1* 3.4*   SGOT  --  >2,000* >2,000*  >2,000* >2,000*  --   --  >2,000*  --   --  >2,000* >2,000*   ALT  --  1,600* 2,056*  2,049* 2,229*  --   --  2,985*  --   --  3,244* >3,500*   INR 1.4* 1.5*  --   --  1.8* 2.0*  --  2.0*   < >  --   --     < > = values in this interval not displayed.      Recent Labs     02/21/20  0543 02/20/20  0208 02/19/20  1648   WBC 6.1 4.0 5.0   HGB 9.1* 11.2* 12.7   HCT 25.4* 33.3* 39.2   * 105* 89*     Lab Results   Component Value Date/Time    Specimen Description: URINE 04/09/2010 09:43 PM     Lab Results   Component Value Date/Time    Culture result: NO GROWTH AFTER 16 HOURS 02/20/2020 11:52 AM    Culture result: (A) 02/19/2020 04:48 PM     GRAM POSITIVE COCCI IN CHAINS GROWING IN BOTH BOTTLES DRAWN (SITE = [facility] )    Culture result:  02/19/2020 04:48 PM     CALLED TO AND READ BACK BY  CARLITOS Andres R.N. BY HR 02/20/20 AT 0701      Culture result: NO GROWTH 2 DAYS 02/19/2020 04:47 PM    Culture result: NO GROWTH 2 DAYS 01/22/2019 10:45 AM     Recent Results (from the past 24 hour(s))   ECHO ADULT COMPLETE    Collection Time: 02/20/20  9:16 AM Result Value Ref Range    LV E' Lateral Velocity 8.14 cm/s    LV E' Septal Velocity 8.03 cm/s    Tapse 2.73 (A) 1.5 - 2.0 cm    Aortic Valve Systolic Peak Velocity 205.54 cm/s    Aortic Valve Area by Continuity of Peak Velocity 2.1 cm2    AoV PG 14.1 mmHg    LVIDd 4.70 3.9 - 5.3 cm    LVPWd 1.12 (A) 0.6 - 0.9 cm    LVIDs 3.34 cm    IVSd 1.21 (A) 0.6 - 0.9 cm    LVOT d 2.05 cm    LVOT Peak Velocity 121.69 cm/s    LVOT Peak Gradient 5.9 mmHg    MV A Levar 91.20 cm/s    MV E Levar 73.52 cm/s    MV E/A 0.81     RVIDd 4.21 cm    Right Ventricular Outflow Track Peak Velocity 85.36 cm/s    LA Vol 4C 35.02 22 - 52 mL    LA Area 4C 15.6 cm2    LV Mass .3 (A) 67 - 162 g    LV Mass AL Index 129.8 43 - 95 g/m2    E/E' lateral 9.03     E/E' septal 9.16     E/E' ratio (averaged) 9.09     Left Atrium Major Axis 2.97 cm    Triscuspid Valve Regurgitation Peak Gradient 27.6 mmHg    Aortic Regurgitant Pressure Half-time 348. 3 cm    Pulmonic Valve Max Velocity 257.57 cm/s    TR Max Velocity 262.90 cm/s    PISA MR Rad 0.52 cm    LA Vol Index 18.83 16 - 28 ml/m2    AV Cusp 0.00 cm    AR Max Levar 463.90 cm/s    Left Ventricular Fractional Shortening by 2D 52.231212648 %    AV Velocity Ratio 0.65     AV peak gradient 15.999824088 mmHg    PV peak gradient 26.5 mmHg    PASP 35.0 mmHg   PROTHROMBIN TIME + INR    Collection Time: 02/20/20 10:25 AM   Result Value Ref Range    INR 1.8 (H) 0.9 - 1.1      Prothrombin time 17.4 (H) 9.0 - 11.1 sec   CERULOPLASMIN    Collection Time: 02/20/20 10:25 AM   Result Value Ref Range    Ceruloplasmin 21.5 19.0 - 39.0 mg/dL   COMPLEMENT, C3    Collection Time: 02/20/20 10:25 AM   Result Value Ref Range    Complement C3 33 (L) 82 - 167 mg/dL   FIBRINOGEN    Collection Time: 02/20/20 10:25 AM   Result Value Ref Range    Fibrinogen 286 200 - 475 mg/dL   D DIMER    Collection Time: 02/20/20 10:25 AM   Result Value Ref Range    D-dimer 10.85 (H) 0.00 - 0.65 mg/L FEU   PROCALCITONIN    Collection Time: 02/20/20 10:25 AM   Result Value Ref Range    Procalcitonin 192.41 ng/mL   TROPONIN I    Collection Time: 02/20/20 10:28 AM   Result Value Ref Range    Troponin-I, Qt. <0.05 <6.35 ng/mL   METABOLIC PANEL, COMPREHENSIVE    Collection Time: 02/20/20 10:28 AM   Result Value Ref Range    Sodium 131 (L) 136 - 145 mmol/L    Potassium 3.6 3.5 - 5.1 mmol/L    Chloride 103 97 - 108 mmol/L    CO2 14 (LL) 21 - 32 mmol/L    Anion gap 14 5 - 15 mmol/L    Glucose 172 (H) 65 - 100 mg/dL    BUN 43 (H) 6 - 20 MG/DL    Creatinine 3.79 (H) 0.55 - 1.02 MG/DL    BUN/Creatinine ratio 11 (L) 12 - 20      GFR est AA 16 (L) >60 ml/min/1.73m2    GFR est non-AA 13 (L) >60 ml/min/1.73m2    Calcium 7.4 (L) 8.5 - 10.1 MG/DL    Bilirubin, total 7.0 (H) 0.2 - 1.0 MG/DL    ALT (SGPT) 2,229 (H) 12 - 78 U/L    AST (SGOT) >2,000 (H) 15 - 37 U/L    Alk.  phosphatase 128 (H) 45 - 117 U/L    Protein, total 4.7 (L) 6.4 - 8.2 g/dL    Albumin 2.2 (L) 3.5 - 5.0 g/dL    Globulin 2.5 2.0 - 4.0 g/dL    A-G Ratio 0.9 (L) 1.1 - 2.2     LACTIC ACID    Collection Time: 02/20/20 10:28 AM   Result Value Ref Range    Lactic acid 2.7 (HH) 0.4 - 2.0 MMOL/L   COMPLEMENT, C4    Collection Time: 02/20/20 10:28 AM   Result Value Ref Range    Complement C4 3 (L) 14 - 44 mg/dL   CULTURE, BLOOD, PAIRED    Collection Time: 02/20/20 11:52 AM   Result Value Ref Range    Special Requests: NO SPECIAL REQUESTS      Culture result: NO GROWTH AFTER 16 HOURS     LD    Collection Time: 02/20/20  4:47 PM   Result Value Ref Range     (H) 81 - 246 U/L   FERRITIN    Collection Time: 02/20/20  4:47 PM   Result Value Ref Range    Ferritin 2,358 (H) 26 - 388 NG/ML   DRUG SCREEN, URINE    Collection Time: 02/20/20  4:47 PM   Result Value Ref Range    AMPHETAMINES NEGATIVE  NEG      BARBITURATES NEGATIVE  NEG      BENZODIAZEPINES NEGATIVE  NEG      COCAINE NEGATIVE  NEG      METHADONE NEGATIVE  NEG      OPIATES NEGATIVE  NEG      PCP(PHENCYCLIDINE) NEGATIVE  NEG      THC (TH-CANNABINOL) NEGATIVE NEG      Drug screen comment (NOTE)    METABOLIC PANEL, COMPREHENSIVE    Collection Time: 02/20/20  5:08 PM   Result Value Ref Range    Sodium 132 (L) 136 - 145 mmol/L    Potassium 3.0 (L) 3.5 - 5.1 mmol/L    Chloride 98 97 - 108 mmol/L    CO2 19 (L) 21 - 32 mmol/L    Anion gap 15 5 - 15 mmol/L    Glucose 200 (H) 65 - 100 mg/dL    BUN 44 (H) 6 - 20 MG/DL    Creatinine 3.25 (H) 0.55 - 1.02 MG/DL    BUN/Creatinine ratio 14 12 - 20      GFR est AA 19 (L) >60 ml/min/1.73m2    GFR est non-AA 16 (L) >60 ml/min/1.73m2    Calcium 7.8 (L) 8.5 - 10.1 MG/DL    Bilirubin, total 7.6 (H) 0.2 - 1.0 MG/DL    ALT (SGPT) 2,049 (H) 12 - 78 U/L    AST (SGOT) >2,000 (H) 15 - 37 U/L    Alk. phosphatase 123 (H) 45 - 117 U/L    Protein, total 4.8 (L) 6.4 - 8.2 g/dL    Albumin 2.3 (L) 3.5 - 5.0 g/dL    Globulin 2.5 2.0 - 4.0 g/dL    A-G Ratio 0.9 (L) 1.1 - 2.2     LACTIC ACID    Collection Time: 02/20/20  5:08 PM   Result Value Ref Range    Lactic acid 3.1 (HH) 0.4 - 2.0 MMOL/L   METABOLIC PANEL, COMPREHENSIVE    Collection Time: 02/20/20  5:08 PM   Result Value Ref Range    Sodium 132 (L) 136 - 145 mmol/L    Potassium 3.1 (L) 3.5 - 5.1 mmol/L    Chloride 98 97 - 108 mmol/L    CO2 20 (L) 21 - 32 mmol/L    Anion gap 14 5 - 15 mmol/L    Glucose 203 (H) 65 - 100 mg/dL    BUN 44 (H) 6 - 20 MG/DL    Creatinine 3.33 (H) 0.55 - 1.02 MG/DL    BUN/Creatinine ratio 13 12 - 20      GFR est AA 19 (L) >60 ml/min/1.73m2    GFR est non-AA 15 (L) >60 ml/min/1.73m2    Calcium 8.0 (L) 8.5 - 10.1 MG/DL    Bilirubin, total 7.5 (H) 0.2 - 1.0 MG/DL    ALT (SGPT) 2,056 (H) 12 - 78 U/L    AST (SGOT) >2,000 (H) 15 - 37 U/L    Alk.  phosphatase 123 (H) 45 - 117 U/L    Protein, total 4.8 (L) 6.4 - 8.2 g/dL    Albumin 2.2 (L) 3.5 - 5.0 g/dL    Globulin 2.6 2.0 - 4.0 g/dL    A-G Ratio 0.8 (L) 1.1 - 2.2     PROTHROMBIN TIME + INR    Collection Time: 02/20/20 11:22 PM   Result Value Ref Range    INR 1.5 (H) 0.9 - 1.1      Prothrombin time 14.9 (H) 9.0 - 11.1 sec   LACTIC ACID    Collection Time: 02/20/20 11:22 PM   Result Value Ref Range    Lactic acid 3.5 (HH) 0.4 - 2.0 MMOL/L   HIV 1/2 AG/AB, 4TH GENERATION,W RFLX CONFIRM    Collection Time: 02/20/20 11:22 PM   Result Value Ref Range    HIV 1/2 Interpretation NONREACTIVE NR      HIV 1/2 result comment SEE NOTE     METABOLIC PANEL, COMPREHENSIVE    Collection Time: 02/20/20 11:22 PM   Result Value Ref Range    Sodium 136 136 - 145 mmol/L    Potassium 2.6 (LL) 3.5 - 5.1 mmol/L    Chloride 98 97 - 108 mmol/L    CO2 28 21 - 32 mmol/L    Anion gap 10 5 - 15 mmol/L    Glucose 233 (H) 65 - 100 mg/dL    BUN 41 (H) 6 - 20 MG/DL    Creatinine 2.73 (H) 0.55 - 1.02 MG/DL    BUN/Creatinine ratio 15 12 - 20      GFR est AA 24 (L) >60 ml/min/1.73m2    GFR est non-AA 19 (L) >60 ml/min/1.73m2    Calcium 7.4 (L) 8.5 - 10.1 MG/DL    Bilirubin, total 6.6 (H) 0.2 - 1.0 MG/DL    ALT (SGPT) 1,600 (H) 12 - 78 U/L    AST (SGOT) >2,000 (H) 15 - 37 U/L    Alk.  phosphatase 113 45 - 117 U/L    Protein, total 4.4 (L) 6.4 - 8.2 g/dL    Albumin 2.1 (L) 3.5 - 5.0 g/dL    Globulin 2.3 2.0 - 4.0 g/dL    A-G Ratio 0.9 (L) 1.1 - 2.2     MAGNESIUM    Collection Time: 02/21/20  5:43 AM   Result Value Ref Range    Magnesium 1.6 1.6 - 2.4 mg/dL   PHOSPHORUS    Collection Time: 02/21/20  5:43 AM   Result Value Ref Range    Phosphorus 1.3 (L) 2.6 - 4.7 MG/DL   CBC WITH AUTOMATED DIFF    Collection Time: 02/21/20  5:43 AM   Result Value Ref Range    WBC 6.1 3.6 - 11.0 K/uL    RBC 2.80 (L) 3.80 - 5.20 M/uL    HGB 9.1 (L) 11.5 - 16.0 g/dL    HCT 25.4 (L) 35.0 - 47.0 %    MCV 90.7 80.0 - 99.0 FL    MCH 32.5 26.0 - 34.0 PG    MCHC 35.8 30.0 - 36.5 g/dL    RDW 13.9 11.5 - 14.5 %    PLATELET 082 (L) 606 - 400 K/uL    MPV 10.9 8.9 - 12.9 FL    NRBC 2.1 (H) 0  WBC    ABSOLUTE NRBC 0.13 (H) 0.00 - 0.01 K/uL    NEUTROPHILS 89 (H) 32 - 75 %    LYMPHOCYTES 4 (L) 12 - 49 %    MONOCYTES 5 5 - 13 %    EOSINOPHILS 0 0 - 7 %    BASOPHILS 0 0 - 1 %    MYELOCYTES 2 (H) 0 %    IMMATURE GRANULOCYTES 0 %    ABS. NEUTROPHILS 5.4 1.8 - 8.0 K/UL    ABS. LYMPHOCYTES 0.2 (L) 0.8 - 3.5 K/UL    ABS. MONOCYTES 0.3 0.0 - 1.0 K/UL    ABS. EOSINOPHILS 0.0 0.0 - 0.4 K/UL    ABS. BASOPHILS 0.0 0.0 - 0.1 K/UL    ABS. IMM. GRANS. 0.0 K/UL    DF MANUAL      RBC COMMENTS POLYCHROMASIA  1+       VANCOMYCIN, RANDOM    Collection Time: 02/21/20  5:43 AM   Result Value Ref Range    Vancomycin, random 10.5 UG/ML   PROTHROMBIN TIME + INR    Collection Time: 02/21/20  5:43 AM   Result Value Ref Range    INR 1.4 (H) 0.9 - 1.1      Prothrombin time 13.7 (H) 9.0 - 11.5 sec   METABOLIC PANEL, BASIC    Collection Time: 02/21/20  5:43 AM   Result Value Ref Range    Sodium 137 136 - 145 mmol/L    Potassium 3.1 (L) 3.5 - 5.1 mmol/L    Chloride 99 97 - 108 mmol/L    CO2 29 21 - 32 mmol/L    Anion gap 9 5 - 15 mmol/L    Glucose 180 (H) 65 - 100 mg/dL    BUN 38 (H) 6 - 20 MG/DL    Creatinine 2.46 (H) 0.55 - 1.02 MG/DL    BUN/Creatinine ratio 15 12 - 20      GFR est AA 27 (L) >60 ml/min/1.73m2    GFR est non-AA 22 (L) >60 ml/min/1.73m2    Calcium 7.8 (L) 8.5 - 10.1 MG/DL   LACTIC ACID    Collection Time: 02/21/20  5:43 AM   Result Value Ref Range    Lactic acid 4.2 (HH) 0.4 - 2.0 MMOL/L   PROCALCITONIN    Collection Time: 02/21/20  5:43 AM   Result Value Ref Range    Procalcitonin 33.59 ng/mL           I have reviewed the flowsheets. Chart and Pertinent Notes have been reviewed. No change in PMH ,family and social history from Consult note.       Stewart San MD

## 2020-02-21 NOTE — PROGRESS NOTES
[facility] [place] [address] [address]  W: [phone]  F: [phone]    Reason for Visit:   Darin Dickerson is a 46 y.o. female who is seen in hospital consultation at the request of Dr. Ruby Argueta for evaluation of concern for TTP, family hx lupus presening with multiorgan failure, possible PEs and thrombocytopenia. History of Present Illness:   Patient seen in the hospital after admission from the ED for low blood pressure. Patient had hx of cough, fatigue, fevers, abd pain and diarrhea. In the ED patient was worked up for sepsis and sepsis bundle intiated. CMP showed marked elevated transaminates, lactic acidosis, JANICE. Elevated T-Bili of 7.3 and Hyponatremia 129. Patient also has some thrombocytopenia, platelet level of 89. Consulted for concern for TTP. Patient is laying in bed, drowsy but conversant. She states she fears the diagnosis of sepsis as this is how her father . INTERIM HX:  Pt seen today for hospital f/u for thrombocytopenia. Platelets improving 011C today. coags improving. Pt resting quietly in bed in ICU. Past Medical History:   Diagnosis Date    Anemia     Asthma     uses inhaler 2-3 times per week    Biliary colic     Hypertension     Ill-defined condition     murmur      Past Surgical History:   Procedure Laterality Date    HX CHOLECYSTECTOMY      HX GYN      vaginal delivery x1    HX GYN  2018    cyst removal    HX GYN          HX OTHER SURGICAL      lipoma removal from right shoulder    HX WISDOM TEETH EXTRACTION        Social History     Tobacco Use    Smoking status: Never Smoker    Smokeless tobacco: Never Used   Substance Use Topics    Alcohol use:  Yes     Alcohol/week: 3.0 standard drinks     Types: 3 Cans of beer per week      Family History   Problem Relation Age of Onset    Hypertension Mother     Cancer Mother         colon, in remission    Clotting Disorder Mother         blood clot post surgery    Hypertension Father     Colon Cancer Paternal Uncle     Pancreatic Cancer Paternal Uncle     Cancer Paternal Uncle     Cancer Maternal Uncle     Stroke Paternal Aunt     Heart Disease Maternal Grandmother      Current Facility-Administered Medications   Medication Dose Route Frequency    lactated Ringers infusion  150 mL/hr IntraVENous CONTINUOUS    potassium phosphate 15 mmol in 0.9% sodium chloride 250 mL infusion   IntraVENous ONCE    vancomycin (VANCOCIN) 1,000 mg in 0.9% sodium chloride (MBP/ADV) 250 mL  1,000 mg IntraVENous ONCE    oseltamivir (TAMIFLU) capsule 30 mg  30 mg Oral Q12H    potassium bicarb-citric acid (EFFER-K) tablet 40 mEq  40 mEq Oral NOW    docusate (COLACE) 50 mg/5 mL oral liquid 100 mg  100 mg Oral BID PRN    Vancomycin Pharmacy Dosing   Other Rx Dosing/Monitoring    folic acid (FOLVITE) tablet 1 mg  1 mg Oral DAILY    thiamine HCL (B-1) tablet 100 mg  100 mg Oral DAILY    therapeutic multivitamin (THERAGRAN) tablet 1 Tab  1 Tab Oral DAILY    famotidine (PEPCID) tablet 20 mg  20 mg Oral DAILY    cefTRIAXone (ROCEPHIN) 2 g in 0.9% sodium chloride (MBP/ADV) 50 mL  2 g IntraVENous Q24H    azithromycin (ZITHROMAX) 500 mg in 0.9% sodium chloride (MBP/ADV) 250 mL  500 mg IntraVENous Q24H    albuterol-ipratropium (DUO-NEB) 2.5 MG-0.5 MG/3 ML  3 mL Nebulization Q4H RT    budesonide (PULMICORT) 500 mcg/2 ml nebulizer suspension  500 mcg Nebulization BID RT    acetylcysteine (ACETADOTE) 7,460 mg in dextrose 5% 1,000 mL infusion  100 mg/kg IntraVENous CONTINUOUS    sodium chloride (NS) flush 5-10 mL  5-10 mL IntraVENous PRN    sodium chloride (NS) flush 5-40 mL  5-40 mL IntraVENous Q8H    sodium chloride (NS) flush 5-40 mL  5-40 mL IntraVENous PRN    ondansetron (ZOFRAN) injection 4 mg  4 mg IntraVENous Q4H PRN    prednisoLONE (ORAPRED) 15 mg/5 mL (3 mg/mL) solution 40 mg  40 mg Oral DAILY      Allergies   Allergen Reactions    Amoxicillin Hives    Chlorhexidine Rash    Lisinopril Angioedema        Review of Systems: A complete review of systems was obtained, negative except as described above. Physical Exam:     Visit Vitals  /84   Pulse (!) 107   Temp 99.1 °F (37.3 °C)   Resp 21   Ht 5' 7\" (1.702 m)   Wt 173 lb 11.6 oz (78.8 kg)   LMP 02/15/2020   SpO2 93%   Breastfeeding No   BMI 27.21 kg/m²     ECOG PS: 3-4  General: No distress, drowsy  Eyes: PERRLA, anicteric sclerae  HENT: Atraumatic  Neck: Supple  Respiratory: normal respiratory effort  CV:  Regular on monitor  MS: moves all extremities  Skin: No rashes, ecchymoses, or petechiae. Normal temperature, turgor, and texture. Psych: drowsy    Results:     Lab Results   Component Value Date/Time    WBC 6.1 02/21/2020 05:43 AM    HGB 9.1 (L) 02/21/2020 05:43 AM    HCT 25.4 (L) 02/21/2020 05:43 AM    PLATELET 766 (L) 90/07/3825 05:43 AM    MCV 90.7 02/21/2020 05:43 AM    ABS. NEUTROPHILS 5.4 02/21/2020 05:43 AM     Lab Results   Component Value Date/Time    Sodium 137 02/21/2020 05:43 AM    Potassium 3.1 (L) 02/21/2020 05:43 AM    Chloride 99 02/21/2020 05:43 AM    CO2 29 02/21/2020 05:43 AM    Glucose 180 (H) 02/21/2020 05:43 AM    BUN 38 (H) 02/21/2020 05:43 AM    Creatinine 2.46 (H) 02/21/2020 05:43 AM    GFR est AA 27 (L) 02/21/2020 05:43 AM    GFR est non-AA 22 (L) 02/21/2020 05:43 AM    Calcium 7.8 (L) 02/21/2020 05:43 AM     Lab Results   Component Value Date/Time    Bilirubin, total 6.6 (H) 02/20/2020 11:22 PM    ALT (SGPT) 1,600 (H) 02/20/2020 11:22 PM    AST (SGOT) >2,000 (H) 02/20/2020 11:22 PM    Alk. phosphatase 113 02/20/2020 11:22 PM    Protein, total 4.4 (L) 02/20/2020 11:22 PM    Albumin 2.1 (L) 02/20/2020 11:22 PM    Globulin 2.3 02/20/2020 11:22 PM     Peripheral Smear 2/19/2020   FINAL PATHOLOGIC DIAGNOSIS   Peripheral blood, smear: Thrombocytopenia without platelet aggregation. Red blood cells and white blood cells within normal morphologic limits. CT A/P 2/19/2020  IMPRESSION:  1. Multifocal pulmonary consolidations. 2. Diffuse hepatic steatosis. 3. Status post cholecystectomy. Records reviewed and summarized above. Pathology report(s) reviewed above. Radiology report(s) reviewed above. Assessment/Plan:   1) acute Thrombocytopenia improving  Etiology most likely due to acute illness/ sepsis/ shock liver. Consulted for TTP but not c/w TTP given no anemia. Peripheral smear shows thrombocytopenia without platelet aggregation. Plt level improved to 111k today. coags improved. Continue to monitor CBC. Should improve as pt improves. 2)JAINCE due to sepsis. Improving. Nephrology following. 3) Shock Liver improving. Hepatology following    4)Coagulopathy improving. Most likely 2/2 #3. Treated with IV vitamin K. No evidence of bleeding.     5)? Sepsis/ flu/ PNA. Per critical care team.     D/w intensivist today. Will follow   Call for questions over weekend. I appreciate the opportunity to participate in Ms. Staples Sandra care.     Signed By: Antonio Otoole, DO

## 2020-02-22 LAB
ALBUMIN SERPL-MCNC: 1.9 G/DL (ref 3.5–5)
ALBUMIN SERPL-MCNC: 2 G/DL (ref 3.5–5)
ALBUMIN/GLOB SERPL: 0.7 {RATIO} (ref 1.1–2.2)
ALBUMIN/GLOB SERPL: 0.8 {RATIO} (ref 1.1–2.2)
ALP SERPL-CCNC: 116 U/L (ref 45–117)
ALP SERPL-CCNC: 118 U/L (ref 45–117)
ALT SERPL-CCNC: 1043 U/L (ref 12–78)
ALT SERPL-CCNC: 930 U/L (ref 12–78)
ANION GAP SERPL CALC-SCNC: 4 MMOL/L (ref 5–15)
ANION GAP SERPL CALC-SCNC: 7 MMOL/L (ref 5–15)
AST SERPL-CCNC: 605 U/L (ref 15–37)
AST SERPL-CCNC: 808 U/L (ref 15–37)
BACTERIA SPEC CULT: ABNORMAL
BACTERIA SPEC CULT: ABNORMAL
BILIRUB SERPL-MCNC: 2.8 MG/DL (ref 0.2–1)
BILIRUB SERPL-MCNC: 3.2 MG/DL (ref 0.2–1)
BUN SERPL-MCNC: 24 MG/DL (ref 6–20)
BUN SERPL-MCNC: 26 MG/DL (ref 6–20)
BUN/CREAT SERPL: 17 (ref 12–20)
BUN/CREAT SERPL: 17 (ref 12–20)
C-ANCA TITR SER IF: NORMAL TITER
CALCIUM SERPL-MCNC: 8.1 MG/DL (ref 8.5–10.1)
CALCIUM SERPL-MCNC: 8.2 MG/DL (ref 8.5–10.1)
CHLORIDE SERPL-SCNC: 103 MMOL/L (ref 97–108)
CHLORIDE SERPL-SCNC: 103 MMOL/L (ref 97–108)
CO2 SERPL-SCNC: 30 MMOL/L (ref 21–32)
CO2 SERPL-SCNC: 31 MMOL/L (ref 21–32)
COPPER SERPL-MCNC: 85 UG/DL (ref 72–166)
CREAT SERPL-MCNC: 1.39 MG/DL (ref 0.55–1.02)
CREAT SERPL-MCNC: 1.54 MG/DL (ref 0.55–1.02)
GLOBULIN SER CALC-MCNC: 2.6 G/DL (ref 2–4)
GLOBULIN SER CALC-MCNC: 2.6 G/DL (ref 2–4)
GLUCOSE SERPL-MCNC: 125 MG/DL (ref 65–100)
GLUCOSE SERPL-MCNC: 147 MG/DL (ref 65–100)
INR PPP: 1.2 (ref 0.9–1.1)
INR PPP: 1.2 (ref 0.9–1.1)
LACTATE SERPL-SCNC: 2.5 MMOL/L (ref 0.4–2)
LACTATE SERPL-SCNC: 2.6 MMOL/L (ref 0.4–2)
MAGNESIUM SERPL-MCNC: 1.9 MG/DL (ref 1.6–2.4)
MYELOPEROXIDASE AB SER IA-ACNC: <9 U/ML (ref 0–9)
P-ANCA ATYPICAL TITR SER IF: NORMAL TITER
P-ANCA TITR SER IF: NORMAL TITER
PHOSPHATE SERPL-MCNC: 1.8 MG/DL (ref 2.6–4.7)
POTASSIUM SERPL-SCNC: 3 MMOL/L (ref 3.5–5.1)
POTASSIUM SERPL-SCNC: 3.6 MMOL/L (ref 3.5–5.1)
PROT SERPL-MCNC: 4.5 G/DL (ref 6.4–8.2)
PROT SERPL-MCNC: 4.6 G/DL (ref 6.4–8.2)
PROTEINASE3 AB SER IA-ACNC: <3.5 U/ML (ref 0–3.5)
PROTHROMBIN TIME: 12.1 SEC (ref 9–11.1)
PROTHROMBIN TIME: 12.4 SEC (ref 9–11.1)
SERVICE CMNT-IMP: ABNORMAL
SODIUM SERPL-SCNC: 138 MMOL/L (ref 136–145)
SODIUM SERPL-SCNC: 140 MMOL/L (ref 136–145)
VANCOMYCIN SERPL-MCNC: 8.4 UG/ML

## 2020-02-22 PROCEDURE — 83605 ASSAY OF LACTIC ACID: CPT

## 2020-02-22 PROCEDURE — 74011250636 HC RX REV CODE- 250/636: Performed by: HOSPITALIST

## 2020-02-22 PROCEDURE — 74011000258 HC RX REV CODE- 258: Performed by: HOSPITALIST

## 2020-02-22 PROCEDURE — 84100 ASSAY OF PHOSPHORUS: CPT

## 2020-02-22 PROCEDURE — 74011250636 HC RX REV CODE- 250/636: Performed by: NURSE PRACTITIONER

## 2020-02-22 PROCEDURE — 94640 AIRWAY INHALATION TREATMENT: CPT

## 2020-02-22 PROCEDURE — 36415 COLL VENOUS BLD VENIPUNCTURE: CPT

## 2020-02-22 PROCEDURE — 74011250637 HC RX REV CODE- 250/637: Performed by: NURSE PRACTITIONER

## 2020-02-22 PROCEDURE — 74011000250 HC RX REV CODE- 250: Performed by: NURSE PRACTITIONER

## 2020-02-22 PROCEDURE — 74011000250 HC RX REV CODE- 250: Performed by: HOSPITALIST

## 2020-02-22 PROCEDURE — 83735 ASSAY OF MAGNESIUM: CPT

## 2020-02-22 PROCEDURE — 74011636637 HC RX REV CODE- 636/637: Performed by: NURSE PRACTITIONER

## 2020-02-22 PROCEDURE — 74011250637 HC RX REV CODE- 250/637: Performed by: HOSPITALIST

## 2020-02-22 PROCEDURE — 65610000006 HC RM INTENSIVE CARE

## 2020-02-22 PROCEDURE — 80202 ASSAY OF VANCOMYCIN: CPT

## 2020-02-22 PROCEDURE — 85610 PROTHROMBIN TIME: CPT

## 2020-02-22 PROCEDURE — 80053 COMPREHEN METABOLIC PANEL: CPT

## 2020-02-22 RX ORDER — METOPROLOL TARTRATE 5 MG/5ML
5 INJECTION INTRAVENOUS ONCE
Status: COMPLETED | OUTPATIENT
Start: 2020-02-22 | End: 2020-02-22

## 2020-02-22 RX ORDER — DIPHENHYDRAMINE HCL 25 MG
25 CAPSULE ORAL
Status: DISCONTINUED | OUTPATIENT
Start: 2020-02-22 | End: 2020-02-26 | Stop reason: HOSPADM

## 2020-02-22 RX ORDER — BENZONATATE 100 MG/1
100 CAPSULE ORAL
Status: DISCONTINUED | OUTPATIENT
Start: 2020-02-22 | End: 2020-02-23

## 2020-02-22 RX ORDER — METOPROLOL TARTRATE 25 MG/1
25 TABLET, FILM COATED ORAL 2 TIMES DAILY
Status: DISCONTINUED | OUTPATIENT
Start: 2020-02-22 | End: 2020-02-23

## 2020-02-22 RX ORDER — METOPROLOL TARTRATE 5 MG/5ML
INJECTION INTRAVENOUS
Status: DISPENSED
Start: 2020-02-22 | End: 2020-02-22

## 2020-02-22 RX ORDER — MAGNESIUM SULFATE 1 G/100ML
1 INJECTION INTRAVENOUS ONCE
Status: COMPLETED | OUTPATIENT
Start: 2020-02-22 | End: 2020-02-22

## 2020-02-22 RX ORDER — POTASSIUM CHLORIDE 7.45 MG/ML
10 INJECTION INTRAVENOUS
Status: COMPLETED | OUTPATIENT
Start: 2020-02-22 | End: 2020-02-22

## 2020-02-22 RX ORDER — HYDRALAZINE HYDROCHLORIDE 20 MG/ML
20 INJECTION INTRAMUSCULAR; INTRAVENOUS
Status: COMPLETED | OUTPATIENT
Start: 2020-02-22 | End: 2020-02-22

## 2020-02-22 RX ORDER — METOPROLOL TARTRATE 5 MG/5ML
INJECTION INTRAVENOUS
Status: DISPENSED
Start: 2020-02-22 | End: 2020-02-23

## 2020-02-22 RX ADMIN — SODIUM CHLORIDE, SODIUM LACTATE, POTASSIUM CHLORIDE, AND CALCIUM CHLORIDE 150 ML/HR: 600; 310; 30; 20 INJECTION, SOLUTION INTRAVENOUS at 15:49

## 2020-02-22 RX ADMIN — Medication 10 ML: at 06:00

## 2020-02-22 RX ADMIN — Medication 10 ML: at 23:00

## 2020-02-22 RX ADMIN — PREDNISOLONE SODIUM PHOSPHATE 40 MG: 15 SOLUTION ORAL at 08:30

## 2020-02-22 RX ADMIN — OSELTAMIVIR PHOSPHATE 30 MG: 30 CAPSULE ORAL at 17:04

## 2020-02-22 RX ADMIN — GUAIFENESIN AND DEXTROMETHORPHAN 10 ML: 100; 10 SYRUP ORAL at 14:48

## 2020-02-22 RX ADMIN — GUAIFENESIN AND DEXTROMETHORPHAN 10 ML: 100; 10 SYRUP ORAL at 21:03

## 2020-02-22 RX ADMIN — POTASSIUM CHLORIDE 10 MEQ: 7.46 INJECTION, SOLUTION INTRAVENOUS at 04:05

## 2020-02-22 RX ADMIN — MELATONIN 3 MG: at 21:02

## 2020-02-22 RX ADMIN — BUDESONIDE INHALATION 500 MCG: 0.5 SUSPENSION RESPIRATORY (INHALATION) at 07:10

## 2020-02-22 RX ADMIN — METOPROLOL TARTRATE 25 MG: 25 TABLET ORAL at 08:30

## 2020-02-22 RX ADMIN — BUDESONIDE INHALATION 500 MCG: 0.5 SUSPENSION RESPIRATORY (INHALATION) at 19:13

## 2020-02-22 RX ADMIN — IPRATROPIUM BROMIDE AND ALBUTEROL SULFATE 3 ML: .5; 3 SOLUTION RESPIRATORY (INHALATION) at 15:57

## 2020-02-22 RX ADMIN — VANCOMYCIN HYDROCHLORIDE 1000 MG: 1 INJECTION, POWDER, LYOPHILIZED, FOR SOLUTION INTRAVENOUS at 10:58

## 2020-02-22 RX ADMIN — OSELTAMIVIR PHOSPHATE 30 MG: 30 CAPSULE ORAL at 07:02

## 2020-02-22 RX ADMIN — SODIUM CHLORIDE, SODIUM LACTATE, POTASSIUM CHLORIDE, AND CALCIUM CHLORIDE 150 ML/HR: 600; 310; 30; 20 INJECTION, SOLUTION INTRAVENOUS at 09:29

## 2020-02-22 RX ADMIN — Medication 100 MG: at 08:29

## 2020-02-22 RX ADMIN — VANCOMYCIN HYDROCHLORIDE 1000 MG: 1 INJECTION, POWDER, LYOPHILIZED, FOR SOLUTION INTRAVENOUS at 23:00

## 2020-02-22 RX ADMIN — MAGNESIUM SULFATE HEPTAHYDRATE 1 G: 1 INJECTION, SOLUTION INTRAVENOUS at 03:09

## 2020-02-22 RX ADMIN — METOPROLOL TARTRATE 25 MG: 25 TABLET ORAL at 17:04

## 2020-02-22 RX ADMIN — BUDESONIDE INHALATION 500 MCG: 0.5 SUSPENSION RESPIRATORY (INHALATION) at 09:00

## 2020-02-22 RX ADMIN — METOPROLOL TARTRATE 5 MG: 5 INJECTION INTRAVENOUS at 21:02

## 2020-02-22 RX ADMIN — IPRATROPIUM BROMIDE AND ALBUTEROL SULFATE 3 ML: .5; 3 SOLUTION RESPIRATORY (INHALATION) at 22:28

## 2020-02-22 RX ADMIN — IPRATROPIUM BROMIDE AND ALBUTEROL SULFATE 3 ML: .5; 3 SOLUTION RESPIRATORY (INHALATION) at 00:57

## 2020-02-22 RX ADMIN — CEFTRIAXONE 2 G: 2 INJECTION, POWDER, FOR SOLUTION INTRAMUSCULAR; INTRAVENOUS at 17:05

## 2020-02-22 RX ADMIN — METOPROLOL TARTRATE 5 MG: 5 INJECTION INTRAVENOUS at 03:00

## 2020-02-22 RX ADMIN — IPRATROPIUM BROMIDE AND ALBUTEROL SULFATE 3 ML: .5; 3 SOLUTION RESPIRATORY (INHALATION) at 05:51

## 2020-02-22 RX ADMIN — FAMOTIDINE 20 MG: 20 TABLET ORAL at 08:30

## 2020-02-22 RX ADMIN — THERA TABS 1 TABLET: TAB at 08:30

## 2020-02-22 RX ADMIN — HYDRALAZINE HYDROCHLORIDE 20 MG: 20 INJECTION INTRAMUSCULAR; INTRAVENOUS at 22:09

## 2020-02-22 RX ADMIN — POTASSIUM CHLORIDE 10 MEQ: 7.46 INJECTION, SOLUTION INTRAVENOUS at 06:00

## 2020-02-22 RX ADMIN — DIPHENHYDRAMINE HYDROCHLORIDE 25 MG: 25 CAPSULE ORAL at 11:57

## 2020-02-22 RX ADMIN — ACETYLCYSTEINE 7880 MG: 200 INJECTION, SOLUTION INTRAVENOUS at 03:38

## 2020-02-22 RX ADMIN — POTASSIUM CHLORIDE 10 MEQ: 7.46 INJECTION, SOLUTION INTRAVENOUS at 05:10

## 2020-02-22 RX ADMIN — SODIUM CHLORIDE, SODIUM LACTATE, POTASSIUM CHLORIDE, AND CALCIUM CHLORIDE 150 ML/HR: 600; 310; 30; 20 INJECTION, SOLUTION INTRAVENOUS at 03:37

## 2020-02-22 RX ADMIN — IPRATROPIUM BROMIDE AND ALBUTEROL SULFATE 3 ML: .5; 3 SOLUTION RESPIRATORY (INHALATION) at 11:39

## 2020-02-22 RX ADMIN — IPRATROPIUM BROMIDE AND ALBUTEROL SULFATE 3 ML: .5; 3 SOLUTION RESPIRATORY (INHALATION) at 07:10

## 2020-02-22 RX ADMIN — GUAIFENESIN AND DEXTROMETHORPHAN 10 ML: 100; 10 SYRUP ORAL at 08:30

## 2020-02-22 RX ADMIN — SODIUM CHLORIDE: 900 INJECTION, SOLUTION INTRAVENOUS at 06:57

## 2020-02-22 RX ADMIN — FOLIC ACID 1 MG: 1 TABLET ORAL at 08:30

## 2020-02-22 RX ADMIN — Medication 10 ML: at 16:06

## 2020-02-22 RX ADMIN — POTASSIUM CHLORIDE 10 MEQ: 7.46 INJECTION, SOLUTION INTRAVENOUS at 03:08

## 2020-02-22 RX ADMIN — IPRATROPIUM BROMIDE AND ALBUTEROL SULFATE 3 ML: .5; 3 SOLUTION RESPIRATORY (INHALATION) at 19:13

## 2020-02-22 NOTE — PROGRESS NOTES
Progress Note    Assessment:   Active Problems:    Liver failure ([facility] [address] (2/19/2020)    JANICE :  - resolving ATN. Non oliguric . Creatinine is better  - Microscopic Hematuria w/ Proteinuria, family hx of Lupus - serologies pending, Low C3, C4 so far    - Echo showed Moderate to severe MR    - daily labs      Combined AG+ NAG Metabolic acidosis      Multilobar PNA   Influenza B +ve   - mx per CCM      Shock Liver , DIC  Hepatic Steatosis   Mild Thrombocytopenia   Chronic Alcoholism : watch for withdrawal  Anemia      Echo : Moderate to Severe MR         D/w pt and ICU staff          Plan:   Creat better  Uo ok  Low C'  She has sister with SLE of note    Back Monday       Time spent with patient during dialysis no      Subjective:       Complaint:  cough, some sob, no nv.       Current Facility-Administered Medications   Medication Dose Route Frequency    metoprolol tartrate (LOPRESSOR) tablet 25 mg  25 mg Oral BID    metoprolol (LOPRESSOR) 5 mg/5 mL injection        acetylcysteine (ACETADOTE) 7,880 mg in dextrose 5% 1,000 mL infusion  100 mg/kg IntraVENous ONCE    vancomycin (VANCOCIN) 1,000 mg in 0.9% sodium chloride (MBP/ADV) 250 mL  1,000 mg IntraVENous Q12H    lactated Ringers infusion  150 mL/hr IntraVENous CONTINUOUS    oseltamivir (TAMIFLU) capsule 30 mg  30 mg Oral Q12H    guaiFENesin-dextromethorphan (ROBITUSSIN DM) 100-10 mg/5 mL syrup 10 mL  10 mL Oral Q6H PRN    melatonin tablet 3 mg  3 mg Oral QHS PRN    docusate (COLACE) 50 mg/5 mL oral liquid 100 mg  100 mg Oral BID PRN    Vancomycin Pharmacy Dosing   Other Rx Dosing/Monitoring    folic acid (FOLVITE) tablet 1 mg  1 mg Oral DAILY    thiamine HCL (B-1) tablet 100 mg  100 mg Oral DAILY    therapeutic multivitamin (THERAGRAN) tablet 1 Tab  1 Tab Oral DAILY    famotidine (PEPCID) tablet 20 mg  20 mg Oral DAILY    cefTRIAXone (ROCEPHIN) 2 g in 0.9% sodium chloride (MBP/ADV) 50 mL  2 g IntraVENous Q24H    azithromycin (ZITHROMAX) 500 mg in 0.9% sodium chloride (MBP/ADV) 250 mL  500 mg IntraVENous Q24H    albuterol-ipratropium (DUO-NEB) 2.5 MG-0.5 MG/3 ML  3 mL Nebulization Q4H RT    budesonide (PULMICORT) 500 mcg/2 ml nebulizer suspension  500 mcg Nebulization BID RT    sodium chloride (NS) flush 5-10 mL  5-10 mL IntraVENous PRN    sodium chloride (NS) flush 5-40 mL  5-40 mL IntraVENous Q8H    sodium chloride (NS) flush 5-40 mL  5-40 mL IntraVENous PRN    ondansetron (ZOFRAN) injection 4 mg  4 mg IntraVENous Q4H PRN    prednisoLONE (ORAPRED) 15 mg/5 mL (3 mg/mL) solution 40 mg  40 mg Oral DAILY       Review of Systems  Pertinent items are noted in HPI.     Objective:     Visit Vitals  /83   Pulse (!) 105   Temp 98.1 °F (36.7 °C)   Resp 24   Ht 5' 7\" (1.702 m)   Wt 84.5 kg (186 lb 4.6 oz)   LMP 02/15/2020   SpO2 98%   Breastfeeding No   BMI 29.18 kg/m²     Temp (24hrs), Av.9 °F (37.2 °C), Min:98.1 °F (36.7 °C), Max:99.9 °F (37.7 °C)       0701 -  1900  In: 514.2 [I.V.:514.2]  Out: -     Physical Exam:    General [    ] healthy     [x] acutely ill [    ] chronically ill   [    ] critical      Skin  [x] Nl color/turgor [    ]   Eyes  [x] EOMI [    ]    ENT  [x] clear/moist [    ]     Neck  [x] supple  [    ]    Pulm  [x] few rhonchi [    ]    CV  [x] RRR  [    ]   ABD  [x] Soft  [    ]      [] Valerio  [    ]    MS  [no LE] Edema  [    ]     Neuro             [x] nonfocal  [    ]    Psyche           [x] Nl   [    ]       Data Review:     LABS:   Recent Results (from the past 24 hour(s))   METABOLIC PANEL, COMPREHENSIVE    Collection Time: 20 11:52 AM   Result Value Ref Range    Sodium 138 136 - 145 mmol/L    Potassium 3.2 (L) 3.5 - 5.1 mmol/L    Chloride 100 97 - 108 mmol/L    CO2 29 21 - 32 mmol/L    Anion gap 9 5 - 15 mmol/L    Glucose 174 (H) 65 - 100 mg/dL    BUN 33 (H) 6 - 20 MG/DL    Creatinine 2.05 (H) 0.55 - 1.02 MG/DL    BUN/Creatinine ratio 16 12 - 20      GFR est AA 33 (L) >60 ml/min/1.73m2    GFR est non-AA 27 (L) >60 ml/min/1.73m2    Calcium 8.0 (L) 8.5 - 10.1 MG/DL    Bilirubin, total 5.3 (H) 0.2 - 1.0 MG/DL    ALT (SGPT) 1,427 (H) 12 - 78 U/L    AST (SGOT) 1,516 (H) 15 - 37 U/L    Alk. phosphatase 127 (H) 45 - 117 U/L    Protein, total 5.0 (L) 6.4 - 8.2 g/dL    Albumin 2.2 (L) 3.5 - 5.0 g/dL    Globulin 2.8 2.0 - 4.0 g/dL    A-G Ratio 0.8 (L) 1.1 - 2.2     LACTIC ACID    Collection Time: 02/21/20 11:52 AM   Result Value Ref Range    Lactic acid 5.0 (HH) 0.4 - 2.0 MMOL/L   CULTURE, BLOOD    Collection Time: 02/21/20 11:52 AM   Result Value Ref Range    Special Requests: NO SPECIAL REQUESTS      Culture result: NO GROWTH AFTER 16 HOURS     METABOLIC PANEL, COMPREHENSIVE    Collection Time: 02/21/20  6:24 PM   Result Value Ref Range    Sodium 138 136 - 145 mmol/L    Potassium 4.1 3.5 - 5.1 mmol/L    Chloride 103 97 - 108 mmol/L    CO2 28 21 - 32 mmol/L    Anion gap 7 5 - 15 mmol/L    Glucose 168 (H) 65 - 100 mg/dL    BUN 28 (H) 6 - 20 MG/DL    Creatinine 1.79 (H) 0.55 - 1.02 MG/DL    BUN/Creatinine ratio 16 12 - 20      GFR est AA 38 (L) >60 ml/min/1.73m2    GFR est non-AA 32 (L) >60 ml/min/1.73m2    Calcium 8.2 (L) 8.5 - 10.1 MG/DL    Bilirubin, total 3.7 (H) 0.2 - 1.0 MG/DL    ALT (SGPT) 1,206 (H) 12 - 78 U/L    AST (SGOT) 1,098 (H) 15 - 37 U/L    Alk.  phosphatase 118 (H) 45 - 117 U/L    Protein, total 5.0 (L) 6.4 - 8.2 g/dL    Albumin 2.1 (L) 3.5 - 5.0 g/dL    Globulin 2.9 2.0 - 4.0 g/dL    A-G Ratio 0.7 (L) 1.1 - 2.2     LACTIC ACID    Collection Time: 02/21/20  6:28 PM   Result Value Ref Range    Lactic acid 4.8 (HH) 0.4 - 2.0 MMOL/L   METABOLIC PANEL, COMPREHENSIVE    Collection Time: 02/22/20 12:12 AM   Result Value Ref Range    Sodium 140 136 - 145 mmol/L    Potassium 3.0 (L) 3.5 - 5.1 mmol/L    Chloride 103 97 - 108 mmol/L    CO2 30 21 - 32 mmol/L    Anion gap 7 5 - 15 mmol/L    Glucose 147 (H) 65 - 100 mg/dL    BUN 26 (H) 6 - 20 MG/DL    Creatinine 1.54 (H) 0.55 - 1.02 MG/DL    BUN/Creatinine ratio 17 12 - 20 GFR est AA 46 (L) >60 ml/min/1.73m2    GFR est non-AA 38 (L) >60 ml/min/1.73m2    Calcium 8.1 (L) 8.5 - 10.1 MG/DL    Bilirubin, total 3.2 (H) 0.2 - 1.0 MG/DL    ALT (SGPT) 1,043 (H) 12 - 78 U/L    AST (SGOT) 808 (H) 15 - 37 U/L    Alk. phosphatase 118 (H) 45 - 117 U/L    Protein, total 4.6 (L) 6.4 - 8.2 g/dL    Albumin 2.0 (L) 3.5 - 5.0 g/dL    Globulin 2.6 2.0 - 4.0 g/dL    A-G Ratio 0.8 (L) 1.1 - 2.2     PROTHROMBIN TIME + INR    Collection Time: 02/22/20 12:12 AM   Result Value Ref Range    INR 1.2 (H) 0.9 - 1.1      Prothrombin time 12.4 (H) 9.0 - 11.1 sec   LACTIC ACID    Collection Time: 02/22/20 12:12 AM   Result Value Ref Range    Lactic acid 2.6 (HH) 0.4 - 2.0 MMOL/L   MAGNESIUM    Collection Time: 02/22/20 12:12 AM   Result Value Ref Range    Magnesium 1.9 1.6 - 2.4 mg/dL   PHOSPHORUS    Collection Time: 02/22/20 12:12 AM   Result Value Ref Range    Phosphorus 1.8 (L) 2.6 - 4.7 MG/DL   METABOLIC PANEL, COMPREHENSIVE    Collection Time: 02/22/20  7:26 AM   Result Value Ref Range    Sodium 138 136 - 145 mmol/L    Potassium 3.6 3.5 - 5.1 mmol/L    Chloride 103 97 - 108 mmol/L    CO2 31 21 - 32 mmol/L    Anion gap 4 (L) 5 - 15 mmol/L    Glucose 125 (H) 65 - 100 mg/dL    BUN 24 (H) 6 - 20 MG/DL    Creatinine 1.39 (H) 0.55 - 1.02 MG/DL    BUN/Creatinine ratio 17 12 - 20      GFR est AA 51 (L) >60 ml/min/1.73m2    GFR est non-AA 42 (L) >60 ml/min/1.73m2    Calcium 8.2 (L) 8.5 - 10.1 MG/DL    Bilirubin, total 2.8 (H) 0.2 - 1.0 MG/DL    ALT (SGPT) 930 (H) 12 - 78 U/L    AST (SGOT) 605 (H) 15 - 37 U/L    Alk.  phosphatase 116 45 - 117 U/L    Protein, total 4.5 (L) 6.4 - 8.2 g/dL    Albumin 1.9 (L) 3.5 - 5.0 g/dL    Globulin 2.6 2.0 - 4.0 g/dL    A-G Ratio 0.7 (L) 1.1 - 2.2     PROTHROMBIN TIME + INR    Collection Time: 02/22/20  7:26 AM   Result Value Ref Range    INR 1.2 (H) 0.9 - 1.1      Prothrombin time 12.1 (H) 9.0 - 11.1 sec   VANCOMYCIN, RANDOM    Collection Time: 02/22/20  7:26 AM   Result Value Ref Range Vancomycin, random 8.4 UG/ML   LACTIC ACID    Collection Time: 02/22/20  7:26 AM   Result Value Ref Range    Lactic acid 2.5 (HH) 0.4 - 2.0 MMOL/L                 Signed By: Clydie Osler, MD, AMELIA                      February 22, 2020                      [email or web address]

## 2020-02-22 NOTE — PROGRESS NOTES
[address] [facility] Adult  Hospitalist Group                                                                                          Critical Care Progress Note  Riddhi Banuelos MD  Answering service: [phone] from in house phone        Date of Service:  2020  NAME:  Lucy Thorne  :  1980  MRN:  [id number]      Interval history / Subjective:    Patient reports feeling the same as yesterday. Still reports pleuritic chest pain, SOB and cough. Assessment & Plan:     -Sever sepsis. From pneumococcal pneumonia.  -Pneumococcal pneumonia.  -Pneumococcal bacteremia.  -Influenza B resp infection. -?UTI.  -Acute liver failure. Unclear etiology. ? Shock liver, she had hypotension on presentation, did not require vasopressors. ? Alcoholic hepatitis - although patient denies heavy alcohol abuse. Viral hep panel neg.  -Acute renal failure. Likely pre renal/ATN in setting of sever sepsis. -Lactic acidosis. -Coagulopathy. Likely from acute liver failure. Vit K given overnight      Plan:  -Continue rocephin for 7days and tamiflu. D/c azithro  -F/u repeat blood cultures.  -Echo is unremarkable.  -On NAC therapy for acute hapatitis, to be finihsed today.  -autoimmune w/u is neg except low complements, no evidence of lupus.  -Continue duoneb Q4hrs.  -She is on prednisone rx for suspected alcoholic hepatitis. -Monitor liver function.  -Increase LR to 125ml/hr.  -Monitor I/O. -Monitor coag panel.  -Closely monitor hemodynamics.  -She is critically ill and has very high chances of decompensation.  -Plan discussed with patient in detail. Code status: Full  DVT prophylaxis: SCD. Care Plan discussed with: Patient/Family, Nurse and   Disposition: Continue care in ICU. 35 minutes of critical care time spent excluding procedures.        Hospital Problems  Date Reviewed: 2019          Codes Class Noted POA    Liver failure [facility]) ICD-10-CM: K72.90  ICD-9-CM: 572.8  2020 Unknown Review of Systems:   A comprehensive review of systems was negative except for that written in the HPI. Vital Signs:    Last 24hrs VS reviewed since prior progress note. Most recent are:  Visit Vitals  /83   Pulse (!) 105   Temp 98.1 °F (36.7 °C)   Resp 24   Ht 5' 7\" (1.702 m)   Wt 84.5 kg (186 lb 4.6 oz)   SpO2 98%   Breastfeeding No   BMI 29.18 kg/m²         Intake/Output Summary (Last 24 hours) at 2/22/2020 1124  Last data filed at 2/22/2020 0900  Gross per 24 hour   Intake 4757.1 ml   Output 725 ml   Net 4032.1 ml        Physical Examination:             Constitutional:  No acute distress, cooperative, pleasant    ENT:  Oral mucous moist, oropharynx benign. Resp:  wheezing and rhonchi kit. No accessory muscle use   CV:  Regular rhythm, normal rate, no murmurs, gallops, rubs    GI:  Soft, non distended, non tender. normoactive bowel sounds, no hepatosplenomegaly     Musculoskeletal:  No edema, warm, 2+ pulses throughout    Neurologic:  Moves all extremities. AAOx3, CN II-XII reviewed           Data Review:    Review and/or order of clinical lab test      Labs:     Recent Labs     02/21/20  0543 02/20/20  0208   WBC 6.1 4.0   HGB 9.1* 11.2*   HCT 25.4* 33.3*   * 105*     Recent Labs     02/22/20  0726 02/22/20  0012 02/21/20  1824  02/21/20  0543  02/20/20  0208  02/19/20  1648    140 138   < > 137   < > 130*   < > 129*   K 3.6 3.0* 4.1   < > 3.1*   < > 4.0   < > 4.9    103 103   < > 99   < > 98   < > 98   CO2 31 30 28   < > 29   < > 16*   < > 15*   BUN 24* 26* 28*   < > 38*   < > 39*   < > 34*   CREA 1.39* 1.54* 1.79*   < > 2.46*   < > 4.63*   < > 4.94*   * 147* 168*   < > 180*   < > 157*   < > 68   CA 8.2* 8.1* 8.2*   < > 7.8*   < > 7.6*   < > 9.1   MG  --  1.9  --   --  1.6  --   --   --  2.0   PHOS  --  1.8*  --   --  1.3*  --  3.0  --   --     < > = values in this interval not displayed.      Recent Labs     02/22/20  0726 02/22/20  0012 02/21/20  1824 SGOT 605* 808* 1,098*   * 1,043* 1,206*    118* 118*   TBILI 2.8* 3.2* 3.7*   TP 4.5* 4.6* 5.0*   ALB 1.9* 2.0* 2.1*   GLOB 2.6 2.6 2.9     Recent Labs     02/22/20  0726 02/22/20  0012 02/21/20  0543   INR 1.2* 1.2* 1.4*   PTP 12.1* 12.4* 13.7*      Recent Labs     02/20/20  1647 02/19/20  2100   TIBC  --  254   PSAT  --  95*   FERR 2,358*  --       Lab Results   Component Value Date/Time    Folate 8.4 05/23/2019 04:05 PM      No results for input(s): PH, PCO2, PO2 in the last 72 hours.   Recent Labs     02/20/20  1028 02/20/20  0208 02/19/20  2100   CPK  --  182  --    TROIQ <0.05  --  <0.05     Lab Results   Component Value Date/Time    Cholesterol, total 184 06/04/2019 10:17 AM    HDL Cholesterol 44 06/04/2019 10:17 AM    LDL, calculated Comment 06/04/2019 10:17 AM    Triglyceride 774 (HH) 06/04/2019 10:17 AM     No results found for: Christiana Alston  Lab Results   Component Value Date/Time    Color BROWN 02/19/2020 10:11 PM    Appearance CLEAR 02/19/2020 10:11 PM    Specific gravity 1.025 02/19/2020 10:11 PM    Specific gravity 1.023 01/22/2019 08:21 AM    pH (UA) 6.5 02/19/2020 10:11 PM    Protein 100 (A) 02/19/2020 10:11 PM    Glucose 100 (A) 02/19/2020 10:11 PM    Ketone 15 (A) 02/19/2020 10:11 PM    Bilirubin NEGATIVE  01/22/2019 08:21 AM    Urobilinogen 2.0 (H) 02/19/2020 10:11 PM    Nitrites POSITIVE (A) 02/19/2020 10:11 PM    Leukocyte Esterase TRACE (A) 02/19/2020 10:11 PM    Epithelial cells MODERATE (A) 02/19/2020 10:11 PM    Bacteria 1+ (A) 02/19/2020 10:11 PM    WBC 0-4 02/19/2020 10:11 PM    RBC 20-50 02/19/2020 10:11 PM         Medications Reviewed:     Current Facility-Administered Medications   Medication Dose Route Frequency    metoprolol tartrate (LOPRESSOR) tablet 25 mg  25 mg Oral BID    metoprolol (LOPRESSOR) 5 mg/5 mL injection        acetylcysteine (ACETADOTE) 7,880 mg in dextrose 5% 1,000 mL infusion  100 mg/kg IntraVENous ONCE    vancomycin (VANCOCIN) 1,000 mg in 0.9% sodium chloride (MBP/ADV) 250 mL  1,000 mg IntraVENous Q12H    lactated Ringers infusion  150 mL/hr IntraVENous CONTINUOUS    oseltamivir (TAMIFLU) capsule 30 mg  30 mg Oral Q12H    guaiFENesin-dextromethorphan (ROBITUSSIN DM) 100-10 mg/5 mL syrup 10 mL  10 mL Oral Q6H PRN    melatonin tablet 3 mg  3 mg Oral QHS PRN    docusate (COLACE) 50 mg/5 mL oral liquid 100 mg  100 mg Oral BID PRN    Vancomycin Pharmacy Dosing   Other Rx Dosing/Monitoring    folic acid (FOLVITE) tablet 1 mg  1 mg Oral DAILY    thiamine HCL (B-1) tablet 100 mg  100 mg Oral DAILY    therapeutic multivitamin (THERAGRAN) tablet 1 Tab  1 Tab Oral DAILY    famotidine (PEPCID) tablet 20 mg  20 mg Oral DAILY    cefTRIAXone (ROCEPHIN) 2 g in 0.9% sodium chloride (MBP/ADV) 50 mL  2 g IntraVENous Q24H    albuterol-ipratropium (DUO-NEB) 2.5 MG-0.5 MG/3 ML  3 mL Nebulization Q4H RT    budesonide (PULMICORT) 500 mcg/2 ml nebulizer suspension  500 mcg Nebulization BID RT    sodium chloride (NS) flush 5-10 mL  5-10 mL IntraVENous PRN    sodium chloride (NS) flush 5-40 mL  5-40 mL IntraVENous Q8H    sodium chloride (NS) flush 5-40 mL  5-40 mL IntraVENous PRN    ondansetron (ZOFRAN) injection 4 mg  4 mg IntraVENous Q4H PRN    prednisoLONE (ORAPRED) 15 mg/5 mL (3 mg/mL) solution 40 mg  40 mg Oral DAILY     ______________________________________________________________________  EXPECTED LENGTH OF STAY: 4d 19h  ACTUAL LENGTH OF STAY:          3                 Jcarlos Valladares MD

## 2020-02-22 NOTE — PROGRESS NOTES
Patient has a very harsh, barky cough with scant amount of blood. RT recommends Robitussin with codeine to soother her cough. RT discussed with RN.

## 2020-02-22 NOTE — PROGRESS NOTES
Day #3 of Vancomycin  Indication:  septic shock, CAP, influenza B  Current regimen:  dose per level  Abx regimen:  azithromycin, ceftriaxone, vancomycin, oseltamivir  ID Following ?: NO  Concomitant nephrotoxic drugs (requires more frequent monitoring): None  Frequency of BMP?: q6h    Recent Labs     20  0726 20  0012 20  1824  20  0543  20  0208  20  1648   WBC  --   --   --   --  6.1  --  4.0  --  5.0   CREA 1.39* 1.54* 1.79*   < > 2.46*   < > 4.63*   < > 4.94*   BUN 24* 26* 28*   < > 38*   < > 39*   < > 34*    < > = values in this interval not displayed. Est CrCl: ~60-65 ml/min; UO: ~0.5 ml/kg/hr plus multiple unmeasured occurrences. Temp (24hrs), Av.9 °F (37.2 °C), Min:98.1 °F (36.7 °C), Max:99.9 °F (37.7 °C)    Cultures:    blood: strep pneumo (resistant to erythromycin but otherwise pan sensitive)   resp panel: influenza B   blood: NGTD (preliminary)    Goal trough = 15 mcg/ml     Recent trough history (date/time/level/dose/action taken):  Random  @ 0543 = 10.5 mcg/ml ~25.5 hrs post-dose [[id number]]   Random  @ 07:26 = 8.4mcg/mL    Plan: Patient's SCr has significantly improved over the last 24 hours and is now double what it was. Will start 1g IV [id number] and follow the renal function closely. Will either empirically adjust based on renal function or order a level tomorrow,  to allow appropriate continuation of Vancomycin.

## 2020-02-22 NOTE — PROGRESS NOTES
Bedside and Verbal shift change report given to Eros Philip (oncoming nurse) by Cee Matt (offgoing nurse). Report included the following information SBAR, Kardex, Intake/Output, MAR, Accordion, Recent Results, Med Rec Status, Cardiac Rhythm sinus tach and Alarm Parameters . 8242: Lactic trending down to 2.5, MD made aware  1113: Nephrology at bedside, speaking with patient regarding labs looking better, per pt sister has Lupus and MD interested in complement levels, opportunity for questions and clarification received.  Spoke with Intensivist on floor, orders for Q6 INR, lactic, and CMP d/c  1243: Dr Doroteo Cruz at bedside, orders for acetylcysteine d/c, per MD OK to continue running until bag is out

## 2020-02-22 NOTE — PROGRESS NOTES
02/21/20 2059   Post-Treatment   Breathing Pattern Regular   Breath Sounds Bilateral Coarse   Left Breath Sounds Lower;Crackles   Right Breath Sounds Lower;Crackles   Pulse 145   SpO2 97 %   Respirations 25   Treatment Tolerance Well  (23% increase in HR noted post tx)       Patient had a 23%n increase in HR post Duoneb tx. This increase has not been noted in previous txs. Will monitor, Pt denies any distress or palpitations.

## 2020-02-22 NOTE — PROGRESS NOTES
[facility] [address]       Ashish Strong MD, [address] [facility], Cite Michael Vides, MD Lidia Gruber, DEVI Calvo, [facility]    Elida Orantes, [facility]   Donny Robertson, [facility]  Meli Kolton, [facility]      [address] [facility]  [address] [facility], [address] [place]  [place], [address]    [phone]    FAX: [phone] [address] [facility]  [address]  [address] [facility], [address]  [place], [address] [facility]    [phone]    FAX: [phone]         HEPATOLOGY PROGRESS NOTE  The patient is a 46year old [de-identified] female who developed several days of worsening cough, lightheadedness, progressive fatigue, fever, diarrhea.  She does consume the equivalent of 4 alcoholic drinks per day and has been doing this for several years. Urszula Ward has taken some acetaminophen for the flu-like symptoms but cannot quantitate this.  She went to urgent care on the day of admission, was found to be hypotensive and sent to [facility].    In the ED liver SBP was in the 80s, liver transaminases were in the 6759-0017 range, TBILI 7.3, INR 1.0, Scr 4.9 mg, Sna 129.    US and CT scan suggest fatty liver without cirrhosis. She has been treated with presser support, broad spectrum ABX, IVNAC, vit K, IV fluids.      Last continue to improve. AST/ALT now under 1000. TBILI under 3.0, INR 1.2, Scr 1.39 mg  Off pressers. DC IV NAC.     ASSESSMENT AND PLAN:  Shock liver  The patient had shock liver with acute marked increase in liver transaminases.    This was due to hypotension and hepatic ischemia.    The liver ALT and AST coming down nicely and now under 1000. Liver function is improving rapidly.   INR down to 1.2, TBILI down to 1.3 mg  Serologic studies for other causes of chronic liver disease are negative     Sepsis  Blood culture was positive for tabby positive cocci. On IV ABX. This is resolving.     Coagulopathy  This is resolved with vitamin K, resolution of shock liver and DIC     Thrombocytopenia   This has improved with resolution of sepsis and DIC.    Anemia   This is improving with resolution of sepsis, DIC and hemolysis.     Acute kidney injury  JANICE has resolved. Scr is down down to near normal.        PHYSICAL EXAMINATION:  VS: per nursing note  General:  Ill appearing  Eyes:  Sclera anicteric. ENT:  No oral lesions.  Thyroid normal.  Nodes:  No adenopathy. Skin:  No spider angiomata.  No jaundice. Respiratory:  Lungs clear to auscultation. Cardiovascular:  Regular heart rate. Abdomen:  Soft non-tender, No obvious ascites. Extremities:  No lower extremity edema.    Neurologic:  Alert and oriented.  Cranial nerves grossly intact.  No asterixis.     LABORATORY:  Results for Anne Marie Mack (MRN [de-identified]) as of 2/22/2020 12:29   Ref. Range 2/21/2020 05:43 2/22/2020 07:26   WBC Latest Ref Range: 3.6 - 11.0 K/uL 6.1    HGB Latest Ref Range: 11.5 - 16.0 g/dL 9.1 (L)    PLATELET Latest Ref Range: 150 - 400 K/uL 111 (L)    INR Latest Ref Range: 0.9 - 1.1   1.4 (H) 1.2 (H)   Sodium Latest Ref Range: 136 - 145 mmol/L 137 138   Potassium Latest Ref Range: 3.5 - 5.1 mmol/L 3.1 (L) 3.6   Chloride Latest Ref Range: 97 - 108 mmol/L 99 103   CO2 Latest Ref Range: 21 - 32 mmol/L 29 31   Glucose Latest Ref Range: 65 - 100 mg/dL 180 (H) 125 (H)   BUN Latest Ref Range: 6 - 20 MG/DL 38 (H) 24 (H)   Creatinine Latest Ref Range: 0.55 - 1.02 MG/DL 2.46 (H) 1.39 (H)   Bilirubin, total Latest Ref Range: 0.2 - 1.0 MG/DL 5.3 (H) 2.8 (H)   Albumin Latest Ref Range: 3.5 - 5.0 g/dL 2.2 (L) 1.9 (L)   ALT (SGPT) Latest Ref Range: 12 - 78 U/L 1,427 (H) 930 (H)   AST Latest Ref Range: 15 - 37 U/L 1,516 (H) 605 (H)   Alk.  phosphatase Latest Ref Range: 45 - 117 U/L 127 (H) Ying Flaherty MD  [facility] [place] [facility] [address].  [address], [address] [facility], [facility] 22.  [address] [facility]

## 2020-02-23 ENCOUNTER — APPOINTMENT (OUTPATIENT)
Dept: GENERAL RADIOLOGY | Age: 40
DRG: 720 | End: 2020-02-23
Attending: HOSPITALIST
Payer: MEDICAID

## 2020-02-23 LAB
ALBUMIN SERPL-MCNC: 2.1 G/DL (ref 3.5–5)
ALBUMIN/GLOB SERPL: 0.8 {RATIO} (ref 1.1–2.2)
ALP SERPL-CCNC: 144 U/L (ref 45–117)
ALT SERPL-CCNC: 662 U/L (ref 12–78)
ANION GAP SERPL CALC-SCNC: 3 MMOL/L (ref 5–15)
AST SERPL-CCNC: 299 U/L (ref 15–37)
BASOPHILS # BLD: 0 K/UL (ref 0–0.1)
BASOPHILS NFR BLD: 0 % (ref 0–1)
BILIRUB SERPL-MCNC: 2.3 MG/DL (ref 0.2–1)
BUN SERPL-MCNC: 19 MG/DL (ref 6–20)
BUN/CREAT SERPL: 18 (ref 12–20)
CALCIUM SERPL-MCNC: 8.7 MG/DL (ref 8.5–10.1)
CHLORIDE SERPL-SCNC: 104 MMOL/L (ref 97–108)
CO2 SERPL-SCNC: 31 MMOL/L (ref 21–32)
CREAT SERPL-MCNC: 1.07 MG/DL (ref 0.55–1.02)
DIFFERENTIAL METHOD BLD: ABNORMAL
EOSINOPHIL # BLD: 0 K/UL (ref 0–0.4)
EOSINOPHIL NFR BLD: 0 % (ref 0–7)
ERYTHROCYTE [DISTWIDTH] IN BLOOD BY AUTOMATED COUNT: 15.8 % (ref 11.5–14.5)
FLUID CULTURE, SPNG2: ABNORMAL
GLOBULIN SER CALC-MCNC: 2.7 G/DL (ref 2–4)
GLUCOSE SERPL-MCNC: 116 MG/DL (ref 65–100)
HCT VFR BLD AUTO: 25.7 % (ref 35–47)
HGB BLD-MCNC: 8.5 G/DL (ref 11.5–16)
IMM GRANULOCYTES # BLD AUTO: 0 K/UL
IMM GRANULOCYTES NFR BLD AUTO: 0 %
INR PPP: 1.2 (ref 0.9–1.1)
LACTATE SERPL-SCNC: 1.1 MMOL/L (ref 0.4–2)
LYMPHOCYTES # BLD: 0.3 K/UL (ref 0.8–3.5)
LYMPHOCYTES NFR BLD: 2 % (ref 12–49)
MAGNESIUM SERPL-MCNC: 1.9 MG/DL (ref 1.6–2.4)
MCH RBC QN AUTO: 32.2 PG (ref 26–34)
MCHC RBC AUTO-ENTMCNC: 33.1 G/DL (ref 30–36.5)
MCV RBC AUTO: 97.3 FL (ref 80–99)
METAMYELOCYTES NFR BLD MANUAL: 2 %
MONOCYTES # BLD: 1.8 K/UL (ref 0–1)
MONOCYTES NFR BLD: 13 % (ref 5–13)
MYELOCYTES NFR BLD MANUAL: 2 %
NEUTS BAND NFR BLD MANUAL: 4 % (ref 0–6)
NEUTS SEG # BLD: 11.2 K/UL (ref 1.8–8)
NEUTS SEG NFR BLD: 77 % (ref 32–75)
NRBC # BLD: 0.08 K/UL (ref 0–0.01)
NRBC BLD-RTO: 0.6 PER 100 WBC
ORGANISM ID, SPNG3: ABNORMAL
PHOSPHATE SERPL-MCNC: 2.3 MG/DL (ref 2.6–4.7)
PLATELET # BLD AUTO: 91 K/UL (ref 150–400)
PLEASE NOTE, SPNG4: ABNORMAL
PMV BLD AUTO: 10.9 FL (ref 8.9–12.9)
POTASSIUM SERPL-SCNC: 3.4 MMOL/L (ref 3.5–5.1)
PROT SERPL-MCNC: 4.8 G/DL (ref 6.4–8.2)
PROTHROMBIN TIME: 11.8 SEC (ref 9–11.1)
RBC # BLD AUTO: 2.64 M/UL (ref 3.8–5.2)
RBC MORPH BLD: ABNORMAL
S PNEUM AG SPEC QL LA: POSITIVE
SODIUM SERPL-SCNC: 138 MMOL/L (ref 136–145)
SPECIMEN SOURCE: ABNORMAL
SPECIMEN, SPNG1: ABNORMAL
WBC # BLD AUTO: 13.8 K/UL (ref 3.6–11)

## 2020-02-23 PROCEDURE — 85025 COMPLETE CBC W/AUTO DIFF WBC: CPT

## 2020-02-23 PROCEDURE — 74011250636 HC RX REV CODE- 250/636: Performed by: NURSE PRACTITIONER

## 2020-02-23 PROCEDURE — 83735 ASSAY OF MAGNESIUM: CPT

## 2020-02-23 PROCEDURE — 74011250637 HC RX REV CODE- 250/637: Performed by: HOSPITALIST

## 2020-02-23 PROCEDURE — 84100 ASSAY OF PHOSPHORUS: CPT

## 2020-02-23 PROCEDURE — 74011000250 HC RX REV CODE- 250: Performed by: HOSPITALIST

## 2020-02-23 PROCEDURE — 94640 AIRWAY INHALATION TREATMENT: CPT

## 2020-02-23 PROCEDURE — 74011000250 HC RX REV CODE- 250: Performed by: NURSE PRACTITIONER

## 2020-02-23 PROCEDURE — 65660000000 HC RM CCU STEPDOWN

## 2020-02-23 PROCEDURE — 74011250636 HC RX REV CODE- 250/636: Performed by: HOSPITALIST

## 2020-02-23 PROCEDURE — 36415 COLL VENOUS BLD VENIPUNCTURE: CPT

## 2020-02-23 PROCEDURE — 74011250637 HC RX REV CODE- 250/637: Performed by: NURSE PRACTITIONER

## 2020-02-23 PROCEDURE — 71045 X-RAY EXAM CHEST 1 VIEW: CPT

## 2020-02-23 PROCEDURE — 77030027138 HC INCENT SPIROMETER -A

## 2020-02-23 PROCEDURE — 83605 ASSAY OF LACTIC ACID: CPT

## 2020-02-23 PROCEDURE — 85610 PROTHROMBIN TIME: CPT

## 2020-02-23 PROCEDURE — 74011636637 HC RX REV CODE- 636/637: Performed by: NURSE PRACTITIONER

## 2020-02-23 PROCEDURE — 74011000258 HC RX REV CODE- 258: Performed by: HOSPITALIST

## 2020-02-23 PROCEDURE — 80053 COMPREHEN METABOLIC PANEL: CPT

## 2020-02-23 PROCEDURE — 77010033678 HC OXYGEN DAILY

## 2020-02-23 RX ORDER — FAMOTIDINE 20 MG/1
20 TABLET, FILM COATED ORAL 2 TIMES DAILY
Status: DISCONTINUED | OUTPATIENT
Start: 2020-02-23 | End: 2020-02-26 | Stop reason: HOSPADM

## 2020-02-23 RX ORDER — IBUPROFEN 400 MG/1
400 TABLET ORAL
Status: DISCONTINUED | OUTPATIENT
Start: 2020-02-23 | End: 2020-02-26 | Stop reason: HOSPADM

## 2020-02-23 RX ORDER — OSELTAMIVIR PHOSPHATE 75 MG/1
75 CAPSULE ORAL EVERY 12 HOURS
Status: COMPLETED | OUTPATIENT
Start: 2020-02-23 | End: 2020-02-24

## 2020-02-23 RX ORDER — POTASSIUM CHLORIDE 7.45 MG/ML
10 INJECTION INTRAVENOUS
Status: COMPLETED | OUTPATIENT
Start: 2020-02-23 | End: 2020-02-23

## 2020-02-23 RX ORDER — HYDRALAZINE HYDROCHLORIDE 20 MG/ML
10 INJECTION INTRAMUSCULAR; INTRAVENOUS
Status: DISCONTINUED | OUTPATIENT
Start: 2020-02-23 | End: 2020-02-26 | Stop reason: HOSPADM

## 2020-02-23 RX ORDER — IBUPROFEN 400 MG/1
400 TABLET ORAL ONCE
Status: COMPLETED | OUTPATIENT
Start: 2020-02-23 | End: 2020-02-23

## 2020-02-23 RX ORDER — IBUPROFEN 400 MG/1
TABLET ORAL
Status: DISPENSED
Start: 2020-02-23 | End: 2020-02-23

## 2020-02-23 RX ORDER — DIPHENHYDRAMINE HCL 12.5MG/5ML
25 ELIXIR ORAL ONCE
Status: DISCONTINUED | OUTPATIENT
Start: 2020-02-23 | End: 2020-02-23

## 2020-02-23 RX ORDER — VALACYCLOVIR HYDROCHLORIDE 500 MG/1
500 TABLET, FILM COATED ORAL EVERY EVENING
Status: DISCONTINUED | OUTPATIENT
Start: 2020-02-23 | End: 2020-02-26 | Stop reason: HOSPADM

## 2020-02-23 RX ORDER — METOPROLOL SUCCINATE 50 MG/1
50 TABLET, EXTENDED RELEASE ORAL DAILY
Status: DISCONTINUED | OUTPATIENT
Start: 2020-02-23 | End: 2020-02-26 | Stop reason: HOSPADM

## 2020-02-23 RX ORDER — MAGNESIUM SULFATE 1 G/100ML
1 INJECTION INTRAVENOUS ONCE
Status: COMPLETED | OUTPATIENT
Start: 2020-02-23 | End: 2020-02-23

## 2020-02-23 RX ORDER — BENZONATATE 100 MG/1
200 CAPSULE ORAL 3 TIMES DAILY
Status: DISCONTINUED | OUTPATIENT
Start: 2020-02-23 | End: 2020-02-26 | Stop reason: HOSPADM

## 2020-02-23 RX ORDER — GUAIFENESIN 600 MG/1
600 TABLET, EXTENDED RELEASE ORAL EVERY 12 HOURS
Status: DISCONTINUED | OUTPATIENT
Start: 2020-02-23 | End: 2020-02-26 | Stop reason: HOSPADM

## 2020-02-23 RX ADMIN — Medication 100 MG: at 08:29

## 2020-02-23 RX ADMIN — VALACYCLOVIR HYDROCHLORIDE 500 MG: 500 TABLET, FILM COATED ORAL at 17:03

## 2020-02-23 RX ADMIN — BUDESONIDE INHALATION 500 MCG: 0.5 SUSPENSION RESPIRATORY (INHALATION) at 19:08

## 2020-02-23 RX ADMIN — MAGNESIUM SULFATE HEPTAHYDRATE 1 G: 1 INJECTION, SOLUTION INTRAVENOUS at 07:21

## 2020-02-23 RX ADMIN — POTASSIUM CHLORIDE 10 MEQ: 7.46 INJECTION, SOLUTION INTRAVENOUS at 07:21

## 2020-02-23 RX ADMIN — IPRATROPIUM BROMIDE AND ALBUTEROL SULFATE 3 ML: .5; 3 SOLUTION RESPIRATORY (INHALATION) at 19:07

## 2020-02-23 RX ADMIN — POTASSIUM CHLORIDE 10 MEQ: 7.46 INJECTION, SOLUTION INTRAVENOUS at 09:12

## 2020-02-23 RX ADMIN — Medication 10 ML: at 21:14

## 2020-02-23 RX ADMIN — MELATONIN 3 MG: at 20:30

## 2020-02-23 RX ADMIN — IPRATROPIUM BROMIDE AND ALBUTEROL SULFATE 3 ML: .5; 3 SOLUTION RESPIRATORY (INHALATION) at 15:23

## 2020-02-23 RX ADMIN — METOPROLOL SUCCINATE 50 MG: 50 TABLET, EXTENDED RELEASE ORAL at 08:30

## 2020-02-23 RX ADMIN — IPRATROPIUM BROMIDE AND ALBUTEROL SULFATE 3 ML: .5; 3 SOLUTION RESPIRATORY (INHALATION) at 03:34

## 2020-02-23 RX ADMIN — BUDESONIDE INHALATION 500 MCG: 0.5 SUSPENSION RESPIRATORY (INHALATION) at 07:22

## 2020-02-23 RX ADMIN — IPRATROPIUM BROMIDE AND ALBUTEROL SULFATE 3 ML: .5; 3 SOLUTION RESPIRATORY (INHALATION) at 07:20

## 2020-02-23 RX ADMIN — IPRATROPIUM BROMIDE AND ALBUTEROL SULFATE 3 ML: .5; 3 SOLUTION RESPIRATORY (INHALATION) at 11:47

## 2020-02-23 RX ADMIN — VANCOMYCIN HYDROCHLORIDE 1000 MG: 1 INJECTION, POWDER, LYOPHILIZED, FOR SOLUTION INTRAVENOUS at 10:33

## 2020-02-23 RX ADMIN — IBUPROFEN 400 MG: 400 TABLET, FILM COATED ORAL at 01:49

## 2020-02-23 RX ADMIN — FAMOTIDINE 20 MG: 20 TABLET ORAL at 17:03

## 2020-02-23 RX ADMIN — HYDRALAZINE HYDROCHLORIDE 10 MG: 20 INJECTION INTRAMUSCULAR; INTRAVENOUS at 23:13

## 2020-02-23 RX ADMIN — Medication 10 ML: at 14:03

## 2020-02-23 RX ADMIN — OSELTAMIVIR PHOSPHATE 30 MG: 30 CAPSULE ORAL at 06:50

## 2020-02-23 RX ADMIN — Medication 10 ML: at 07:15

## 2020-02-23 RX ADMIN — OSELTAMIVIR PHOSPHATE 75 MG: 75 CAPSULE ORAL at 17:02

## 2020-02-23 RX ADMIN — PREDNISOLONE SODIUM PHOSPHATE 40 MG: 15 SOLUTION ORAL at 08:30

## 2020-02-23 RX ADMIN — POTASSIUM CHLORIDE 10 MEQ: 7.46 INJECTION, SOLUTION INTRAVENOUS at 10:31

## 2020-02-23 RX ADMIN — BENZONATATE 200 MG: 100 CAPSULE ORAL at 21:14

## 2020-02-23 RX ADMIN — FAMOTIDINE 20 MG: 20 TABLET ORAL at 08:29

## 2020-02-23 RX ADMIN — BENZONATATE 100 MG: 100 CAPSULE ORAL at 16:51

## 2020-02-23 RX ADMIN — GUAIFENESIN AND DEXTROMETHORPHAN 10 ML: 100; 10 SYRUP ORAL at 23:08

## 2020-02-23 RX ADMIN — GUAIFENESIN 600 MG: 600 TABLET, EXTENDED RELEASE ORAL at 20:29

## 2020-02-23 RX ADMIN — CEFTRIAXONE 2 G: 2 INJECTION, POWDER, FOR SOLUTION INTRAMUSCULAR; INTRAVENOUS at 16:44

## 2020-02-23 RX ADMIN — FOLIC ACID 1 MG: 1 TABLET ORAL at 08:30

## 2020-02-23 RX ADMIN — THERA TABS 1 TABLET: TAB at 08:30

## 2020-02-23 RX ADMIN — IBUPROFEN 400 MG: 400 TABLET, FILM COATED ORAL at 07:15

## 2020-02-23 RX ADMIN — HYDRALAZINE HYDROCHLORIDE 10 MG: 20 INJECTION INTRAMUSCULAR; INTRAVENOUS at 16:38

## 2020-02-23 RX ADMIN — SODIUM CHLORIDE: 900 INJECTION, SOLUTION INTRAVENOUS at 07:45

## 2020-02-23 NOTE — PROGRESS NOTES
Pt was in ICU with severe Pneumonia. A pulmonary consultation has been placed to follow up with pt upon transfer outside of ICU. Will see pt tomorrow once she has moved out of ICU. Thank you for consultation.

## 2020-02-23 NOTE — PROGRESS NOTES
[facility] Adult  Hospitalist Group                                                                                          Critical Care Progress Note  Julio Leon MD  Answering service: [phone] from in house phone        Date of Service:  2020  NAME:  Lulú Pantoja  :  1980  MRN:  [id number]      Interval history / Subjective:    Patient reports feeling better today, still reporting pleuritic chest pain. Cough and SOB improving. Assessment & Plan:     -Sever sepsis. From pneumococcal pneumonia.  -Pneumococcal pneumonia.  -Pneumococcal bacteremia.  -Influenza B resp infection. -?UTI.  -Acute liver failure. Unclear etiology. ? Shock liver, she had hypotension on presentation, did not require vasopressors. ? Alcoholic hepatitis - although patient denies heavy alcohol abuse. Viral hep panel neg.  -Acute renal failure. Likely pre renal/ATN in setting of sever sepsis. -Lactic acidosis. -Coagulopathy. Likely from acute liver failure. Vit K given overnight      Plan:  -Continue rocephin for 7days and tamiflu. D/juan pablo azithro  -F/u repeat blood cultures.  -Echo is unremarkable. -Finished NAC rx.  -autoimmune w/u is neg except low complements, no evidence of lupus.  -Continue duoneb Q4hrs.  -Repeat CXR today, she has high risk of developing pl effusion/empyema with pneumococcal pneumonia.  -She is on prednisone rx for suspected alcoholic hepatitis. -Monitor liver function. -LR at 100ml/hr  -Monitor I/O. -Monitor coag panel.  -Closely monitor hemodynamics.  -Plan discussed with patient in detail.  -Can transfer out of ICU. Code status: Full  DVT prophylaxis: SCD. Care Plan discussed with: Patient/Family, Nurse and   Disposition: Transfer out of ICU.            Hospital Problems  Date Reviewed: 2019          Codes Class Noted POA    Liver failure [facility]) ICD-10-CM: K72.90  ICD-9-CM: 572.8  2020 Unknown                Review of Systems:   A comprehensive review of systems was negative except for that written in the HPI. Vital Signs:    Last 24hrs VS reviewed since prior progress note. Most recent are:  Visit Vitals  BP (!) 150/96 (BP 1 Location: Right arm, BP Patient Position: Sitting)   Pulse (!) 105   Temp 97.8 °F (36.6 °C)   Resp 26   Ht 5' 7\" (1.702 m)   Wt 86.7 kg (191 lb 2.2 oz)   SpO2 100%   Breastfeeding No   BMI 29.94 kg/m²         Intake/Output Summary (Last 24 hours) at 2/23/2020 1231  Last data filed at 2/23/2020 1200  Gross per 24 hour   Intake 4016.5 ml   Output 250 ml   Net 3766.5 ml        Physical Examination:             Constitutional:  No acute distress, cooperative, pleasant    ENT:  Oral mucous moist, oropharynx benign. Resp:  wheezing and rhonchi kit. No accessory muscle use   CV:  Regular rhythm, normal rate, no murmurs, gallops, rubs    GI:  Soft, non distended, non tender. normoactive bowel sounds, no hepatosplenomegaly     Musculoskeletal:  No edema, warm, 2+ pulses throughout    Neurologic:  Moves all extremities. AAOx3, CN II-XII reviewed           Data Review:    Review and/or order of clinical lab test      Labs:     Recent Labs     02/23/20 0522 02/21/20  0543   WBC 13.8* 6.1   HGB 8.5* 9.1*   HCT 25.7* 25.4*   PLT 91* 111*     Recent Labs     02/23/20  0522 02/22/20  0726 02/22/20  0012  02/21/20  0543    138 140   < > 137   K 3.4* 3.6 3.0*   < > 3.1*    103 103   < > 99   CO2 31 31 30   < > 29   BUN 19 24* 26*   < > 38*   CREA 1.07* 1.39* 1.54*   < > 2.46*   * 125* 147*   < > 180*   CA 8.7 8.2* 8.1*   < > 7.8*   MG 1.9  --  1.9  --  1.6   PHOS 2.3*  --  1.8*  --  1.3*    < > = values in this interval not displayed.      Recent Labs     02/23/20 0522 02/22/20  0726 02/22/20  0012   SGOT 299* 605* 808*   * 930* 1,043*   * 116 118*   TBILI 2.3* 2.8* 3.2*   TP 4.8* 4.5* 4.6*   ALB 2.1* 1.9* 2.0*   GLOB 2.7 2.6 2.6     Recent Labs     02/23/20  0522 02/22/20  0726 02/22/20  0012   INR 1.2* 1.2* 1.2*   PTP 11.8* 12.1* 12.4*      Recent Labs     02/20/20  1647   FERR 2,358*      Lab Results   Component Value Date/Time    Folate 8.4 05/23/2019 04:05 PM      No results for input(s): PH, PCO2, PO2 in the last 72 hours. No results for input(s): CPK, CKNDX, TROIQ in the last 72 hours.     No lab exists for component: CPKMB  Lab Results   Component Value Date/Time    Cholesterol, total 184 06/04/2019 10:17 AM    HDL Cholesterol 44 06/04/2019 10:17 AM    LDL, calculated Comment 06/04/2019 10:17 AM    Triglyceride 774 (HH) 06/04/2019 10:17 AM     No results found for: [facility]  Lab Results   Component Value Date/Time    Color BROWN 02/19/2020 10:11 PM    Appearance CLEAR 02/19/2020 10:11 PM    Specific gravity 1.025 02/19/2020 10:11 PM    Specific gravity 1.023 01/22/2019 08:21 AM    pH (UA) 6.5 02/19/2020 10:11 PM    Protein 100 (A) 02/19/2020 10:11 PM    Glucose 100 (A) 02/19/2020 10:11 PM    Ketone 15 (A) 02/19/2020 10:11 PM    Bilirubin NEGATIVE  01/22/2019 08:21 AM    Urobilinogen 2.0 (H) 02/19/2020 10:11 PM    Nitrites POSITIVE (A) 02/19/2020 10:11 PM    Leukocyte Esterase TRACE (A) 02/19/2020 10:11 PM    Epithelial cells MODERATE (A) 02/19/2020 10:11 PM    Bacteria 1+ (A) 02/19/2020 10:11 PM    WBC 0-4 02/19/2020 10:11 PM    RBC 20-50 02/19/2020 10:11 PM         Medications Reviewed:     Current Facility-Administered Medications   Medication Dose Route Frequency    ibuprofen (MOTRIN) 400 mg tablet        ibuprofen (MOTRIN) tablet 400 mg  400 mg Oral Q6H PRN    metoprolol succinate (TOPROL-XL) XL tablet 50 mg  50 mg Oral DAILY    vancomycin (VANCOCIN) 1,000 mg in 0.9% sodium chloride (MBP/ADV) 250 mL  1,000 mg IntraVENous Q12H    benzonatate (TESSALON) capsule 100 mg  100 mg Oral TID PRN    diphenhydrAMINE (BENADRYL) capsule 25 mg  25 mg Oral Q6H PRN    lactated Ringers infusion  100 mL/hr IntraVENous CONTINUOUS    oseltamivir (TAMIFLU) capsule 30 mg  30 mg Oral Q12H    guaiFENesin-dextromethorphan (ROBITUSSIN DM) 100-10 mg/5 mL syrup 10 mL  10 mL Oral Q6H PRN    melatonin tablet 3 mg  3 mg Oral QHS PRN    docusate (COLACE) 50 mg/5 mL oral liquid 100 mg  100 mg Oral BID PRN    Vancomycin Pharmacy Dosing   Other Rx Dosing/Monitoring    folic acid (FOLVITE) tablet 1 mg  1 mg Oral DAILY    thiamine HCL (B-1) tablet 100 mg  100 mg Oral DAILY    therapeutic multivitamin (THERAGRAN) tablet 1 Tab  1 Tab Oral DAILY    famotidine (PEPCID) tablet 20 mg  20 mg Oral DAILY    cefTRIAXone (ROCEPHIN) 2 g in 0.9% sodium chloride (MBP/ADV) 50 mL  2 g IntraVENous Q24H    albuterol-ipratropium (DUO-NEB) 2.5 MG-0.5 MG/3 ML  3 mL Nebulization Q4H RT    budesonide (PULMICORT) 500 mcg/2 ml nebulizer suspension  500 mcg Nebulization BID RT    sodium chloride (NS) flush 5-10 mL  5-10 mL IntraVENous PRN    sodium chloride (NS) flush 5-40 mL  5-40 mL IntraVENous Q8H    sodium chloride (NS) flush 5-40 mL  5-40 mL IntraVENous PRN    ondansetron (ZOFRAN) injection 4 mg  4 mg IntraVENous Q4H PRN    prednisoLONE (ORAPRED) 15 mg/5 mL (3 mg/mL) solution 40 mg  40 mg Oral DAILY     ______________________________________________________________________  EXPECTED LENGTH OF STAY: 4d 19h  ACTUAL LENGTH OF STAY:          4                 Abhinav Heredia MD

## 2020-02-23 NOTE — PROGRESS NOTES
[facility] Adult  Hospitalist Group     ICU Transfer/Accept Summary     [place] [address] [place]  DATE OF TRANSFER: 2/23/2020       PATIENT ID: Allison Zapien  MRN: [de-identified]   YOB: 1980    PRIMARY CARE PROVIDER: Dorothy Cabrera NP   DATE OF ADMISSION: 2/19/2020  4:13 PM    ATTENDING PHYSICIAN: Maik Chacon MD  CONSULTATIONS:   IP CONSULT TO CARDIOLOGY  IP CONSULT TO NEPHROLOGY  IP CONSULT TO HEMATOLOGY  IP CONSULT TO HEPATOLOGY    PROCEDURES/SURGERIES:   * No surgery found *    REASON FOR ADMISSION: <principal problem not specified>     HOSPITAL PROBLEM LIST:  Patient Active Problem List   Diagnosis Code    Biliary colic K21.28    Acute bronchitis J20.9    Benign essential HTN I10    HSV (herpes simplex virus) infection B00.9    Lump of skin R22.9    Cellulitis of great toe L03.039    Vitamin D deficiency E55.9    Liver failure ([facility] [address] K72.90         Brief HPI and Hospital Course: The patient is a 46year old female who developed several days of worsening cough, lightheadedness, progressive fatigue, fever, diarrhea.  She does consume the equivalent of 4 alcoholic drinks per day and has been doing this for several years. Morgan Ospina has taken some acetaminophen for the flu-like symptoms but cannot quantitate this.  She went to urgent care on the day of admission, was found to be hypotensive and sent to [facility].    In the ED liver SBP was in the 80s, liver transaminases were in the 8692-7822 range, TBILI 7.3, INR 1.0, Scr 4.9 mg, Sna 129.    US and CT scan suggest fatty liver without cirrhosis. She has been treated with presser support, broad spectrum ABX, IVNAC, vit K, IV fluids  During ICU course: she was found to have influenza B and pneumococcal bacteremia. Assessment and Plan:  -Severe sepsis. From pneumococcal pneumonia: resolved, transfer out ICU   -Pneumococcal pneumonia: continue IV rocephin, completed azithromycin.      Follow up CXR 2/23:Increased opacity in the right upper lobe and left base. she has high risk of developing pl effusion/empyema with pneumococcal pneumonia. May need extend antibiotic treatment. Will consult pulmonologist.   -Pneumococcal bacteremia: blood culture 2/19 2/2 positive. Repeat culture 2/20, /21 no grow. Echo unremarkable. ? 2 weeks of IV abx. Will consult ID for recommendation of abx.   -Influenza B resp infection. Will complete 7 days of tamiflu. -?UTI.  -Acute liver failure. Unclear etiology. Hepatologist consulted. Clinically more likely Shock liver, she had hypotension on presentation, did not require vasopressors. She was started of steroids of possible Alcoholic hepatitis - although patient denies heavy alcohol abuse. Will dc steroids. Viral hep panel neg.  -Acute renal failure. Likely pre renal/ATN in setting of sever sepsis. Nephrologist on board. Improving.   -Lactic acidosis. Resolved   -Coagulopathy. Likely from acute liver failure. Improving INR 1.2   -HTN: restart home metoprolol since sepsis resolved. -increasing leukocytosis:  Wbc worse than 2/21, ? Due to steroids, ? Due to new complication from the pneumococcal pna, may consider chest CT if clinically not improving. PHYSICAL EXAMINATION:  Visit Vitals  BP (!) 165/103 (BP 1 Location: Right arm, BP Patient Position: Sitting)   Pulse (!) 108   Temp 98.2 °F (36.8 °C)   Resp 19   Ht 5' 7\" (1.702 m)   Wt 86.7 kg (191 lb 2.2 oz)   LMP 02/15/2020   SpO2 96%   Breastfeeding No   BMI 29.94 kg/m²       General:          Alert, cooperative, no distress  HEENT:           Atraumatic, MMM            Neck:               Supple, symmetrical,  thyroid: non tender  Lungs:             bilat crackles. Decrease bs LL lung   Heart:              Regular  rhythm,  No  murmur   No edema  Abdomen:       Soft, non-tender. Not distended. Bowel sounds normal  Extremities:     No cyanosis. No clubbing,  +2 distal pulses  Skin:                Not pale.   Not Jaundiced  No rashes   Psych: Not anxious or agitated.   Neurologic:      Alert, moves all extremities, oriented X 3.     CODE STATUS:  x Full Code    DNR    Partial    Comfort Care     Signed:   Julieta Soliman MD  Date of Service:  2/23/2020  4:37 PM

## 2020-02-23 NOTE — ROUTINE PROCESS
ID consult called to Dr. Kamala Marroquin. He states \"it is 6pm on Sunday and I do not have anything to write with. They will have to call tomorrow\"

## 2020-02-24 LAB
BACTERIA SPEC CULT: ABNORMAL
BACTERIA SPEC CULT: NORMAL
BASOPHILS # BLD: 0 K/UL (ref 0–0.1)
BASOPHILS NFR BLD: 0 % (ref 0–1)
CC UR VC: ABNORMAL
DIFFERENTIAL METHOD BLD: ABNORMAL
DSDNA AB SER-ACNC: 1 IU/ML (ref 0–9)
EOSINOPHIL # BLD: 0 K/UL (ref 0–0.4)
EOSINOPHIL NFR BLD: 0 % (ref 0–7)
ERYTHROCYTE [DISTWIDTH] IN BLOOD BY AUTOMATED COUNT: 16.8 % (ref 11.5–14.5)
HCT VFR BLD AUTO: 25.6 % (ref 35–47)
HGB BLD-MCNC: 8.5 G/DL (ref 11.5–16)
HISTONE IGG SER IA-ACNC: 4.1 UNITS (ref 0–0.9)
IMM GRANULOCYTES # BLD AUTO: 0 K/UL
IMM GRANULOCYTES NFR BLD AUTO: 0 %
INR PPP: 1.1 (ref 0.9–1.1)
LYMPHOCYTES # BLD: 0.8 K/UL (ref 0.8–3.5)
LYMPHOCYTES NFR BLD: 5 % (ref 12–49)
MAGNESIUM SERPL-MCNC: 1.9 MG/DL (ref 1.6–2.4)
MCH RBC QN AUTO: 32.3 PG (ref 26–34)
MCHC RBC AUTO-ENTMCNC: 33.2 G/DL (ref 30–36.5)
MCV RBC AUTO: 97.3 FL (ref 80–99)
METAMYELOCYTES NFR BLD MANUAL: 4 %
MONOCYTES # BLD: 1.6 K/UL (ref 0–1)
MONOCYTES NFR BLD: 10 % (ref 5–13)
NEUTS SEG # BLD: 13 K/UL (ref 1.8–8)
NEUTS SEG NFR BLD: 81 % (ref 32–75)
NRBC # BLD: 0.1 K/UL (ref 0–0.01)
NRBC BLD-RTO: 0.6 PER 100 WBC
PLATELET # BLD AUTO: 83 K/UL (ref 150–400)
PMV BLD AUTO: 12.3 FL (ref 8.9–12.9)
PROTHROMBIN TIME: 11.4 SEC (ref 9–11.1)
RBC # BLD AUTO: 2.63 M/UL (ref 3.8–5.2)
RBC MORPH BLD: ABNORMAL
RBC MORPH BLD: ABNORMAL
SERVICE CMNT-IMP: ABNORMAL
SERVICE CMNT-IMP: NORMAL
WBC # BLD AUTO: 16.1 K/UL (ref 3.6–11)

## 2020-02-24 PROCEDURE — 94664 DEMO&/EVAL PT USE INHALER: CPT

## 2020-02-24 PROCEDURE — 83735 ASSAY OF MAGNESIUM: CPT

## 2020-02-24 PROCEDURE — 74011000250 HC RX REV CODE- 250: Performed by: HOSPITALIST

## 2020-02-24 PROCEDURE — 74011250637 HC RX REV CODE- 250/637: Performed by: HOSPITALIST

## 2020-02-24 PROCEDURE — 74011250637 HC RX REV CODE- 250/637: Performed by: NURSE PRACTITIONER

## 2020-02-24 PROCEDURE — 74011250636 HC RX REV CODE- 250/636: Performed by: INTERNAL MEDICINE

## 2020-02-24 PROCEDURE — 77030029684 HC NEB SM VOL KT MONA -A

## 2020-02-24 PROCEDURE — 65660000000 HC RM CCU STEPDOWN

## 2020-02-24 PROCEDURE — 74011000258 HC RX REV CODE- 258: Performed by: HOSPITALIST

## 2020-02-24 PROCEDURE — 94640 AIRWAY INHALATION TREATMENT: CPT

## 2020-02-24 PROCEDURE — 85025 COMPLETE CBC W/AUTO DIFF WBC: CPT

## 2020-02-24 PROCEDURE — 94760 N-INVAS EAR/PLS OXIMETRY 1: CPT

## 2020-02-24 PROCEDURE — 85610 PROTHROMBIN TIME: CPT

## 2020-02-24 PROCEDURE — 74011250636 HC RX REV CODE- 250/636: Performed by: HOSPITALIST

## 2020-02-24 PROCEDURE — 77010033678 HC OXYGEN DAILY

## 2020-02-24 PROCEDURE — 36415 COLL VENOUS BLD VENIPUNCTURE: CPT

## 2020-02-24 PROCEDURE — 51798 US URINE CAPACITY MEASURE: CPT

## 2020-02-24 PROCEDURE — 74011636637 HC RX REV CODE- 636/637: Performed by: INTERNAL MEDICINE

## 2020-02-24 RX ORDER — PREDNISOLONE SODIUM PHOSPHATE 15 MG/5ML
40 SOLUTION ORAL DAILY
Status: DISCONTINUED | OUTPATIENT
Start: 2020-02-24 | End: 2020-02-26

## 2020-02-24 RX ORDER — LORAZEPAM 2 MG/ML
1 INJECTION INTRAMUSCULAR
Status: COMPLETED | OUTPATIENT
Start: 2020-02-24 | End: 2020-02-24

## 2020-02-24 RX ORDER — IPRATROPIUM BROMIDE AND ALBUTEROL SULFATE 2.5; .5 MG/3ML; MG/3ML
3 SOLUTION RESPIRATORY (INHALATION)
Status: DISCONTINUED | OUTPATIENT
Start: 2020-02-24 | End: 2020-02-26 | Stop reason: HOSPADM

## 2020-02-24 RX ADMIN — FOLIC ACID 1 MG: 1 TABLET ORAL at 10:05

## 2020-02-24 RX ADMIN — OSELTAMIVIR PHOSPHATE 75 MG: 75 CAPSULE ORAL at 07:27

## 2020-02-24 RX ADMIN — IPRATROPIUM BROMIDE AND ALBUTEROL SULFATE 3 ML: .5; 3 SOLUTION RESPIRATORY (INHALATION) at 00:56

## 2020-02-24 RX ADMIN — OSELTAMIVIR PHOSPHATE 75 MG: 75 CAPSULE ORAL at 17:19

## 2020-02-24 RX ADMIN — VALACYCLOVIR HYDROCHLORIDE 500 MG: 500 TABLET, FILM COATED ORAL at 17:19

## 2020-02-24 RX ADMIN — PREDNISOLONE SODIUM PHOSPHATE 40 MG: 15 SOLUTION ORAL at 11:58

## 2020-02-24 RX ADMIN — GUAIFENESIN 600 MG: 600 TABLET, EXTENDED RELEASE ORAL at 20:33

## 2020-02-24 RX ADMIN — BUDESONIDE INHALATION 500 MCG: 0.5 SUSPENSION RESPIRATORY (INHALATION) at 07:52

## 2020-02-24 RX ADMIN — Medication 10 ML: at 22:40

## 2020-02-24 RX ADMIN — Medication 10 ML: at 15:22

## 2020-02-24 RX ADMIN — BENZONATATE 200 MG: 100 CAPSULE ORAL at 22:39

## 2020-02-24 RX ADMIN — FAMOTIDINE 20 MG: 20 TABLET ORAL at 17:19

## 2020-02-24 RX ADMIN — LORAZEPAM 1 MG: 2 INJECTION INTRAMUSCULAR; INTRAVENOUS at 03:00

## 2020-02-24 RX ADMIN — Medication 100 MG: at 10:05

## 2020-02-24 RX ADMIN — BUDESONIDE INHALATION 500 MCG: 0.5 SUSPENSION RESPIRATORY (INHALATION) at 21:21

## 2020-02-24 RX ADMIN — IBUPROFEN 400 MG: 400 TABLET, FILM COATED ORAL at 01:30

## 2020-02-24 RX ADMIN — METOPROLOL SUCCINATE 50 MG: 50 TABLET, EXTENDED RELEASE ORAL at 10:05

## 2020-02-24 RX ADMIN — FAMOTIDINE 20 MG: 20 TABLET ORAL at 10:05

## 2020-02-24 RX ADMIN — IPRATROPIUM BROMIDE AND ALBUTEROL SULFATE 3 ML: .5; 3 SOLUTION RESPIRATORY (INHALATION) at 21:21

## 2020-02-24 RX ADMIN — MELATONIN 3 MG: at 22:39

## 2020-02-24 RX ADMIN — BENZONATATE 200 MG: 100 CAPSULE ORAL at 10:05

## 2020-02-24 RX ADMIN — IPRATROPIUM BROMIDE AND ALBUTEROL SULFATE 3 ML: .5; 3 SOLUTION RESPIRATORY (INHALATION) at 14:06

## 2020-02-24 RX ADMIN — Medication 10 ML: at 03:57

## 2020-02-24 RX ADMIN — CEFTRIAXONE 2 G: 2 INJECTION, POWDER, FOR SOLUTION INTRAMUSCULAR; INTRAVENOUS at 17:00

## 2020-02-24 RX ADMIN — BENZONATATE 200 MG: 100 CAPSULE ORAL at 17:00

## 2020-02-24 RX ADMIN — IPRATROPIUM BROMIDE AND ALBUTEROL SULFATE 3 ML: .5; 3 SOLUTION RESPIRATORY (INHALATION) at 07:52

## 2020-02-24 RX ADMIN — GUAIFENESIN 600 MG: 600 TABLET, EXTENDED RELEASE ORAL at 10:05

## 2020-02-24 RX ADMIN — HYDRALAZINE HYDROCHLORIDE 10 MG: 20 INJECTION INTRAMUSCULAR; INTRAVENOUS at 20:33

## 2020-02-24 RX ADMIN — THERA TABS 1 TABLET: TAB at 10:05

## 2020-02-24 NOTE — PROGRESS NOTES
Primary Nurse Debby Nails RN and Dustin Alvarez RN performed a dual skin assessment on this patient No impairment noted. Otis score is 21.

## 2020-02-24 NOTE — PROGRESS NOTES
Called Dr Gloria Henderson regarding the patients concerns and symptoms of SOB and edema and her possible alcohol with draw and her poor urinary out put of 150. Dr said he would review her chart and then decide what to do. OFFICE VISIT      Patient: Eduardo Romo Date of Service: 2019   : 1978 MRN: [id number]     SUBJECTIVE:   HISTORY OF PRESENT ILLNESS:  Eduardo Romo is a 40 year old male who presents today for Ernst , mild sore throat and general body aches, No URI symptoms,  no cough. + frequent air travel no flu vaccine. Has not taken any meds today. Asthma as a kid   HA near sinus.         PAST MEDICAL HISTORY:  Past Medical History:   Diagnosis Date   • RAD (reactive airway disease)        MEDICATIONS:  Current Outpatient Medications   Medication Sig   • amoxicillin-clavulanate (AUGMENTIN) 875-125 MG per tablet Take 1 tablet by mouth every 12 hours. Take with food.     No current facility-administered medications for this visit.        ALLERGIES:  ALLERGIES:   Allergen Reactions   • Motrin SWELLING       PAST SURGICAL HISTORY:  Past Surgical History:   Procedure Laterality Date   • Appendectomy         FAMILY HISTORY:  Family History   Problem Relation Age of Onset   • Patient is unaware of any medical problems Mother    • Patient is unaware of any medical problems Father        SOCIAL HISTORY:  Social History     Tobacco Use   • Smoking status: Never Smoker   • Smokeless tobacco: Never Used   Substance Use Topics   • Alcohol use: Not on file   • Drug use: Not on file       Review of Systems   Constitutional: Positive for fatigue. Negative for fever.   HENT: Positive for sore throat. Negative for congestion, sinus pressure and sinus pain.    Respiratory: Negative for cough.    Gastrointestinal: Negative for abdominal pain, diarrhea, nausea and vomiting.   Musculoskeletal: Positive for myalgias.   Skin: Negative for rash.   Neurological: Headaches: ethmoid sinus area             OBJECTIVE:     Physical Exam   Constitutional: He appears well-developed and well-nourished.   HENT:   Right Ear: Tympanic membrane and external ear normal.   Left Ear: External ear normal. Tympanic membrane is erythematous and bulging.    Nose: Nose normal.   Mouth/Throat: Uvula is midline, oropharynx is clear and moist and mucous membranes are normal.   Left TM bright red without cone of light   Tender bilateral ethmoid sinus area on plapation   Eyes: Conjunctivae, EOM and lids are normal.   Neck: Normal range of motion. Neck supple.   Cardiovascular: Normal rate, regular rhythm, S1 normal and S2 normal.   Pulmonary/Chest: Effort normal and breath sounds normal.   No cough noted   Lymphadenopathy:        Right cervical: No superficial cervical and no posterior cervical adenopathy present.       Left cervical: No superficial cervical and no posterior cervical adenopathy present.   Skin: Skin is warm, dry and intact.   Warm flushed, temp double checked normal          Visit Vitals  /64   Pulse 88   Temp 98.3 °F (36.8 °C)   Resp 16       DIAGNOSTIC STUDIES:   LAB RESULTS:    No results found for this visit on 01/20/19.    Assessment AND PLAN:   This is a 40 year old year-old male who presents with HA, mild sore throat     1. Acute suppurative otitis media of left ear without spontaneous rupture of tympanic membrane, recurrence not specified    2. Acute bacterial sinusitis        If no improvement in symptoms OR any worse symptoms see your primary care doctor OR go to Emergency Department    Instructions provided as documented in the AVS.  Recheck if symptoms worsen           The patient indicated understanding of the diagnosis and agreed with the plan of care.

## 2020-02-24 NOTE — PROGRESS NOTES
1930: Bedside and Verbal shift change report given to [address] (oncoming nurse) by Vinod Gerard RN (offgoing nurse). Report included the following information SBAR, Kardex, ED Summary, MAR, Recent Results, and Cardiac Rhythm sinus tach . 2130: TRANSFER - OUT REPORT:    Verbal report given to Tamy Steiner RN(name) on [place]  being transferred to 536(unit) for routine progression of care       Report consisted of patients Situation, Background, Assessment and   Recommendations(SBAR). Information from the following report(s) SBAR, Kardex, ED Summary, Intake/Output, Recent Results and Cardiac Rhythm sinus tach was reviewed with the receiving nurse. Lines:   Peripheral IV 02/20/20 Left;Posterior Forearm (Active)   Site Assessment Clean, dry, & intact 2/23/2020  4:00 PM   Phlebitis Assessment 0 2/23/2020  8:00 PM   Infiltration Assessment 0 2/23/2020  8:00 PM   Dressing Status Clean, dry, & intact 2/23/2020  8:00 PM   Dressing Type Transparent 2/23/2020  8:00 PM   Hub Color/Line Status Pink;Capped 2/23/2020  8:00 PM   Action Taken Open ports on tubing capped 2/23/2020  8:00 PM   Alcohol Cap Used Yes 2/23/2020  8:00 PM       Peripheral IV 02/20/20 Posterior;Right Forearm (Active)   Site Assessment Clean, dry, & intact 2/23/2020  8:00 PM   Phlebitis Assessment 0 2/23/2020  8:00 PM   Infiltration Assessment 0 2/23/2020  8:00 PM   Dressing Status Clean, dry, & intact 2/23/2020  8:00 PM   Dressing Type Transparent 2/23/2020  8:00 PM   Hub Color/Line Status Pink;Capped 2/23/2020  8:00 PM   Action Taken Open ports on tubing capped 2/23/2020  8:00 PM   Alcohol Cap Used Yes 2/23/2020  8:00 PM        Opportunity for questions and clarification was provided.       Patient transported with:   Monitor  Registered Nurse

## 2020-02-24 NOTE — PROGRESS NOTES
[facility]   [address] [facility], [address] [place] [address] [address], [facility]  Phone: [phone]   [phone]       Nephrology Progress Note  Imelda Abdi     1980     [de-identified]  Date of Admission : 2/19/2020 02/24/20    CC: Follow up for ARF    Assessment and Plan   JANICE :  - resolving ATN. Non oliguric   - Microscopic Hematuria w/ Proteinuria, family hx of Lupus - SWAPNIL and ANCAs neg, Low C3, C4   - no labs today  - cont present care  - labs in AM    Combined AG+ NAG Metabolic acidosis   - improving    Multilobar PNA   Influenza B +ve     Shock Liver , DIC  Hepatic Steatosis   Mild Thrombocytopenia   Chronic Alcoholism : watch for withdrawal  Anemia     Echo : Moderate to Severe MR       D/w pt and ICU staff      Interval History:  Seen and examined. Feeling ok. No labs today, Cr improving yesterday. No cp or sob. Review of Systems: Pertinent items are noted in HPI.     Current Medications:   Current Facility-Administered Medications   Medication Dose Route Frequency    albuterol-ipratropium (DUO-NEB) 2.5 MG-0.5 MG/3 ML  3 mL Nebulization Q6H RT    prednisoLONE (ORAPRED) 15 mg/5 mL (3 mg/mL) solution 40 mg  40 mg Oral DAILY    ibuprofen (MOTRIN) tablet 400 mg  400 mg Oral Q6H PRN    metoprolol succinate (TOPROL-XL) XL tablet 50 mg  50 mg Oral DAILY    oseltamivir (TAMIFLU) capsule 75 mg  75 mg Oral Q12H    famotidine (PEPCID) tablet 20 mg  20 mg Oral BID    valACYclovir (VALTREX) tablet 500 mg  500 mg Oral QPM    hydrALAZINE (APRESOLINE) 20 mg/mL injection 10 mg  10 mg IntraVENous Q4H PRN    guaiFENesin ER (MUCINEX) tablet 600 mg  600 mg Oral Q12H    benzonatate (TESSALON) capsule 200 mg  200 mg Oral TID    diphenhydrAMINE (BENADRYL) capsule 25 mg  25 mg Oral Q6H PRN    guaiFENesin-dextromethorphan (ROBITUSSIN DM) 100-10 mg/5 mL syrup 10 mL  10 mL Oral Q6H PRN    melatonin tablet 3 mg  3 mg Oral QHS PRN    docusate (COLACE) 50 mg/5 mL oral liquid 100 mg  100 mg Oral BID PRN    folic acid (FOLVITE) tablet 1 mg  1 mg Oral DAILY    thiamine HCL (B-1) tablet 100 mg  100 mg Oral DAILY    therapeutic multivitamin (THERAGRAN) tablet 1 Tab  1 Tab Oral DAILY    cefTRIAXone (ROCEPHIN) 2 g in 0.9% sodium chloride (MBP/ADV) 50 mL  2 g IntraVENous Q24H    budesonide (PULMICORT) 500 mcg/2 ml nebulizer suspension  500 mcg Nebulization BID RT    sodium chloride (NS) flush 5-10 mL  5-10 mL IntraVENous PRN    sodium chloride (NS) flush 5-40 mL  5-40 mL IntraVENous Q8H    sodium chloride (NS) flush 5-40 mL  5-40 mL IntraVENous PRN    ondansetron (ZOFRAN) injection 4 mg  4 mg IntraVENous Q4H PRN      Allergies   Allergen Reactions    Amoxicillin Hives    Chlorhexidine Rash    Lisinopril Angioedema       Objective:  Vitals:    Vitals:    02/24/20 0348 02/24/20 0431 02/24/20 0753 02/24/20 0842   BP: (!) 171/99 (!) 155/91  (!) 176/103   Pulse: 94 91  (!) 116   Resp: 18 20  18   Temp: 99.1 °F (37.3 °C) 98.1 °F (36.7 °C)  97.7 °F (36.5 °C)   SpO2: 100% 100% 97% 95%   Weight:    88 kg (194 lb 1.6 oz)   Height:         Intake and Output:  No intake/output data recorded. 02/22 1901 - 02/24 0700  In: 3569 [I.V.:3569]  Out: 825 [Urine:825]    Physical Examination:  General: Awake and alert, in NAD  Neck:  Supple, no mass  Resp:  Decreased bibasilar breath sounds  CV:  RRR, no m/r/g, 1+ b/l LE edema  GI:  Soft, NT, + BS  Neurologic:  Non focal  Skin:  No Rash  :  No martinez    []    High complexity decision making was performed  []    Patient is at high-risk of decompensation with multiple organ involvement    Lab Data Personally Reviewed: I have reviewed all the pertinent labs, microbiology data and radiology studies during assessment.     Recent Labs     02/24/20  0155 02/23/20  0522 02/22/20  0726 02/22/20  0012 02/21/20  1824 02/21/20  1152   NA  --  138 138 140 138 138   K  --  3.4* 3.6 3.0* 4.1 3.2*   CL  --  104 103 103 103 100   CO2  --  31 31 30 28 29   GLU  --  116* 125* 147* 168* 174*   BUN  -- 19 24* 26* 28* 33*   CREA  --  1.07* 1.39* 1.54* 1.79* 2.05*   CA  --  8.7 8.2* 8.1* 8.2* 8.0*   MG 1.9 1.9  --  1.9  --   --    PHOS  --  2.3*  --  1.8*  --   --    ALB  --  2.1* 1.9* 2.0* 2.1* 2.2*   SGOT  --  299* 605* 808* 1,098* 1,516*   ALT  --  662* 930* 1,043* 1,206* 1,427*   INR 1.1 1.2* 1.2* 1.2*  --   --      Recent Labs     02/24/20  0155 02/23/20  0522   WBC 16.1* 13.8*   HGB 8.5* 8.5*   HCT 25.6* 25.7*   PLT 83* 91*     Lab Results   Component Value Date/Time    Specimen Description: URINE 04/09/2010 09:43 PM     Lab Results   Component Value Date/Time    Culture result: NO GROWTH 3 DAYS 02/21/2020 11:52 AM    Culture result: NO GROWTH 4 DAYS 02/20/2020 11:52 AM    Culture result: YEAST IDENTIFICATION TO FOLLOW (A) 02/20/2020 06:38 AM    Culture result: (A) 02/19/2020 04:48 PM     STREPTOCOCCUS PNEUMONIAE GROWING IN BOTH BOTTLES DRAWN (SITE = [facility] )    Culture result:  02/19/2020 04:48 PM     CALLED TO AND READ BACK BY  CARLITOS Andres R.N. BY  02/20/20 AT 0701       Recent Results (from the past 24 hour(s))   MAGNESIUM    Collection Time: 02/24/20  1:55 AM   Result Value Ref Range    Magnesium 1.9 1.6 - 2.4 mg/dL   CBC WITH AUTOMATED DIFF    Collection Time: 02/24/20  1:55 AM   Result Value Ref Range    WBC 16.1 (H) 3.6 - 11.0 K/uL    RBC 2.63 (L) 3.80 - 5.20 M/uL    HGB 8.5 (L) 11.5 - 16.0 g/dL    HCT 25.6 (L) 35.0 - 47.0 %    MCV 97.3 80.0 - 99.0 FL    MCH 32.3 26.0 - 34.0 PG    MCHC 33.2 30.0 - 36.5 g/dL    RDW 16.8 (H) 11.5 - 14.5 %    PLATELET 83 (L) 339 - 400 K/uL    MPV 12.3 8.9 - 12.9 FL    NRBC 0.6 (H) 0  WBC    ABSOLUTE NRBC 0.10 (H) 0.00 - 0.01 K/uL    NEUTROPHILS 81 (H) 32 - 75 %    LYMPHOCYTES 5 (L) 12 - 49 %    MONOCYTES 10 5 - 13 %    EOSINOPHILS 0 0 - 7 %    BASOPHILS 0 0 - 1 %    METAMYELOCYTES 4 (H) 0 %    IMMATURE GRANULOCYTES 0 %    ABS. NEUTROPHILS 13.0 (H) 1.8 - 8.0 K/UL    ABS. LYMPHOCYTES 0.8 0.8 - 3.5 K/UL    ABS. MONOCYTES 1.6 (H) 0.0 - 1.0 K/UL    ABS. EOSINOPHILS 0.0 0.0 - 0.4 K/UL    ABS. BASOPHILS 0.0 0.0 - 0.1 K/UL    ABS. IMM. GRANS. 0.0 K/UL    DF MANUAL      RBC COMMENTS ANISOCYTOSIS  1+        RBC COMMENTS POLYCHROMASIA  1+       PROTHROMBIN TIME + INR    Collection Time: 02/24/20  1:55 AM   Result Value Ref Range    INR 1.1 0.9 - 1.1      Prothrombin time 11.4 (H) 9.0 - 11.1 sec           I have reviewed the flowsheets. Chart and Pertinent Notes have been reviewed. No change in PMH ,family and social history from Consult note.       Tony Avina MD

## 2020-02-24 NOTE — PROGRESS NOTES
Pt complaining from the time she got here about being swollen. She said they gave her so much fluid and now she is so swollen. Pt was I renal failure but labs show it is getting better. Pt anxious and has a headache. Pt's mother called and said that pt, called her mom at home and said she thinks she is going to die because she can't breathe. Her oxygen level is 100 % and lungs sounded clear when I listened to them. . Vitals signs are stable BP a little high hydralazine was given. Told her I could walk with her a little later. Called Dr Rosanna Allred and told him about all of her symptoms and complaints also told him  that I think she may be going through DT's she said she drinks 4 drinks a day and has not had any since at least admission. Orders given for 1 mg ativan now.

## 2020-02-24 NOTE — PROGRESS NOTES
Hospitalist Progress Note  Sean Arita MD  Answering service: [phone] OR [phone] from in house phone        Date of Service:  2020  NAME:  Crhistian Hernandez  :  1980  MRN:  [id number]      Admission Summary:   As per initial admission summary  The patient is a 46year old female who developed several days of worsening cough, lightheadedness, progressive fatigue, fever, diarrhea.  She does consume the equivalent of 4 alcoholic drinks per day and has been doing this for several years. Kanwal Parker has taken some acetaminophen for the flu-like symptoms but cannot quantitate this.  She went to urgent care on the day of admission, was found to be hypotensive and sent to [facility].    In the ED liver SBP was in the 80s, liver transaminases were in the 1473-9211 range, TBILI 7.3, INR 1.0, Scr 4.9 mg, Sna 129.    US and CT scan suggest fatty liver without cirrhosis. She has been treated with presser support, broad spectrum ABX, IVNAC, vit K, IV fluids  During ICU course: she was found to have influenza B and pneumococcal bacteremia. Interval history / Subjective:     Patient seen for Follow up of PNA,     Patient seen and examined by the bedside, Labs, images and notes reviewed  Patient is comfortable, denies any chest pain, SOB, abdominal pain, fevers, chills. No N/V/D  Complains of some leg swelling   Discussed with nursing staff, no acute issues overnight, orders reviewed. Assessment & Plan:     Assessment and Plan:  -Severe sepsis. From pneumococcal pneumonia: resolved,  -Pneumococcal pneumonia: continue IV rocephin, completed azithromycin. Pulmonology recommended continuing the abx for 10 days     Follow up CXR :Increased opacity in the right upper lobe and left base. she has high risk of developing pl effusion/empyema with pneumococcal pneumonia. May need extend antibiotic treatment.    Repeat imaging in AM   Out patient CT Chest 12 weeks from initial study for clearance of the pneumonia.  -Pneumococcal bacteremia: blood culture 2/19 2/2 positive. Repeat culture 2/20, /21 no grow. Echo unremarkable. ? 2 weeks of IV abx. ID consulted   -Influenza B resp infection. Will complete 7 days of tamiflu.   -Acute liver failure. resolving    Unclear etiology. Hepatologist consulted. Clinically more likely Shock liver, she had hypotension on presentation, did not require vasopressors. She was started of steroids of possible Alcoholic hepatitis -   steroids. Viral hep panel neg.  -Acute renal failure. Likely pre renal/ATN in setting of sever sepsis. Nephrologist on board. Improving.   -Lactic acidosis. Resolved   -Coagulopathy. Likely from acute liver failure. Improving INR 1.2   -HTN: restart home metoprolol since sepsis resolved. -increasing leukocytosis:  Wbc worse than 2/21, ? Due to steroids, ? Due to new complication from the pneumococcal pna, may consider chest CT if clinically not improving.         Code status: Full code  DVT prophylaxis: SCDs    Care Plan discussed with: Patient/Family It alked to mother in person  in detail regarding patient condition Shakiramonika Sanders, [phone])   Disposition: Home w/Family and TBD     Hospital Problems  Date Reviewed: 5/23/2019          Codes Class Noted POA    Liver failure [facility]) ICD-10-CM: K72.90  ICD-9-CM: 572.8  2/19/2020 Unknown                Review of Systems:   A comprehensive review of systems was negative except for that written in the HPI. Vital Signs:    Last 24hrs VS reviewed since prior progress note.  Most recent are:  Visit Vitals  BP (!) 157/98 (BP 1 Location: Left arm, BP Patient Position: Head of bed elevated (Comment degrees))   Pulse 100   Temp 98.6 °F (37 °C)   Resp 16   Ht 5' 7\" (1.702 m)   Wt 88 kg (194 lb 1.6 oz)   SpO2 96%   Breastfeeding No   BMI 30.40 kg/m²         Intake/Output Summary (Last 24 hours) at 2/24/2020 1614  Last data filed at 2/24/2020 1516  Gross per 24 hour   Intake 150 ml   Output 950 ml   Net -800 ml        Physical Examination:             Constitutional:  No acute distress, cooperative, pleasant    ENT:  Oral mucous moist, oropharynx benign. Neck supple,    Resp:  CTA bilaterally. No wheezing/rhonchi/rales. No accessory muscle use   CV:  Regular rhythm, normal rate, no murmurs, gallops, rubs    GI:  Soft, non distended, non tender. normoactive bowel sounds, no hepatosplenomegaly     Musculoskeletal:  positive for edema, warm, 2+ pulses throughout    Neurologic:  Moves all extremities. AAOx3, CN II-XII reviewed     Psych:  Good insight, Not anxious nor agitated. Skin:  Good turgor, no rashes or ulcers  Hematologic/Lymphatic/Immunlogic:  No jaundice nor lymph node swelling  Eyes:  EOMI. Anicteric sclerae, PERRL. Data Review:    Review and/or order of clinical lab test  Review and/or order of tests in the medicine section of CPT  Discussion of test results with performing physician  I personally reviewed  Image      Labs:     Recent Labs     02/24/20 0155 02/23/20 0522   WBC 16.1* 13.8*   HGB 8.5* 8.5*   HCT 25.6* 25.7*   PLT 83* 91*     Recent Labs     02/24/20 0155 02/23/20  0522 02/22/20  0726 02/22/20  0012   NA  --  138 138 140   K  --  3.4* 3.6 3.0*   CL  --  104 103 103   CO2  --  31 31 30   BUN  --  19 24* 26*   CREA  --  1.07* 1.39* 1.54*   GLU  --  116* 125* 147*   CA  --  8.7 8.2* 8.1*   MG 1.9 1.9  --  1.9   PHOS  --  2.3*  --  1.8*     Recent Labs     02/23/20  0522 02/22/20  0726 02/22/20  0012   SGOT 299* 605* 808*   * 930* 1,043*   * 116 118*   TBILI 2.3* 2.8* 3.2*   TP 4.8* 4.5* 4.6*   ALB 2.1* 1.9* 2.0*   GLOB 2.7 2.6 2.6     Recent Labs     02/24/20  0155 02/23/20  0522 02/22/20  0726   INR 1.1 1.2* 1.2*   PTP 11.4* 11.8* 12.1*      No results for input(s): FE, TIBC, PSAT, FERR in the last 72 hours.    Lab Results   Component Value Date/Time    Folate 8.4 05/23/2019 04:05 PM      No results for input(s): PH, PCO2, PO2 in the last 72 hours. No results for input(s): CPK, CKNDX, TROIQ in the last 72 hours.     No lab exists for component: CPKMB  Lab Results   Component Value Date/Time    Cholesterol, total 184 06/04/2019 10:17 AM    HDL Cholesterol 44 06/04/2019 10:17 AM    LDL, calculated Comment 06/04/2019 10:17 AM    Triglyceride 774 (HH) 06/04/2019 10:17 AM     No results found for: [facility]  Lab Results   Component Value Date/Time    Color BROWN 02/19/2020 10:11 PM    Appearance CLEAR 02/19/2020 10:11 PM    Specific gravity 1.025 02/19/2020 10:11 PM    Specific gravity 1.023 01/22/2019 08:21 AM    pH (UA) 6.5 02/19/2020 10:11 PM    Protein 100 (A) 02/19/2020 10:11 PM    Glucose 100 (A) 02/19/2020 10:11 PM    Ketone 15 (A) 02/19/2020 10:11 PM    Bilirubin NEGATIVE  01/22/2019 08:21 AM    Urobilinogen 2.0 (H) 02/19/2020 10:11 PM    Nitrites POSITIVE (A) 02/19/2020 10:11 PM    Leukocyte Esterase TRACE (A) 02/19/2020 10:11 PM    Epithelial cells MODERATE (A) 02/19/2020 10:11 PM    Bacteria 1+ (A) 02/19/2020 10:11 PM    WBC 0-4 02/19/2020 10:11 PM    RBC 20-50 02/19/2020 10:11 PM         Medications Reviewed:     Current Facility-Administered Medications   Medication Dose Route Frequency    albuterol-ipratropium (DUO-NEB) 2.5 MG-0.5 MG/3 ML  3 mL Nebulization Q6H RT    prednisoLONE (ORAPRED) 15 mg/5 mL (3 mg/mL) solution 40 mg  40 mg Oral DAILY    ibuprofen (MOTRIN) tablet 400 mg  400 mg Oral Q6H PRN    metoprolol succinate (TOPROL-XL) XL tablet 50 mg  50 mg Oral DAILY    oseltamivir (TAMIFLU) capsule 75 mg  75 mg Oral Q12H    famotidine (PEPCID) tablet 20 mg  20 mg Oral BID    valACYclovir (VALTREX) tablet 500 mg  500 mg Oral QPM    hydrALAZINE (APRESOLINE) 20 mg/mL injection 10 mg  10 mg IntraVENous Q4H PRN    guaiFENesin ER (MUCINEX) tablet 600 mg  600 mg Oral Q12H    benzonatate (TESSALON) capsule 200 mg  200 mg Oral TID    diphenhydrAMINE (BENADRYL) capsule 25 mg  25 mg Oral Q6H PRN    guaiFENesin-dextromethorphan (ROBITUSSIN DM) 100-10 mg/5 mL syrup 10 mL  10 mL Oral Q6H PRN    melatonin tablet 3 mg  3 mg Oral QHS PRN    docusate (COLACE) 50 mg/5 mL oral liquid 100 mg  100 mg Oral BID PRN    folic acid (FOLVITE) tablet 1 mg  1 mg Oral DAILY    thiamine HCL (B-1) tablet 100 mg  100 mg Oral DAILY    therapeutic multivitamin (THERAGRAN) tablet 1 Tab  1 Tab Oral DAILY    cefTRIAXone (ROCEPHIN) 2 g in 0.9% sodium chloride (MBP/ADV) 50 mL  2 g IntraVENous Q24H    budesonide (PULMICORT) 500 mcg/2 ml nebulizer suspension  500 mcg Nebulization BID RT    sodium chloride (NS) flush 5-10 mL  5-10 mL IntraVENous PRN    sodium chloride (NS) flush 5-40 mL  5-40 mL IntraVENous Q8H    sodium chloride (NS) flush 5-40 mL  5-40 mL IntraVENous PRN    ondansetron (ZOFRAN) injection 4 mg  4 mg IntraVENous Q4H PRN     ______________________________________________________________________  EXPECTED LENGTH OF STAY: 4d 19h  ACTUAL LENGTH OF STAY:          Essence Byrne MD

## 2020-02-24 NOTE — PROGRESS NOTES
GILMER:  1. Home when stable. 2. Nephrology/ ID following. 3. Transportation; Family. Cm noted of transfer to this floor from ICU, patient is independent with ADLs and IADLs prior to admission. She will discharge home with home health.       [facility]

## 2020-02-24 NOTE — CONSULTS
Infectious Disease Consult Note    Reason for Consult: Bacteremia and influenza  Date of Consultation: February 24, 2020  Date of Admission: 2/19/2020  Referring Physician: Dr. Omari Leahy      HPI:    Ms. 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 She had presented with multiorgan system failure and admitted to the ICU. Prior to admission here she reports that she had gone to an outpatient clinic with symptoms of headache, cough and upper respiratory symptoms. She was treated with Z-Reinaldo as an outpatient. She followed up with a primary care office and had abnormal labs and was sent to the emergency room     She was found to have JANICE with a creatinine greater than 3, market elevation of her LFTs, lactate and procalcitonin. Blood cultures were positive for strep pneumonia on 2/19/2020 with negative repeat blood cultures on 2/20/2020. Viral respiratory panel was positive for influenza B. Urine showed 30,000 again Candida glabrata. Urine pregnancy test on 1/22/2019 was negative. She was treated with Tamiflu and ceftriaxone. CT imaging done of her abdomen and pelvis, general multifocal pulmonary consolidations and hepatic steatosis. Transthoracic echo did not mention of any valvular vegetations. She says that she feels markedly better. Admits to having some wheezing and a dry cough but improved overall. Denies nausea vomiting diarrhea dysuria headache. Denied myalgias arthralgias. Denied rash. Denied current outbreak of any herpetic lesions to her knowledge.       Past Medical History:  Past Medical History:   Diagnosis Date    Anemia     Asthma     uses inhaler 2-3 times per week    Biliary colic 2/54/9108    Hypertension     Ill-defined condition     murmur         Surgical History:  Past Surgical History: Procedure Laterality Date    HX CHOLECYSTECTOMY      HX GYN      vaginal delivery x1    HX GYN  2018    cyst removal    HX GYN          HX OTHER SURGICAL      lipoma removal from right shoulder    HX WISDOM TEETH EXTRACTION           Family History:   Family History   Problem Relation Age of Onset    Hypertension Mother     Cancer Mother         colon, in remission    Clotting Disorder Mother         blood clot post surgery    Hypertension Father     Colon Cancer Paternal Uncle     Pancreatic Cancer Paternal Uncle     Cancer Paternal Uncle     Cancer Maternal Uncle     Stroke Paternal Aunt     Heart Disease Maternal Grandmother          Social History:     · Living Situation: At home with her mother and child  · Tobacco: Denied  · Alcohol: Says she quit and used to drink about 4 beers a day before  · Illicit Drugs:  denied to me  · Sexual History: Past  · Travel History denied  · Exposures:   · Outdoor/Hiking: denied  · Animal/Pet: Denied  · Construction: denied  · Hot Tub: denied   · Brackish/stagnant exposure: denied  · TB exposure: denied  · Sick Contacts: denied     Allergies: Allergies   Allergen Reactions    Amoxicillin Hives    Chlorhexidine Rash    Lisinopril Angioedema         Review of Systems:    Full 10 point review of systems obtained  Pertinent positives per HPI  All others negative at this time      Medications:  No current facility-administered medications on file prior to encounter. Current Outpatient Medications on File Prior to Encounter   Medication Sig Dispense Refill    azithromycin (ZITHROMAX Z-PETER) 250 mg tablet Take 250 mg by mouth daily. 500 mg on day 1, followed by 250 mg daily for 4 days      ergocalciferol (ERGOCALCIFEROL) 50,000 unit capsule Take 1 Cap by mouth every seven (7) days. 12 Cap 2    metoprolol succinate (TOPROL-XL) 50 mg XL tablet Take 1 Tab by mouth daily.  90 Tab 2    fenofibrate nanocrystallized (TRICOR) 145 mg tablet Take 1 Tab by mouth daily. 30 Tab 5    albuterol (PROVENTIL HFA, VENTOLIN HFA, PROAIR HFA) 90 mcg/actuation inhaler Take 2 Puffs by inhalation every four (4) hours as needed for Wheezing. 1 Inhaler 11    valACYclovir (VALTREX) 500 mg tablet Take 500 mg by mouth every evening.   1           Physical Exam:    Vitals:   Patient Vitals for the past 24 hrs:   Temp Pulse Resp BP SpO2   02/24/20 1200  (!) 103  (!) 161/95    02/24/20 0842 97.7 °F (36.5 °C) (!) 116 18 (!) 176/103 95 %   02/24/20 0753     97 %   02/24/20 0431 98.1 °F (36.7 °C) 91 20 (!) 155/91 100 %   02/24/20 0348 99.1 °F (37.3 °C) 94 18 (!) 171/99 100 %   02/24/20 0124 99 °F (37.2 °C) (!) 105 20 (!) 154/92 99 %   02/24/20 0056     99 %   02/23/20 2340  (!) 103  (!) 151/94    02/23/20 2327  (!) 108  (!) 154/95    02/23/20 2320  (!) 102  (!) 158/97    02/23/20 2313  (!) 102  (!) 165/102    02/23/20 2254 98.5 °F (36.9 °C) (!) 106 22 (!) 165/102 98 %   02/23/20 2100  (!) 116 23 147/87 97 %   02/23/20 2000 98.5 °F (36.9 °C) (!) 129 (!) 46 147/89 99 %   02/23/20 1908     98 %   02/23/20 1900  (!) 104 20 146/85 97 %   02/23/20 1800  (!) 109 16 148/86 97 %   02/23/20 1700  (!) 106 19 154/90 96 %   02/23/20 1600 98.2 °F (36.8 °C) (!) 108 19 (!) 165/103 96 %   02/23/20 1500  (!) 102 23 157/90 95 %   02/23/20 1400  (!) 102 19 137/89 94 %   02/23/20 1300  (!) 101 19 (!) 155/94 98 %   ·   · GEN: NAD  · HEENT: o scleral icterus,  , no sinus tenderness, no thrush  · CV: S1, S2 heard regularly,  · Lungs: Wheezing  · Abdomen: soft, non distended, non tender, no CVA tenderness   · Genitourinary:  no martinez  · Extremities: no edema  · Neuro: Alert, oriented to  self place and situation, moves all extremities to commands, verbal   · Skin: no rash  · Musculoskeletal Nontender to palpation of back  · Psych slow to respond to questions, non-tearful      Labs:   Recent Results (from the past 24 hour(s))   MAGNESIUM    Collection Time: 02/24/20  1:55 AM Result Value Ref Range    Magnesium 1.9 1.6 - 2.4 mg/dL   CBC WITH AUTOMATED DIFF    Collection Time: 02/24/20  1:55 AM   Result Value Ref Range    WBC 16.1 (H) 3.6 - 11.0 K/uL    RBC 2.63 (L) 3.80 - 5.20 M/uL    HGB 8.5 (L) 11.5 - 16.0 g/dL    HCT 25.6 (L) 35.0 - 47.0 %    MCV 97.3 80.0 - 99.0 FL    MCH 32.3 26.0 - 34.0 PG    MCHC 33.2 30.0 - 36.5 g/dL    RDW 16.8 (H) 11.5 - 14.5 %    PLATELET 83 (L) 959 - 400 K/uL    MPV 12.3 8.9 - 12.9 FL    NRBC 0.6 (H) 0  WBC    ABSOLUTE NRBC 0.10 (H) 0.00 - 0.01 K/uL    NEUTROPHILS 81 (H) 32 - 75 %    LYMPHOCYTES 5 (L) 12 - 49 %    MONOCYTES 10 5 - 13 %    EOSINOPHILS 0 0 - 7 %    BASOPHILS 0 0 - 1 %    METAMYELOCYTES 4 (H) 0 %    IMMATURE GRANULOCYTES 0 %    ABS. NEUTROPHILS 13.0 (H) 1.8 - 8.0 K/UL    ABS. LYMPHOCYTES 0.8 0.8 - 3.5 K/UL    ABS. MONOCYTES 1.6 (H) 0.0 - 1.0 K/UL    ABS. EOSINOPHILS 0.0 0.0 - 0.4 K/UL    ABS. BASOPHILS 0.0 0.0 - 0.1 K/UL    ABS. IMM.  GRANS. 0.0 K/UL    DF MANUAL      RBC COMMENTS ANISOCYTOSIS  1+        RBC COMMENTS POLYCHROMASIA  1+       PROTHROMBIN TIME + INR    Collection Time: 02/24/20  1:55 AM   Result Value Ref Range    INR 1.1 0.9 - 1.1      Prothrombin time 11.4 (H) 9.0 - 11.1 sec         Blood 2/21/20       Component Value Ref Range & Units Status   Special Requests: NO SPECIAL REQUESTS    Preliminary   Culture result: NO GROWTH 3 DAYS    Preliminary   Result History            Blood: 2/20/20      Component Value Ref Range & Units Status   Special Requests: NO SPECIAL REQUESTS    Preliminary   Culture result: NO GROWTH 4 DAYS    Preliminary   Result History       Blood 2/19/20   Blood        Component Value Ref Range & Units Status   Special Requests: NO SPECIAL REQUESTS    Final   Culture result: Abnormal     Final   STREPTOCOCCUS PNEUMONIAE GROWING IN BOTH BOTTLES DRAWN (SITE = [facility] )    Culture result:    Final   CALLED TO AND READ BACK BY   CARLITOS Andres R.N. BY HR 02/20/20 AT 0701    Susceptibility      Streptococcus pneumoniae     ANNETTE    Ceftriaxone Meningitis 0.023 ug/mL S    Ceftriaxone Non-meningitis 0.023 ug/mL S    Erythromycin ($$$$) 4 ug/mL R    Levofloxacin ($) 1.5 ug/mL S    Penicillin (Oral) 0.047 ug/mL S    Penicillin meningitis 0.047 ug/mL S1    Penicillin non-meningitis 0.047 ug/mL S    Trimeth/Sulfa 0.125 ug/mL S    Vancomycin ($) 0.50 ug/mL S        2/19/20  Component Value Ref Range & Units Status   Adenovirus NOT DETECTED  NOTD   Final   Coronavirus 229E NOT DETECTED  NOTD   Final   Coronavirus HKU1 NOT DETECTED  NOTD   Final   Coronavirus CVNL63 NOT DETECTED  NOTD   Final   Coronavirus OC43 NOT DETECTED  NOTD   Final   Metapneumovirus NOT DETECTED  NOTD   Final   Rhinovirus and Enterovirus NOT DETECTED  NOTD   Final   Influenza A NOT DETECTED  NOTD   Final   Influenza A, subtype H1 NOT DETECTED  NOTD   Final   Influenza A, subtype H3 NOT DETECTED  NOTD   Final   INFLUENZA A H1N1 PCR NOT DETECTED  NOTD   Final   Influenza B DETECTEDAbnormal   NOTD   Final   Parainfluenza 1 NOT DETECTED  NOTD   Final   Parainfluenza 2 NOT DETECTED  NOTD   Final   Parainfluenza 3 NOT DETECTED  NOTD   Final   Parainfluenza virus 4 NOT DETECTED  NOTD   Final   RSV by PCR NOT DETECTED  NOTD   Final   B. parapertussis, PCR NOT DETECTED  NOTD   Final   Bordetella pertussis - PCR NOT DETECTED  NOTD   Final   Chlamydophila pneumoniae DNA, QL, PCR NOT DETECTED  NOTD   Final   Mycoplasma pneumoniae DNA, QL, PCR NOT DETECTED                    Imaging:   CT A/P 2/19/20  CONTRAST:  None.     TECHNIQUE:   Thin axial images were obtained through the abdomen and pelvis. Coronal and  sagittal reconstructions were generated. Oral contrast was not administered. CT  dose reduction was achieved through use of a standardized protocol tailored for  this examination and automatic exposure control for dose modulation.      The absence of intravenous enteral contrast material reduces the capacity of CT  to evaluate the bowel and solid organs.    FINDINGS:   LUNG BASES: Multifocal consolidative opacifications with air bronchograms in  inferior lingula, basilar left lower lobe, and lateral basilar right lower lobe. INCIDENTALLY IMAGED HEART AND MEDIASTINUM: Unremarkable. LIVER: Diffusely diminished hepatic echotexture. No hepatic mass or biliary duct  dilation demonstrated. GALLBLADDER: Absent. SPLEEN: No mass. PANCREAS: No mass or ductal dilatation. ADRENALS: Unremarkable. KIDNEYS/URETERS: No mass, calculus, or hydronephrosis. STOMACH: Small hiatal hernia. SMALL BOWEL: No dilatation or wall thickening. COLON: No dilation or mural thickening. APPENDIX: Not shown. PERITONEUM: No ascites or pneumoperitoneum. RETROPERITONEUM: No lymphadenopathy or aortic aneurysm. REPRODUCTIVE ORGANS: Small exophytic subserosal leiomyomas at uterine fundus  measuring up to 1.5 cm. URINARY BLADDER: No mass or calculus. BONES: No destructive bone lesion. ADDITIONAL COMMENTS: N/A     IMPRESSION  IMPRESSION:     1. Multifocal pulmonary consolidations. 2. Diffuse hepatic steatosis. 3. Status post cholecystectomy. TTE  Left Ventricle Normal cavity size and systolic function (ejection fraction normal). Mild concentric hypertrophy . The muscle mass is normal. The cavity shape is normal. Normal septal motion. The estimated ejection fraction is 60 - 65%. LV wall motion is noted to be no regional wall motion abnormality. There is age-appropriate left ventricular diastolic function. Wall Scoring The left ventricular wall motion is normal.            Left Atrium Normal cavity size. Right Ventricle Normal cavity size, wall thickness and global systolic function. Right Atrium Normal cavity size. Aortic Valve Normal valve structure, trileaflet valve structure, no stenosis and no regurgitation. Mitral Valve Normal valve structure and no stenosis. Mitral valve thickening. There is posterior leaflet thickening at the base. Moderate to severe regurgitation.  The mitral regurgitant jet is posteriorly directed. Tricuspid Valve Normal valve structure and no stenosis. Trace regurgitation. Pulmonary arterial systolic pressure is 28.2 mmHg. Mild pulmonary hypertension. Pulmonic Valve Pulmonic valve not well visualized. Aorta Normal aortic root, ascending aortic, and aortic arch. Pericardium No evidence of pericardial effusion. Assessment / Plan:     Ms. 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 And found to have Streptococcus pneumonia bacteremia and influenza B infection and pneumonia. 1) Streptococcus pneumonia bacteremia and pneumonia  Agree with ceftriaxone  TTE noted  Plan for ceftriaxone 2gm daily till 3/5/20 ( 2 weeks from negative blood cx )  Check weekly labs for CBC with differential and CMP while on IV ceftriaxone    2) Influenza B   recommend 7-day course of Tamiflu renally dosed    3) history of genital herpes  She is on Valtrex  ( dose renally)  denies any current lesions    4) AK improving    5) asthma on steroids per primary team    6) Leukocytosis likely in the setting of steroids    7) elevated LFTs in the setting of sepsis currently improving continue to monitor,     8) screening  HIV nonreactive  Hep C antibody nonreactive  Last pregnancy screening negative in January 2020, will repeat a pregnancy screening  this admission if not done previously    9) DVT prophylaxis per primary team              Thank for the opportunity to participate in the care of this patient. Please contact with questions or concerns.        Dayana Evans,   8:04 PM

## 2020-02-24 NOTE — CONSULTS
Name: LUPIS NGUYEN: Jackelin Ramos 56   : 1980 Admit Date: 2020   Phone:   Room: 536/01   PCP: Rody Chacon NP  MRN: [id number]   Date: 2020  Code: Full Code        HPI:    12:20 PM       History was obtained from patient. I was asked by Trish Vásquez MD to see Iris Newman in consultation for a chief complaint of pneumonia. History of Present Illness: 46year old female with past medical history of asthma and allergies who presented to [facility] with increasing cough, shortness of breath and fever. She went to [place] 1 week back and developed URTI with headaches. Her 8year old son has similar symptoms. She though it was all allergies. The day she came back last Monday (7 days back), her symptoms were worse [facility]. She was subsequently admitted to [facility] ICU with hypotension and JANICE. Initial  Data reviewed. Wbc low  Lactate elevated => now cleared. Cr elevated => improving. Transaminitis => improving. Urinary pneumococcal antigen positive. Influenza B positive. BC positive for streptococcal pneumonia. CT Chest - multifocal pneumonia. CXR shows worsening on the right side. Current abx/anti viral - rocephin / tamiflu.     Past Medical History:   Diagnosis Date    Anemia     Asthma     uses inhaler 2-3 times per week    Biliary colic 3/07/4396    Hypertension     Ill-defined condition     murmur       Past Surgical History:   Procedure Laterality Date    HX CHOLECYSTECTOMY      HX GYN      vaginal delivery x1    HX GYN  2018    cyst removal    HX GYN          HX OTHER SURGICAL      lipoma removal from right shoulder    HX WISDOM TEETH EXTRACTION         Family History   Problem Relation Age of Onset    Hypertension Mother     Cancer Mother         colon, in remission    Clotting Disorder Mother         blood clot post surgery    Hypertension Father     Colon Cancer Paternal Cristian Lee Pancreatic Cancer Paternal Uncle     Cancer Paternal Uncle     Cancer Maternal Uncle     Stroke Paternal Aunt     Heart Disease Maternal Grandmother        Social History     Tobacco Use    Smoking status: Never Smoker    Smokeless tobacco: Never Used   Substance Use Topics    Alcohol use:  Yes     Alcohol/week: 3.0 standard drinks     Types: 3 Cans of beer per week       Allergies   Allergen Reactions    Amoxicillin Hives    Chlorhexidine Rash    Lisinopril Angioedema       Current Facility-Administered Medications   Medication Dose Route Frequency    albuterol-ipratropium (DUO-NEB) 2.5 MG-0.5 MG/3 ML  3 mL Nebulization Q6H RT    prednisoLONE (ORAPRED) 15 mg/5 mL (3 mg/mL) solution 40 mg  40 mg Oral DAILY    ibuprofen (MOTRIN) tablet 400 mg  400 mg Oral Q6H PRN    metoprolol succinate (TOPROL-XL) XL tablet 50 mg  50 mg Oral DAILY    oseltamivir (TAMIFLU) capsule 75 mg  75 mg Oral Q12H    famotidine (PEPCID) tablet 20 mg  20 mg Oral BID    valACYclovir (VALTREX) tablet 500 mg  500 mg Oral QPM    hydrALAZINE (APRESOLINE) 20 mg/mL injection 10 mg  10 mg IntraVENous Q4H PRN    guaiFENesin ER (MUCINEX) tablet 600 mg  600 mg Oral Q12H    benzonatate (TESSALON) capsule 200 mg  200 mg Oral TID    diphenhydrAMINE (BENADRYL) capsule 25 mg  25 mg Oral Q6H PRN    guaiFENesin-dextromethorphan (ROBITUSSIN DM) 100-10 mg/5 mL syrup 10 mL  10 mL Oral Q6H PRN    melatonin tablet 3 mg  3 mg Oral QHS PRN    docusate (COLACE) 50 mg/5 mL oral liquid 100 mg  100 mg Oral BID PRN    folic acid (FOLVITE) tablet 1 mg  1 mg Oral DAILY    thiamine HCL (B-1) tablet 100 mg  100 mg Oral DAILY    therapeutic multivitamin (THERAGRAN) tablet 1 Tab  1 Tab Oral DAILY    cefTRIAXone (ROCEPHIN) 2 g in 0.9% sodium chloride (MBP/ADV) 50 mL  2 g IntraVENous Q24H    budesonide (PULMICORT) 500 mcg/2 ml nebulizer suspension  500 mcg Nebulization BID RT    sodium chloride (NS) flush 5-10 mL  5-10 mL IntraVENous PRN    sodium chloride (NS) flush 5-40 mL  5-40 mL IntraVENous Q8H    sodium chloride (NS) flush 5-40 mL  5-40 mL IntraVENous PRN    ondansetron (ZOFRAN) injection 4 mg  4 mg IntraVENous Q4H PRN         REVIEW OF SYSTEMS   Negative except as stated in the HPI. Physical Exam:   Visit Vitals  BP (!) 161/95 (BP 1 Location: Left arm, BP Patient Position: Head of bed elevated (Comment degrees))   Pulse (!) 103   Temp 97.7 °F (36.5 °C)   Resp 18   Ht 5' 7\" (1.702 m)   Wt 88 kg (194 lb 1.6 oz)   SpO2 95%   Breastfeeding No   BMI 30.40 kg/m²       General:  Alert, cooperative. Head:  Normocephalic. Eyes:  Conjunctivae/corneas clear. Nose: Nares normal. Septum midline. Throat: Lips, mucosa, and tongue normal.   Neck: Supple, symmetrical, trachea midline. Lungs:   Occasional rhonchi. Heart:  Regular rate and rhythm, S1, S2 normal, no murmur, click, rub or gallop. Abdomen:   Soft, non-tender. Bowel sounds normal. No masses,  No organomegaly. Extremities: Extremities normal, atraumatic, no cyanosis or edema. Pulses: 2+ and symmetric all extremities. Neurologic: Grossly nonfocal       Lab Results   Component Value Date/Time    Sodium 138 02/23/2020 05:22 AM    Potassium 3.4 (L) 02/23/2020 05:22 AM    Chloride 104 02/23/2020 05:22 AM    CO2 31 02/23/2020 05:22 AM    BUN 19 02/23/2020 05:22 AM    Creatinine 1.07 (H) 02/23/2020 05:22 AM    Glucose 116 (H) 02/23/2020 05:22 AM    Calcium 8.7 02/23/2020 05:22 AM    Magnesium 1.9 02/24/2020 01:55 AM    Phosphorus 2.3 (L) 02/23/2020 05:22 AM    Lactic acid 1.1 02/23/2020 05:22 AM       Lab Results   Component Value Date/Time    WBC 16.1 (H) 02/24/2020 01:55 AM    HGB 8.5 (L) 02/24/2020 01:55 AM    PLATELET 83 (L) 83/86/3822 01:55 AM    MCV 97.3 02/24/2020 01:55 AM       Lab Results   Component Value Date/Time    INR 1.1 02/24/2020 01:55 AM    aPTT 25 05/23/2019 04:05 PM    AST (SGOT) 299 (H) 02/23/2020 05:22 AM    Alk.  phosphatase 144 (H) 02/23/2020 05:22 AM    Protein, total 4.8 (L) 02/23/2020 05:22 AM    Albumin 2.1 (L) 02/23/2020 05:22 AM    Globulin 2.7 02/23/2020 05:22 AM       Lab Results   Component Value Date/Time    Iron 242 (H) 02/19/2020 09:00 PM    TIBC 254 02/19/2020 09:00 PM    Iron % saturation 95 (H) 02/19/2020 09:00 PM    Ferritin 2,358 (H) 02/20/2020 04:47 PM       Lab Results   Component Value Date/Time    C-Reactive protein 9.95 (H) 02/19/2020 09:00 PM    TSH 0.19 (L) 02/19/2020 09:00 PM       Lab Results   Component Value Date/Time     02/20/2020 02:08 AM    CK-MB Index CANNOT BE CALCULATED 04/20/2015 10:55 AM    Troponin-I, Qt. <0.05 02/20/2020 10:28 AM        Lab Results   Component Value Date/Time    Culture result: NO GROWTH 3 DAYS 02/21/2020 11:52 AM    Culture result: NO GROWTH 4 DAYS 02/20/2020 11:52 AM    Culture result: YEAST IDENTIFICATION TO FOLLOW (A) 02/20/2020 06:38 AM       Lab Results   Component Value Date/Time    Hepatitis B surface Ag <0.10 02/19/2020 09:00 PM       Lab Results   Component Value Date/Time     02/20/2020 02:08 AM    CK 68 04/20/2015 10:55 AM       Lab Results   Component Value Date/Time    Color BROWN 02/19/2020 10:11 PM    Appearance CLEAR 02/19/2020 10:11 PM    Specific gravity 1.025 02/19/2020 10:11 PM    pH (UA) 6.5 02/19/2020 10:11 PM    Protein 100 (A) 02/19/2020 10:11 PM    Glucose 100 (A) 02/19/2020 10:11 PM    Ketone 15 (A) 02/19/2020 10:11 PM    Bilirubin NEGATIVE  01/22/2019 08:21 AM    Blood LARGE (A) 02/19/2020 10:11 PM    Urobilinogen 2.0 (H) 02/19/2020 10:11 PM    Nitrites POSITIVE (A) 02/19/2020 10:11 PM    Leukocyte Esterase TRACE (A) 02/19/2020 10:11 PM    WBC 0-4 02/19/2020 10:11 PM    RBC 20-50 02/19/2020 10:11 PM    Bacteria 1+ (A) 02/19/2020 10:11 PM       IMPRESSION:  ===========  -Flu - influenza B.  -secondary pneumococcal pneumonia; (risk factor Influenza and asthma).   -pneumococcal bacteremia.  -transaminates - shock liver vs alcoholic hepatitis - treated empirically with NAC.  -JANICE - resolving.  -asthma.  -allergic rhinitis. PLAN:  =====  -Influenza B complicated by pneumococcal pneumonia and pneumococcal bacteremia.  -In the presence of pneumococcal bacteremia recommend extending the rocephin Rx or equivalent rx (levaquin) to 10 days.  -Clinically looks better but wbc up and CXR with worsening on the right upper lobe/ left base. Radiological improvement lags behind the clinical improvement. CXR every other day. I have ordered for tomorrow. If worse, rule out any pleural effusion.  -Tamiflu x 5 days.  -Out patient CT Chest 12 weeks from initial study for clearance of the pneumonia. Thank you for the consult. Will follow.     Julieta Corcoran MD

## 2020-02-24 NOTE — PROGRESS NOTES
ICU nurse Zaida Esposito called and said to have day shift call and infectious disease consult on pt.

## 2020-02-24 NOTE — PROGRESS NOTES
TRANSFER - IN REPORT:    Verbal report received from [facility] on Mayur Porter  being received from ICU(unit) for change in patient condition()      Report consisted of patients Situation, Background, Assessment and   Recommendations(SBAR). Information from the following report(s) SBAR and Recent Results was reviewed with the receiving nurse. Opportunity for questions and clarification was provided. Assessment completed upon patients arrival to unit and care assumed.

## 2020-02-25 ENCOUNTER — APPOINTMENT (OUTPATIENT)
Dept: GENERAL RADIOLOGY | Age: 40
DRG: 720 | End: 2020-02-25
Attending: INTERNAL MEDICINE
Payer: MEDICAID

## 2020-02-25 LAB
ALBUMIN SERPL-MCNC: 2.1 G/DL (ref 3.5–5)
ALBUMIN/GLOB SERPL: 0.7 {RATIO} (ref 1.1–2.2)
ALP SERPL-CCNC: 136 U/L (ref 45–117)
ALT SERPL-CCNC: 319 U/L (ref 12–78)
ANION GAP SERPL CALC-SCNC: 5 MMOL/L (ref 5–15)
AST SERPL-CCNC: 88 U/L (ref 15–37)
BACTERIA SPEC CULT: NORMAL
BASOPHILS # BLD: 0 K/UL (ref 0–0.1)
BASOPHILS NFR BLD: 0 % (ref 0–1)
BILIRUB SERPL-MCNC: 1.4 MG/DL (ref 0.2–1)
BUN SERPL-MCNC: 18 MG/DL (ref 6–20)
BUN/CREAT SERPL: 17 (ref 12–20)
CALCIUM SERPL-MCNC: 9.6 MG/DL (ref 8.5–10.1)
CH50 SERPL-ACNC: 17 U/ML
CHLORIDE SERPL-SCNC: 105 MMOL/L (ref 97–108)
CO2 SERPL-SCNC: 27 MMOL/L (ref 21–32)
CREAT SERPL-MCNC: 1.09 MG/DL (ref 0.55–1.02)
DIFFERENTIAL METHOD BLD: ABNORMAL
EOSINOPHIL # BLD: 0 K/UL (ref 0–0.4)
EOSINOPHIL NFR BLD: 0 % (ref 0–7)
ERYTHROCYTE [DISTWIDTH] IN BLOOD BY AUTOMATED COUNT: 17.3 % (ref 11.5–14.5)
GLOBULIN SER CALC-MCNC: 3.2 G/DL (ref 2–4)
GLUCOSE SERPL-MCNC: 116 MG/DL (ref 65–100)
HCG UR QL: NEGATIVE
HCT VFR BLD AUTO: 25.6 % (ref 35–47)
HGB BLD-MCNC: 8.3 G/DL (ref 11.5–16)
IMM GRANULOCYTES # BLD AUTO: 0 K/UL
IMM GRANULOCYTES NFR BLD AUTO: 0 %
LYMPHOCYTES # BLD: 1.5 K/UL (ref 0.8–3.5)
LYMPHOCYTES NFR BLD: 8 % (ref 12–49)
MAGNESIUM SERPL-MCNC: 1.8 MG/DL (ref 1.6–2.4)
MCH RBC QN AUTO: 32.4 PG (ref 26–34)
MCHC RBC AUTO-ENTMCNC: 32.4 G/DL (ref 30–36.5)
MCV RBC AUTO: 100 FL (ref 80–99)
METAMYELOCYTES NFR BLD MANUAL: 3 %
MONOCYTES # BLD: 2.5 K/UL (ref 0–1)
MONOCYTES NFR BLD: 13 % (ref 5–13)
MYELOCYTES NFR BLD MANUAL: 3 %
NEUTS BAND NFR BLD MANUAL: 5 % (ref 0–6)
NEUTS SEG # BLD: 13.9 K/UL (ref 1.8–8)
NEUTS SEG NFR BLD: 68 % (ref 32–75)
NRBC # BLD: 0.07 K/UL (ref 0–0.01)
NRBC BLD-RTO: 0.4 PER 100 WBC
PHOSPHATE SERPL-MCNC: 3.6 MG/DL (ref 2.6–4.7)
PLATELET # BLD AUTO: 72 K/UL (ref 150–400)
PLATELET COMMENTS,PCOM: ABNORMAL
POTASSIUM SERPL-SCNC: 3.8 MMOL/L (ref 3.5–5.1)
PROT SERPL-MCNC: 5.3 G/DL (ref 6.4–8.2)
RBC # BLD AUTO: 2.56 M/UL (ref 3.8–5.2)
RBC MORPH BLD: ABNORMAL
SERVICE CMNT-IMP: NORMAL
SODIUM SERPL-SCNC: 137 MMOL/L (ref 136–145)
WBC # BLD AUTO: 19 K/UL (ref 3.6–11)

## 2020-02-25 PROCEDURE — 74011000258 HC RX REV CODE- 258: Performed by: INTERNAL MEDICINE

## 2020-02-25 PROCEDURE — 94640 AIRWAY INHALATION TREATMENT: CPT

## 2020-02-25 PROCEDURE — 74011000250 HC RX REV CODE- 250: Performed by: HOSPITALIST

## 2020-02-25 PROCEDURE — 85025 COMPLETE CBC W/AUTO DIFF WBC: CPT

## 2020-02-25 PROCEDURE — 74011636637 HC RX REV CODE- 636/637: Performed by: INTERNAL MEDICINE

## 2020-02-25 PROCEDURE — 74011250637 HC RX REV CODE- 250/637: Performed by: INTERNAL MEDICINE

## 2020-02-25 PROCEDURE — 80053 COMPREHEN METABOLIC PANEL: CPT

## 2020-02-25 PROCEDURE — 84100 ASSAY OF PHOSPHORUS: CPT

## 2020-02-25 PROCEDURE — 74011250636 HC RX REV CODE- 250/636: Performed by: HOSPITALIST

## 2020-02-25 PROCEDURE — 74011250637 HC RX REV CODE- 250/637: Performed by: NURSE PRACTITIONER

## 2020-02-25 PROCEDURE — 65270000029 HC RM PRIVATE

## 2020-02-25 PROCEDURE — 94760 N-INVAS EAR/PLS OXIMETRY 1: CPT

## 2020-02-25 PROCEDURE — 81025 URINE PREGNANCY TEST: CPT

## 2020-02-25 PROCEDURE — 71046 X-RAY EXAM CHEST 2 VIEWS: CPT

## 2020-02-25 PROCEDURE — 74011250637 HC RX REV CODE- 250/637: Performed by: HOSPITALIST

## 2020-02-25 PROCEDURE — 36415 COLL VENOUS BLD VENIPUNCTURE: CPT

## 2020-02-25 PROCEDURE — 74011250636 HC RX REV CODE- 250/636: Performed by: INTERNAL MEDICINE

## 2020-02-25 PROCEDURE — 83735 ASSAY OF MAGNESIUM: CPT

## 2020-02-25 RX ORDER — OSELTAMIVIR PHOSPHATE 75 MG/1
75 CAPSULE ORAL EVERY 12 HOURS
Status: DISCONTINUED | OUTPATIENT
Start: 2020-02-25 | End: 2020-02-26 | Stop reason: HOSPADM

## 2020-02-25 RX ORDER — FUROSEMIDE 40 MG/1
40 TABLET ORAL DAILY
Status: DISCONTINUED | OUTPATIENT
Start: 2020-02-25 | End: 2020-02-26 | Stop reason: HOSPADM

## 2020-02-25 RX ADMIN — FUROSEMIDE 40 MG: 40 TABLET ORAL at 11:56

## 2020-02-25 RX ADMIN — GUAIFENESIN 600 MG: 600 TABLET, EXTENDED RELEASE ORAL at 09:27

## 2020-02-25 RX ADMIN — OSELTAMIVIR PHOSPHATE 75 MG: 75 CAPSULE ORAL at 21:35

## 2020-02-25 RX ADMIN — PREDNISOLONE SODIUM PHOSPHATE 40 MG: 15 SOLUTION ORAL at 09:28

## 2020-02-25 RX ADMIN — Medication 100 MG: at 09:31

## 2020-02-25 RX ADMIN — IPRATROPIUM BROMIDE AND ALBUTEROL SULFATE 3 ML: .5; 3 SOLUTION RESPIRATORY (INHALATION) at 07:40

## 2020-02-25 RX ADMIN — THERA TABS 1 TABLET: TAB at 09:27

## 2020-02-25 RX ADMIN — FAMOTIDINE 20 MG: 20 TABLET ORAL at 09:27

## 2020-02-25 RX ADMIN — IBUPROFEN 400 MG: 400 TABLET, FILM COATED ORAL at 03:28

## 2020-02-25 RX ADMIN — HYDRALAZINE HYDROCHLORIDE 10 MG: 20 INJECTION INTRAMUSCULAR; INTRAVENOUS at 05:05

## 2020-02-25 RX ADMIN — BENZONATATE 200 MG: 100 CAPSULE ORAL at 17:02

## 2020-02-25 RX ADMIN — GUAIFENESIN 600 MG: 600 TABLET, EXTENDED RELEASE ORAL at 21:35

## 2020-02-25 RX ADMIN — VALACYCLOVIR HYDROCHLORIDE 500 MG: 500 TABLET, FILM COATED ORAL at 17:04

## 2020-02-25 RX ADMIN — OSELTAMIVIR PHOSPHATE 75 MG: 75 CAPSULE ORAL at 09:28

## 2020-02-25 RX ADMIN — METOPROLOL SUCCINATE 50 MG: 50 TABLET, EXTENDED RELEASE ORAL at 09:27

## 2020-02-25 RX ADMIN — IPRATROPIUM BROMIDE AND ALBUTEROL SULFATE 3 ML: .5; 3 SOLUTION RESPIRATORY (INHALATION) at 03:45

## 2020-02-25 RX ADMIN — FAMOTIDINE 20 MG: 20 TABLET ORAL at 17:04

## 2020-02-25 RX ADMIN — GUAIFENESIN AND DEXTROMETHORPHAN 10 ML: 100; 10 SYRUP ORAL at 03:27

## 2020-02-25 RX ADMIN — Medication 10 ML: at 21:36

## 2020-02-25 RX ADMIN — BENZONATATE 200 MG: 100 CAPSULE ORAL at 09:27

## 2020-02-25 RX ADMIN — Medication 5 ML: at 06:00

## 2020-02-25 RX ADMIN — BUDESONIDE INHALATION 500 MCG: 0.5 SUSPENSION RESPIRATORY (INHALATION) at 22:12

## 2020-02-25 RX ADMIN — MELATONIN 3 MG: at 21:35

## 2020-02-25 RX ADMIN — FOLIC ACID 1 MG: 1 TABLET ORAL at 09:27

## 2020-02-25 RX ADMIN — BENZONATATE 200 MG: 100 CAPSULE ORAL at 21:35

## 2020-02-25 RX ADMIN — BUDESONIDE INHALATION 500 MCG: 0.5 SUSPENSION RESPIRATORY (INHALATION) at 07:40

## 2020-02-25 RX ADMIN — IPRATROPIUM BROMIDE AND ALBUTEROL SULFATE 3 ML: .5; 3 SOLUTION RESPIRATORY (INHALATION) at 22:12

## 2020-02-25 RX ADMIN — CEFTRIAXONE 2 G: 2 INJECTION, POWDER, FOR SOLUTION INTRAMUSCULAR; INTRAVENOUS at 17:04

## 2020-02-25 NOTE — PROGRESS NOTES
Hospitalist Progress Note  Glendell Ganser, MD  Answering service: [phone] OR [phone] from in house phone        Date of Service:  2020  NAME:  Boaz Parisi  :  1980  MRN:  [id number]      Admission Summary:   As per initial admission summary  The patient is a 46year old female who developed several days of worsening cough, lightheadedness, progressive fatigue, fever, diarrhea.  She does consume the equivalent of 4 alcoholic drinks per day and has been doing this for several years. Anjelica Argueta has taken some acetaminophen for the flu-like symptoms but cannot quantitate this.  She went to urgent care on the day of admission, was found to be hypotensive and sent to [facility].    In the ED liver SBP was in the 80s, liver transaminases were in the 0575-0852 range, TBILI 7.3, INR 1.0, Scr 4.9 mg, Sna 129.    US and CT scan suggest fatty liver without cirrhosis. She has been treated with presser support, broad spectrum ABX, IVNAC, vit K, IV fluids  During ICU course: she was found to have influenza B and pneumococcal bacteremia. Interval history / Subjective:     Patient seen for Follow up of PNA,     Patient seen and examined by the bedside, Labs, images and notes reviewed  Patient is comfortable, denies any chest pain, SOB, abdominal pain, fevers, chills. No N/V/D  Complains of some leg swelling. Discussed with nephrology today . Will start on diuretics   Discussed with nursing staff, no acute issues overnight, orders reviewed. Assessment & Plan:     Assessment and Plan:  -Severe sepsis. From pneumococcal pneumonia: resolved,    -Pneumococcal pneumonia: continue IV rocephin, completed azithromycin. Pulmonology recommended continuing the abx for 10 days     Follow up CXR :Increased opacity in the right upper lobe and left base. she has high risk of developing pl effusion/empyema with pneumococcal pneumonia. May need extend antibiotic treatment. Repeat imaging in AM   Out patient CT Chest 12 weeks from initial study for clearance of the pneumonia. Edema  Started on lasix 40 mg   Discussed with nephrology     -Pneumococcal bacteremia: blood culture 2/19 2/2 positive. Repeat culture 2/20, /21 no grow. Echo unremarkable. ? 2 weeks of IV abx. ID consulted , Plan for ceftriaxone 2gm daily till 3/5/20 as per ID     -Influenza B resp infection. Will complete 7 days of tamiflu.     -Acute liver failure. resolving    Unclear etiology. Hepatologist consulted. Clinically more likely Shock liver, she had hypotension on presentation, did not require vasopressors. She was started of steroids of possible Alcoholic hepatitis -   steroids. Viral hep panel neg.    -Acute renal failure. Likely pre renal/ATN in setting of sever sepsis. Nephrologist on board. Improving.     -Lactic acidosis. Resolved     -Coagulopathy. Likely from acute liver failure. Improving INR 1.2     -HTN: restart home metoprolol since sepsis resolved. -increasing leukocytosis:  Wbc worse than 2/21, ? Due to steroids, ? Due to new complication from the pneumococcal pna, may consider chest CT if clinically not improving.         Code status: Full code  DVT prophylaxis: SCDs    Care Plan discussed with: Patient/Family It alked to mother in person  in detail regarding patient condition Maryellen Jimenez, [phone])   Disposition: Home w/Family and TBD     Hospital Problems  Date Reviewed: 5/23/2019          Codes Class Noted POA    Liver failure [facility]) ICD-10-CM: K72.90  ICD-9-CM: 572.8  2/19/2020 Unknown                Review of Systems:   A comprehensive review of systems was negative except for that written in the HPI. Vital Signs:    Last 24hrs VS reviewed since prior progress note.  Most recent are:  Visit Vitals  BP (!) 155/98   Pulse 95   Temp 99.1 °F (37.3 °C)   Resp 18   Ht 5' 7\" (1.702 m)   Wt 88 kg (194 lb 1.6 oz)   SpO2 97%   Breastfeeding No   BMI 30.40 kg/m²         Intake/Output Summary (Last 24 hours) at 2/25/2020 1544  Last data filed at 2/25/2020 1512  Gross per 24 hour   Intake    Output 500 ml   Net -500 ml        Physical Examination:             Constitutional:  No acute distress, cooperative, pleasant    ENT:  Oral mucous moist, oropharynx benign. Neck supple,    Resp:  CTA bilaterally. No wheezing/rhonchi/rales. No accessory muscle use   CV:  Regular rhythm, normal rate, no murmurs, gallops, rubs    GI:  Soft, non distended, non tender. normoactive bowel sounds, no hepatosplenomegaly     Musculoskeletal:  positive for edema, warm, 2+ pulses throughout    Neurologic:  Moves all extremities. AAOx3, CN II-XII reviewed     Psych:  Good insight, Not anxious nor agitated. Skin:  Good turgor, no rashes or ulcers  Hematologic/Lymphatic/Immunlogic:  No jaundice nor lymph node swelling  Eyes:  EOMI. Anicteric sclerae, PERRL. Data Review:    Review and/or order of clinical lab test  Review and/or order of tests in the medicine section of CPT  Discussion of test results with performing physician  I personally reviewed  Image      Labs:     Recent Labs     02/25/20 0530 02/24/20 0155   WBC 19.0* 16.1*   HGB 8.3* 8.5*   HCT 25.6* 25.6*   PLT 72* 83*     Recent Labs     02/25/20 0523 02/24/20 0155 02/23/20 0522     --  138   K 3.8  --  3.4*     --  104   CO2 27  --  31   BUN 18  --  19   CREA 1.09*  --  1.07*   *  --  116*   CA 9.6  --  8.7   MG 1.8 1.9 1.9   PHOS 3.6  --  2.3*     Recent Labs     02/25/20 0523 02/23/20  0522   SGOT 88* 299*   * 662*   * 144*   TBILI 1.4* 2.3*   TP 5.3* 4.8*   ALB 2.1* 2.1*   GLOB 3.2 2.7     Recent Labs     02/24/20 0155 02/23/20 0522   INR 1.1 1.2*   PTP 11.4* 11.8*      No results for input(s): FE, TIBC, PSAT, FERR in the last 72 hours. Lab Results   Component Value Date/Time    Folate 8.4 05/23/2019 04:05 PM      No results for input(s): PH, PCO2, PO2 in the last 72 hours.   No results for input(s): CPK, CKNDX, TROIQ in the last 72 hours.     No lab exists for component: CPKMB  Lab Results   Component Value Date/Time    Cholesterol, total 184 06/04/2019 10:17 AM    HDL Cholesterol 44 06/04/2019 10:17 AM    LDL, calculated Comment 06/04/2019 10:17 AM    Triglyceride 774 (HH) 06/04/2019 10:17 AM     No results found for: [facility]  Lab Results   Component Value Date/Time    Color BROWN 02/19/2020 10:11 PM    Appearance CLEAR 02/19/2020 10:11 PM    Specific gravity 1.025 02/19/2020 10:11 PM    Specific gravity 1.023 01/22/2019 08:21 AM    pH (UA) 6.5 02/19/2020 10:11 PM    Protein 100 (A) 02/19/2020 10:11 PM    Glucose 100 (A) 02/19/2020 10:11 PM    Ketone 15 (A) 02/19/2020 10:11 PM    Bilirubin NEGATIVE  01/22/2019 08:21 AM    Urobilinogen 2.0 (H) 02/19/2020 10:11 PM    Nitrites POSITIVE (A) 02/19/2020 10:11 PM    Leukocyte Esterase TRACE (A) 02/19/2020 10:11 PM    Epithelial cells MODERATE (A) 02/19/2020 10:11 PM    Bacteria 1+ (A) 02/19/2020 10:11 PM    WBC 0-4 02/19/2020 10:11 PM    RBC 20-50 02/19/2020 10:11 PM         Medications Reviewed:     Current Facility-Administered Medications   Medication Dose Route Frequency    oseltamivir (TAMIFLU) capsule 75 mg  75 mg Oral Q12H    furosemide (LASIX) tablet 40 mg  40 mg Oral DAILY    albuterol-ipratropium (DUO-NEB) 2.5 MG-0.5 MG/3 ML  3 mL Nebulization Q6H RT    prednisoLONE (ORAPRED) 15 mg/5 mL (3 mg/mL) solution 40 mg  40 mg Oral DAILY    ibuprofen (MOTRIN) tablet 400 mg  400 mg Oral Q6H PRN    metoprolol succinate (TOPROL-XL) XL tablet 50 mg  50 mg Oral DAILY    famotidine (PEPCID) tablet 20 mg  20 mg Oral BID    valACYclovir (VALTREX) tablet 500 mg  500 mg Oral QPM    hydrALAZINE (APRESOLINE) 20 mg/mL injection 10 mg  10 mg IntraVENous Q4H PRN    guaiFENesin ER (MUCINEX) tablet 600 mg  600 mg Oral Q12H    benzonatate (TESSALON) capsule 200 mg  200 mg Oral TID    diphenhydrAMINE (BENADRYL) capsule 25 mg  25 mg Oral Q6H PRN    guaiFENesin-dextromethorphan (ROBITUSSIN DM) 100-10 mg/5 mL syrup 10 mL  10 mL Oral Q6H PRN    melatonin tablet 3 mg  3 mg Oral QHS PRN    docusate (COLACE) 50 mg/5 mL oral liquid 100 mg  100 mg Oral BID PRN    folic acid (FOLVITE) tablet 1 mg  1 mg Oral DAILY    thiamine HCL (B-1) tablet 100 mg  100 mg Oral DAILY    therapeutic multivitamin (THERAGRAN) tablet 1 Tab  1 Tab Oral DAILY    cefTRIAXone (ROCEPHIN) 2 g in 0.9% sodium chloride (MBP/ADV) 50 mL  2 g IntraVENous Q24H    budesonide (PULMICORT) 500 mcg/2 ml nebulizer suspension  500 mcg Nebulization BID RT    sodium chloride (NS) flush 5-10 mL  5-10 mL IntraVENous PRN    sodium chloride (NS) flush 5-40 mL  5-40 mL IntraVENous Q8H    sodium chloride (NS) flush 5-40 mL  5-40 mL IntraVENous PRN    ondansetron (ZOFRAN) injection 4 mg  4 mg IntraVENous Q4H PRN     ______________________________________________________________________  EXPECTED LENGTH OF STAY: 4d 19h  ACTUAL LENGTH OF STAY:          6                 Ramona Murphy MD

## 2020-02-25 NOTE — PROGRESS NOTES
Name: LUPIS NGUYEN: Jackelin Cheung   : 1980 Admit Date: 2020   Phone:   Room: 536/01   PCP: Roseann Espinosa NP  MRN: [id number]   Date: 2020  Code: Full Code        HPI:      Breathing better today       12:20 PM       History was obtained from patient. I was asked by Lubna Burt MD to see Marisabel Thomas in consultation for a chief complaint of pneumonia. History of Present Illness: 46year old female with past medical history of asthma and allergies who presented to [facility] with increasing cough, shortness of breath and fever. She went to [place] 1 week back and developed URTI with headaches. Her 8year old son has similar symptoms. She though it was all allergies. The day she came back last Monday (7 days back), her symptoms were worse [facility]. She was subsequently admitted to [facility] ICU with hypotension and JANICE. Initial  Data reviewed. Wbc low  Lactate elevated => now cleared. Cr elevated => improving. Transaminitis => improving. Urinary pneumococcal antigen positive. Influenza B positive. BC positive for streptococcal pneumonia. CT Chest - multifocal pneumonia. CXR shows worsening on the right side. Current abx/anti viral - rocephin / tamiflu.     Past Medical History:   Diagnosis Date    Anemia     Asthma     uses inhaler 2-3 times per week    Biliary colic     Hypertension     Ill-defined condition     murmur       Past Surgical History:   Procedure Laterality Date    HX CHOLECYSTECTOMY      HX GYN      vaginal delivery x1    HX GYN  2018    cyst removal    HX GYN          HX OTHER SURGICAL      lipoma removal from right shoulder    HX WISDOM TEETH EXTRACTION         Family History   Problem Relation Age of Onset    Hypertension Mother     Cancer Mother         colon, in remission    Clotting Disorder Mother         blood clot post surgery    Hypertension Father  Colon Cancer Paternal Uncle     Pancreatic Cancer Paternal Uncle     Cancer Paternal Uncle     Cancer Maternal Uncle     Stroke Paternal Aunt     Heart Disease Maternal Grandmother        Social History     Tobacco Use    Smoking status: Never Smoker    Smokeless tobacco: Never Used   Substance Use Topics    Alcohol use:  Yes     Alcohol/week: 3.0 standard drinks     Types: 3 Cans of beer per week       Allergies   Allergen Reactions    Amoxicillin Hives    Chlorhexidine Rash    Lisinopril Angioedema       Current Facility-Administered Medications   Medication Dose Route Frequency    oseltamivir (TAMIFLU) capsule 75 mg  75 mg Oral Q12H    furosemide (LASIX) tablet 40 mg  40 mg Oral DAILY    albuterol-ipratropium (DUO-NEB) 2.5 MG-0.5 MG/3 ML  3 mL Nebulization Q6H RT    prednisoLONE (ORAPRED) 15 mg/5 mL (3 mg/mL) solution 40 mg  40 mg Oral DAILY    ibuprofen (MOTRIN) tablet 400 mg  400 mg Oral Q6H PRN    metoprolol succinate (TOPROL-XL) XL tablet 50 mg  50 mg Oral DAILY    famotidine (PEPCID) tablet 20 mg  20 mg Oral BID    valACYclovir (VALTREX) tablet 500 mg  500 mg Oral QPM    hydrALAZINE (APRESOLINE) 20 mg/mL injection 10 mg  10 mg IntraVENous Q4H PRN    guaiFENesin ER (MUCINEX) tablet 600 mg  600 mg Oral Q12H    benzonatate (TESSALON) capsule 200 mg  200 mg Oral TID    diphenhydrAMINE (BENADRYL) capsule 25 mg  25 mg Oral Q6H PRN    guaiFENesin-dextromethorphan (ROBITUSSIN DM) 100-10 mg/5 mL syrup 10 mL  10 mL Oral Q6H PRN    melatonin tablet 3 mg  3 mg Oral QHS PRN    docusate (COLACE) 50 mg/5 mL oral liquid 100 mg  100 mg Oral BID PRN    folic acid (FOLVITE) tablet 1 mg  1 mg Oral DAILY    thiamine HCL (B-1) tablet 100 mg  100 mg Oral DAILY    therapeutic multivitamin (THERAGRAN) tablet 1 Tab  1 Tab Oral DAILY    cefTRIAXone (ROCEPHIN) 2 g in 0.9% sodium chloride (MBP/ADV) 50 mL  2 g IntraVENous Q24H    budesonide (PULMICORT) 500 mcg/2 ml nebulizer suspension  500 mcg Nebulization BID RT    sodium chloride (NS) flush 5-10 mL  5-10 mL IntraVENous PRN    sodium chloride (NS) flush 5-40 mL  5-40 mL IntraVENous Q8H    sodium chloride (NS) flush 5-40 mL  5-40 mL IntraVENous PRN    ondansetron (ZOFRAN) injection 4 mg  4 mg IntraVENous Q4H PRN         REVIEW OF SYSTEMS   Negative except as stated in the HPI. Physical Exam:   Visit Vitals  /90 (BP 1 Location: Left arm, BP Patient Position: At rest)   Pulse (!) 109   Temp 98.7 °F (37.1 °C)   Resp 16   Ht 5' 7\" (1.702 m)   Wt 88 kg (194 lb 1.6 oz)   SpO2 97%   Breastfeeding No   BMI 30.40 kg/m²       General:  Alert, cooperative. Head:  Normocephalic. Eyes:  Conjunctivae/corneas clear. Nose: Nares normal. Septum midline. Throat: Lips, mucosa, and tongue normal.   Neck: Supple, symmetrical, trachea midline. Lungs:   Occasional rhonchi. Heart:  Regular rate and rhythm, S1, S2 normal, no murmur, click, rub or gallop. Abdomen:   Soft, non-tender. Bowel sounds normal. No masses,  No organomegaly. Extremities: Extremities normal, atraumatic, no cyanosis or edema. Pulses: 2+ and symmetric all extremities.            Neurologic: Grossly nonfocal       Lab Results   Component Value Date/Time    Sodium 137 02/25/2020 05:23 AM    Potassium 3.8 02/25/2020 05:23 AM    Chloride 105 02/25/2020 05:23 AM    CO2 27 02/25/2020 05:23 AM    BUN 18 02/25/2020 05:23 AM    Creatinine 1.09 (H) 02/25/2020 05:23 AM    Glucose 116 (H) 02/25/2020 05:23 AM    Calcium 9.6 02/25/2020 05:23 AM    Magnesium 1.8 02/25/2020 05:23 AM    Phosphorus 3.6 02/25/2020 05:23 AM    Lactic acid 1.1 02/23/2020 05:22 AM       Lab Results   Component Value Date/Time    WBC 19.0 (H) 02/25/2020 05:30 AM    HGB 8.3 (L) 02/25/2020 05:30 AM    PLATELET 72 (L) 50/17/7790 05:30 AM    .0 (H) 02/25/2020 05:30 AM       Lab Results   Component Value Date/Time    INR 1.1 02/24/2020 01:55 AM    aPTT 25 05/23/2019 04:05 PM    AST (SGOT) 88 (H) 02/25/2020 05:23 AM    Alk. phosphatase 136 (H) 02/25/2020 05:23 AM    Protein, total 5.3 (L) 02/25/2020 05:23 AM    Albumin 2.1 (L) 02/25/2020 05:23 AM    Globulin 3.2 02/25/2020 05:23 AM       Lab Results   Component Value Date/Time    Iron 242 (H) 02/19/2020 09:00 PM    TIBC 254 02/19/2020 09:00 PM    Iron % saturation 95 (H) 02/19/2020 09:00 PM    Ferritin 2,358 (H) 02/20/2020 04:47 PM       Lab Results   Component Value Date/Time    C-Reactive protein 9.95 (H) 02/19/2020 09:00 PM    TSH 0.19 (L) 02/19/2020 09:00 PM       Lab Results   Component Value Date/Time     02/20/2020 02:08 AM    CK-MB Index CANNOT BE CALCULATED 04/20/2015 10:55 AM    Troponin-I, Qt. <0.05 02/20/2020 10:28 AM        Lab Results   Component Value Date/Time    Culture result: NO GROWTH 4 DAYS 02/21/2020 11:52 AM    Culture result: NO GROWTH 5 DAYS 02/20/2020 11:52 AM    Culture result: ANNIE GLABRATA (A) 02/20/2020 06:38 AM       Lab Results   Component Value Date/Time    Hepatitis B surface Ag <0.10 02/19/2020 09:00 PM       Lab Results   Component Value Date/Time     02/20/2020 02:08 AM    CK 68 04/20/2015 10:55 AM       Lab Results   Component Value Date/Time    Color BROWN 02/19/2020 10:11 PM    Appearance CLEAR 02/19/2020 10:11 PM    Specific gravity 1.025 02/19/2020 10:11 PM    pH (UA) 6.5 02/19/2020 10:11 PM    Protein 100 (A) 02/19/2020 10:11 PM    Glucose 100 (A) 02/19/2020 10:11 PM    Ketone 15 (A) 02/19/2020 10:11 PM    Bilirubin NEGATIVE  01/22/2019 08:21 AM    Blood LARGE (A) 02/19/2020 10:11 PM    Urobilinogen 2.0 (H) 02/19/2020 10:11 PM    Nitrites POSITIVE (A) 02/19/2020 10:11 PM    Leukocyte Esterase TRACE (A) 02/19/2020 10:11 PM    WBC 0-4 02/19/2020 10:11 PM    RBC 20-50 02/19/2020 10:11 PM    Bacteria 1+ (A) 02/19/2020 10:11 PM       IMPRESSION:  ===========  -Flu - influenza B.  -secondary pneumococcal pneumonia; (risk factor Influenza and asthma).   -pneumococcal bacteremia.  -transaminates - shock liver vs alcoholic hepatitis - treated empirically with NAC.  -JANICE - resolving.  -asthma.  -allergic rhinitis. PLAN:  =====  -Influenza B complicated by pneumococcal pneumonia and pneumococcal bacteremia.  -In the presence of pneumococcal bacteremia recommend extending the rocephin Rx or equivalent rx (levaquin) to 10 days.  -chest film today shows improvement.  -Tamiflu x 5 days.  -Out patient CT Chest 12 weeks from initial study for clearance of the pneumonia.   - we will be available again to see if needed     Willy Elaine PA-C

## 2020-02-25 NOTE — PROGRESS NOTES
Problem: Pressure Injury - Risk of  Goal: *Prevention of pressure injury  Description  Document Otis Scale and appropriate interventions in the flowsheet. Outcome: Progressing Towards Goal  Note: Pressure Injury Interventions: Activity Interventions: Increase time out of bed, Pressure redistribution bed/mattress(bed type), PT/OT evaluation    Mobility Interventions: HOB 30 degrees or less, Pressure redistribution bed/mattress (bed type), PT/OT evaluation    Nutrition Interventions: Document food/fluid/supplement intake                     Problem: Patient Education: Go to Patient Education Activity  Goal: Patient/Family Education  Outcome: Progressing Towards Goal     Problem: Risk for Spread of Infection  Goal: Prevent transmission of infectious organism to others  Description  Prevent the transmission of infectious organisms to other patients, staff members, and visitors.   Outcome: Progressing Towards Goal     Problem: Patient Education:  Go to Education Activity  Goal: Patient/Family Education  Outcome: Progressing Towards Goal     Problem: Pneumonia: Discharge Outcomes  Goal: *Describes follow-up/return visits to physicians  Outcome: Progressing Towards Goal  Goal: *Verbalizes name, dosage, time, side effects, and number of days to continue medications  Outcome: Progressing Towards Goal  Goal: *Respiratory status at baseline  Outcome: Progressing Towards Goal  Goal: *Vital signs within defined limits  Outcome: Progressing Towards Goal  Goal: *Optimal pain control at patient's stated goal  Outcome: Progressing Towards Goal

## 2020-02-25 NOTE — PROGRESS NOTES
Pt's IV started to show signs of infiltration so Hung Valdez RN and I attempted to get new IV access twice with the vein finder but were unsuccessful. We were then able to adjust the IV in place and it was again patent. We then attempted to get labs drawn twice and managed to get all of the labs except the PTT/ INR. At this point pt had been stuck a total of 4 times and there was no support available from other units so we were unable to draw the final lab.

## 2020-02-25 NOTE — PROGRESS NOTES
[facility]   [address] [facility], [address] [place] [address], [facility] [place]  Phone: [phone]   [phone]       Nephrology Progress Note  Ashish Mariano     1980     [de-identified]  Date of Admission : 2/19/2020 02/25/20    CC: Follow up for ARF    Assessment and Plan   JANICE :  - resolving ATN. Non oliguric   - Microscopic Hematuria w/ Proteinuria, family hx of Lupus - SWAPNIL and ANCAs neg, Low C3, C4   - Cr stable  - start diuretics today  - daily labs    Hypervolemia:  - start lasix 40mg daily    Combined AG+ NAG Metabolic acidosis   - improving    Multilobar PNA   Influenza B +ve     Shock Liver , DIC  Hepatic Steatosis   Mild Thrombocytopenia   Chronic Alcoholism : watch for withdrawal  Anemia     Echo : Moderate to Severe MR      Interval History:  Seen and examined. Feeling ok. Cr stable. Wt up ?? 30 lbs. No cp, sob. C/o edema in LEs and thighs. Review of Systems: Pertinent items are noted in HPI.     Current Medications:   Current Facility-Administered Medications   Medication Dose Route Frequency    oseltamivir (TAMIFLU) capsule 75 mg  75 mg Oral Q12H    furosemide (LASIX) tablet 40 mg  40 mg Oral DAILY    albuterol-ipratropium (DUO-NEB) 2.5 MG-0.5 MG/3 ML  3 mL Nebulization Q6H RT    prednisoLONE (ORAPRED) 15 mg/5 mL (3 mg/mL) solution 40 mg  40 mg Oral DAILY    ibuprofen (MOTRIN) tablet 400 mg  400 mg Oral Q6H PRN    metoprolol succinate (TOPROL-XL) XL tablet 50 mg  50 mg Oral DAILY    famotidine (PEPCID) tablet 20 mg  20 mg Oral BID    valACYclovir (VALTREX) tablet 500 mg  500 mg Oral QPM    hydrALAZINE (APRESOLINE) 20 mg/mL injection 10 mg  10 mg IntraVENous Q4H PRN    guaiFENesin ER (MUCINEX) tablet 600 mg  600 mg Oral Q12H    benzonatate (TESSALON) capsule 200 mg  200 mg Oral TID    diphenhydrAMINE (BENADRYL) capsule 25 mg  25 mg Oral Q6H PRN    guaiFENesin-dextromethorphan (ROBITUSSIN DM) 100-10 mg/5 mL syrup 10 mL  10 mL Oral Q6H PRN    melatonin tablet 3 mg  3 mg Oral QHS PRN    docusate (COLACE) 50 mg/5 mL oral liquid 100 mg  100 mg Oral BID PRN    folic acid (FOLVITE) tablet 1 mg  1 mg Oral DAILY    thiamine HCL (B-1) tablet 100 mg  100 mg Oral DAILY    therapeutic multivitamin (THERAGRAN) tablet 1 Tab  1 Tab Oral DAILY    cefTRIAXone (ROCEPHIN) 2 g in 0.9% sodium chloride (MBP/ADV) 50 mL  2 g IntraVENous Q24H    budesonide (PULMICORT) 500 mcg/2 ml nebulizer suspension  500 mcg Nebulization BID RT    sodium chloride (NS) flush 5-10 mL  5-10 mL IntraVENous PRN    sodium chloride (NS) flush 5-40 mL  5-40 mL IntraVENous Q8H    sodium chloride (NS) flush 5-40 mL  5-40 mL IntraVENous PRN    ondansetron (ZOFRAN) injection 4 mg  4 mg IntraVENous Q4H PRN      Allergies   Allergen Reactions    Amoxicillin Hives    Chlorhexidine Rash    Lisinopril Angioedema       Objective:  Vitals:    Vitals:    02/25/20 0347 02/25/20 0503 02/25/20 0740 02/25/20 0824   BP:  (!) 166/95  167/90   Pulse:  (!) 114  (!) 109   Resp:       Temp:    98.7 °F (37.1 °C)   SpO2: 96%  96% 97%   Weight:       Height:         Intake and Output:  No intake/output data recorded. 02/23 1901 - 02/25 0700  In: -   Out: 12 [Urine:950]    Physical Examination:  General: Awake and alert, in NAD  Neck:  Supple, no mass  Resp:  Decreased bibasilar breath sounds  CV:  RRR, no m/r/g, 2+ b/l LE edema  GI:  Soft, NT, + BS  Neurologic:  Non focal  Skin:  No Rash  :  No martinez    []    High complexity decision making was performed  []    Patient is at high-risk of decompensation with multiple organ involvement    Lab Data Personally Reviewed: I have reviewed all the pertinent labs, microbiology data and radiology studies during assessment.     Recent Labs     02/25/20  0523 02/24/20  0155 02/23/20  0522     --  138   K 3.8  --  3.4*     --  104   CO2 27  --  31   *  --  116*   BUN 18  --  19   CREA 1.09*  --  1.07*   CA 9.6  --  8.7   MG 1.8 1.9 1.9   PHOS 3.6  --  2.3*   ALB 2.1*  --  2.1*   SGOT 88*  --  299*   *  --  662*   INR  --  1.1 1.2*     Recent Labs     02/25/20  0530 02/24/20  0155 02/23/20  0522   WBC 19.0* 16.1* 13.8*   HGB 8.3* 8.5* 8.5*   HCT 25.6* 25.6* 25.7*   PLT 72* 83* 91*     Lab Results   Component Value Date/Time    Specimen Description: URINE 04/09/2010 09:43 PM     Lab Results   Component Value Date/Time    Culture result: NO GROWTH 4 DAYS 02/21/2020 11:52 AM    Culture result: NO GROWTH 5 DAYS 02/20/2020 11:52 AM    Culture result: ANNIE GLABRATA (A) 02/20/2020 06:38 AM    Culture result: (A) 02/19/2020 04:48 PM     STREPTOCOCCUS PNEUMONIAE GROWING IN BOTH BOTTLES DRAWN (SITE = [facility] )    Culture result:  02/19/2020 04:48 PM     CALLED TO AND READ BACK BY  CARLITOS Andres R.N. BY  02/20/20 AT 0701       Recent Results (from the past 24 hour(s))   HCG URINE, QL    Collection Time: 02/25/20  2:51 AM   Result Value Ref Range    HCG urine, QL NEGATIVE  NEG     MAGNESIUM    Collection Time: 02/25/20  5:23 AM   Result Value Ref Range    Magnesium 1.8 1.6 - 2.4 mg/dL   PHOSPHORUS    Collection Time: 02/25/20  5:23 AM   Result Value Ref Range    Phosphorus 3.6 2.6 - 4.7 MG/DL   METABOLIC PANEL, COMPREHENSIVE    Collection Time: 02/25/20  5:23 AM   Result Value Ref Range    Sodium 137 136 - 145 mmol/L    Potassium 3.8 3.5 - 5.1 mmol/L    Chloride 105 97 - 108 mmol/L    CO2 27 21 - 32 mmol/L    Anion gap 5 5 - 15 mmol/L    Glucose 116 (H) 65 - 100 mg/dL    BUN 18 6 - 20 MG/DL    Creatinine 1.09 (H) 0.55 - 1.02 MG/DL    BUN/Creatinine ratio 17 12 - 20      GFR est AA >60 >60 ml/min/1.73m2    GFR est non-AA 56 (L) >60 ml/min/1.73m2    Calcium 9.6 8.5 - 10.1 MG/DL    Bilirubin, total 1.4 (H) 0.2 - 1.0 MG/DL    ALT (SGPT) 319 (H) 12 - 78 U/L    AST (SGOT) 88 (H) 15 - 37 U/L    Alk.  phosphatase 136 (H) 45 - 117 U/L    Protein, total 5.3 (L) 6.4 - 8.2 g/dL    Albumin 2.1 (L) 3.5 - 5.0 g/dL    Globulin 3.2 2.0 - 4.0 g/dL    A-G Ratio 0.7 (L) 1.1 - 2.2     CBC WITH AUTOMATED DIFF Collection Time: 02/25/20  5:30 AM   Result Value Ref Range    WBC 19.0 (H) 3.6 - 11.0 K/uL    RBC 2.56 (L) 3.80 - 5.20 M/uL    HGB 8.3 (L) 11.5 - 16.0 g/dL    HCT 25.6 (L) 35.0 - 47.0 %    .0 (H) 80.0 - 99.0 FL    MCH 32.4 26.0 - 34.0 PG    MCHC 32.4 30.0 - 36.5 g/dL    RDW 17.3 (H) 11.5 - 14.5 %    PLATELET 72 (L) 642 - 400 K/uL    NRBC 0.4 (H) 0  WBC    ABSOLUTE NRBC 0.07 (H) 0.00 - 0.01 K/uL    NEUTROPHILS 68 32 - 75 %    BAND NEUTROPHILS 5 0 - 6 %    LYMPHOCYTES 8 (L) 12 - 49 %    MONOCYTES 13 5 - 13 %    EOSINOPHILS 0 0 - 7 %    BASOPHILS 0 0 - 1 %    METAMYELOCYTES 3 (H) 0 %    MYELOCYTES 3 (H) 0 %    IMMATURE GRANULOCYTES 0 %    ABS. NEUTROPHILS 13.9 (H) 1.8 - 8.0 K/UL    ABS. LYMPHOCYTES 1.5 0.8 - 3.5 K/UL    ABS. MONOCYTES 2.5 (H) 0.0 - 1.0 K/UL    ABS. EOSINOPHILS 0.0 0.0 - 0.4 K/UL    ABS. BASOPHILS 0.0 0.0 - 0.1 K/UL    ABS. IMM. GRANS. 0.0 K/UL    DF MANUAL      PLATELET COMMENTS Large Platelets      RBC COMMENTS MACROCYTOSIS  1+        RBC COMMENTS ANISOCYTOSIS  1+        RBC COMMENTS POLYCHROMASIA  1+        RBC COMMENTS MICROCYTOSIS  1+        RBC COMMENTS SCHISTOCYTES  1+               I have reviewed the flowsheets. Chart and Pertinent Notes have been reviewed. No change in PMH ,family and social history from Consult note.       Chico Brewer MD

## 2020-02-26 ENCOUNTER — TELEPHONE (OUTPATIENT)
Dept: FAMILY MEDICINE CLINIC | Age: 40
End: 2020-02-26

## 2020-02-26 VITALS
RESPIRATION RATE: 18 BRPM | HEART RATE: 99 BPM | HEIGHT: 67 IN | SYSTOLIC BLOOD PRESSURE: 167 MMHG | TEMPERATURE: 99.1 F | DIASTOLIC BLOOD PRESSURE: 90 MMHG | OXYGEN SATURATION: 97 % | BODY MASS INDEX: 30.61 KG/M2 | WEIGHT: 195 LBS

## 2020-02-26 LAB
ALBUMIN SERPL-MCNC: 2.1 G/DL (ref 3.5–5)
ALBUMIN/GLOB SERPL: 0.7 {RATIO} (ref 1.1–2.2)
ALP SERPL-CCNC: 132 U/L (ref 45–117)
ALT SERPL-CCNC: 236 U/L (ref 12–78)
ANION GAP SERPL CALC-SCNC: 4 MMOL/L (ref 5–15)
AST SERPL-CCNC: 71 U/L (ref 15–37)
BACTERIA SPEC CULT: NORMAL
BASOPHILS # BLD: 0 K/UL (ref 0–0.1)
BASOPHILS NFR BLD: 0 % (ref 0–1)
BILIRUB SERPL-MCNC: 0.9 MG/DL (ref 0.2–1)
BUN SERPL-MCNC: 20 MG/DL (ref 6–20)
BUN/CREAT SERPL: 18 (ref 12–20)
CALCIUM SERPL-MCNC: 9.4 MG/DL (ref 8.5–10.1)
CHLORIDE SERPL-SCNC: 104 MMOL/L (ref 97–108)
CO2 SERPL-SCNC: 28 MMOL/L (ref 21–32)
CREAT SERPL-MCNC: 1.12 MG/DL (ref 0.55–1.02)
DIFFERENTIAL METHOD BLD: ABNORMAL
EOSINOPHIL # BLD: 0 K/UL (ref 0–0.4)
EOSINOPHIL NFR BLD: 0 % (ref 0–7)
ERYTHROCYTE [DISTWIDTH] IN BLOOD BY AUTOMATED COUNT: 17.2 % (ref 11.5–14.5)
GLOBULIN SER CALC-MCNC: 3.1 G/DL (ref 2–4)
GLUCOSE SERPL-MCNC: 120 MG/DL (ref 65–100)
HCT VFR BLD AUTO: 24.4 % (ref 35–47)
HGB BLD-MCNC: 8.1 G/DL (ref 11.5–16)
IMM GRANULOCYTES # BLD AUTO: 0 K/UL
IMM GRANULOCYTES NFR BLD AUTO: 0 %
INR PPP: 1.1 (ref 0.9–1.1)
LYMPHOCYTES # BLD: 1.4 K/UL (ref 0.8–3.5)
LYMPHOCYTES NFR BLD: 9 % (ref 12–49)
MAGNESIUM SERPL-MCNC: 1.6 MG/DL (ref 1.6–2.4)
MCH RBC QN AUTO: 33.1 PG (ref 26–34)
MCHC RBC AUTO-ENTMCNC: 33.2 G/DL (ref 30–36.5)
MCV RBC AUTO: 99.6 FL (ref 80–99)
METAMYELOCYTES NFR BLD MANUAL: 5 %
MONOCYTES # BLD: 1.7 K/UL (ref 0–1)
MONOCYTES NFR BLD: 11 % (ref 5–13)
MYELOCYTES NFR BLD MANUAL: 4 %
NEUTS BAND NFR BLD MANUAL: 8 % (ref 0–6)
NEUTS SEG # BLD: 10.8 K/UL (ref 1.8–8)
NEUTS SEG NFR BLD: 63 % (ref 32–75)
NRBC # BLD: 0.06 K/UL (ref 0–0.01)
NRBC BLD-RTO: 0.4 PER 100 WBC
PHOSPHATE SERPL-MCNC: 3.8 MG/DL (ref 2.6–4.7)
PLATELET # BLD AUTO: 67 K/UL (ref 150–400)
PLATELET COMMENTS,PCOM: ABNORMAL
POTASSIUM SERPL-SCNC: 3.6 MMOL/L (ref 3.5–5.1)
PROT SERPL-MCNC: 5.2 G/DL (ref 6.4–8.2)
PROTHROMBIN TIME: 11.1 SEC (ref 9–11.1)
RBC # BLD AUTO: 2.45 M/UL (ref 3.8–5.2)
RBC MORPH BLD: ABNORMAL
SERVICE CMNT-IMP: NORMAL
SODIUM SERPL-SCNC: 136 MMOL/L (ref 136–145)
WBC # BLD AUTO: 15.2 K/UL (ref 3.6–11)

## 2020-02-26 PROCEDURE — 74011250637 HC RX REV CODE- 250/637: Performed by: NURSE PRACTITIONER

## 2020-02-26 PROCEDURE — 85025 COMPLETE CBC W/AUTO DIFF WBC: CPT

## 2020-02-26 PROCEDURE — 83735 ASSAY OF MAGNESIUM: CPT

## 2020-02-26 PROCEDURE — 94640 AIRWAY INHALATION TREATMENT: CPT

## 2020-02-26 PROCEDURE — C1887 CATHETER, GUIDING: HCPCS

## 2020-02-26 PROCEDURE — 74011636637 HC RX REV CODE- 636/637: Performed by: INTERNAL MEDICINE

## 2020-02-26 PROCEDURE — 74011250637 HC RX REV CODE- 250/637: Performed by: INTERNAL MEDICINE

## 2020-02-26 PROCEDURE — 84100 ASSAY OF PHOSPHORUS: CPT

## 2020-02-26 PROCEDURE — 74011250636 HC RX REV CODE- 250/636: Performed by: INTERNAL MEDICINE

## 2020-02-26 PROCEDURE — 74011000250 HC RX REV CODE- 250: Performed by: HOSPITALIST

## 2020-02-26 PROCEDURE — 36600 WITHDRAWAL OF ARTERIAL BLOOD: CPT

## 2020-02-26 PROCEDURE — 74011250637 HC RX REV CODE- 250/637: Performed by: HOSPITALIST

## 2020-02-26 PROCEDURE — 74011000258 HC RX REV CODE- 258: Performed by: INTERNAL MEDICINE

## 2020-02-26 PROCEDURE — 80053 COMPREHEN METABOLIC PANEL: CPT

## 2020-02-26 PROCEDURE — 36415 COLL VENOUS BLD VENIPUNCTURE: CPT

## 2020-02-26 PROCEDURE — 77030004515 HC CATH ANGI DX AVU ANGI -C

## 2020-02-26 PROCEDURE — 85610 PROTHROMBIN TIME: CPT

## 2020-02-26 PROCEDURE — 05HC33Z INSERTION OF INFUSION DEVICE INTO LEFT BASILIC VEIN, PERCUTANEOUS APPROACH: ICD-10-PCS | Performed by: INTERNAL MEDICINE

## 2020-02-26 PROCEDURE — 94760 N-INVAS EAR/PLS OXIMETRY 1: CPT

## 2020-02-26 RX ORDER — THERA TABS 400 MCG
1 TAB ORAL DAILY
Qty: 30 TAB | Refills: 0 | Status: SHIPPED | OUTPATIENT
Start: 2020-02-27 | End: 2020-04-17 | Stop reason: ALTCHOICE

## 2020-02-26 RX ORDER — GUAIFENESIN/DEXTROMETHORPHAN 100-10MG/5
10 SYRUP ORAL
Qty: 1 BOTTLE | Refills: 0 | Status: SHIPPED | OUTPATIENT
Start: 2020-02-26 | End: 2020-03-07

## 2020-02-26 RX ORDER — FUROSEMIDE 40 MG/1
TABLET ORAL
Qty: 14 TAB | Refills: 0 | Status: SHIPPED | OUTPATIENT
Start: 2020-02-27 | End: 2020-04-13 | Stop reason: SDUPTHER

## 2020-02-26 RX ORDER — LANOLIN ALCOHOL/MO/W.PET/CERES
100 CREAM (GRAM) TOPICAL DAILY
Qty: 30 TAB | Refills: 0 | Status: SHIPPED | OUTPATIENT
Start: 2020-02-27 | End: 2020-04-17 | Stop reason: ALTCHOICE

## 2020-02-26 RX ORDER — FOLIC ACID 1 MG/1
1 TABLET ORAL DAILY
Qty: 30 TAB | Refills: 0 | Status: SHIPPED | OUTPATIENT
Start: 2020-02-27 | End: 2020-04-17 | Stop reason: ALTCHOICE

## 2020-02-26 RX ADMIN — BENZONATATE 200 MG: 100 CAPSULE ORAL at 16:39

## 2020-02-26 RX ADMIN — BUDESONIDE INHALATION 500 MCG: 0.5 SUSPENSION RESPIRATORY (INHALATION) at 07:16

## 2020-02-26 RX ADMIN — PREDNISOLONE SODIUM PHOSPHATE 40 MG: 15 SOLUTION ORAL at 08:24

## 2020-02-26 RX ADMIN — IPRATROPIUM BROMIDE AND ALBUTEROL SULFATE 3 ML: .5; 3 SOLUTION RESPIRATORY (INHALATION) at 07:16

## 2020-02-26 RX ADMIN — FOLIC ACID 1 MG: 1 TABLET ORAL at 08:23

## 2020-02-26 RX ADMIN — GUAIFENESIN 600 MG: 600 TABLET, EXTENDED RELEASE ORAL at 08:23

## 2020-02-26 RX ADMIN — IPRATROPIUM BROMIDE AND ALBUTEROL SULFATE 3 ML: .5; 3 SOLUTION RESPIRATORY (INHALATION) at 03:12

## 2020-02-26 RX ADMIN — METOPROLOL SUCCINATE 50 MG: 50 TABLET, EXTENDED RELEASE ORAL at 08:23

## 2020-02-26 RX ADMIN — OSELTAMIVIR PHOSPHATE 75 MG: 75 CAPSULE ORAL at 08:23

## 2020-02-26 RX ADMIN — Medication 10 ML: at 07:39

## 2020-02-26 RX ADMIN — BENZONATATE 200 MG: 100 CAPSULE ORAL at 08:23

## 2020-02-26 RX ADMIN — FUROSEMIDE 40 MG: 40 TABLET ORAL at 08:23

## 2020-02-26 RX ADMIN — CEFTRIAXONE 2 G: 2 INJECTION, POWDER, FOR SOLUTION INTRAMUSCULAR; INTRAVENOUS at 16:39

## 2020-02-26 RX ADMIN — FAMOTIDINE 20 MG: 20 TABLET ORAL at 08:23

## 2020-02-26 RX ADMIN — Medication 100 MG: at 08:36

## 2020-02-26 RX ADMIN — THERA TABS 1 TABLET: TAB at 08:23

## 2020-02-26 NOTE — PROGRESS NOTES
Infectious Disease Progress Note      HPI:    Ms. Donald Saenz seen earlier  Plan for picc line   Says cough better   Denied Nausea, vomiting diarrhea, fever, chills   No bleeding signs reported           Current Facility-Administered Medications:     oseltamivir (TAMIFLU) capsule 75 mg, 75 mg, Oral, Q12H, Fei Wilkerson MD, 75 mg at 02/26/20 6239    furosemide (LASIX) tablet 40 mg, 40 mg, Oral, DAILY, Yael Vasques MD, 40 mg at 02/26/20 5613    albuterol-ipratropium (DUO-NEB) 2.5 MG-0.5 MG/3 ML, 3 mL, Nebulization, Q6H RT, Beatriz Lo MD, 3 mL at 02/26/20 0716    ibuprofen (MOTRIN) tablet 400 mg, 400 mg, Oral, Q6H PRN, Khang Ng R, NP, 400 mg at 02/25/20 0328    metoprolol succinate (TOPROL-XL) XL tablet 50 mg, 50 mg, Oral, DAILY, Khang Ng R, NP, 50 mg at 02/26/20 1759    famotidine (PEPCID) tablet 20 mg, 20 mg, Oral, BID, Yola Cortes MD, 20 mg at 02/26/20 7554    valACYclovir (VALTREX) tablet 500 mg, 500 mg, Oral, QPM, Yola Cortes MD, 500 mg at 02/25/20 1704    hydrALAZINE (APRESOLINE) 20 mg/mL injection 10 mg, 10 mg, IntraVENous, Q4H PRN, Yola Cortes MD, 10 mg at 02/25/20 0505    guaiFENesin ER (MUCINEX) tablet 600 mg, 600 mg, Oral, Q12H, Beatriz Lo MD, 600 mg at 02/26/20 8764    benzonatate (TESSALON) capsule 200 mg, 200 mg, Oral, TID, Beatriz Lo MD, 200 mg at 02/26/20 2514    diphenhydrAMINE (BENADRYL) capsule 25 mg, 25 mg, Oral, Q6H PRN, Yola Cortes MD, 25 mg at 02/22/20 1157    guaiFENesin-dextromethorphan (ROBITUSSIN DM) 100-10 mg/5 mL syrup 10 mL, 10 mL, Oral, Q6H PRN, Dorothyann Seed R, NP, 10 mL at 02/25/20 0327    melatonin tablet 3 mg, 3 mg, Oral, QHS PRN, Dorothyann Seed R, NP, 3 mg at 02/25/20 2135    docusate (COLACE) 50 mg/5 mL oral liquid 100 mg, 100 mg, Oral, BID PRN, Sakina Valdes NP    folic acid (FOLVITE) tablet 1 mg, 1 mg, Oral, DAILY, Yola Cortes MD, 1 mg at 02/26/20 8080    thiamine HCL (B-1) tablet 100 mg, 100 mg, Oral, DAILY, Yola Cortes MD, 100 mg at 02/26/20 0836    therapeutic multivitamin (THERAGRAN) tablet 1 Tab, 1 Tab, Oral, DAILY, Connie East MD, 1 Tab at 02/26/20 9099    cefTRIAXone (ROCEPHIN) 2 g in 0.9% sodium chloride (MBP/ADV) 50 mL, 2 g, IntraVENous, Q24H, Parisa Morales MD, Last Rate: 100 mL/hr at 02/25/20 1704, 2 g at 02/25/20 1704    budesonide (PULMICORT) 500 mcg/2 ml nebulizer suspension, 500 mcg, Nebulization, BID RT, Connie East MD, 500 mcg at 02/26/20 0716    sodium chloride (NS) flush 5-10 mL, 5-10 mL, IntraVENous, PRN, Jelena Sam MD    sodium chloride (NS) flush 5-40 mL, 5-40 mL, IntraVENous, Q8H, Kenneth Conquest R, NP, 10 mL at 02/26/20 0739    sodium chloride (NS) flush 5-40 mL, 5-40 mL, IntraVENous, PRN, Katerina Galarza, ANDRIY    ondansetron [facility]) injection 4 mg, 4 mg, IntraVENous, Q4H PRN, Kenneth Conquest R, NP, 4 mg at 02/20/20 2000    Exam  Visit Vitals  /90 (BP 1 Location: Left arm, BP Patient Position: At rest;Sitting)   Pulse 99   Temp 99.1 °F (37.3 °C)   Resp 18   Ht 5' 7\" (1.702 m)   Wt 195 lb (88.5 kg)   SpO2 97%   Breastfeeding No   BMI 30.54 kg/m²        · GEN: NAD  · HEENT: no scleral icterus,  , no thrush   · CV: S1, S2 heard regularly,  · Lungs: clear to auscultation   · Abdomen: soft, non distended, non tender,   · Skin no rash      Labs:   Recent Results (from the past 24 hour(s))   MAGNESIUM    Collection Time: 02/26/20  3:47 AM   Result Value Ref Range    Magnesium 1.6 1.6 - 2.4 mg/dL   PHOSPHORUS    Collection Time: 02/26/20  3:47 AM   Result Value Ref Range    Phosphorus 3.8 2.6 - 4.7 MG/DL   CBC WITH AUTOMATED DIFF    Collection Time: 02/26/20  3:47 AM   Result Value Ref Range    WBC 15.2 (H) 3.6 - 11.0 K/uL    RBC 2.45 (L) 3.80 - 5.20 M/uL    HGB 8.1 (L) 11.5 - 16.0 g/dL    HCT 24.4 (L) 35.0 - 47.0 %    MCV 99.6 (H) 80.0 - 99.0 FL    MCH 33.1 26.0 - 34.0 PG    MCHC 33.2 30.0 - 36.5 g/dL    RDW 17.2 (H) 11.5 - 14.5 %    PLATELET 67 (L) 083 - 400 K/uL    NRBC 0.4 (H) 0  WBC ABSOLUTE NRBC 0.06 (H) 0.00 - 0.01 K/uL    NEUTROPHILS 63 32 - 75 %    BAND NEUTROPHILS 8 (H) 0 - 6 %    LYMPHOCYTES 9 (L) 12 - 49 %    MONOCYTES 11 5 - 13 %    EOSINOPHILS 0 0 - 7 %    BASOPHILS 0 0 - 1 %    METAMYELOCYTES 5 (H) 0 %    MYELOCYTES 4 (H) 0 %    IMMATURE GRANULOCYTES 0 %    ABS. NEUTROPHILS 10.8 (H) 1.8 - 8.0 K/UL    ABS. LYMPHOCYTES 1.4 0.8 - 3.5 K/UL    ABS. MONOCYTES 1.7 (H) 0.0 - 1.0 K/UL    ABS. EOSINOPHILS 0.0 0.0 - 0.4 K/UL    ABS. BASOPHILS 0.0 0.0 - 0.1 K/UL    ABS. IMM. GRANS. 0.0 K/UL    DF MANUAL      PLATELET COMMENTS Large Platelets      RBC COMMENTS ANISOCYTOSIS  1+        RBC COMMENTS MACROCYTOSIS  1+        RBC COMMENTS POLYCHROMASIA  1+        RBC COMMENTS MICROCYTOSIS  1+        RBC COMMENTS SCHISTOCYTES  1+       PROTHROMBIN TIME + INR    Collection Time: 02/26/20  3:47 AM   Result Value Ref Range    INR 1.1 0.9 - 1.1      Prothrombin time 11.1 9.0 - 27.1 sec   METABOLIC PANEL, COMPREHENSIVE    Collection Time: 02/26/20  3:47 AM   Result Value Ref Range    Sodium 136 136 - 145 mmol/L    Potassium 3.6 3.5 - 5.1 mmol/L    Chloride 104 97 - 108 mmol/L    CO2 28 21 - 32 mmol/L    Anion gap 4 (L) 5 - 15 mmol/L    Glucose 120 (H) 65 - 100 mg/dL    BUN 20 6 - 20 MG/DL    Creatinine 1.12 (H) 0.55 - 1.02 MG/DL    BUN/Creatinine ratio 18 12 - 20      GFR est AA >60 >60 ml/min/1.73m2    GFR est non-AA 54 (L) >60 ml/min/1.73m2    Calcium 9.4 8.5 - 10.1 MG/DL    Bilirubin, total 0.9 0.2 - 1.0 MG/DL    ALT (SGPT) 236 (H) 12 - 78 U/L    AST (SGOT) 71 (H) 15 - 37 U/L    Alk.  phosphatase 132 (H) 45 - 117 U/L    Protein, total 5.2 (L) 6.4 - 8.2 g/dL    Albumin 2.1 (L) 3.5 - 5.0 g/dL    Globulin 3.1 2.0 - 4.0 g/dL    A-G Ratio 0.7 (L) 1.1 - 2.2           Blood 2/21/20       Component Value Ref Range & Units Status   Special Requests: NO SPECIAL REQUESTS    Preliminary   Culture result: NO GROWTH 3 DAYS    Preliminary   Result History            Blood: 2/20/20      Component Value Ref Range & Units Status   Special Requests: NO SPECIAL REQUESTS    Preliminary   Culture result: NO GROWTH 4 DAYS    Preliminary   Result History       Blood 2/19/20   Blood        Component Value Ref Range & Units Status   Special Requests: NO SPECIAL REQUESTS    Final   Culture result: Abnormal     Final   STREPTOCOCCUS PNEUMONIAE GROWING IN BOTH BOTTLES DRAWN (SITE = [facility] )    Culture result:    Final   CALLED TO AND READ BACK BY   CARLITOS Andres R.N. BY HR 02/20/20 AT 0701    Susceptibility      Streptococcus pneumoniae     ANNETTE    Ceftriaxone Meningitis 0.023 ug/mL S    Ceftriaxone Non-meningitis 0.023 ug/mL S    Erythromycin ($$$$) 4 ug/mL R    Levofloxacin ($) 1.5 ug/mL S    Penicillin (Oral) 0.047 ug/mL S    Penicillin meningitis 0.047 ug/mL S1    Penicillin non-meningitis 0.047 ug/mL S    Trimeth/Sulfa 0.125 ug/mL S    Vancomycin ($) 0.50 ug/mL S        2/19/20  Component Value Ref Range & Units Status   Adenovirus NOT DETECTED  NOTD   Final   Coronavirus 229E NOT DETECTED  NOTD   Final   Coronavirus HKU1 NOT DETECTED  NOTD   Final   Coronavirus CVNL63 NOT DETECTED  NOTD   Final   Coronavirus OC43 NOT DETECTED  NOTD   Final   Metapneumovirus NOT DETECTED  NOTD   Final   Rhinovirus and Enterovirus NOT DETECTED  NOTD   Final   Influenza A NOT DETECTED  NOTD   Final   Influenza A, subtype H1 NOT DETECTED  NOTD   Final   Influenza A, subtype H3 NOT DETECTED  NOTD   Final   INFLUENZA A H1N1 PCR NOT DETECTED  NOTD   Final   Influenza B DETECTEDAbnormal   NOTD   Final   Parainfluenza 1 NOT DETECTED  NOTD   Final   Parainfluenza 2 NOT DETECTED  NOTD   Final   Parainfluenza 3 NOT DETECTED  NOTD   Final   Parainfluenza virus 4 NOT DETECTED  NOTD   Final   RSV by PCR NOT DETECTED  NOTD   Final   B. parapertussis, PCR NOT DETECTED  NOTD   Final   Bordetella pertussis - PCR NOT DETECTED  NOTD   Final   Chlamydophila pneumoniae DNA, QL, PCR NOT DETECTED  NOTD   Final   Mycoplasma pneumoniae DNA, QL, PCR NOT DETECTED Imaging:   CT A/P 2/19/20  CONTRAST:  None.     TECHNIQUE:   Thin axial images were obtained through the abdomen and pelvis. Coronal and  sagittal reconstructions were generated. Oral contrast was not administered. CT  dose reduction was achieved through use of a standardized protocol tailored for  this examination and automatic exposure control for dose modulation.      The absence of intravenous enteral contrast material reduces the capacity of CT  to evaluate the bowel and solid organs.      FINDINGS:   LUNG BASES: Multifocal consolidative opacifications with air bronchograms in  inferior lingula, basilar left lower lobe, and lateral basilar right lower lobe. INCIDENTALLY IMAGED HEART AND MEDIASTINUM: Unremarkable. LIVER: Diffusely diminished hepatic echotexture. No hepatic mass or biliary duct  dilation demonstrated. GALLBLADDER: Absent. SPLEEN: No mass. PANCREAS: No mass or ductal dilatation. ADRENALS: Unremarkable. KIDNEYS/URETERS: No mass, calculus, or hydronephrosis. STOMACH: Small hiatal hernia. SMALL BOWEL: No dilatation or wall thickening. COLON: No dilation or mural thickening. APPENDIX: Not shown. PERITONEUM: No ascites or pneumoperitoneum. RETROPERITONEUM: No lymphadenopathy or aortic aneurysm. REPRODUCTIVE ORGANS: Small exophytic subserosal leiomyomas at uterine fundus  measuring up to 1.5 cm. URINARY BLADDER: No mass or calculus. BONES: No destructive bone lesion. ADDITIONAL COMMENTS: N/A     IMPRESSION  IMPRESSION:     1. Multifocal pulmonary consolidations. 2. Diffuse hepatic steatosis. 3. Status post cholecystectomy. TTE  Left Ventricle Normal cavity size and systolic function (ejection fraction normal). Mild concentric hypertrophy . The muscle mass is normal. The cavity shape is normal. Normal septal motion. The estimated ejection fraction is 60 - 65%. LV wall motion is noted to be no regional wall motion abnormality.  There is age-appropriate left ventricular diastolic function. Wall Scoring The left ventricular wall motion is normal.            Left Atrium Normal cavity size. Right Ventricle Normal cavity size, wall thickness and global systolic function. Right Atrium Normal cavity size. Aortic Valve Normal valve structure, trileaflet valve structure, no stenosis and no regurgitation. Mitral Valve Normal valve structure and no stenosis. Mitral valve thickening. There is posterior leaflet thickening at the base. Moderate to severe regurgitation. The mitral regurgitant jet is posteriorly directed. Tricuspid Valve Normal valve structure and no stenosis. Trace regurgitation. Pulmonary arterial systolic pressure is 03.7 mmHg. Mild pulmonary hypertension. Pulmonic Valve Pulmonic valve not well visualized. Aorta Normal aortic root, ascending aortic, and aortic arch. Pericardium No evidence of pericardial effusion. Assessment / Plan:     Ms. 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 And found to have Streptococcus pneumonia bacteremia and influenza B infection and pneumonia.       1) Streptococcus pneumonia bacteremia and pneumonia  TTE noted  Plan for ceftriaxone 2gm daily till 3/5/20 ( 2 weeks from negative blood cx )  Check weekly labs for CBC with differential and CMP while on IV ceftriaxone  Platelets dropping which could be from antibiotics and need monitoring  Per primary team being discharged today   Will need close blood work follow up for CBC wt diff,   Will also check CMP  CM setting up for outpatient infusion for antibiotics   picc need removed once antibiotics completed   Need to monitor for bruising, bleeding symptoms         2) Influenza B   recommend 7-day course of Tamiflu renally dosed    3) history of genital herpes  She is on Valtrex  ( dose renally)  denies any current lesions    4) AK improving    5) asthma on steroids per primary team    6) Leukocytosis likely in the setting of steroids    7) elevated LFTs in the setting of sepsis currently improving continue to monitor,     8) screening  HIV nonreactive  Hep C antibody nonreactive      9) DVT prophylaxis per primary team              Thank for the opportunity to participate in the care of this patient. Please contact with questions or concerns.        Stephanie Cancino,   1:19 PM

## 2020-02-26 NOTE — DISCHARGE SUMMARY
Inpatient hospitalist discharge summary                Brief Overview    PATIENT ID: He Pacheco    MRN: [[id number]]     YOB: 1980    Admitting Provider: Kylah Garcia MD    Discharging Provider: Dejan Nichols MD   To contact this individual call [phone] and ask the  to page. If unavailable ask to be transferred the Adult Hospitalist Department. PCP at discharge: Jozef Myers NP [address] [facility] / [address] [place] [address]    Admission date: 2/19/2020  Date of Discharge: 02/26/20    Chief complaint:   Chief Complaint   Patient presents with    Hypotension     Patient Active Problem List   Diagnosis Code    Biliary colic Y57.09    Acute bronchitis J20.9    Benign essential HTN I10    HSV (herpes simplex virus) infection B00.9    Lump of skin R22.9    Cellulitis of great toe L03.039    Vitamin D deficiency E55.9    Liver failure ([facility] [address] K72.90         Discharge diagnosis, hospital course/plan:  -Severe sepsis. From pneumococcal pneumonia: resolved,     -Pneumococcal pneumonia: continue IV rocephin as per id recommednations  Patient wanst to coem to infusion center for iv antibiotics , completed azithromycin. Out patient CT Chest 12 weeks from initial study for clearance of the pneumonia. Mid line today     Edema, improving  Started on lasix 40 mg   Discussed with nephrology   Will continue lasix for now as per nephrology recommendations and she will follow up with nephrology as outpatient basis  Patient understood and agreed with the plan      -Pneumococcal bacteremia: blood culture 2/19 2/2 positive. Repeat culture 2/20, /21 no grow. Echo unremarkable. ? 2 weeks of IV abx. ID consulted , Plan for ceftriaxone 2gm daily till 3/5/20 as per ID   Ordered midline today. Patient wants to come to infusion center . Patient understood and agreed with plan      -Influenza B resp infection.   Was on tamiflu     -Acute liver failure. resolved Unclear etiology. Hepatologist consulted. Clinically more likely Shock liver, she had hypotension on presentation, did not require vasopressors. She was started of steroids of possible Alcoholic hepatitis -steroids were stopped as less likely alcohol liver disease. As per hepatology liver failure has resolved   Viral hep panel neg.     -Acute renal failure. resolved   Likely pre renal/ATN in setting of sever sepsis. Nephrology ok prescribing lasix on discharge   She will follow up with nephrology as outpatient basis  Patient understood and agreed with plan      -Lactic acidosis. Resolved      -Coagulopathy. Resolved      -HTN:  Home meds     -increasing leukocytosis:   thromocytopenia   Improving  Likely due to steroids  Patient will follow up with PCP for blood work . Patient understood and agreed with plan. She will go to PCP on Friday (cbc and BMP)      On the date of discharge, diagnostic face to face encounter was performed. Patient was hemodynamically stable, offering no new complaints. Denies any shortness of breath at rest, no fevers or chills, no diarrhea or constipation. Patient is agreeable for discharge. Patient understood and verbalized the understanding of the discharge plan. Patient was advised to seek medical help/ care or return to ED, if symptoms recur, worsen or new symptoms develop. Discharge Disposition:  Home or Self Care    Discharge activity:  Activity as tolerated    Code status at discharge:  Full Code     Outpatient follow up:  Please follow up with your PCP in one week  In addition follow up with nephrology, hematology, hepatology, Infectious disease, pulmonology as scheduled       Future appointments-  No future appointments.   Follow-up Information     Follow up With Specialties Details Why Contact Info    Jerlyn Hamman, NP Nurse Practitioner In 1 week  [address] [facility],  [address]      Figueroa Rodriges MD Nephrology In 1 week  [address] [facility] [address]  [address] [facility] [address] Mary Briggs DO Severino Hematology and Oncology, Internal Medicine, Hematology, Oncology In 1 week  [address] [facility] [address] [place] [address]      Darcy Iglesias MD Hepatology, Liver Disease, Internal Medicine In 1 week  [address] [facility]  [address]  [phone]      Lakeshia Fabian DO Infectious Diseases   [address]  [address]  [address] 55 [facility]      Abner Wilson MD Internal Medicine, Pulmonary Disease   [address] [facility]  [address] [place] [address]  [place] [phone]            Operative procedures performed:      Consults: IP CONSULT TO CARDIOLOGY  IP CONSULT TO NEPHROLOGY  IP CONSULT TO HEMATOLOGY  IP CONSULT TO HEPATOLOGY  IP CONSULT TO INFECTIOUS DISEASES  IP CONSULT TO PULMONOLOGY    Procedures:   * No surgery found *    Diet:  DIET REGULAR    Pertinent test results:  Xr Chest Pa Lat    Result Date: 2/25/2020  Indication:  pneumonia Exam: PA and lateral views of the chest. Direct comparison is made to prior CXR dated February 23, 2020. Findings: Cardiomediastinal silhouette is within normal limits. There is linear right upper lung airspace disease and left basilar patchy airspace disease, improved compared to prior chest x-ray dated February 23, 2020. There is no pleural fluid. There is no pneumothorax. IMPRESSION: Improved aeration bilaterally. Xr Chest Pa Lat    Result Date: 2/19/2020  EXAM: XR CHEST PA LAT INDICATION: hypotension, cough COMPARISON: 4/20/2015. FINDINGS: PA and lateral radiographs of the chest demonstrate 3 peripheral wedge-shaped areas of opacity including the right upper lobe, right middle lobe, and left lower lobe. The cardiac and mediastinal contours and pulmonary vascularity are normal. The bones and soft tissues are within normal limits.      IMPRESSION: Trilobar peripheral wedge-shaped opacities could represent multilobar pneumonia. Multiple pulmonary infarcts cannot be excluded. Clinical correlation needed    Ct Abd Pelv Wo Cont    Result Date: 2/19/2020  EXAM: CT ABD PELV WO CONT INDICATION: acute renal failure, deranged LFTs COMPARISON: Concurrent ultrasound. CONTRAST:  None. TECHNIQUE: Thin axial images were obtained through the abdomen and pelvis. Coronal and sagittal reconstructions were generated. Oral contrast was not administered. CT dose reduction was achieved through use of a standardized protocol tailored for this examination and automatic exposure control for dose modulation. The absence of intravenous enteral contrast material reduces the capacity of CT to evaluate the bowel and solid organs. FINDINGS: LUNG BASES: Multifocal consolidative opacifications with air bronchograms in inferior lingula, basilar left lower lobe, and lateral basilar right lower lobe. INCIDENTALLY IMAGED HEART AND MEDIASTINUM: Unremarkable. LIVER: Diffusely diminished hepatic echotexture. No hepatic mass or biliary duct dilation demonstrated. GALLBLADDER: Absent. SPLEEN: No mass. PANCREAS: No mass or ductal dilatation. ADRENALS: Unremarkable. KIDNEYS/URETERS: No mass, calculus, or hydronephrosis. STOMACH: Small hiatal hernia. SMALL BOWEL: No dilatation or wall thickening. COLON: No dilation or mural thickening. APPENDIX: Not shown. PERITONEUM: No ascites or pneumoperitoneum. RETROPERITONEUM: No lymphadenopathy or aortic aneurysm. REPRODUCTIVE ORGANS: Small exophytic subserosal leiomyomas at uterine fundus measuring up to 1.5 cm. URINARY BLADDER: No mass or calculus. BONES: No destructive bone lesion. ADDITIONAL COMMENTS: N/A     IMPRESSION: 1. Multifocal pulmonary consolidations. 2. Diffuse hepatic steatosis. 3. Status post cholecystectomy.     [facility]    Result Date: 2/19/2020  INDICATION: liver injury COMPARISON: Ultrasound 4/10/2010 EXAM: Real-time ultrasound of the right upper quadrant FINDINGS: The patient is status post cholecystectomy. There is no intrahepatic or extrahepatic biliary duct dilation. The common bile duct aorta measures 4 mm. Diffuse hepatic heterogeneous increased echotexture is shown. Portal venous flow is toward the liver with Main portal vein diameter of 9 mm and portal vein velocity of 30 cm/s. Flow is documented in the right and left portal veins as well as within the hepatic veins. The appearance of the pancreatic head is normal. The right kidney is normal vertebral 11.0 cm in length. IMPRESSION: Status post cholecystectomy. Heterogeneous hepatic echotexture. No hepatic mass or biliary duct dilation demonstrated. Xr Chest Port    Result Date: 2/23/2020  INDICATION: . F/u pneumonia. ? developing pleural effusion Additional history: COMPARISON: Previous chest xray, 2/19/2020. LIMITATIONS: Portable technique. Peggyann Gang FINDINGS: Single frontal view of the chest. . Lines/tubes/surgical: Cardiac monitor leads overly the patient. Heart/mediastinum: Unremarkable. Lungs/pleura: Increased opacities in the right upper lobe and left base. No significant change in the appearance opacity in the right lower lobe. No visualized pleural effusion or pneumothorax. Additional Comments: None. Peggyann Gang IMPRESSION: 1. Increased opacity in the right upper lobe and left base. No significant change in the appearance of the opacity in the right lower lobe.          Recent Results (from the past 168 hour(s))   CULTURE, BLOOD    Collection Time: 02/19/20  4:47 PM   Result Value Ref Range    Special Requests: NO SPECIAL REQUESTS      Culture result: NO GROWTH 5 DAYS     CBC WITH AUTOMATED DIFF    Collection Time: 02/19/20  4:48 PM   Result Value Ref Range    WBC 5.0 3.6 - 11.0 K/uL    RBC 3.95 3.80 - 5.20 M/uL    HGB 12.7 11.5 - 16.0 g/dL    HCT 39.2 35.0 - 47.0 %    MCV 99.2 (H) 80.0 - 99.0 FL    MCH 32.2 26.0 - 34.0 PG    MCHC 32.4 30.0 - 36.5 g/dL    RDW 14.7 (H) 11.5 - 14.5 %    PLATELET 89 (L) 612 - 400 K/uL    NRBC 0.8 (H) 0  WBC ABSOLUTE NRBC 0.04 (H) 0.00 - 0.01 K/uL    NEUTROPHILS 85 (H) 32 - 75 %    BAND NEUTROPHILS 1 0 - 6 %    LYMPHOCYTES 8 (L) 12 - 49 %    MONOCYTES 5 5 - 13 %    EOSINOPHILS 1 0 - 7 %    BASOPHILS 0 0 - 1 %    IMMATURE GRANULOCYTES 0 %    ABS. NEUTROPHILS 4.2 1.8 - 8.0 K/UL    ABS. LYMPHOCYTES 0.4 (L) 0.8 - 3.5 K/UL    ABS. MONOCYTES 0.3 0.0 - 1.0 K/UL    ABS. EOSINOPHILS 0.1 0.0 - 0.4 K/UL    ABS. BASOPHILS 0.0 0.0 - 0.1 K/UL    ABS. IMM. GRANS. 0.0 K/UL    DF MANUAL      PLATELET COMMENTS Large Platelets      RBC COMMENTS ANISOCYTOSIS  1+        WBC COMMENTS REACTIVE LYMPHS     METABOLIC PANEL, COMPREHENSIVE    Collection Time: 02/19/20  4:48 PM   Result Value Ref Range    Sodium 129 (L) 136 - 145 mmol/L    Potassium 4.9 3.5 - 5.1 mmol/L    Chloride 98 97 - 108 mmol/L    CO2 15 (LL) 21 - 32 mmol/L    Anion gap 16 (H) 5 - 15 mmol/L    Glucose 68 65 - 100 mg/dL    BUN 34 (H) 6 - 20 MG/DL    Creatinine 4.94 (H) 0.55 - 1.02 MG/DL    BUN/Creatinine ratio 7 (L) 12 - 20      GFR est AA 12 (L) >60 ml/min/1.73m2    GFR est non-AA 10 (L) >60 ml/min/1.73m2    Calcium 9.1 8.5 - 10.1 MG/DL    Bilirubin, total 7.3 (H) 0.2 - 1.0 MG/DL    ALT (SGPT) >3,500 (H) 12 - 78 U/L    AST (SGOT) >2,000 (H) 15 - 37 U/L    Alk.  phosphatase 230 (H) 45 - 117 U/L    Protein, total 6.5 6.4 - 8.2 g/dL    Albumin 3.4 (L) 3.5 - 5.0 g/dL    Globulin 3.1 2.0 - 4.0 g/dL    A-G Ratio 1.1 1.1 - 2.2     MAGNESIUM    Collection Time: 02/19/20  4:48 PM   Result Value Ref Range    Magnesium 2.0 1.6 - 2.4 mg/dL   CULTURE, BLOOD    Collection Time: 02/19/20  4:48 PM   Result Value Ref Range    Special Requests: NO SPECIAL REQUESTS      Culture result: (A)       STREPTOCOCCUS PNEUMONIAE GROWING IN BOTH BOTTLES DRAWN (SITE = [facility] )    Culture result:        CALLED TO AND READ BACK BY  CARLITOS Andres R.N. BY HR 02/20/20 AT 0701         Susceptibility    Streptococcus pneumoniae - ANNETTE     Penicillin meningitis* 0.047 Susceptible ug/mL      * (SENSITIVITIES PERFORMED BY E-TEST)     Penicillin non-meningitis 0.047 Susceptible ug/mL     Penicillin (Oral) 0.047 Susceptible ug/mL     Ceftriaxone Meningitis 0.023 Susceptible ug/mL     Ceftriaxone Non-meningitis 0.023 Susceptible ug/mL     Trimeth/Sulfa 0.125 Susceptible ug/mL     Vancomycin ($) 0.50 Susceptible ug/mL     Levofloxacin ($) 1.5 Susceptible ug/mL     Erythromycin ($$$$) 4 Resistant ug/mL   LACTIC ACID    Collection Time: 02/19/20  4:48 PM   Result Value Ref Range    Lactic acid 7.1 (HH) 0.4 - 2.0 MMOL/L   SAMPLES BEING HELD    Collection Time: 02/19/20  4:48 PM   Result Value Ref Range    SAMPLES BEING HELD 1RED     COMMENT        Add-on orders for these samples will be processed based on acceptable specimen integrity and analyte stability, which may vary by analyte. PERIPHERAL SMEAR    Collection Time: 02/19/20  4:48 PM   Result Value Ref Range    PERIPHERAL SMEAR        Pathologic examination results can be viewed in [facility] Chart Review under the Pathology tab.    EKG, 12 LEAD, INITIAL    Collection Time: 02/19/20  4:59 PM   Result Value Ref Range    Ventricular Rate 90 BPM    Atrial Rate 90 BPM    P-R Interval 130 ms    QRS Duration 98 ms    Q-T Interval 410 ms    QTC Calculation (Bezet) 501 ms    Calculated P Axis 45 degrees    Calculated R Axis 29 degrees    Calculated T Axis 21 degrees    Diagnosis       Normal sinus rhythm  Nonspecific ST abnormality    When compared with ECG of 19-NOV-2018 14:45,  No significant change was found  Confirmed by Whit Hernandez M.D., Yoli Arenas ([id number]) on 2/20/2020 8:18:35 AM     LACTIC ACID    Collection Time: 02/19/20  6:18 PM   Result Value Ref Range    Lactic acid 6.0 (HH) 0.4 - 2.0 MMOL/L   METABOLIC PANEL, COMPREHENSIVE    Collection Time: 02/19/20  6:19 PM   Result Value Ref Range    Sodium 131 (L) 136 - 145 mmol/L    Potassium 4.5 3.5 - 5.1 mmol/L    Chloride 100 97 - 108 mmol/L    CO2 15 (LL) 21 - 32 mmol/L    Anion gap 16 (H) 5 - 15 mmol/L    Glucose 77 65 - 100 mg/dL BUN 34 (H) 6 - 20 MG/DL    Creatinine 4.62 (H) 0.55 - 1.02 MG/DL    BUN/Creatinine ratio 7 (L) 12 - 20      GFR est AA 13 (L) >60 ml/min/1.73m2    GFR est non-AA 11 (L) >60 ml/min/1.73m2    Calcium 8.0 (L) 8.5 - 10.1 MG/DL    Bilirubin, total 5.9 (H) 0.2 - 1.0 MG/DL    ALT (SGPT) 3,244 (H) 12 - 78 U/L    AST (SGOT) >2,000 (H) 15 - 37 U/L    Alk. phosphatase 184 (H) 45 - 117 U/L    Protein, total 5.2 (L) 6.4 - 8.2 g/dL    Albumin 3.1 (L) 3.5 - 5.0 g/dL    Globulin 2.1 2.0 - 4.0 g/dL    A-G Ratio 1.5 1.1 - 2.2     HCG QL SERUM    Collection Time: 02/19/20  6:30 PM   Result Value Ref Range    HCG, Ql. NEGATIVE  NEG     POC EG7    Collection Time: 02/19/20  7:32 PM   Result Value Ref Range    Calcium, ionized (POC) 1.12 1.12 - 1.32 mmol/L    FIO2 (POC) 21 %    pH (POC) 7.294 (L) 7.35 - 7.45      pCO2 (POC) 26.7 (L) 35.0 - 45.0 MMHG    pO2 (POC) 77 (L) 80 - 100 MMHG    HCO3 (POC) 12.9 (L) 22 - 26 MMOL/L    Base deficit (POC) 14 mmol/L    sO2 (POC) 94 92 - 97 %    Site LEFT RADIAL      Device: ROOM AIR      Allens test (POC) YES      Specimen type (POC) ARTERIAL      Total resp.  rate 19     FIBRINOGEN    Collection Time: 02/19/20  7:33 PM   Result Value Ref Range    Fibrinogen 330 200 - 475 mg/dL   D DIMER    Collection Time: 02/19/20  7:33 PM   Result Value Ref Range    D-dimer <0.19 0.00 - 0.65 mg/L FEU   PROTHROMBIN TIME + INR    Collection Time: 02/19/20  7:33 PM   Result Value Ref Range    INR 1.0 0.9 - 1.1      Prothrombin time 9.7 9.0 - 11.1 sec   PROTHROMBIN TIME + INR    Collection Time: 02/19/20  9:00 PM   Result Value Ref Range    INR 2.0 (H) 0.9 - 1.1      Prothrombin time 19.4 (H) 9.0 - 11.1 sec   ANTI-HISTONE AND ANTI-DS DNA AB    Collection Time: 02/19/20  9:00 PM   Result Value Ref Range    Anti-histone Abs 4.1 (H) 0.0 - 0.9 Units    Anti-DNA (DS) Ab, QT 1 0 - 9 IU/mL   SED RATE (ESR)    Collection Time: 02/19/20  9:00 PM   Result Value Ref Range    Sed rate, automated 15 0 - 20 mm/hr   C REACTIVE PROTEIN, QT    Collection Time: 02/19/20  9:00 PM   Result Value Ref Range    C-Reactive protein 9.95 (H) 0.00 - 0.60 mg/dL   TROPONIN I    Collection Time: 02/19/20  9:00 PM   Result Value Ref Range    Troponin-I, Qt. <0.05 <0.05 ng/mL   ACETAMINOPHEN    Collection Time: 02/19/20  9:00 PM   Result Value Ref Range    Acetaminophen level 3 (L) 10 - 30 ug/mL   IRON PROFILE    Collection Time: 02/19/20  9:00 PM   Result Value Ref Range    Iron 242 (H) 35 - 150 ug/dL    TIBC 254 250 - 450 ug/dL    Iron % saturation 95 (H) 20 - 50 %   AMMONIA    Collection Time: 02/19/20  9:00 PM   Result Value Ref Range    Ammonia 32 (H) <32 UMOL/L   VOLATILES SCREEN    Collection Time: 02/19/20  9:00 PM   Result Value Ref Range    Specimen: BLOOD      Chain of custody? NO      REPORT STATUS FINAL REPORT      Methanol NEGATIVE  NEG mg/L    Ethanol NEGATIVE  NEG mg/L    Isopropanol NEGATIVE  NEG mg/L    Acetone NEGATIVE  NEG mg/L   ETHYL ALCOHOL    Collection Time: 02/19/20  9:00 PM   Result Value Ref Range    ALCOHOL(ETHYL),SERUM <84 <13 MG/DL   SALICYLATE    Collection Time: 02/19/20  9:00 PM   Result Value Ref Range    Salicylate level <6.4 (L) 2.8 - 20.0 MG/DL   HAPTOGLOBIN    Collection Time: 02/19/20  9:00 PM   Result Value Ref Range    Haptoglobin 29 (L) 30 - 200 mg/dL   HEPATITIS PANEL, ACUTE    Collection Time: 02/19/20  9:00 PM   Result Value Ref Range    Hepatitis A, IgM NONREACTIVE NR      __          Hepatitis B surface Ag <0.10 Index    Hep B surface Ag Interp. NEGATIVE  NEG      __          Hepatitis B core, IgM NONREACTIVE NR      __          Hep C  virus Ab Interp.  NONREACTIVE NR      Hep C  virus Ab comment Method used is Siemens Advia Centaur     TSH 3RD GENERATION    Collection Time: 02/19/20  9:00 PM   Result Value Ref Range    TSH 0.19 (L) 0.36 - 3.74 uIU/mL   T4, FREE    Collection Time: 02/19/20  9:00 PM   Result Value Ref Range    T4, Free 1.3 0.8 - 1.5 NG/DL   URINALYSIS W/MICROSCOPIC    Collection Time: 02/19/20 10:11 PM   Result Value Ref Range    Color BROWN      Appearance CLEAR CLEAR      Specific gravity 1.025 1.003 - 1.030      pH (UA) 6.5 5.0 - 8.0      Protein 100 (A) NEG mg/dL    Glucose 100 (A) NEG mg/dL    Ketone 15 (A) NEG mg/dL    Blood LARGE (A) NEG      Urobilinogen 2.0 (H) 0.2 - 1.0 EU/dL    Nitrites POSITIVE (A) NEG      Leukocyte Esterase TRACE (A) NEG      WBC 0-4 0 - 4 /hpf    RBC 20-50 0 - 5 /hpf    Epithelial cells MODERATE (A) FEW /lpf    Bacteria 1+ (A) NEG /hpf    Budding yeast PRESENT (A) NEG     RESPIRATORY PANEL,PCR,NASOPHARYNGEAL    Collection Time: 02/19/20 10:11 PM   Result Value Ref Range    Adenovirus NOT DETECTED NOTD      Coronavirus 229E NOT DETECTED NOTD      Coronavirus HKU1 NOT DETECTED NOTD      Coronavirus CVNL63 NOT DETECTED NOTD      Coronavirus OC43 NOT DETECTED NOTD      Metapneumovirus NOT DETECTED NOTD      Rhinovirus and Enterovirus NOT DETECTED NOTD      Influenza A NOT DETECTED NOTD      Influenza A, subtype H1 NOT DETECTED NOTD      Influenza A, subtype H3 NOT DETECTED NOTD      INFLUENZA A H1N1 PCR NOT DETECTED NOTD      Influenza B DETECTED (A) NOTD      Parainfluenza 1 NOT DETECTED NOTD      Parainfluenza 2 NOT DETECTED NOTD      Parainfluenza 3 NOT DETECTED NOTD      Parainfluenza virus 4 NOT DETECTED NOTD      RSV by PCR NOT DETECTED NOTD      B. parapertussis, PCR NOT DETECTED NOTD      Bordetella pertussis - PCR NOT DETECTED NOTD      Chlamydophila pneumoniae DNA, QL, PCR NOT DETECTED NOTD      Mycoplasma pneumoniae DNA, QL, PCR NOT DETECTED NOTD     BILIRUBIN, CONFIRM    Collection Time: 02/19/20 10:11 PM   Result Value Ref Range    Bilirubin UA, confirm POSITIVE (A) NEG     CBC WITH AUTOMATED DIFF    Collection Time: 02/20/20  2:08 AM   Result Value Ref Range    WBC 4.0 3.6 - 11.0 K/uL    RBC 3.40 (L) 3.80 - 5.20 M/uL    HGB 11.2 (L) 11.5 - 16.0 g/dL    HCT 33.3 (L) 35.0 - 47.0 %    MCV 97.9 80.0 - 99.0 FL    MCH 32.9 26.0 - 34.0 PG    MCHC 33.6 30.0 - 36.5 g/dL    RDW 14. 8 (H) 11.5 - 14.5 %    PLATELET 841 (L) 851 - 400 K/uL    MPV 11.3 8.9 - 12.9 FL    NRBC 0.8 (H) 0  WBC    ABSOLUTE NRBC 0.03 (H) 0.00 - 0.01 K/uL    NEUTROPHILS 70 32 - 75 %    BAND NEUTROPHILS 6 0 - 6 %    LYMPHOCYTES 17 12 - 49 %    MONOCYTES 3 (L) 5 - 13 %    EOSINOPHILS 1 0 - 7 %    BASOPHILS 1 0 - 1 %    METAMYELOCYTES 1 (H) 0 %    MYELOCYTES 1 (H) 0 %    IMMATURE GRANULOCYTES 0 %    ABS. NEUTROPHILS 3.0 1.8 - 8.0 K/UL    ABS. LYMPHOCYTES 0.7 (L) 0.8 - 3.5 K/UL    ABS. MONOCYTES 0.1 0.0 - 1.0 K/UL    ABS. EOSINOPHILS 0.0 0.0 - 0.4 K/UL    ABS. BASOPHILS 0.0 0.0 - 0.1 K/UL    ABS. IMM. GRANS. 0.0 K/UL    DF MANUAL      PLATELET COMMENTS Large Platelets      RBC COMMENTS MACROCYTOSIS  1+        RBC COMMENTS MICROCYTOSIS  1+        RBC COMMENTS ANISOCYTOSIS  1+       RENAL FUNCTION PANEL    Collection Time: 02/20/20  2:08 AM   Result Value Ref Range    Sodium 130 (L) 136 - 145 mmol/L    Potassium 4.0 3.5 - 5.1 mmol/L    Chloride 98 97 - 108 mmol/L    CO2 16 (L) 21 - 32 mmol/L    Anion gap 16 (H) 5 - 15 mmol/L    Glucose 157 (H) 65 - 100 mg/dL    BUN 39 (H) 6 - 20 MG/DL    Creatinine 4.63 (H) 0.55 - 1.02 MG/DL    BUN/Creatinine ratio 8 (L) 12 - 20      GFR est AA 13 (L) >60 ml/min/1.73m2    GFR est non-AA 11 (L) >60 ml/min/1.73m2    Calcium 7.6 (L) 8.5 - 10.1 MG/DL    Phosphorus 3.0 2.6 - 4.7 MG/DL    Albumin 2.6 (L) 3.5 - 5.0 g/dL   CK    Collection Time: 02/20/20  2:08 AM   Result Value Ref Range     26 - 192 U/L   HEPATIC FUNCTION PANEL    Collection Time: 02/20/20  2:08 AM   Result Value Ref Range    Protein, total 5.4 (L) 6.4 - 8.2 g/dL    Albumin 2.7 (L) 3.5 - 5.0 g/dL    Globulin 2.7 2.0 - 4.0 g/dL    A-G Ratio 1.0 (L) 1.1 - 2.2      Bilirubin, total 6.5 (H) 0.2 - 1.0 MG/DL    Bilirubin, direct 4.1 (H) 0.0 - 0.2 MG/DL    Alk.  phosphatase 142 (H) 45 - 117 U/L    AST (SGOT) >2,000 (H) 15 - 37 U/L    ALT (SGPT) 2,985 (H) 12 - 78 U/L   PROTHROMBIN TIME + INR    Collection Time: 02/20/20  2:09 AM Result Value Ref Range    INR 2.0 (H) 0.9 - 1.1      Prothrombin time 19.8 (H) 9.0 - 11.1 sec   LACTIC ACID    Collection Time: 02/20/20  2:20 AM   Result Value Ref Range    Lactic acid 3.7 (HH) 0.4 - 2.0 MMOL/L   CULTURE, URINE    Collection Time: 02/20/20  6:38 AM   Result Value Ref Range    Special Requests: NO SPECIAL REQUESTS      [place] Count [id number]  COLONIES/mL        Culture result: ANNIE GLABRATA (A)     ECHO ADULT COMPLETE    Collection Time: 02/20/20  9:16 AM   Result Value Ref Range    LV E' Lateral Velocity 8.14 cm/s    LV E' Septal Velocity 8.03 cm/s    Tapse 2.73 (A) 1.5 - 2.0 cm    Aortic Valve Systolic Peak Velocity 238.17 cm/s    Aortic Valve Area by Continuity of Peak Velocity 2.1 cm2    AoV PG 14.1 mmHg    LVIDd 4.70 3.9 - 5.3 cm    LVPWd 1.12 (A) 0.6 - 0.9 cm    LVIDs 3.34 cm    IVSd 1.21 (A) 0.6 - 0.9 cm    LVOT d 2.05 cm    LVOT Peak Velocity 121.69 cm/s    LVOT Peak Gradient 5.9 mmHg    MV A Levar 91.20 cm/s    MV E Levar 73.52 cm/s    MV E/A 0.81     RVIDd 4.21 cm    Right Ventricular Outflow Track Peak Velocity 85.36 cm/s    LA Vol 4C 35.02 22 - 52 mL    LA Area 4C 15.6 cm2    LV Mass .3 (A) 67 - 162 g    LV Mass AL Index 129.8 43 - 95 g/m2    E/E' lateral 9.03     E/E' septal 9.16     E/E' ratio (averaged) 9.09     Left Atrium Major Axis 2.97 cm    Triscuspid Valve Regurgitation Peak Gradient 27.6 mmHg    Aortic Regurgitant Pressure Half-time 348. 3 cm    Pulmonic Valve Max Velocity 257.57 cm/s    TR Max Velocity 262.90 cm/s    PISA MR Rad 0.52 cm    LA Vol Index 18.83 16 - 28 ml/m2    AV Cusp 0.00 cm    AR Max Levar 463.90 cm/s    Left Ventricular Fractional Shortening by 2D 01.048513202 %    AV Velocity Ratio 0.65     AV peak gradient 14.233679088 mmHg    PV peak gradient 26.5 mmHg    PASP 35.0 mmHg   PROTHROMBIN TIME + INR    Collection Time: 02/20/20 10:25 AM   Result Value Ref Range    INR 1.8 (H) 0.9 - 1.1      Prothrombin time 17.4 (H) 9.0 - 11.1 sec   CERULOPLASMIN    Collection Time: 02/20/20 10:25 AM   Result Value Ref Range    Ceruloplasmin 21.5 19.0 - 39.0 mg/dL   ACTIN (SMOOTH MUSCLE) ANTIBODY    Collection Time: 02/20/20 10:25 AM   Result Value Ref Range    Actin (Smooth Muscle) Ab 7 0 - 19 Units   COMPLEMENT, C3    Collection Time: 02/20/20 10:25 AM   Result Value Ref Range    Complement C3 33 (L) 82 - 167 mg/dL   FIBRINOGEN    Collection Time: 02/20/20 10:25 AM   Result Value Ref Range    Fibrinogen 286 200 - 475 mg/dL   D DIMER    Collection Time: 02/20/20 10:25 AM   Result Value Ref Range    D-dimer 10.85 (H) 0.00 - 0.65 mg/L FEU   PROCALCITONIN    Collection Time: 02/20/20 10:25 AM   Result Value Ref Range    Procalcitonin 192.41 ng/mL   TROPONIN I    Collection Time: 02/20/20 10:28 AM   Result Value Ref Range    Troponin-I, Qt. <0.05 <5.44 ng/mL   METABOLIC PANEL, COMPREHENSIVE    Collection Time: 02/20/20 10:28 AM   Result Value Ref Range    Sodium 131 (L) 136 - 145 mmol/L    Potassium 3.6 3.5 - 5.1 mmol/L    Chloride 103 97 - 108 mmol/L    CO2 14 (LL) 21 - 32 mmol/L    Anion gap 14 5 - 15 mmol/L    Glucose 172 (H) 65 - 100 mg/dL    BUN 43 (H) 6 - 20 MG/DL    Creatinine 3.79 (H) 0.55 - 1.02 MG/DL    BUN/Creatinine ratio 11 (L) 12 - 20      GFR est AA 16 (L) >60 ml/min/1.73m2    GFR est non-AA 13 (L) >60 ml/min/1.73m2    Calcium 7.4 (L) 8.5 - 10.1 MG/DL    Bilirubin, total 7.0 (H) 0.2 - 1.0 MG/DL    ALT (SGPT) 2,229 (H) 12 - 78 U/L    AST (SGOT) >2,000 (H) 15 - 37 U/L    Alk.  phosphatase 128 (H) 45 - 117 U/L    Protein, total 4.7 (L) 6.4 - 8.2 g/dL    Albumin 2.2 (L) 3.5 - 5.0 g/dL    Globulin 2.5 2.0 - 4.0 g/dL    A-G Ratio 0.9 (L) 1.1 - 2.2     LACTIC ACID    Collection Time: 02/20/20 10:28 AM   Result Value Ref Range    Lactic acid 2.7 (HH) 0.4 - 2.0 MMOL/L   COMPLEMENT, C4    Collection Time: 02/20/20 10:28 AM   Result Value Ref Range    Complement C4 3 (L) 14 - 44 mg/dL   COMPLEMENT, CH50, TOTAL    Collection Time: 02/20/20 10:28 AM   Result Value Ref Range    Complement, Total (CH50) 17 (L) >41 U/mL   CULTURE, BLOOD, PAIRED    Collection Time: 02/20/20 11:52 AM   Result Value Ref Range    Special Requests: NO SPECIAL REQUESTS      Culture result: NO GROWTH 5 DAYS     COPPER    Collection Time: 02/20/20 12:00 PM   Result Value Ref Range    Copper, serum 85 72 - 166 ug/dL   S. PNEUMO AG, UR/CSF    Collection Time: 02/20/20  4:47 PM   Result Value Ref Range    Source URINE      Specimen Urine     Streptococcus pneumoniae Ag Positive (A) NEGATIVE      Fluid culture Not indicated. Organism ID Not indicated.      Please note Comment     SWAPNIL, DIRECT, W/REFLEX    Collection Time: 02/20/20  4:47 PM   Result Value Ref Range    SWAPNIL, Direct NEGATIVE  NEGATIVE     DNA AB, DOUBLE STRANDED, IGG    Collection Time: 02/20/20  4:47 PM   Result Value Ref Range    Anti-DNA (DS) Ab, QT <1 0 - 9 IU/mL   LD    Collection Time: 02/20/20  4:47 PM   Result Value Ref Range     (H) 81 - 246 U/L   LUPUS ANTICOAGULANT PANEL W/ REFLEX    Collection Time: 02/20/20  4:47 PM   Result Value Ref Range    PTT-LA 30.3 0.0 - 51.9 sec    dRVVT 42.4 0.0 - 47.0 sec    Interpretation Comment:    LEGIONELLA PNEUMOPHILA AG, URINE    Collection Time: 02/20/20  4:47 PM   Result Value Ref Range    Source URINE      L pneumophila S1 Ag, urine NEGATIVE  NEGATIVE     FERRITIN    Collection Time: 02/20/20  4:47 PM   Result Value Ref Range    Ferritin 2,358 (H) 26 - 388 NG/ML   DRUG SCREEN, URINE    Collection Time: 02/20/20  4:47 PM   Result Value Ref Range    AMPHETAMINES NEGATIVE  NEG      BARBITURATES NEGATIVE  NEG      BENZODIAZEPINES NEGATIVE  NEG      COCAINE NEGATIVE  NEG      METHADONE NEGATIVE  NEG      OPIATES NEGATIVE  NEG      PCP(PHENCYCLIDINE) NEGATIVE  NEG      THC (TH-CANNABINOL) NEGATIVE  NEG      Drug screen comment (NOTE)    ANCA PANEL    Collection Time: 02/20/20  4:47 PM   Result Value Ref Range    Myeloperoxidase (MPO) Ab <9.0 0.0 - 9.0 U/mL    Proteinase 3 (SC-3) Ab <3.5 0.0 - 3.5 U/mL    Cytoplasmic (C-ANCA) Ab <1:20 Neg:<1:20 titer    Perinuclear (P-ANCA) <1:20 Neg:<1:20 titer    Atypical pANCA <1:20 EQL:<1:84 titer   METABOLIC PANEL, COMPREHENSIVE    Collection Time: 02/20/20  5:08 PM   Result Value Ref Range    Sodium 132 (L) 136 - 145 mmol/L    Potassium 3.0 (L) 3.5 - 5.1 mmol/L    Chloride 98 97 - 108 mmol/L    CO2 19 (L) 21 - 32 mmol/L    Anion gap 15 5 - 15 mmol/L    Glucose 200 (H) 65 - 100 mg/dL    BUN 44 (H) 6 - 20 MG/DL    Creatinine 3.25 (H) 0.55 - 1.02 MG/DL    BUN/Creatinine ratio 14 12 - 20      GFR est AA 19 (L) >60 ml/min/1.73m2    GFR est non-AA 16 (L) >60 ml/min/1.73m2    Calcium 7.8 (L) 8.5 - 10.1 MG/DL    Bilirubin, total 7.6 (H) 0.2 - 1.0 MG/DL    ALT (SGPT) 2,049 (H) 12 - 78 U/L    AST (SGOT) >2,000 (H) 15 - 37 U/L    Alk. phosphatase 123 (H) 45 - 117 U/L    Protein, total 4.8 (L) 6.4 - 8.2 g/dL    Albumin 2.3 (L) 3.5 - 5.0 g/dL    Globulin 2.5 2.0 - 4.0 g/dL    A-G Ratio 0.9 (L) 1.1 - 2.2     SWAPNIL BY MULTIPLEX FLOW IA, QL    Collection Time: 02/20/20  5:08 PM   Result Value Ref Range    SWAPNIL, Direct NEGATIVE  NEGATIVE     LACTIC ACID    Collection Time: 02/20/20  5:08 PM   Result Value Ref Range    Lactic acid 3.1 (HH) 0.4 - 2.0 MMOL/L   METABOLIC PANEL, COMPREHENSIVE    Collection Time: 02/20/20  5:08 PM   Result Value Ref Range    Sodium 132 (L) 136 - 145 mmol/L    Potassium 3.1 (L) 3.5 - 5.1 mmol/L    Chloride 98 97 - 108 mmol/L    CO2 20 (L) 21 - 32 mmol/L    Anion gap 14 5 - 15 mmol/L    Glucose 203 (H) 65 - 100 mg/dL    BUN 44 (H) 6 - 20 MG/DL    Creatinine 3.33 (H) 0.55 - 1.02 MG/DL    BUN/Creatinine ratio 13 12 - 20      GFR est AA 19 (L) >60 ml/min/1.73m2    GFR est non-AA 15 (L) >60 ml/min/1.73m2    Calcium 8.0 (L) 8.5 - 10.1 MG/DL    Bilirubin, total 7.5 (H) 0.2 - 1.0 MG/DL    ALT (SGPT) 2,056 (H) 12 - 78 U/L    AST (SGOT) >2,000 (H) 15 - 37 U/L    Alk.  phosphatase 123 (H) 45 - 117 U/L    Protein, total 4.8 (L) 6.4 - 8.2 g/dL    Albumin 2.2 (L) 3.5 - 5.0 g/dL    Globulin 2.6 2.0 - 4.0 g/dL    A-G Ratio 0.8 (L) 1.1 - 2.2     PROTHROMBIN TIME + INR    Collection Time: 02/20/20 11:22 PM   Result Value Ref Range    INR 1.5 (H) 0.9 - 1.1      Prothrombin time 14.9 (H) 9.0 - 11.1 sec   LACTIC ACID    Collection Time: 02/20/20 11:22 PM   Result Value Ref Range    Lactic acid 3.5 (HH) 0.4 - 2.0 MMOL/L   HIV 1/2 AG/AB, 4TH GENERATION,W RFLX CONFIRM    Collection Time: 02/20/20 11:22 PM   Result Value Ref Range    HIV 1/2 Interpretation NONREACTIVE NR      HIV 1/2 result comment SEE NOTE     METABOLIC PANEL, COMPREHENSIVE    Collection Time: 02/20/20 11:22 PM   Result Value Ref Range    Sodium 136 136 - 145 mmol/L    Potassium 2.6 (LL) 3.5 - 5.1 mmol/L    Chloride 98 97 - 108 mmol/L    CO2 28 21 - 32 mmol/L    Anion gap 10 5 - 15 mmol/L    Glucose 233 (H) 65 - 100 mg/dL    BUN 41 (H) 6 - 20 MG/DL    Creatinine 2.73 (H) 0.55 - 1.02 MG/DL    BUN/Creatinine ratio 15 12 - 20      GFR est AA 24 (L) >60 ml/min/1.73m2    GFR est non-AA 19 (L) >60 ml/min/1.73m2    Calcium 7.4 (L) 8.5 - 10.1 MG/DL    Bilirubin, total 6.6 (H) 0.2 - 1.0 MG/DL    ALT (SGPT) 1,600 (H) 12 - 78 U/L    AST (SGOT) >2,000 (H) 15 - 37 U/L    Alk.  phosphatase 113 45 - 117 U/L    Protein, total 4.4 (L) 6.4 - 8.2 g/dL    Albumin 2.1 (L) 3.5 - 5.0 g/dL    Globulin 2.3 2.0 - 4.0 g/dL    A-G Ratio 0.9 (L) 1.1 - 2.2     MAGNESIUM    Collection Time: 02/21/20  5:43 AM   Result Value Ref Range    Magnesium 1.6 1.6 - 2.4 mg/dL   PHOSPHORUS    Collection Time: 02/21/20  5:43 AM   Result Value Ref Range    Phosphorus 1.3 (L) 2.6 - 4.7 MG/DL   CBC WITH AUTOMATED DIFF    Collection Time: 02/21/20  5:43 AM   Result Value Ref Range    WBC 6.1 3.6 - 11.0 K/uL    RBC 2.80 (L) 3.80 - 5.20 M/uL    HGB 9.1 (L) 11.5 - 16.0 g/dL    HCT 25.4 (L) 35.0 - 47.0 %    MCV 90.7 80.0 - 99.0 FL    MCH 32.5 26.0 - 34.0 PG    MCHC 35.8 30.0 - 36.5 g/dL    RDW 13.9 11.5 - 14.5 %    PLATELET 537 (L) 191 - 400 K/uL    MPV 10.9 8.9 - 12.9 FL    NRBC 2.1 (H) 0  WBC ABSOLUTE NRBC 0.13 (H) 0.00 - 0.01 K/uL    NEUTROPHILS 89 (H) 32 - 75 %    LYMPHOCYTES 4 (L) 12 - 49 %    MONOCYTES 5 5 - 13 %    EOSINOPHILS 0 0 - 7 %    BASOPHILS 0 0 - 1 %    MYELOCYTES 2 (H) 0 %    IMMATURE GRANULOCYTES 0 %    ABS. NEUTROPHILS 5.4 1.8 - 8.0 K/UL    ABS. LYMPHOCYTES 0.2 (L) 0.8 - 3.5 K/UL    ABS. MONOCYTES 0.3 0.0 - 1.0 K/UL    ABS. EOSINOPHILS 0.0 0.0 - 0.4 K/UL    ABS. BASOPHILS 0.0 0.0 - 0.1 K/UL    ABS. IMM.  GRANS. 0.0 K/UL    DF MANUAL      RBC COMMENTS POLYCHROMASIA  1+       VANCOMYCIN, RANDOM    Collection Time: 02/21/20  5:43 AM   Result Value Ref Range    Vancomycin, random 10.5 UG/ML   PROTHROMBIN TIME + INR    Collection Time: 02/21/20  5:43 AM   Result Value Ref Range    INR 1.4 (H) 0.9 - 1.1      Prothrombin time 13.7 (H) 9.0 - 32.9 sec   METABOLIC PANEL, BASIC    Collection Time: 02/21/20  5:43 AM   Result Value Ref Range    Sodium 137 136 - 145 mmol/L    Potassium 3.1 (L) 3.5 - 5.1 mmol/L    Chloride 99 97 - 108 mmol/L    CO2 29 21 - 32 mmol/L    Anion gap 9 5 - 15 mmol/L    Glucose 180 (H) 65 - 100 mg/dL    BUN 38 (H) 6 - 20 MG/DL    Creatinine 2.46 (H) 0.55 - 1.02 MG/DL    BUN/Creatinine ratio 15 12 - 20      GFR est AA 27 (L) >60 ml/min/1.73m2    GFR est non-AA 22 (L) >60 ml/min/1.73m2    Calcium 7.8 (L) 8.5 - 10.1 MG/DL   LACTIC ACID    Collection Time: 02/21/20  5:43 AM   Result Value Ref Range    Lactic acid 4.2 (HH) 0.4 - 2.0 MMOL/L   PROCALCITONIN    Collection Time: 02/21/20  5:43 AM   Result Value Ref Range    Procalcitonin 67.39 ng/mL   METABOLIC PANEL, COMPREHENSIVE    Collection Time: 02/21/20 11:52 AM   Result Value Ref Range    Sodium 138 136 - 145 mmol/L    Potassium 3.2 (L) 3.5 - 5.1 mmol/L    Chloride 100 97 - 108 mmol/L    CO2 29 21 - 32 mmol/L    Anion gap 9 5 - 15 mmol/L    Glucose 174 (H) 65 - 100 mg/dL    BUN 33 (H) 6 - 20 MG/DL    Creatinine 2.05 (H) 0.55 - 1.02 MG/DL    BUN/Creatinine ratio 16 12 - 20      GFR est AA 33 (L) >60 ml/min/1.73m2    GFR est non-AA 27 (L) >60 ml/min/1.73m2    Calcium 8.0 (L) 8.5 - 10.1 MG/DL    Bilirubin, total 5.3 (H) 0.2 - 1.0 MG/DL    ALT (SGPT) 1,427 (H) 12 - 78 U/L    AST (SGOT) 1,516 (H) 15 - 37 U/L    Alk. phosphatase 127 (H) 45 - 117 U/L    Protein, total 5.0 (L) 6.4 - 8.2 g/dL    Albumin 2.2 (L) 3.5 - 5.0 g/dL    Globulin 2.8 2.0 - 4.0 g/dL    A-G Ratio 0.8 (L) 1.1 - 2.2     LACTIC ACID    Collection Time: 02/21/20 11:52 AM   Result Value Ref Range    Lactic acid 5.0 (HH) 0.4 - 2.0 MMOL/L   CULTURE, BLOOD    Collection Time: 02/21/20 11:52 AM   Result Value Ref Range    Special Requests: NO SPECIAL REQUESTS      Culture result: NO GROWTH 5 DAYS     METABOLIC PANEL, COMPREHENSIVE    Collection Time: 02/21/20  6:24 PM   Result Value Ref Range    Sodium 138 136 - 145 mmol/L    Potassium 4.1 3.5 - 5.1 mmol/L    Chloride 103 97 - 108 mmol/L    CO2 28 21 - 32 mmol/L    Anion gap 7 5 - 15 mmol/L    Glucose 168 (H) 65 - 100 mg/dL    BUN 28 (H) 6 - 20 MG/DL    Creatinine 1.79 (H) 0.55 - 1.02 MG/DL    BUN/Creatinine ratio 16 12 - 20      GFR est AA 38 (L) >60 ml/min/1.73m2    GFR est non-AA 32 (L) >60 ml/min/1.73m2    Calcium 8.2 (L) 8.5 - 10.1 MG/DL    Bilirubin, total 3.7 (H) 0.2 - 1.0 MG/DL    ALT (SGPT) 1,206 (H) 12 - 78 U/L    AST (SGOT) 1,098 (H) 15 - 37 U/L    Alk.  phosphatase 118 (H) 45 - 117 U/L    Protein, total 5.0 (L) 6.4 - 8.2 g/dL    Albumin 2.1 (L) 3.5 - 5.0 g/dL    Globulin 2.9 2.0 - 4.0 g/dL    A-G Ratio 0.7 (L) 1.1 - 2.2     LACTIC ACID    Collection Time: 02/21/20  6:28 PM   Result Value Ref Range    Lactic acid 4.8 (HH) 0.4 - 2.0 MMOL/L   METABOLIC PANEL, COMPREHENSIVE    Collection Time: 02/22/20 12:12 AM   Result Value Ref Range    Sodium 140 136 - 145 mmol/L    Potassium 3.0 (L) 3.5 - 5.1 mmol/L    Chloride 103 97 - 108 mmol/L    CO2 30 21 - 32 mmol/L    Anion gap 7 5 - 15 mmol/L    Glucose 147 (H) 65 - 100 mg/dL    BUN 26 (H) 6 - 20 MG/DL    Creatinine 1.54 (H) 0.55 - 1.02 MG/DL    BUN/Creatinine ratio 17 12 - 20 GFR est AA 46 (L) >60 ml/min/1.73m2    GFR est non-AA 38 (L) >60 ml/min/1.73m2    Calcium 8.1 (L) 8.5 - 10.1 MG/DL    Bilirubin, total 3.2 (H) 0.2 - 1.0 MG/DL    ALT (SGPT) 1,043 (H) 12 - 78 U/L    AST (SGOT) 808 (H) 15 - 37 U/L    Alk. phosphatase 118 (H) 45 - 117 U/L    Protein, total 4.6 (L) 6.4 - 8.2 g/dL    Albumin 2.0 (L) 3.5 - 5.0 g/dL    Globulin 2.6 2.0 - 4.0 g/dL    A-G Ratio 0.8 (L) 1.1 - 2.2     PROTHROMBIN TIME + INR    Collection Time: 02/22/20 12:12 AM   Result Value Ref Range    INR 1.2 (H) 0.9 - 1.1      Prothrombin time 12.4 (H) 9.0 - 11.1 sec   LACTIC ACID    Collection Time: 02/22/20 12:12 AM   Result Value Ref Range    Lactic acid 2.6 (HH) 0.4 - 2.0 MMOL/L   MAGNESIUM    Collection Time: 02/22/20 12:12 AM   Result Value Ref Range    Magnesium 1.9 1.6 - 2.4 mg/dL   PHOSPHORUS    Collection Time: 02/22/20 12:12 AM   Result Value Ref Range    Phosphorus 1.8 (L) 2.6 - 4.7 MG/DL   METABOLIC PANEL, COMPREHENSIVE    Collection Time: 02/22/20  7:26 AM   Result Value Ref Range    Sodium 138 136 - 145 mmol/L    Potassium 3.6 3.5 - 5.1 mmol/L    Chloride 103 97 - 108 mmol/L    CO2 31 21 - 32 mmol/L    Anion gap 4 (L) 5 - 15 mmol/L    Glucose 125 (H) 65 - 100 mg/dL    BUN 24 (H) 6 - 20 MG/DL    Creatinine 1.39 (H) 0.55 - 1.02 MG/DL    BUN/Creatinine ratio 17 12 - 20      GFR est AA 51 (L) >60 ml/min/1.73m2    GFR est non-AA 42 (L) >60 ml/min/1.73m2    Calcium 8.2 (L) 8.5 - 10.1 MG/DL    Bilirubin, total 2.8 (H) 0.2 - 1.0 MG/DL    ALT (SGPT) 930 (H) 12 - 78 U/L    AST (SGOT) 605 (H) 15 - 37 U/L    Alk.  phosphatase 116 45 - 117 U/L    Protein, total 4.5 (L) 6.4 - 8.2 g/dL    Albumin 1.9 (L) 3.5 - 5.0 g/dL    Globulin 2.6 2.0 - 4.0 g/dL    A-G Ratio 0.7 (L) 1.1 - 2.2     PROTHROMBIN TIME + INR    Collection Time: 02/22/20  7:26 AM   Result Value Ref Range    INR 1.2 (H) 0.9 - 1.1      Prothrombin time 12.1 (H) 9.0 - 11.1 sec   VANCOMYCIN, RANDOM    Collection Time: 02/22/20  7:26 AM   Result Value Ref Range    Vancomycin, random 8.4 UG/ML   LACTIC ACID    Collection Time: 02/22/20  7:26 AM   Result Value Ref Range    Lactic acid 2.5 (HH) 0.4 - 2.0 MMOL/L   MAGNESIUM    Collection Time: 02/23/20  5:22 AM   Result Value Ref Range    Magnesium 1.9 1.6 - 2.4 mg/dL   PHOSPHORUS    Collection Time: 02/23/20  5:22 AM   Result Value Ref Range    Phosphorus 2.3 (L) 2.6 - 4.7 MG/DL   CBC WITH AUTOMATED DIFF    Collection Time: 02/23/20  5:22 AM   Result Value Ref Range    WBC 13.8 (H) 3.6 - 11.0 K/uL    RBC 2.64 (L) 3.80 - 5.20 M/uL    HGB 8.5 (L) 11.5 - 16.0 g/dL    HCT 25.7 (L) 35.0 - 47.0 %    MCV 97.3 80.0 - 99.0 FL    MCH 32.2 26.0 - 34.0 PG    MCHC 33.1 30.0 - 36.5 g/dL    RDW 15.8 (H) 11.5 - 14.5 %    PLATELET 91 (L) 534 - 400 K/uL    MPV 10.9 8.9 - 12.9 FL    NRBC 0.6 (H) 0  WBC    ABSOLUTE NRBC 0.08 (H) 0.00 - 0.01 K/uL    NEUTROPHILS 77 (H) 32 - 75 %    BAND NEUTROPHILS 4 0 - 6 %    LYMPHOCYTES 2 (L) 12 - 49 %    MONOCYTES 13 5 - 13 %    EOSINOPHILS 0 0 - 7 %    BASOPHILS 0 0 - 1 %    METAMYELOCYTES 2 (H) 0 %    MYELOCYTES 2 (H) 0 %    IMMATURE GRANULOCYTES 0 %    ABS. NEUTROPHILS 11.2 (H) 1.8 - 8.0 K/UL    ABS. LYMPHOCYTES 0.3 (L) 0.8 - 3.5 K/UL    ABS. MONOCYTES 1.8 (H) 0.0 - 1.0 K/UL    ABS. EOSINOPHILS 0.0 0.0 - 0.4 K/UL    ABS. BASOPHILS 0.0 0.0 - 0.1 K/UL    ABS. IMM.  GRANS. 0.0 K/UL    DF MANUAL      RBC COMMENTS ANISOCYTOSIS  1+        RBC COMMENTS MACROCYTOSIS  1+        RBC COMMENTS POLYCHROMASIA  1+       PROTHROMBIN TIME + INR    Collection Time: 02/23/20  5:22 AM   Result Value Ref Range    INR 1.2 (H) 0.9 - 1.1      Prothrombin time 11.8 (H) 9.0 - 74.7 sec   METABOLIC PANEL, COMPREHENSIVE    Collection Time: 02/23/20  5:22 AM   Result Value Ref Range    Sodium 138 136 - 145 mmol/L    Potassium 3.4 (L) 3.5 - 5.1 mmol/L    Chloride 104 97 - 108 mmol/L    CO2 31 21 - 32 mmol/L    Anion gap 3 (L) 5 - 15 mmol/L    Glucose 116 (H) 65 - 100 mg/dL    BUN 19 6 - 20 MG/DL    Creatinine 1.07 (H) 0.55 - 1.02 MG/DL    BUN/Creatinine ratio 18 12 - 20      GFR est AA >60 >60 ml/min/1.73m2    GFR est non-AA 57 (L) >60 ml/min/1.73m2    Calcium 8.7 8.5 - 10.1 MG/DL    Bilirubin, total 2.3 (H) 0.2 - 1.0 MG/DL    ALT (SGPT) 662 (H) 12 - 78 U/L    AST (SGOT) 299 (H) 15 - 37 U/L    Alk. phosphatase 144 (H) 45 - 117 U/L    Protein, total 4.8 (L) 6.4 - 8.2 g/dL    Albumin 2.1 (L) 3.5 - 5.0 g/dL    Globulin 2.7 2.0 - 4.0 g/dL    A-G Ratio 0.8 (L) 1.1 - 2.2     LACTIC ACID    Collection Time: 02/23/20  5:22 AM   Result Value Ref Range    Lactic acid 1.1 0.4 - 2.0 MMOL/L   MAGNESIUM    Collection Time: 02/24/20  1:55 AM   Result Value Ref Range    Magnesium 1.9 1.6 - 2.4 mg/dL   CBC WITH AUTOMATED DIFF    Collection Time: 02/24/20  1:55 AM   Result Value Ref Range    WBC 16.1 (H) 3.6 - 11.0 K/uL    RBC 2.63 (L) 3.80 - 5.20 M/uL    HGB 8.5 (L) 11.5 - 16.0 g/dL    HCT 25.6 (L) 35.0 - 47.0 %    MCV 97.3 80.0 - 99.0 FL    MCH 32.3 26.0 - 34.0 PG    MCHC 33.2 30.0 - 36.5 g/dL    RDW 16.8 (H) 11.5 - 14.5 %    PLATELET 83 (L) 390 - 400 K/uL    MPV 12.3 8.9 - 12.9 FL    NRBC 0.6 (H) 0  WBC    ABSOLUTE NRBC 0.10 (H) 0.00 - 0.01 K/uL    NEUTROPHILS 81 (H) 32 - 75 %    LYMPHOCYTES 5 (L) 12 - 49 %    MONOCYTES 10 5 - 13 %    EOSINOPHILS 0 0 - 7 %    BASOPHILS 0 0 - 1 %    METAMYELOCYTES 4 (H) 0 %    IMMATURE GRANULOCYTES 0 %    ABS. NEUTROPHILS 13.0 (H) 1.8 - 8.0 K/UL    ABS. LYMPHOCYTES 0.8 0.8 - 3.5 K/UL    ABS. MONOCYTES 1.6 (H) 0.0 - 1.0 K/UL    ABS. EOSINOPHILS 0.0 0.0 - 0.4 K/UL    ABS. BASOPHILS 0.0 0.0 - 0.1 K/UL    ABS. IMM.  GRANS. 0.0 K/UL    DF MANUAL      RBC COMMENTS ANISOCYTOSIS  1+        RBC COMMENTS POLYCHROMASIA  1+       PROTHROMBIN TIME + INR    Collection Time: 02/24/20  1:55 AM   Result Value Ref Range    INR 1.1 0.9 - 1.1      Prothrombin time 11.4 (H) 9.0 - 11.1 sec   HCG URINE, QL    Collection Time: 02/25/20  2:51 AM   Result Value Ref Range    HCG urine, QL NEGATIVE  NEG     MAGNESIUM    Collection Time: 02/25/20  5:23 AM   Result Value Ref Range    Magnesium 1.8 1.6 - 2.4 mg/dL   PHOSPHORUS    Collection Time: 02/25/20  5:23 AM   Result Value Ref Range    Phosphorus 3.6 2.6 - 4.7 MG/DL   METABOLIC PANEL, COMPREHENSIVE    Collection Time: 02/25/20  5:23 AM   Result Value Ref Range    Sodium 137 136 - 145 mmol/L    Potassium 3.8 3.5 - 5.1 mmol/L    Chloride 105 97 - 108 mmol/L    CO2 27 21 - 32 mmol/L    Anion gap 5 5 - 15 mmol/L    Glucose 116 (H) 65 - 100 mg/dL    BUN 18 6 - 20 MG/DL    Creatinine 1.09 (H) 0.55 - 1.02 MG/DL    BUN/Creatinine ratio 17 12 - 20      GFR est AA >60 >60 ml/min/1.73m2    GFR est non-AA 56 (L) >60 ml/min/1.73m2    Calcium 9.6 8.5 - 10.1 MG/DL    Bilirubin, total 1.4 (H) 0.2 - 1.0 MG/DL    ALT (SGPT) 319 (H) 12 - 78 U/L    AST (SGOT) 88 (H) 15 - 37 U/L    Alk. phosphatase 136 (H) 45 - 117 U/L    Protein, total 5.3 (L) 6.4 - 8.2 g/dL    Albumin 2.1 (L) 3.5 - 5.0 g/dL    Globulin 3.2 2.0 - 4.0 g/dL    A-G Ratio 0.7 (L) 1.1 - 2.2     CBC WITH AUTOMATED DIFF    Collection Time: 02/25/20  5:30 AM   Result Value Ref Range    WBC 19.0 (H) 3.6 - 11.0 K/uL    RBC 2.56 (L) 3.80 - 5.20 M/uL    HGB 8.3 (L) 11.5 - 16.0 g/dL    HCT 25.6 (L) 35.0 - 47.0 %    .0 (H) 80.0 - 99.0 FL    MCH 32.4 26.0 - 34.0 PG    MCHC 32.4 30.0 - 36.5 g/dL    RDW 17.3 (H) 11.5 - 14.5 %    PLATELET 72 (L) 516 - 400 K/uL    NRBC 0.4 (H) 0  WBC    ABSOLUTE NRBC 0.07 (H) 0.00 - 0.01 K/uL    NEUTROPHILS 68 32 - 75 %    BAND NEUTROPHILS 5 0 - 6 %    LYMPHOCYTES 8 (L) 12 - 49 %    MONOCYTES 13 5 - 13 %    EOSINOPHILS 0 0 - 7 %    BASOPHILS 0 0 - 1 %    METAMYELOCYTES 3 (H) 0 %    MYELOCYTES 3 (H) 0 %    IMMATURE GRANULOCYTES 0 %    ABS. NEUTROPHILS 13.9 (H) 1.8 - 8.0 K/UL    ABS. LYMPHOCYTES 1.5 0.8 - 3.5 K/UL    ABS. MONOCYTES 2.5 (H) 0.0 - 1.0 K/UL    ABS. EOSINOPHILS 0.0 0.0 - 0.4 K/UL    ABS. BASOPHILS 0.0 0.0 - 0.1 K/UL    ABS. IMM.  GRANS. 0.0 K/UL    DF MANUAL      PLATELET COMMENTS Large Platelets      RBC COMMENTS MACROCYTOSIS  1+        RBC COMMENTS ANISOCYTOSIS  1+        RBC COMMENTS POLYCHROMASIA  1+        RBC COMMENTS MICROCYTOSIS  1+        RBC COMMENTS SCHISTOCYTES  1+       MAGNESIUM    Collection Time: 02/26/20  3:47 AM   Result Value Ref Range    Magnesium 1.6 1.6 - 2.4 mg/dL   PHOSPHORUS    Collection Time: 02/26/20  3:47 AM   Result Value Ref Range    Phosphorus 3.8 2.6 - 4.7 MG/DL   CBC WITH AUTOMATED DIFF    Collection Time: 02/26/20  3:47 AM   Result Value Ref Range    WBC 15.2 (H) 3.6 - 11.0 K/uL    RBC 2.45 (L) 3.80 - 5.20 M/uL    HGB 8.1 (L) 11.5 - 16.0 g/dL    HCT 24.4 (L) 35.0 - 47.0 %    MCV 99.6 (H) 80.0 - 99.0 FL    MCH 33.1 26.0 - 34.0 PG    MCHC 33.2 30.0 - 36.5 g/dL    RDW 17.2 (H) 11.5 - 14.5 %    PLATELET 67 (L) 977 - 400 K/uL    NRBC 0.4 (H) 0  WBC    ABSOLUTE NRBC 0.06 (H) 0.00 - 0.01 K/uL    NEUTROPHILS 63 32 - 75 %    BAND NEUTROPHILS 8 (H) 0 - 6 %    LYMPHOCYTES 9 (L) 12 - 49 %    MONOCYTES 11 5 - 13 %    EOSINOPHILS 0 0 - 7 %    BASOPHILS 0 0 - 1 %    METAMYELOCYTES 5 (H) 0 %    MYELOCYTES 4 (H) 0 %    IMMATURE GRANULOCYTES 0 %    ABS. NEUTROPHILS 10.8 (H) 1.8 - 8.0 K/UL    ABS. LYMPHOCYTES 1.4 0.8 - 3.5 K/UL    ABS. MONOCYTES 1.7 (H) 0.0 - 1.0 K/UL    ABS. EOSINOPHILS 0.0 0.0 - 0.4 K/UL    ABS. BASOPHILS 0.0 0.0 - 0.1 K/UL    ABS. IMM.  GRANS. 0.0 K/UL    DF MANUAL      PLATELET COMMENTS Large Platelets      RBC COMMENTS ANISOCYTOSIS  1+        RBC COMMENTS MACROCYTOSIS  1+        RBC COMMENTS POLYCHROMASIA  1+        RBC COMMENTS MICROCYTOSIS  1+        RBC COMMENTS SCHISTOCYTES  1+       PROTHROMBIN TIME + INR    Collection Time: 02/26/20  3:47 AM   Result Value Ref Range    INR 1.1 0.9 - 1.1      Prothrombin time 11.1 9.0 - 60.4 sec   METABOLIC PANEL, COMPREHENSIVE    Collection Time: 02/26/20  3:47 AM   Result Value Ref Range    Sodium 136 136 - 145 mmol/L    Potassium 3.6 3.5 - 5.1 mmol/L    Chloride 104 97 - 108 mmol/L    CO2 28 21 - 32 mmol/L    Anion gap 4 (L) 5 - 15 mmol/L    Glucose 120 (H) 65 - 100 mg/dL    BUN 20 6 - 20 MG/DL    Creatinine 1.12 (H) 0.55 - 1.02 MG/DL    BUN/Creatinine ratio 18 12 - 20      GFR est AA >60 >60 ml/min/1.73m2    GFR est non-AA 54 (L) >60 ml/min/1.73m2    Calcium 9.4 8.5 - 10.1 MG/DL    Bilirubin, total 0.9 0.2 - 1.0 MG/DL    ALT (SGPT) 236 (H) 12 - 78 U/L    AST (SGOT) 71 (H) 15 - 37 U/L    Alk. phosphatase 132 (H) 45 - 117 U/L    Protein, total 5.2 (L) 6.4 - 8.2 g/dL    Albumin 2.1 (L) 3.5 - 5.0 g/dL    Globulin 3.1 2.0 - 4.0 g/dL    A-G Ratio 0.7 (L) 1.1 - 2.2             Physical Exam on Discharge:    Discharge condition: good    Vital signs:   Patient Vitals for the past 12 hrs:   Temp Pulse Resp BP SpO2   02/26/20 0812 99.1 °F (37.3 °C) 99 18 167/90 97 %   02/26/20 0716     97 %   02/26/20 0313     97 %   02/26/20 0235 98 °F (36.7 °C) 89 18 (!) 171/94 96 %       Visit Vitals  /90 (BP 1 Location: Left arm, BP Patient Position: At rest;Sitting)   Pulse 99   Temp 99.1 °F (37.3 °C)   Resp 18   Ht 5' 7\" (1.702 m)   Wt 88.5 kg (195 lb)   LMP 02/15/2020   SpO2 97%   Breastfeeding No   BMI 30.54 kg/m²     General:  Alert, cooperative, no distress, appears stated age. Head:  Normocephalic, without obvious abnormality, atraumatic. Lungs:   Clear to auscultation bilaterally. Chest wall:  No tenderness or deformity. Heart:  Regular rate and rhythm, S1, S2 normal, no murmur, click, rub or gallop. Abdomen:   Soft, non-tender. Bowel sounds normal. No masses,  No organomegaly. Extremities: Extremities normal, atraumatic, no cyanosis, edema positive. Pulses: 2+ and symmetric all extremities. Neurologic: CNII-XII intact. Normal strength, sensation and reflexes throughout. Current Discharge Medication List      START taking these medications    Details   cefTRIAXone 2 gram 2 g, ADDaptor 1 Device IVPB 2 g by IntraVENous route every twenty-four (24) hours.  Indications: as per ID recommednations  Qty: 1 Dose, Refills: 0 folic acid (FOLVITE) 1 mg tablet Take 1 Tab by mouth daily. Qty: 30 Tab, Refills: 0      furosemide (LASIX) 40 mg tablet One tab daily  Qty: 14 Tab, Refills: 0      guaiFENesin-dextromethorphan (ROBITUSSIN DM) 100-10 mg/5 mL syrup Take 10 mL by mouth three (3) times daily as needed for Cough for up to 10 days. Qty: 1 Bottle, Refills: 0      therapeutic multivitamin (THERAGRAN) tablet Take 1 Tab by mouth daily. Qty: 30 Tab, Refills: 0      thiamine HCL (B-1) 100 mg tablet Take 1 Tab by mouth daily. Qty: 30 Tab, Refills: 0         CONTINUE these medications which have NOT CHANGED    Details   ergocalciferol (ERGOCALCIFEROL) 50,000 unit capsule Take 1 Cap by mouth every seven (7) days. Qty: 12 Cap, Refills: 2    Associated Diagnoses: Vitamin D deficiency      metoprolol succinate (TOPROL-XL) 50 mg XL tablet Take 1 Tab by mouth daily. Qty: 90 Tab, Refills: 2    Associated Diagnoses: Benign essential HTN      albuterol (PROVENTIL HFA, VENTOLIN HFA, PROAIR HFA) 90 mcg/actuation inhaler Take 2 Puffs by inhalation every four (4) hours as needed for Wheezing. Qty: 1 Inhaler, Refills: 11    Associated Diagnoses: Mild intermittent asthmatic bronchitis with acute exacerbation      valACYclovir (VALTREX) 500 mg tablet Take 500 mg by mouth every evening. Refills: 1         STOP taking these medications       azithromycin (ZITHROMAX Z-PETER) 250 mg tablet Comments:   Reason for Stopping:         fenofibrate nanocrystallized (TRICOR) 145 mg tablet Comments:   Reason for Stopping:                  Total time spent on discharge planning, counseling and co-ordination of care:   35 minutes    Karis Siegel MD  02/26/20  10:37 AM

## 2020-02-26 NOTE — PROCEDURES
Midline Insertion and Progress Note    PRE-PROCEDURE VERIFICATION  Correct Procedure: yes  Correct Site:  yes  Temperature: Temp: 99.1 °F (37.3 °C), Temperature Source: Temp Source: Oral  Recent Labs     02/26/20  0347   BUN 20   CREA 1.12*   PLT 67*   INR 1.1   WBC 15.2*     Allergies: Amoxicillin; Chlorhexidine; and Lisinopril    PROCEDURE DETAIL  A single lumen midline IV catheter was started for Home IV Therapy. The following documentation is in addition to the Midline properties in the lines/airways flowsheet :  Lot #: [id number]  Catheter Total Length: 9 (cm)  External Catheter Length: 0 (cm)  Circumference: 28 (cm)  Vein Selection for Midline :left basilic  Complication Related to Insertion: none    Line is okay to use.

## 2020-02-26 NOTE — PROGRESS NOTES
Problem: Pressure Injury - Risk of  Goal: *Prevention of pressure injury  Description  Document Otis Scale and appropriate interventions in the flowsheet. Outcome: Progressing Towards Goal  Note: Pressure Injury Interventions: Activity Interventions: Increase time out of bed, PT/OT evaluation    Mobility Interventions: HOB 30 degrees or less, Pressure redistribution bed/mattress (bed type), PT/OT evaluation    Nutrition Interventions: Document food/fluid/supplement intake                     Problem: Risk for Spread of Infection  Goal: Prevent transmission of infectious organism to others  Description  Prevent the transmission of infectious organisms to other patients, staff members, and visitors. Outcome: Progressing Towards Goal     Problem: Falls - Risk of  Goal: *Absence of Falls  Description  Document Kasandra Barragan Fall Risk and appropriate interventions in the flowsheet.   Outcome: Progressing Towards Goal  Note: Fall Risk Interventions:            Medication Interventions: Evaluate medications/consider consulting pharmacy    Elimination Interventions: Call light in reach              Problem: Pneumonia: Discharge Outcomes  Goal: *Demonstrates progressive activity  Outcome: Progressing Towards Goal  Goal: *Describes follow-up/return visits to physicians  Outcome: Progressing Towards Goal  Goal: *Tolerating diet  Outcome: Progressing Towards Goal  Goal: *Respiratory status at baseline  Outcome: Progressing Towards Goal  Goal: *Vital signs within defined limits  Outcome: Progressing Towards Goal  Goal: *Describes available resources and support systems  Outcome: Progressing Towards Goal  Goal: *Optimal pain control at patient's stated goal  Outcome: Progressing Towards Goal

## 2020-02-26 NOTE — TELEPHONE ENCOUNTER
----- Message from Sravani Cortezigham sent at 2/26/2020 11:59 AM EST -----  Regarding: Dr. Hancock Rash: [phone]  Caller's first and last name: Eri Macdonald, [employer]    Reason for call: Eri Macdonald has requested a call back from provider in regards to this patient. Patient has not been seen by this provider yet. No other details provided.   Callback required yes/no and why: yes  Best contact number(s): [phone]  Details to clarify the request: n/a

## 2020-02-26 NOTE — DISCHARGE INSTRUCTIONS
Discharge Instructions       PATIENT ID: Matthew Betancourt  MRN: [de-identified]   YOB: 1980    DATE OF ADMISSION: 2/19/2020  4:13 PM    DATE OF DISCHARGE: 2/26/2020    PRIMARY CARE PROVIDER: Sarah Salazar NP     ATTENDING PHYSICIAN: Zeny Yoon MD  DISCHARGING PROVIDER: Carla Mccabe MD    To contact this individual call [phone] and ask the  to page. If unavailable ask to be transferred the Adult Hospitalist Department.     DISCHARGE DIAGNOSES acute liver failure    CONSULTATIONS: IP CONSULT TO CARDIOLOGY  IP CONSULT TO NEPHROLOGY  IP CONSULT TO HEMATOLOGY  IP CONSULT TO HEPATOLOGY  IP CONSULT TO INFECTIOUS DISEASES  IP CONSULT TO PULMONOLOGY    PROCEDURES/SURGERIES: * No surgery found *    FOLLOW UP APPOINTMENTS:   Follow-up Information     Follow up With Specialties Details Why Contact Info    Sarah Salazar NP Nurse Practitioner In 1 week  [address] [facility]  [phone]      Dylon Hernandez MD Nephrology In 1 week  [address], Stephanie Gil DO Hematology and Oncology, Internal Medicine, Hematology, Oncology In 1 week  [address] [facility] [address] [facility]  [phone]      Arthur Junior MD Hepatology, Liver Disease, Internal Medicine In 1 week  [address] [facility] [address] [facility]  [phone]      Karly Calloway DO Infectious Diseases   [address]  [address] [facility] [address] [facility]      Petra Hall MD Internal Medicine, Pulmonary Disease   [address] [facility]  [address]  [place] [address] [phone]             ADDITIONAL CARE RECOMMENDATIONS:   Follow up with PCP in one week  Follow up with nephrology, hematology, hepatology, Infectious disease, and pulmonology as scheduled   You need CBC and BMP check with PCP    DIET: Regular Diet    ACTIVITY: Activity as tolerated        DISCHARGE MEDICATIONS:   See Medication Reconciliation Form    · It is important that you take the medication exactly as they are prescribed. · Keep your medication in the bottles provided by the pharmacist and keep a list of the medication names, dosages, and times to be taken in your wallet. · Do not take other medications without consulting your doctor. NOTIFY YOUR PHYSICIAN FOR ANY OF THE FOLLOWING:   Fever over 101 degrees for 24 hours. Chest pain, shortness of breath, fever, chills, nausea, vomiting, diarrhea, change in mentation, falling, weakness, bleeding. Severe pain or pain not relieved by medications. Or, any other signs or symptoms that you may have questions about. DISPOSITION:    Home With:   OT  PT  HH  RN       SNF/Inpatient Rehab/LTAC    Independent/assisted living    Hospice    Other:     CDMP Checked:   Yes ***     PROBLEM LIST Updated:  Yes ***       Information obtained by :   I understand that if any problems occur once I am at home I am to contact my physician. I understand and acknowledge receipt of the instructions indicated above.                                                                                                                                              Physician's or R.N.'s Signature                                                                  Date/Time                                                                                                                                              Patient or Representative Signature                                                          Date/Time          Signed:   Ashley Castellanos MD  2/26/2020  10:50 AM

## 2020-02-26 NOTE — PROGRESS NOTES
[facility]   [address] [facility], [address] [place] [address], [facility] [place]  Phone: [phone]   [phone]       Nephrology Progress Note  Umesh Marcano     1980     [de-identified]  Date of Admission : 2/19/2020 02/26/20    CC: Follow up for ARF    Assessment and Plan   JANICE :  - resolving ATN. Non oliguric   - Microscopic Hematuria w/ Proteinuria, family hx of Lupus - SWAPNIL and ANCAs neg, Low C3, C4   - Cr stable  - cont po lasix  - ok for d/c today from renal perspective  - will need f/u with us in 2 weeks post d/c    Hypervolemia:  - cont po lasix    Combined AG+ NAG Metabolic acidosis   - improving    Multilobar PNA   Influenza B +ve     Shock Liver , DIC  Hepatic Steatosis   Mild Thrombocytopenia   Chronic Alcoholism : watch for withdrawal  Anemia     Echo : Moderate to Severe MR      Interval History:  Seen and examined. Feeling ok. Cr stable. Wt stable, voiding ok. No cp, sob. Review of Systems: Pertinent items are noted in HPI.     Current Medications:   Current Facility-Administered Medications   Medication Dose Route Frequency    oseltamivir (TAMIFLU) capsule 75 mg  75 mg Oral Q12H    furosemide (LASIX) tablet 40 mg  40 mg Oral DAILY    albuterol-ipratropium (DUO-NEB) 2.5 MG-0.5 MG/3 ML  3 mL Nebulization Q6H RT    prednisoLONE (ORAPRED) 15 mg/5 mL (3 mg/mL) solution 40 mg  40 mg Oral DAILY    ibuprofen (MOTRIN) tablet 400 mg  400 mg Oral Q6H PRN    metoprolol succinate (TOPROL-XL) XL tablet 50 mg  50 mg Oral DAILY    famotidine (PEPCID) tablet 20 mg  20 mg Oral BID    valACYclovir (VALTREX) tablet 500 mg  500 mg Oral QPM    hydrALAZINE (APRESOLINE) 20 mg/mL injection 10 mg  10 mg IntraVENous Q4H PRN    guaiFENesin ER (MUCINEX) tablet 600 mg  600 mg Oral Q12H    benzonatate (TESSALON) capsule 200 mg  200 mg Oral TID    diphenhydrAMINE (BENADRYL) capsule 25 mg  25 mg Oral Q6H PRN    guaiFENesin-dextromethorphan (ROBITUSSIN DM) 100-10 mg/5 mL syrup 10 mL  10 mL Oral Q6H PRN    melatonin tablet 3 mg  3 mg Oral QHS PRN    docusate (COLACE) 50 mg/5 mL oral liquid 100 mg  100 mg Oral BID PRN    folic acid (FOLVITE) tablet 1 mg  1 mg Oral DAILY    thiamine HCL (B-1) tablet 100 mg  100 mg Oral DAILY    therapeutic multivitamin (THERAGRAN) tablet 1 Tab  1 Tab Oral DAILY    cefTRIAXone (ROCEPHIN) 2 g in 0.9% sodium chloride (MBP/ADV) 50 mL  2 g IntraVENous Q24H    budesonide (PULMICORT) 500 mcg/2 ml nebulizer suspension  500 mcg Nebulization BID RT    sodium chloride (NS) flush 5-10 mL  5-10 mL IntraVENous PRN    sodium chloride (NS) flush 5-40 mL  5-40 mL IntraVENous Q8H    sodium chloride (NS) flush 5-40 mL  5-40 mL IntraVENous PRN    ondansetron (ZOFRAN) injection 4 mg  4 mg IntraVENous Q4H PRN      Allergies   Allergen Reactions    Amoxicillin Hives     Tolerated ceftriaxone.  Chlorhexidine Rash    Lisinopril Angioedema       Objective:  Vitals:    Vitals:    02/26/20 0313 02/26/20 0647 02/26/20 0716 02/26/20 0812   BP:    167/90   Pulse:    99   Resp:    18   Temp:    99.1 °F (37.3 °C)   SpO2: 97%  97% 97%   Weight:  88.5 kg (195 lb)     Height:         Intake and Output:  02/26 0701 - 02/26 1900  In: 480 [P.O.:480]  Out: 900 [Urine:900]  02/24 1901 - 02/26 0700  In: 1200 [P.O.:1200]  Out: 1150 [Urine:1150]    Physical Examination:  General: Awake and alert, in NAD  Neck:  Supple, no mass  Resp:  Decreased bibasilar breath sounds  CV:  RRR, no m/r/g, 2+ b/l LE edema  GI:  Soft, NT, + BS  Neurologic:  Non focal  Skin:  No Rash  :  No martinez    []    High complexity decision making was performed  []    Patient is at high-risk of decompensation with multiple organ involvement    Lab Data Personally Reviewed: I have reviewed all the pertinent labs, microbiology data and radiology studies during assessment.     Recent Labs     02/26/20  0347 02/25/20  0523 02/24/20  0155    137  --    K 3.6 3.8  --     105  --    CO2 28 27  --    * 116*  --    BUN 20 18  -- CREA 1.12* 1.09*  --    CA 9.4 9.6  --    MG 1.6 1.8 1.9   PHOS 3.8 3.6  --    ALB 2.1* 2.1*  --    SGOT 71* 88*  --    * 319*  --    INR 1.1  --  1.1     Recent Labs     02/26/20  0347 02/25/20  0530 02/24/20  0155   WBC 15.2* 19.0* 16.1*   HGB 8.1* 8.3* 8.5*   HCT 24.4* 25.6* 25.6*   PLT 67* 72* 83*     Lab Results   Component Value Date/Time    Specimen Description: URINE 04/09/2010 09:43 PM     Lab Results   Component Value Date/Time    Culture result: NO GROWTH 5 DAYS 02/21/2020 11:52 AM    Culture result: NO GROWTH 5 DAYS 02/20/2020 11:52 AM    Culture result: ANNIE GLABRATA (A) 02/20/2020 06:38 AM    Culture result: (A) 02/19/2020 04:48 PM     STREPTOCOCCUS PNEUMONIAE GROWING IN BOTH BOTTLES DRAWN (SITE = L.  )    Culture result:  02/19/2020 04:48 PM     CALLED TO AND READ BACK BY  CARLITOS Andres R.N. BY HR 02/20/20 AT 0701       Recent Results (from the past 24 hour(s))   MAGNESIUM    Collection Time: 02/26/20  3:47 AM   Result Value Ref Range    Magnesium 1.6 1.6 - 2.4 mg/dL   PHOSPHORUS    Collection Time: 02/26/20  3:47 AM   Result Value Ref Range    Phosphorus 3.8 2.6 - 4.7 MG/DL   CBC WITH AUTOMATED DIFF    Collection Time: 02/26/20  3:47 AM   Result Value Ref Range    WBC 15.2 (H) 3.6 - 11.0 K/uL    RBC 2.45 (L) 3.80 - 5.20 M/uL    HGB 8.1 (L) 11.5 - 16.0 g/dL    HCT 24.4 (L) 35.0 - 47.0 %    MCV 99.6 (H) 80.0 - 99.0 FL    MCH 33.1 26.0 - 34.0 PG    MCHC 33.2 30.0 - 36.5 g/dL    RDW 17.2 (H) 11.5 - 14.5 %    PLATELET 67 (L) 732 - 400 K/uL    NRBC 0.4 (H) 0  WBC    ABSOLUTE NRBC 0.06 (H) 0.00 - 0.01 K/uL    NEUTROPHILS 63 32 - 75 %    BAND NEUTROPHILS 8 (H) 0 - 6 %    LYMPHOCYTES 9 (L) 12 - 49 %    MONOCYTES 11 5 - 13 %    EOSINOPHILS 0 0 - 7 %    BASOPHILS 0 0 - 1 %    METAMYELOCYTES 5 (H) 0 %    MYELOCYTES 4 (H) 0 %    IMMATURE GRANULOCYTES 0 %    ABS. NEUTROPHILS 10.8 (H) 1.8 - 8.0 K/UL    ABS. LYMPHOCYTES 1.4 0.8 - 3.5 K/UL    ABS. MONOCYTES 1.7 (H) 0.0 - 1.0 K/UL    ABS. EOSINOPHILS 0.0 0.0 - 0.4 K/UL    ABS. BASOPHILS 0.0 0.0 - 0.1 K/UL    ABS. IMM. GRANS. 0.0 K/UL    DF MANUAL      PLATELET COMMENTS Large Platelets      RBC COMMENTS ANISOCYTOSIS  1+        RBC COMMENTS MACROCYTOSIS  1+        RBC COMMENTS POLYCHROMASIA  1+        RBC COMMENTS MICROCYTOSIS  1+        RBC COMMENTS SCHISTOCYTES  1+       PROTHROMBIN TIME + INR    Collection Time: 02/26/20  3:47 AM   Result Value Ref Range    INR 1.1 0.9 - 1.1      Prothrombin time 11.1 9.0 - 18.6 sec   METABOLIC PANEL, COMPREHENSIVE    Collection Time: 02/26/20  3:47 AM   Result Value Ref Range    Sodium 136 136 - 145 mmol/L    Potassium 3.6 3.5 - 5.1 mmol/L    Chloride 104 97 - 108 mmol/L    CO2 28 21 - 32 mmol/L    Anion gap 4 (L) 5 - 15 mmol/L    Glucose 120 (H) 65 - 100 mg/dL    BUN 20 6 - 20 MG/DL    Creatinine 1.12 (H) 0.55 - 1.02 MG/DL    BUN/Creatinine ratio 18 12 - 20      GFR est AA >60 >60 ml/min/1.73m2    GFR est non-AA 54 (L) >60 ml/min/1.73m2    Calcium 9.4 8.5 - 10.1 MG/DL    Bilirubin, total 0.9 0.2 - 1.0 MG/DL    ALT (SGPT) 236 (H) 12 - 78 U/L    AST (SGOT) 71 (H) 15 - 37 U/L    Alk. phosphatase 132 (H) 45 - 117 U/L    Protein, total 5.2 (L) 6.4 - 8.2 g/dL    Albumin 2.1 (L) 3.5 - 5.0 g/dL    Globulin 3.1 2.0 - 4.0 g/dL    A-G Ratio 0.7 (L) 1.1 - 2.2             I have reviewed the flowsheets. Chart and Pertinent Notes have been reviewed. No change in PMH ,family and social history from Consult note.       Cari Gallo MD

## 2020-02-26 NOTE — PROGRESS NOTES
Hospital follow-up PCP transitional care appointment has been scheduled with Dr. Jaxon Baugh for Friday, 3/6/20 at 11:00 a.m. Pending patient discharge.   Ashlyn Isaac, Care Management Specialist.

## 2020-02-26 NOTE — PROGRESS NOTES
GILMER:  1. OPIC schedule pending. 2. PICC line placement. 3. Transport; Family. CM faxed iv abc orders to [facility] to schedule her for infusions. [phone]. Patient will be getting PICC line today.     [facility]

## 2020-02-26 NOTE — PROGRESS NOTES
[facility] [address] [facility] Leonard Gallegos MD, [place], [address] Vides, [address], MD Kasey Chow, DEVI Cheng, [facility]    Lamont Heath, [facility]   Donny Martineza Head, [facility]  Jessie Thompson, [facility]        [address] of [facility]    [address], [address] [address], [address]    [phone]    FAX: [phone] [address]  [address] [facility]  [address] [facility], [address]  [place], [address] [facility]    [phone]    FAX: [phone]         HEPATOLOGY PROGRESS NOTE  The patient is a 46year old [facility] female who developed several days of worsening cough, lightheadedness, progressive fatigue, fever, diarrhea.  She does consume the equivalent of 4 alcoholic drinks per day and has been doing this for several years. Matthew Nieto has taken some acetaminophen for the flu-like symptoms but cannot quantitate this.  She went to urgent care on the day of admission, was found to be hypotensive and sent [address] [facility] [address]    In the ED liver SBP was in the 80s, liver transaminases were in the 9879-3872 range, TBILI 7.3, INR 1.0, Scr 4.9 mg, Sna 129.    US and CT scan suggest fatty liver without cirrhosis. She has been treated with presser support, broad spectrum ABX, IVNAC, vit K, IV fluids.      Now out of ICU. AST/ALT now under 400. TBILI under 1.4, INR 1.2, Scr 1.39 mg  Liver issues have essentially resolved     ASSESSMENT AND PLAN:  Shock liver  The patient had shock liver with acute marked increase in liver transaminases.    This was due to hypotension and hepatic ischemia.    The liver ALT and AST coming down nicely and now under 300.   INR is normal.    Serologic studies for other causes of chronic liver disease are negative    There is no evidence of chronic liver disease although she has consumed alcohol regularly in the past.  Will evaluate for alcohol induced liver injury in office with [facility] after discharge.      Sepsis  Blood culture was positive for tabby positive cocci.    On IV ABX. This has resolved     Thrombocytopenia   This has improved with resolution of sepsis and DIC but she still has significant thrombocytopenia.     Anemia   HB remains low but stable.        PHYSICAL EXAMINATION:  VS: per nursing note  General:  Ill appearing  Eyes:  Sclera anicteric. ENT:  No oral lesions.  Thyroid normal.  Nodes:  No adenopathy. Skin:  No spider angiomata.  No jaundice. Respiratory:  Lungs clear to auscultation. Cardiovascular:  Regular heart rate. Abdomen:  Soft non-tender, No obvious ascites. Extremities:  No lower extremity edema.    Neurologic:  Alert and oriented.  Cranial nerves grossly intact.  No asterixis.     LABORATORY:  Results for Ander Humphrey (MRN [de-identified]) as of 2/25/2020 19:09   Ref. Range 2/22/2020 07:26 2/23/2020 05:22 2/24/2020 01:55 2/25/2020 05:23   WBC Latest Ref Range: 3.6 - 11.0 K/uL  13.8 (H) 16.1 (H) 19.0 (H)   HGB Latest Ref Range: 11.5 - 16.0 g/dL  8.5 (L) 8.5 (L) 8.3 (L)   PLATELET Latest Ref Range: 150 - 400 K/uL  91 (L) 83 (L) 72 (L)   INR Latest Ref Range: 0.9 - 1.1   1.2 (H) 1.2 (H) 1.1    Sodium Latest Ref Range: 136 - 145 mmol/L 138 138  137   Potassium Latest Ref Range: 3.5 - 5.1 mmol/L 3.6 3.4 (L)  3.8   Chloride Latest Ref Range: 97 - 108 mmol/L 103 104  105   CO2 Latest Ref Range: 21 - 32 mmol/L 31 31  27   Glucose Latest Ref Range: 65 - 100 mg/dL 125 (H) 116 (H)  116 (H)   BUN Latest Ref Range: 6 - 20 MG/DL 24 (H) 19  18   Creatinine Latest Ref Range: 0.55 - 1.02 MG/DL 1.39 (H) 1.07 (H)  1.09 (H)   Bilirubin, total Latest Ref Range: 0.2 - 1.0 MG/DL 2.8 (H) 2.3 (H)  1.4 (H)   Albumin Latest Ref Range: 3.5 - 5.0 g/dL 1.9 (L) 2.1 (L)  2.1 (L)   ALT (SGPT) Latest Ref Range: 12 - 78 U/L 930 (H) 662 (H)  319 (H)   AST Latest Ref Range: 15 - 37 U/L 605 (H) 299 (H)  88 (H)   Alk. phosphatase Latest Ref Range: 45 - 117 U/L 116 144 (H)  136 (H)       Darinel Patrick MD  [address], [address] [place] [address], [facility] [address]

## 2020-02-26 NOTE — PROGRESS NOTES
GILMER:  1. OPIC appointment placed on AVS.  2. PICC line today. 3. Transport; her mother. CM scheduled appointment with Janet Arias for next Tuesday. CM Specialist scheduled her PCP appointment for next Friday, and both appts are placed on AVS.  OPIC appointments are placed on AVS, and she will received her dose at 4:00 p.m. today and then discharge.       Omaira Tay [facility]

## 2020-02-27 ENCOUNTER — HOSPITAL ENCOUNTER (OUTPATIENT)
Dept: INFUSION THERAPY | Age: 40
Discharge: HOME | End: 2020-02-27
Payer: MEDICAID

## 2020-02-27 VITALS
SYSTOLIC BLOOD PRESSURE: 167 MMHG | RESPIRATION RATE: 18 BRPM | TEMPERATURE: 98.2 F | DIASTOLIC BLOOD PRESSURE: 89 MMHG | HEART RATE: 86 BPM

## 2020-02-27 PROCEDURE — 74011250636 HC RX REV CODE- 250/636: Performed by: INTERNAL MEDICINE

## 2020-02-27 PROCEDURE — 96365 THER/PROPH/DIAG IV INF INIT: CPT

## 2020-02-27 PROCEDURE — 74011000258 HC RX REV CODE- 258: Performed by: INTERNAL MEDICINE

## 2020-02-27 RX ORDER — SODIUM CHLORIDE 9 MG/ML
25 INJECTION, SOLUTION INTRAVENOUS AS NEEDED
Status: DISCONTINUED | OUTPATIENT
Start: 2020-02-27 | End: 2020-02-28 | Stop reason: HOSPADM

## 2020-02-27 RX ADMIN — SODIUM CHLORIDE 25 ML/HR: 900 INJECTION, SOLUTION INTRAVENOUS at 17:40

## 2020-02-27 RX ADMIN — CEFTRIAXONE SODIUM 2 G: 2 INJECTION, POWDER, FOR SOLUTION INTRAMUSCULAR; INTRAVENOUS at 17:45

## 2020-02-27 NOTE — PROGRESS NOTES
Outpatient Infusion Center Short Visit Progress Note    6011 Pt admit to [facility] for daily Rocephin ambulatory in stable condition accompanied by mother. Assessment completed. No new concerns voiced. Single lumen Midline flushed w/ positive blood return; NS infusing. Patient Vitals for the past 12 hrs:   Temp Pulse Resp BP   02/27/20 1736 98.2 °F (36.8 °C) 86 18 167/89     Medications Administered     0.9% sodium chloride infusion     Admin Date  02/27/2020 Action  New Bag Dose  25 mL/hr Rate  25 mL/hr Route  IntraVENous Administered By  Eliana Diaz RN          cefTRIAXone (ROCEPHIN) 2 g in 0.9% sodium chloride (MBP/ADV) 50 mL     Admin Date  02/27/2020 Action  New Bag Dose  2 g Rate  100 mL/hr Route  IntraVENous Administered By  Eliana Diaz, GEN                  9177 Pt tolerated treatment well. D/c home ambulatory in no distress. Pt aware of next appointment scheduled for 02/28/2020.

## 2020-02-28 ENCOUNTER — DOCUMENTATION ONLY (OUTPATIENT)
Dept: FAMILY MEDICINE CLINIC | Age: 40
End: 2020-02-28

## 2020-02-28 ENCOUNTER — DOCUMENTATION ONLY (OUTPATIENT)
Dept: ONCOLOGY | Age: 40
End: 2020-02-28

## 2020-02-28 ENCOUNTER — HOSPITAL ENCOUNTER (OUTPATIENT)
Dept: INFUSION THERAPY | Age: 40
Discharge: HOME | End: 2020-02-28
Payer: MEDICAID

## 2020-02-28 VITALS
DIASTOLIC BLOOD PRESSURE: 90 MMHG | HEART RATE: 86 BPM | RESPIRATION RATE: 18 BRPM | SYSTOLIC BLOOD PRESSURE: 181 MMHG | TEMPERATURE: 97.3 F

## 2020-02-28 DIAGNOSIS — J45.21 MILD INTERMITTENT ASTHMATIC BRONCHITIS WITH ACUTE EXACERBATION: ICD-10-CM

## 2020-02-28 LAB
ALBUMIN SERPL-MCNC: 2.4 G/DL (ref 3.5–5)
ALBUMIN/GLOB SERPL: 0.8 {RATIO} (ref 1.1–2.2)
ALP SERPL-CCNC: 117 U/L (ref 45–117)
ALT SERPL-CCNC: 146 U/L (ref 12–78)
ANION GAP SERPL CALC-SCNC: 7 MMOL/L (ref 5–15)
AST SERPL-CCNC: 49 U/L (ref 15–37)
BASOPHILS # BLD: 0 K/UL (ref 0–0.1)
BASOPHILS NFR BLD: 0 % (ref 0–1)
BILIRUB DIRECT SERPL-MCNC: 0.5 MG/DL (ref 0–0.2)
BILIRUB SERPL-MCNC: 0.7 MG/DL (ref 0.2–1)
BUN SERPL-MCNC: 13 MG/DL (ref 6–20)
BUN/CREAT SERPL: 13 (ref 12–20)
CALCIUM SERPL-MCNC: 9.3 MG/DL (ref 8.5–10.1)
CHLORIDE SERPL-SCNC: 104 MMOL/L (ref 97–108)
CO2 SERPL-SCNC: 31 MMOL/L (ref 21–32)
CREAT SERPL-MCNC: 1.04 MG/DL (ref 0.55–1.02)
DIFFERENTIAL METHOD BLD: ABNORMAL
EOSINOPHIL # BLD: 0.3 K/UL (ref 0–0.4)
EOSINOPHIL NFR BLD: 4 % (ref 0–7)
ERYTHROCYTE [DISTWIDTH] IN BLOOD BY AUTOMATED COUNT: 16.5 % (ref 11.5–14.5)
GLOBULIN SER CALC-MCNC: 2.9 G/DL (ref 2–4)
GLUCOSE SERPL-MCNC: 122 MG/DL (ref 65–100)
HCT VFR BLD AUTO: 25.9 % (ref 35–47)
HGB BLD-MCNC: 8.2 G/DL (ref 11.5–16)
IMM GRANULOCYTES # BLD AUTO: 0 K/UL
IMM GRANULOCYTES NFR BLD AUTO: 0 %
LYMPHOCYTES # BLD: 1 K/UL (ref 0.8–3.5)
LYMPHOCYTES NFR BLD: 12 % (ref 12–49)
MCH RBC QN AUTO: 32.4 PG (ref 26–34)
MCHC RBC AUTO-ENTMCNC: 31.7 G/DL (ref 30–36.5)
MCV RBC AUTO: 102.4 FL (ref 80–99)
METAMYELOCYTES NFR BLD MANUAL: 1 %
MONOCYTES # BLD: 0.4 K/UL (ref 0–1)
MONOCYTES NFR BLD: 5 % (ref 5–13)
MYELOCYTES NFR BLD MANUAL: 1 %
NEUTS SEG # BLD: 6.2 K/UL (ref 1.8–8)
NEUTS SEG NFR BLD: 77 % (ref 32–75)
NRBC # BLD: 0.02 K/UL (ref 0–0.01)
NRBC BLD-RTO: 0.2 PER 100 WBC
PLATELET # BLD AUTO: 104 K/UL (ref 150–400)
PMV BLD AUTO: 12.6 FL (ref 8.9–12.9)
POTASSIUM SERPL-SCNC: 2.9 MMOL/L (ref 3.5–5.1)
PROT SERPL-MCNC: 5.3 G/DL (ref 6.4–8.2)
RBC # BLD AUTO: 2.53 M/UL (ref 3.8–5.2)
RBC MORPH BLD: ABNORMAL
SODIUM SERPL-SCNC: 142 MMOL/L (ref 136–145)
WBC # BLD AUTO: 8 K/UL (ref 3.6–11)

## 2020-02-28 PROCEDURE — 74011000258 HC RX REV CODE- 258: Performed by: INTERNAL MEDICINE

## 2020-02-28 PROCEDURE — 80076 HEPATIC FUNCTION PANEL: CPT

## 2020-02-28 PROCEDURE — 36415 COLL VENOUS BLD VENIPUNCTURE: CPT

## 2020-02-28 PROCEDURE — 96365 THER/PROPH/DIAG IV INF INIT: CPT

## 2020-02-28 PROCEDURE — 80048 BASIC METABOLIC PNL TOTAL CA: CPT

## 2020-02-28 PROCEDURE — 74011250636 HC RX REV CODE- 250/636: Performed by: INTERNAL MEDICINE

## 2020-02-28 PROCEDURE — 85025 COMPLETE CBC W/AUTO DIFF WBC: CPT

## 2020-02-28 RX ORDER — SODIUM CHLORIDE 9 MG/ML
25 INJECTION, SOLUTION INTRAVENOUS AS NEEDED
Status: DISCONTINUED | OUTPATIENT
Start: 2020-02-28 | End: 2020-02-29 | Stop reason: HOSPADM

## 2020-02-28 RX ORDER — ALBUTEROL SULFATE 90 UG/1
2 AEROSOL, METERED RESPIRATORY (INHALATION)
Qty: 1 INHALER | Refills: 11 | Status: SHIPPED | OUTPATIENT
Start: 2020-02-28

## 2020-02-28 RX ADMIN — CEFTRIAXONE SODIUM 2 G: 2 INJECTION, POWDER, FOR SOLUTION INTRAMUSCULAR; INTRAVENOUS at 17:50

## 2020-02-28 RX ADMIN — SODIUM CHLORIDE 25 ML/HR: 900 INJECTION, SOLUTION INTRAVENOUS at 17:45

## 2020-02-28 NOTE — PROGRESS NOTES
Outpatient Infusion Center Short Visit Progress Note    1180 Pt admit to [facility] for daily Rocephin ambulatory in stable condition. Assessment completed. No new concerns voiced. Single lumen Midline flushed without positive blood return; labs drawn peripherally and sent for processing; line flushed, NS infusing. Please review pending lab results in [address] [place] [address]. Patient Vitals for the past 12 hrs:   Temp Pulse Resp BP   02/28/20 1734 97.3 °F (36.3 °C) 86 18 181/90         1830 Pt tolerated treatment well. D/c home ambulatory in no distress. Pt aware of next appointment scheduled for 02/29/2020.

## 2020-02-28 NOTE — PROGRESS NOTES
This pt just needs a f/u CBC with diff from hospital stay  If she is seeing her PCP, then they can do it  If not, we can order f/u CBC for thromobcytopenia

## 2020-02-29 ENCOUNTER — HOSPITAL ENCOUNTER (OUTPATIENT)
Dept: INFUSION THERAPY | Age: 40
Discharge: HOME | End: 2020-02-29
Payer: MEDICAID

## 2020-02-29 ENCOUNTER — DOCUMENTATION ONLY (OUTPATIENT)
Dept: FAMILY MEDICINE CLINIC | Age: 40
End: 2020-02-29

## 2020-02-29 VITALS — SYSTOLIC BLOOD PRESSURE: 173 MMHG | TEMPERATURE: 98.7 F | DIASTOLIC BLOOD PRESSURE: 90 MMHG | RESPIRATION RATE: 16 BRPM

## 2020-02-29 PROCEDURE — 96365 THER/PROPH/DIAG IV INF INIT: CPT

## 2020-02-29 PROCEDURE — 74011250636 HC RX REV CODE- 250/636: Performed by: INTERNAL MEDICINE

## 2020-02-29 PROCEDURE — 74011000258 HC RX REV CODE- 258: Performed by: INTERNAL MEDICINE

## 2020-02-29 RX ORDER — HEPARIN 100 UNIT/ML
500 SYRINGE INTRAVENOUS AS NEEDED
Status: CANCELLED | OUTPATIENT
Start: 2020-02-29 | End: 2020-03-01

## 2020-02-29 RX ORDER — SODIUM CHLORIDE 0.9 % (FLUSH) 0.9 %
5-10 SYRINGE (ML) INJECTION AS NEEDED
Status: CANCELLED | OUTPATIENT
Start: 2020-02-29 | End: 2020-03-01

## 2020-02-29 RX ORDER — SODIUM CHLORIDE 0.9 % (FLUSH) 0.9 %
10 SYRINGE (ML) INJECTION AS NEEDED
Status: DISCONTINUED | OUTPATIENT
Start: 2020-02-29 | End: 2020-03-01 | Stop reason: HOSPADM

## 2020-02-29 RX ADMIN — CEFTRIAXONE SODIUM 2 G: 2 INJECTION, POWDER, FOR SOLUTION INTRAMUSCULAR; INTRAVENOUS at 08:25

## 2020-02-29 RX ADMIN — Medication 10 ML: at 08:20

## 2020-02-29 RX ADMIN — Medication 10 ML: at 08:55

## 2020-02-29 NOTE — PROGRESS NOTES
[facility] VISIT NOTE    0820  Pt arrived at [facility] ambulatory and in no distress for IV Ceftriaxone. Assessment completed, pt c/o continued lower extremity swelling from being in the hospital.    Blood pressure 173/90, temperature 98.7 °F (37.1 °C), resp. rate 16, last menstrual period 02/15/2020. Left arm midline flushes easily. Medications received:  Ceftriaxone IV    Tolerated treatment well, no adverse reaction noted. Midline flushed and secured for treatment tomorrow. 0900  D/C'd from [facility] ambulatory and in no distress accompanied by her mom. Next appointment is 3/1/20 at 0830.

## 2020-02-29 NOTE — PROGRESS NOTES
Called Ms Collette Luisito as labs indicate low K  Unable to reach   Forwarded labs to PCP   Will ask infusion center to give her 40 meq K when she is there tomorrow until she follows up with PCP as I am unable to reach patient at this time     Alaina Estrella,   7:29 PM

## 2020-03-01 ENCOUNTER — DOCUMENTATION ONLY (OUTPATIENT)
Dept: FAMILY MEDICINE CLINIC | Age: 40
End: 2020-03-01

## 2020-03-01 ENCOUNTER — HOSPITAL ENCOUNTER (OUTPATIENT)
Dept: INFUSION THERAPY | Age: 40
Discharge: HOME | End: 2020-03-01
Payer: MEDICAID

## 2020-03-01 VITALS
SYSTOLIC BLOOD PRESSURE: 160 MMHG | HEART RATE: 71 BPM | RESPIRATION RATE: 16 BRPM | TEMPERATURE: 98.8 F | OXYGEN SATURATION: 100 % | DIASTOLIC BLOOD PRESSURE: 91 MMHG

## 2020-03-01 PROCEDURE — 74011000258 HC RX REV CODE- 258: Performed by: INTERNAL MEDICINE

## 2020-03-01 PROCEDURE — 96365 THER/PROPH/DIAG IV INF INIT: CPT

## 2020-03-01 PROCEDURE — 74011250636 HC RX REV CODE- 250/636: Performed by: INTERNAL MEDICINE

## 2020-03-01 RX ORDER — HEPARIN 100 UNIT/ML
500 SYRINGE INTRAVENOUS AS NEEDED
Status: DISCONTINUED | OUTPATIENT
Start: 2020-03-01 | End: 2020-03-02 | Stop reason: HOSPADM

## 2020-03-01 RX ORDER — SODIUM CHLORIDE 0.9 % (FLUSH) 0.9 %
5-10 SYRINGE (ML) INJECTION AS NEEDED
Status: DISCONTINUED | OUTPATIENT
Start: 2020-03-01 | End: 2020-03-02 | Stop reason: HOSPADM

## 2020-03-01 RX ADMIN — Medication 20 ML: at 09:02

## 2020-03-01 RX ADMIN — Medication 10 ML: at 08:30

## 2020-03-01 RX ADMIN — Medication 10 ML: at 09:06

## 2020-03-01 RX ADMIN — CEFTRIAXONE SODIUM 2 G: 2 INJECTION, POWDER, FOR SOLUTION INTRAMUSCULAR; INTRAVENOUS at 08:35

## 2020-03-01 NOTE — PROGRESS NOTES
I called the infusion center and the patient   Line keeps ringing  Unable to contact patient . I tried at home and mobile number listed   No one at infusion center picked up at this time and attempted twice this am   Will also forward to pcp.  Hopefully they are able to reach patient   She goes for antibiotics at infusion center as I see notes indicating she has come daily so far

## 2020-03-01 NOTE — PROGRESS NOTES
Diane SHELLEY Short Visit Note:    0830  Pt arrived to [facility] ambulatory and in no distress for Rocephin. Midline intact to left upper arm, flushed but no blood return noted. No new complaints voiced. Visit Vitals  BP (!) 160/91 (BP 1 Location: Right arm, BP Patient Position: Sitting)   Pulse 71   Temp 98.8 °F (37.1 °C)   Resp 16   LMP 02/15/2020   SpO2 100%       Medications received:  Rocephin 2 grams IV over 30 minutes    0905 Pt tolerated treatment well, no adverse reaction noted. Midline flushed per protocol and end cap applied. Discharged from [facility] ambulatory and in no acute distress accompanied by family member. Next appt 3/2/20 @ 1700.

## 2020-03-01 NOTE — PROGRESS NOTES
Giuliana - call patient about potassium results.   Will need to send potassium supplement to pharmacy

## 2020-03-02 ENCOUNTER — TELEPHONE (OUTPATIENT)
Dept: FAMILY MEDICINE CLINIC | Age: 40
End: 2020-03-02

## 2020-03-02 ENCOUNTER — PATIENT MESSAGE (OUTPATIENT)
Dept: FAMILY MEDICINE CLINIC | Age: 40
End: 2020-03-02

## 2020-03-02 ENCOUNTER — HOSPITAL ENCOUNTER (OUTPATIENT)
Dept: INFUSION THERAPY | Age: 40
Discharge: HOME | End: 2020-03-02
Payer: MEDICAID

## 2020-03-02 VITALS
DIASTOLIC BLOOD PRESSURE: 93 MMHG | RESPIRATION RATE: 18 BRPM | TEMPERATURE: 98 F | HEART RATE: 84 BPM | SYSTOLIC BLOOD PRESSURE: 181 MMHG

## 2020-03-02 DIAGNOSIS — E87.6 HYPOKALEMIA: Primary | ICD-10-CM

## 2020-03-02 DIAGNOSIS — I10 BENIGN ESSENTIAL HTN: ICD-10-CM

## 2020-03-02 PROCEDURE — 74011000258 HC RX REV CODE- 258: Performed by: INTERNAL MEDICINE

## 2020-03-02 PROCEDURE — 74011250636 HC RX REV CODE- 250/636: Performed by: INTERNAL MEDICINE

## 2020-03-02 PROCEDURE — 96365 THER/PROPH/DIAG IV INF INIT: CPT

## 2020-03-02 RX ORDER — SODIUM CHLORIDE 9 MG/ML
25 INJECTION, SOLUTION INTRAVENOUS AS NEEDED
Status: DISCONTINUED | OUTPATIENT
Start: 2020-03-02 | End: 2020-03-03 | Stop reason: HOSPADM

## 2020-03-02 RX ADMIN — CEFTRIAXONE SODIUM 2 G: 2 INJECTION, POWDER, FOR SOLUTION INTRAMUSCULAR; INTRAVENOUS at 17:15

## 2020-03-02 RX ADMIN — SODIUM CHLORIDE 25 ML/HR: 900 INJECTION, SOLUTION INTRAVENOUS at 17:10

## 2020-03-02 NOTE — TELEPHONE ENCOUNTER
----- Message from Brittany Sam NP sent at 3/1/2020  6:00 PM EST -----  Giovanny Farr call patient about potassium results.   Will need to send potassium supplement to pharmacy

## 2020-03-02 NOTE — PROGRESS NOTES
Outpatient Infusion Center Short Visit Progress Note    1700 Pt admit to [facility] for daily Rocephin ambulatory in stable condition. Assessment completed. No new concerns voiced. Single lumen midline flushed w/ positive blood return; line flushed, NS infusing. Patient Vitals for the past 12 hrs:   Temp Pulse Resp BP   03/02/20 1704 98 °F (36.7 °C) 84 18 (!) 181/93       Medications Administered     0.9% sodium chloride infusion     Admin Date  03/02/2020 Action  New Bag Dose  25 mL/hr Rate  25 mL/hr Route  IntraVENous Administered By  Roldan Taylor, RN          cefTRIAXone (ROCEPHIN) 2 g in 0.9% sodium chloride (MBP/ADV) 50 mL     Admin Date  03/02/2020 Action  New Bag Dose  2 g Rate  100 mL/hr Route  IntraVENous Administered By  Roldan Taylor, RN                  5545 Pt tolerated treatment well. Offered to change Midline dressing, pt stated \"she can wait\", line will be removed Thursday. D/c home ambulatory in no distress. Pt aware of next appointment scheduled for 03/03/2020.

## 2020-03-03 ENCOUNTER — OFFICE VISIT (OUTPATIENT)
Dept: ONCOLOGY | Age: 40
End: 2020-03-03

## 2020-03-03 ENCOUNTER — HOSPITAL ENCOUNTER (OUTPATIENT)
Dept: INFUSION THERAPY | Age: 40
Discharge: HOME | End: 2020-03-03
Payer: MEDICAID

## 2020-03-03 ENCOUNTER — OFFICE VISIT (OUTPATIENT)
Dept: HEMATOLOGY | Age: 40
End: 2020-03-03

## 2020-03-03 VITALS
HEIGHT: 67 IN | OXYGEN SATURATION: 100 % | DIASTOLIC BLOOD PRESSURE: 79 MMHG | WEIGHT: 165 LBS | TEMPERATURE: 97.6 F | SYSTOLIC BLOOD PRESSURE: 166 MMHG | BODY MASS INDEX: 25.9 KG/M2 | HEART RATE: 73 BPM

## 2020-03-03 VITALS
RESPIRATION RATE: 16 BRPM | SYSTOLIC BLOOD PRESSURE: 158 MMHG | TEMPERATURE: 98.6 F | DIASTOLIC BLOOD PRESSURE: 84 MMHG | HEART RATE: 83 BPM

## 2020-03-03 VITALS
OXYGEN SATURATION: 96 % | BODY MASS INDEX: 26.48 KG/M2 | HEART RATE: 73 BPM | HEIGHT: 67 IN | WEIGHT: 168.7 LBS | SYSTOLIC BLOOD PRESSURE: 159 MMHG | TEMPERATURE: 98.2 F | DIASTOLIC BLOOD PRESSURE: 90 MMHG

## 2020-03-03 DIAGNOSIS — R74.8 ELEVATED LIVER ENZYMES: Primary | ICD-10-CM

## 2020-03-03 DIAGNOSIS — D69.6 THROMBOCYTOPENIA (HCC): Primary | ICD-10-CM

## 2020-03-03 DIAGNOSIS — D64.9 ANEMIA, UNSPECIFIED TYPE: ICD-10-CM

## 2020-03-03 PROCEDURE — 74011250636 HC RX REV CODE- 250/636: Performed by: INTERNAL MEDICINE

## 2020-03-03 PROCEDURE — 96365 THER/PROPH/DIAG IV INF INIT: CPT

## 2020-03-03 PROCEDURE — 74011000258 HC RX REV CODE- 258: Performed by: INTERNAL MEDICINE

## 2020-03-03 RX ORDER — SODIUM CHLORIDE 0.9 % (FLUSH) 0.9 %
5-10 SYRINGE (ML) INJECTION AS NEEDED
Status: DISCONTINUED | OUTPATIENT
Start: 2020-03-03 | End: 2020-03-04 | Stop reason: HOSPADM

## 2020-03-03 RX ORDER — HEPARIN 100 UNIT/ML
500 SYRINGE INTRAVENOUS AS NEEDED
Status: DISCONTINUED | OUTPATIENT
Start: 2020-03-03 | End: 2020-03-04 | Stop reason: HOSPADM

## 2020-03-03 RX ORDER — POTASSIUM CHLORIDE 20 MEQ/1
20 TABLET, EXTENDED RELEASE ORAL 2 TIMES DAILY
Qty: 60 TAB | Refills: 1 | Status: SHIPPED | OUTPATIENT
Start: 2020-03-03 | End: 2020-04-27 | Stop reason: SDUPTHER

## 2020-03-03 RX ORDER — METOPROLOL SUCCINATE 50 MG/1
100 TABLET, EXTENDED RELEASE ORAL DAILY
Qty: 90 TAB | Refills: 2
Start: 2020-03-03 | End: 2020-03-06 | Stop reason: SDUPTHER

## 2020-03-03 RX ADMIN — Medication 10 ML: at 15:02

## 2020-03-03 RX ADMIN — Medication 500 UNITS: at 15:02

## 2020-03-03 RX ADMIN — CEFTRIAXONE SODIUM 2 G: 2 INJECTION, POWDER, FOR SOLUTION INTRAMUSCULAR; INTRAVENOUS at 14:17

## 2020-03-03 NOTE — PROGRESS NOTES
[facility] [place] [address] [address], [address]  W: [phone]  F: [phone]    Reason for Visit:   Mayur Porter is a 46 y.o. female who is seen for hospital f/u.   hospital consultation at the request of Dr. Madison Leyden for evaluation of concern for TTP, family hx lupus presening with multiorgan failure, possible PEs and thrombocytopenia. History of Present Illness:     Pt seen today for office f/u after hospital consult for thrombocytopenia while septic from PNA/ flu. Last platelet count was up 104k. Still anemic and hx of chronic anemia per pt. Was on oral iron in past.   Pt was seen by hepatology and had labs done today but pending. Pt feeling much better overall. On IV abx with midline. Last visit:  Patient seen in the hospital after admission from the ED for low blood pressure. Patient had hx of cough, fatigue, fevers, abd pain and diarrhea. In the ED patient was worked up for sepsis and sepsis bundle intiated. CMP showed marked elevated transaminates, lactic acidosis, JANICE. Elevated T-Bili of 7.3 and Hyponatremia 129. Patient also has some thrombocytopenia, platelet level of 89. Consulted for concern for TTP. Patient is laying in bed, drowsy but conversant. She states she fears the diagnosis of sepsis as this is how her father . Past Medical History:   Diagnosis Date    Anemia     Asthma     uses inhaler 2-3 times per week    Biliary colic     Hypertension     Ill-defined condition     murmur      Past Surgical History:   Procedure Laterality Date    HX CHOLECYSTECTOMY      HX GYN      vaginal delivery x1    HX GYN  2018    cyst removal    HX GYN          HX OTHER SURGICAL      lipoma removal from right shoulder    HX WISDOM TEETH EXTRACTION        Social History     Tobacco Use    Smoking status: Never Smoker    Smokeless tobacco: Never Used   Substance Use Topics    Alcohol use:  Yes Alcohol/week: 3.0 standard drinks     Types: 3 Cans of beer per week      Family History   Problem Relation Age of Onset    Hypertension Mother     Cancer Mother         colon, in remission    Clotting Disorder Mother         blood clot post surgery    Hypertension Father     Colon Cancer Paternal Uncle     Pancreatic Cancer Paternal Uncle     Cancer Paternal Uncle     Cancer Maternal Uncle     Stroke Paternal Aunt     Heart Disease Maternal Grandmother      Current Outpatient Medications   Medication Sig    metoprolol succinate (TOPROL-XL) 50 mg XL tablet Take 2 Tabs by mouth daily.  potassium chloride (K-DUR, KLOR-CON) 20 mEq tablet Take 1 Tab by mouth two (2) times a day.  cefTRIAXone 2 gram 2 g, ADDaptor 1 Device IVPB 2 g by IntraVENous route every twenty-four (24) hours. Indications: as per ID recommednations    folic acid (FOLVITE) 1 mg tablet Take 1 Tab by mouth daily.  furosemide (LASIX) 40 mg tablet One tab daily    therapeutic multivitamin (THERAGRAN) tablet Take 1 Tab by mouth daily.  thiamine HCL (B-1) 100 mg tablet Take 1 Tab by mouth daily.  ergocalciferol (ERGOCALCIFEROL) 50,000 unit capsule Take 1 Cap by mouth every seven (7) days.  valACYclovir (VALTREX) 500 mg tablet Take 500 mg by mouth every evening.  albuterol (PROVENTIL HFA, VENTOLIN HFA, PROAIR HFA) 90 mcg/actuation inhaler Take 2 Puffs by inhalation every four (4) hours as needed for Wheezing.  guaiFENesin-dextromethorphan (ROBITUSSIN DM) 100-10 mg/5 mL syrup Take 10 mL by mouth three (3) times daily as needed for Cough for up to 10 days. No current facility-administered medications for this visit.       Facility-Administered Medications Ordered in Other Visits   Medication Dose Route Frequency    cefTRIAXone (ROCEPHIN) 2 g in 0.9% sodium chloride (MBP/ADV) 50 mL  2 g IntraVENous ONCE    [START ON 3/4/2020] cefTRIAXone (ROCEPHIN) 2 g in 0.9% sodium chloride (MBP/ADV) 50 mL  2 g IntraVENous ONCE    [START ON 3/5/2020] cefTRIAXone (ROCEPHIN) 2 g in 0.9% sodium chloride (MBP/ADV) 50 mL  2 g IntraVENous ONCE    0.9% sodium chloride infusion  25 mL/hr IntraVENous PRN      Allergies   Allergen Reactions    Amoxicillin Hives     Tolerated ceftriaxone.  Chlorhexidine Rash    Lisinopril Angioedema        Review of Systems: A complete review of systems was obtained, negative except as described above. Physical Exam:     Visit Vitals  /90 (BP 1 Location: Right arm, BP Patient Position: Sitting)   Pulse 73   Temp 98.2 °F (36.8 °C) (Oral)   Ht 5' 7\" (1.702 m)   Wt 168 lb 11.2 oz (76.5 kg)   LMP 02/15/2020   SpO2 96%   BMI 26.42 kg/m²     ECOG PS: 0  General: No distress  Eyes: PERRLA, anicteric sclerae  HENT: Atraumatic  Neck: Supple  Respiratory: clear  CV:  Regular   MS: ambulatory  Skin: No rashes, ecchymoses, or petechiae. Normal temperature, turgor, and texture. Psych: alert, conversant    Results:     Lab Results   Component Value Date/Time    WBC 8.0 02/28/2020 05:36 PM    HGB 8.2 (L) 02/28/2020 05:36 PM    HCT 25.9 (L) 02/28/2020 05:36 PM    PLATELET 732 (L) 22/01/2193 05:36 PM    .4 (H) 02/28/2020 05:36 PM    ABS. NEUTROPHILS 6.2 02/28/2020 05:36 PM     Lab Results   Component Value Date/Time    Sodium 142 02/28/2020 05:36 PM    Potassium 2.9 (L) 02/28/2020 05:36 PM    Chloride 104 02/28/2020 05:36 PM    CO2 31 02/28/2020 05:36 PM    Glucose 122 (H) 02/28/2020 05:36 PM    BUN 13 02/28/2020 05:36 PM    Creatinine 1.04 (H) 02/28/2020 05:36 PM    GFR est AA >60 02/28/2020 05:36 PM    GFR est non-AA 59 (L) 02/28/2020 05:36 PM    Calcium 9.3 02/28/2020 05:36 PM     Lab Results   Component Value Date/Time    Bilirubin, total 0.7 02/28/2020 05:36 PM    ALT (SGPT) 146 (H) 02/28/2020 05:36 PM    AST (SGOT) 49 (H) 02/28/2020 05:36 PM    Alk.  phosphatase 117 02/28/2020 05:36 PM    Protein, total 5.3 (L) 02/28/2020 05:36 PM    Albumin 2.4 (L) 02/28/2020 05:36 PM    Globulin 2.9 02/28/2020 05:36 PM Peripheral Smear 2/19/2020   FINAL PATHOLOGIC DIAGNOSIS   Peripheral blood, smear: Thrombocytopenia without platelet aggregation. Red blood cells and white blood cells within normal morphologic limits. CT A/P 2/19/2020  IMPRESSION:  1. Multifocal pulmonary consolidations. 2. Diffuse hepatic steatosis. 3. Status post cholecystectomy. Records reviewed and summarized above. Pathology report(s) reviewed above. Radiology report(s) reviewed above. Assessment/Plan:   1) acute Thrombocytopenia improving  Etiology most likely due to acute illness/ sepsis/ shock liver. Consulted for TTP but not c/w TTP given no anemia. Peripheral smear shows thrombocytopenia without platelet aggregation. Plt level improved to 104k. Clinically pt is better overall. 2) chronic anemia per pt. Low iron in past.   Check labs today. Was on oral iron/ not now. 3) Shock Liver improving. Hepatology saw pt today. 4)Coagulopathy improving. Most likely 2/2 #3. Treated with IV vitamin K. No evidence of bleeding.     5)? Sepsis/ flu/ PNA. On IV abx per PCP. Call for questions   F/u here in 2 months if needed. I appreciate the opportunity to participate in Ms. Molly Lynn care.     Signed By: Marcelino Jackson DO

## 2020-03-03 NOTE — Clinical Note
3/10/20 Patient: Cha Sahu YOB: 1980 Date of Visit: 3/3/2020 Vel Atwood NP 
[address] [address] VIA In Basket Dear Vel Atwood NP, Thank you for referring Ms. Cha Sahu to [address] [place] [address],[address] for evaluation. My notes for this consultation are attached. If you have questions, please do not hesitate to call me. I look forward to following your patient along with you. Sincerely, Branden Yousif MD

## 2020-03-03 NOTE — PROGRESS NOTES
Alejandro Goncalves is a 46 y.o. female  Chief Complaint   Patient presents with   [facility]         Visit Vitals  /79 (BP 1 Location: Left arm, BP Patient Position: Sitting)   Pulse 73   Temp 97.6 °F (36.4 °C) (Tympanic)   Ht 5' 7\" (1.702 m)   Wt 165 lb (74.8 kg)   SpO2 100%   BMI 25.84 kg/m²     3 most recent PHQ Screens 3/3/2020   Little interest or pleasure in doing things Not at all   Feeling down, depressed, irritable, or hopeless Not at all   Total Score PHQ 2 0     Learning Assessment 3/3/2020   PRIMARY LEARNER Patient   HIGHEST LEVEL OF EDUCATION - PRIMARY LEARNER  -   BARRIERS PRIMARY LEARNER [address] CAREGIVER No   PRIMARY LANGUAGE ENGLISH   LEARNER PREFERENCE PRIMARY DEMONSTRATION   ANSWERED BY patient   RELATIONSHIP SELF     Abuse Screening Questionnaire 3/3/2020   Do you ever feel afraid of your partner? N   Are you in a relationship with someone who physically or mentally threatens you? N   Is it safe for you to go home?  Itzel Florez

## 2020-03-03 NOTE — PROGRESS NOTES
[facility] Outpatient Infusion Center Note:  [id number] arrived at [facility] ambulatory and in no distress for daily antibiotic. Assessment stable, no new complaints voiced. Pt had appt with Dr Aracelis Guerin prior to this. Pt has midline . Treatment finishes Thursday. Medications received:  Rocephin    1455Tolerated treatment well, no adverse reaction noted.   D/Cd from [facility] ambulatory and in no distress accompanied by family member  Next appt 3/4 1700  Visit Vitals  /84   Pulse 83   Temp 98.6 °F (37 °C)   Resp 16   LMP 02/15/2020

## 2020-03-03 NOTE — PROGRESS NOTES
[address] [place] [address], MD, MD Nicholas Jones, DEVI Kelly, ACN-BC     Aundrea Olguin, [facility]   KAREEM Russell, [facility]       [facility] Deputado [facility]    at [address], [address] [place] [address], [facility] 22.    [phone]    FAX: [address] [place] [address], [address] [facility] [address]    [phone]    FAX: [phone]         Patient Care Team:  Mauro Barba NP as PCP - General (Nurse Practitioner)  Mauro Barba NP as PCP - [facility]  Sinan Ivy RN as Nurse Navigator  Wendy Grossman MD (General Surgery)      Problem List  Date Reviewed: 3/10/2020          Codes Class Noted    Shock liver ICD-10-CM: K72.00  ICD-9-CM: 745  3/10/2020        Acetaminophen overdose, accidental or unintentional, subsequent encounter ICD-10-CM: T39.1X1D  ICD-9-CM: V58.89, 965.4  3/10/2020        Vitamin D deficiency ICD-10-CM: E55.9  ICD-9-CM: 268.9  9/20/2016        Lump of skin ICD-10-CM: R22.9  ICD-9-CM: 782.2  1/2/2014        Cellulitis of great toe ICD-10-CM: L03.039  ICD-9-CM: 681.10  1/2/2014        Benign essential HTN ICD-10-CM: I10  ICD-9-CM: 401.1  2/21/2013        HSV (herpes simplex virus) infection ICD-10-CM: B00.9  ICD-9-CM: 054.9  2/21/2013        Acute bronchitis ICD-10-CM: J20.9  ICD-9-CM: 466.0  11/23/2011              This is the first office visit for Lucy Thorne at [address]. We participated in the care of this patient during a recent hospitalization at [facility] for treatment of severe influenza with SIRS, hypotension , shock liver,    The patient was discharged 4 days ago and this is the transitional care office visit.     The hospital course, active problem list, all pertinent past medical history, medications, endoscopic studies, radiologic findings and laboratory findings related to the liver disorder were reviewed with the patient. The patient is a 46 y.o. Black female who was found to have elevated  liver enzymes when she was hospitalized with influenza, pneumonia, hypoxia, hypoxia, SIRS and hypotension. Prior to admission the patient had taken some acetaminophen for the flu-like symptoms and there was concern for acaminophen toxicity. The patient had marked elevation in liver transaminases into the 2000 range, TBILI 7.5 mg and INR 2.0 that was thought to be due to shock liver from SIRS and hypotension. There was also a history of her consuming acetaminophen in the days preceding her admission for flu-like symptoms. She was treated with IV NAC during the hospitalization. Serologic evaluation for markers of chronic liver disease was negative    Ultrasound and CT scan of the liver was performed in 2/2020. The results of the imaging suggested chronic or fatty liver disease. An assessment of liver fibrosis with biopsy or elastography has not been performed. The patient has recovered from the acute event. She remains weak but is starting to feel better. The patient is not currently experiencing the following symptoms of liver disease:  pain in the right side over the liver,     The patient has Moderate limitations in functional activities which can be attributed to the event. ASSESSMENT AND PLAN:  Elevated liver enzymes  Acute elevation in liver transaminases due to shock liver     The marked elevation in liver enzymes may have also have been due to acetaminophen toxicity. She had been consuming  alcohol daily and this is known to increase the risk of acetaminophen toxicity. Liver enzymes improved rapidly during the hospitalization.     Repeat labs today were significant for AST 36, with normal ALT, TBILI and INR.    The issue is whether she has underlying alcoholic fatty liver which increased her risk for the severe acute hepatitis she developed. Elevation in Ferritin  There is a marked elevation in ferritin with Elevated iron saturation. Repeat Ferritin was down from 2000 to 200   FE sat was down from 95 to 21%  The elevation in ferritin reflects hepatic inflammation from the acute event. Fatty liver  Suspect the patient has fatty liver based upon imaging,   The histologic severity has not been defined. Fatty liver is likely due to alcohol. The need to perform an assessment of liver fibrosis was discussed with the patient. The Fibroscan can assess liver fibrosis and determine if a patient has advanced fibrosis or cirrhosis without the need for liver biopsy. This will be performed at the next office visit. If the Fibroscan suggests advanced fibrosis then a liver biopsy should be considered. The Fibroscan can be repeated annually or as often as clinically indicated to assess for fibrosis progression and/or regression. IF the Fibroscan shows no significant liver injury then no further follow-up will be required. Treatment of other medical problems in patients with chronic liver disease  There are no contraindications for the patient to take most medications that are necessary for treatment of other medical issues. Counseling for alcohol in patients with chronic liver disease  The patient was counseled regarding alcohol consumption and the effect of alcohol on chronic liver disease. The patient was reminded that alcohol can cause fatty liver. The patient consumes too much alcohol and is at risk to develop alcohol induced liver injury. It was recommended that all alcohol consumption be limited to no ore than 0-2 per day.     Vaccinations   Vaccination for viral hepatitis B is recommended since the patient has no serologic evidence of previous exposure or vaccination with immunity. The need for vaccination against viral hepatitis A will be assessed with serologic and instituted as appropriate. Since the patient does not have a chronic liver disease which can lead to liver injury screening for HAV and HBV is not needed. Routine vaccinations against other bacterial and viral agents can be performed as indicated. Annual flu vaccination should be administered if indicated. ALLERGIES  Allergies   Allergen Reactions    Amoxicillin Hives     Tolerated ceftriaxone.  Chlorhexidine Rash    Lisinopril Angioedema       MEDICATIONS  Current Outpatient Medications   Medication Sig    albuterol (PROVENTIL HFA, VENTOLIN HFA, PROAIR HFA) 90 mcg/actuation inhaler Take 2 Puffs by inhalation every four (4) hours as needed for Wheezing.  cefTRIAXone 2 gram 2 g, ADDaptor 1 Device IVPB 2 g by IntraVENous route every twenty-four (24) hours. Indications: as per ID recommednations (Patient not taking: Reported on 7/5/7713)    folic acid (FOLVITE) 1 mg tablet Take 1 Tab by mouth daily.  furosemide (LASIX) 40 mg tablet One tab daily    therapeutic multivitamin (THERAGRAN) tablet Take 1 Tab by mouth daily.  thiamine HCL (B-1) 100 mg tablet Take 1 Tab by mouth daily.  ergocalciferol (ERGOCALCIFEROL) 50,000 unit capsule Take 1 Cap by mouth every seven (7) days.  valACYclovir (VALTREX) 500 mg tablet Take 500 mg by mouth every evening.  metoprolol succinate (TOPROL-XL) 100 mg tablet Take 1.5 Tabs by mouth daily.  loratadine (CLARITIN) 10 mg tablet Take 1 Tab by mouth daily.  fluticasone propionate (FLONASE) 50 mcg/actuation nasal spray 2 Sprays by Both Nostrils route daily.  melatonin 3 mg tablet Take 1 Tab by mouth nightly.  potassium chloride (K-DUR, KLOR-CON) 20 mEq tablet Take 1 Tab by mouth two (2) times a day. No current facility-administered medications for this visit.         SYSTEM REVIEW NOT RELATED TO LIVER DISEASE OR REVIEWED ABOVE:  Constitution systems: Negative for fever, chills, weight gain, weight loss. Eyes: Negative for visual changes. ENT: Negative for sore throat, painful swallowing. Respiratory: Negative for cough, hemoptysis, SOB. Cardiology: Negative for chest pain, palpitations. GI:  Negative for constipation or diarrhea. : Negative for urinary frequency, dysuria, hematuria, nocturia. Skin: Negative for rash. Hematology: Negative for easy bruising, blood clots. Musculo-skelatal: Negative for back pain, muscle pain, weakness. Neurologic: Negative for headaches, dizziness, vertigo, memory problems not related to HE. Psychology: Negative for anxiety, depression. FAMILY HISTORY:  The father   of sepsis. The mother Has/had the following chronic disease(s): was recently found to have liver fibrosis during cholecystectomy.     SOCIAL HISTORY:  The patient has never been . The patient has 1 child. The patient has never used tobacco products. The patient consumes 4 alcoholic beverages per day   The patient does not work   She lives with her mother. PHYSICAL EXAMINATION:  Visit Vitals  /79 (BP 1 Location: Left arm, BP Patient Position: Sitting)   Pulse 73   Temp 97.6 °F (36.4 °C) (Tympanic)   Ht 5' 7\" (1.702 m)   Wt 165 lb (74.8 kg)   LMP 02/15/2020   SpO2 100%   BMI 25.84 kg/m²     General: No acute distress. Eyes: Sclera anicteric. ENT: No oral lesions. Thyroid normal.  Nodes: No adenopathy. Skin: No spider angiomata. No jaundice. No palmar erythema. Respiratory: Lungs clear to auscultation. Cardiovascular: Regular heart rate. No murmurs. No JVD. Abdomen: Soft non-tender. Liver size normal to percussion/palpation. Spleen not palpable. No obvious ascites. Extremities: No edema. No muscle wasting. No gross arthritic changes. Neurologic: Alert and oriented. Cranial nerves grossly intact. No asterixis.     LABORATORY STUDIES:  Liver [place] of [address] [place] [address] & Units 3/3/2020 2/28/2020   WBC 3.4 - 10.8 x10E3/uL 7.9 8.0   ANC 1.4 - 7.0 x10E3/uL 5.6 6.2   HGB 11.1 - 15.9 g/dL 9.4 (L) 8.2 (L)    - 450 x10E3/uL 245 104 (L)   INR 0.8 - 1.2 1.0    AST 0 - 40 IU/L 28 49 (H)   ALT 0 - 32 IU/L 61 (H) 146 (H)   Alk Phos 39 - 117 IU/L 98 117   Bili, Total 0.0 - 1.2 mg/dL 0.8 0.7   Bili, Direct 0.00 - 0.40 mg/dL 0.41 (H) 0.5 (H)   Albumin 3.8 - 4.8 g/dL 3.6 (L) 2.4 (L)   BUN 6 - 20 mg/dL 7 13   Creat 0.57 - 1.00 mg/dL 0.83 1.04 (H)   Na 134 - 144 mmol/L 143 142   K 3.5 - 5.2 mmol/L 3.2 (L) 2.9 (L)   Cl 96 - 106 mmol/L 99 104   CO2 20 - 29 mmol/L 27 31   Glucose 65 - 99 mg/dL 76 122 (H)     SEROLOGIES:  Serologies Latest Ref Rng & Units 2/20/2020 2/19/2020   Hep B Surface Ag Index  <0.10   Hep B Surface Ag Interp NEG    NEGATIVE   Hep C Ab NR    NONREACTIVE   Ferritin 15 - 150 ng/mL 2,358 (H)    Iron % Saturation 15 - 55 %  95 (H)   C-ANCA Neg:<1:20 titer <1:20    P-ANCA Neg:<1:20 titer <1:20    ANCA Neg:<1:20 titer <1:20    ASMCA 0 - 19 Units 7    Ceruloplasmin 19.0 - 39.0 mg/dL 21.5      Serologies Latest Ref Rng & Units 3/3/2020   Ferritin 15 - 150 ng/mL 222 (H)   Iron % Saturation 15 - 55 % 21     LIVER HISTOLOGY:  Not available or performed    ENDOSCOPIC PROCEDURES:  Not available or performed    RADIOLOGY:  2/2020. Ultrasound of liver. Echogenic consistent with fatty liver. No liver mass lesions. No dilated bile ducts. No ascites. 2/2020. CT scan abdomen without IV contrast.  Changes consistent with fatty liver. No liver mass lesions. Normal spleen. No ascites. OTHER TESTING:  Not available or performed    FOLLOW-UP:  All of the issues listed above in the Assessment and Plan were discussed with the patient. All questions were answered. The patient expressed a clear understanding of the above. [address] [address] in 6 weeks for Fibroscan to review all data and determine the treatment plan.     If the Fibroscan is normal then no further follow-up is necessary.       MD Gopal Lanier [address] [place] [address] A, [address] [place] [address] 22.  [phone]  [address] [facility]

## 2020-03-03 NOTE — TELEPHONE ENCOUNTER
From: Davon Villatoro  To: Lizzeth Diez NP  Sent: 3/2/2020 6:00 PM EST  Subject: Prescription Question    Good evening. ..my blood pressure seems to not go down like it should since I went in the hospital. It's now causing headaches. .I went to the infusion center and they always check my. bp before my treatment. But I've had a headache most of the evening so my bp was 180 over 90 something. Would it hurt if I took a 2nd blood pressure pill this evening because I dont believe the dosage is high enough to control my bp.

## 2020-03-03 NOTE — PROGRESS NOTES
Robert Forte is a 46 y.o. female  Chief Complaint   Patient presents with    New Patient     liver failure   [facility] Follow Up     1. Have you been to the ER, urgent care clinic since your last visit? Hospitalized since your last visit? This is the patients first time here in this office. 2. Have you seen or consulted any other health care providers outside of the [address] [place] [address] since your last visit? Include any pap smears or colon screening. This is the patients first time here in this office. Pt states she is having bad headache, may be blood pressure pills?

## 2020-03-04 ENCOUNTER — HOSPITAL ENCOUNTER (OUTPATIENT)
Dept: INFUSION THERAPY | Age: 40
Discharge: HOME | End: 2020-03-04
Payer: MEDICAID

## 2020-03-04 VITALS
RESPIRATION RATE: 16 BRPM | DIASTOLIC BLOOD PRESSURE: 98 MMHG | HEART RATE: 88 BPM | SYSTOLIC BLOOD PRESSURE: 169 MMHG | TEMPERATURE: 99.3 F

## 2020-03-04 LAB
ALBUMIN SERPL-MCNC: 3.6 G/DL (ref 3.8–4.8)
ALP SERPL-CCNC: 98 IU/L (ref 39–117)
ALT SERPL-CCNC: 61 IU/L (ref 0–32)
AST SERPL-CCNC: 28 IU/L (ref 0–40)
BASOPHILS # BLD AUTO: 0 X10E3/UL (ref 0–0.2)
BASOPHILS NFR BLD AUTO: 1 %
BILIRUB DIRECT SERPL-MCNC: 0.41 MG/DL (ref 0–0.4)
BILIRUB SERPL-MCNC: 0.8 MG/DL (ref 0–1.2)
BUN SERPL-MCNC: 7 MG/DL (ref 6–20)
BUN/CREAT SERPL: 8 (ref 9–23)
CALCIUM SERPL-MCNC: 10.4 MG/DL (ref 8.7–10.2)
CHLORIDE SERPL-SCNC: 99 MMOL/L (ref 96–106)
CO2 SERPL-SCNC: 27 MMOL/L (ref 20–29)
CREAT SERPL-MCNC: 0.83 MG/DL (ref 0.57–1)
EOSINOPHIL # BLD AUTO: 0.2 X10E3/UL (ref 0–0.4)
EOSINOPHIL NFR BLD AUTO: 3 %
ERYTHROCYTE [DISTWIDTH] IN BLOOD BY AUTOMATED COUNT: 15.3 % (ref 11.7–15.4)
FERRITIN SERPL-MCNC: 222 NG/ML (ref 15–150)
GLUCOSE SERPL-MCNC: 76 MG/DL (ref 65–99)
HCT VFR BLD AUTO: 29.5 % (ref 34–46.6)
HGB BLD-MCNC: 9.4 G/DL (ref 11.1–15.9)
IMM GRANULOCYTES # BLD AUTO: 0 X10E3/UL (ref 0–0.1)
IMM GRANULOCYTES NFR BLD AUTO: 0 %
INR PPP: 1 (ref 0.8–1.2)
IRON SATN MFR SERPL: 21 % (ref 15–55)
IRON SERPL-MCNC: 76 UG/DL (ref 27–159)
LYMPHOCYTES # BLD AUTO: 1.3 X10E3/UL (ref 0.7–3.1)
LYMPHOCYTES NFR BLD AUTO: 16 %
MCH RBC QN AUTO: 31.4 PG (ref 26.6–33)
MCHC RBC AUTO-ENTMCNC: 31.9 G/DL (ref 31.5–35.7)
MCV RBC AUTO: 99 FL (ref 79–97)
MONOCYTES # BLD AUTO: 0.8 X10E3/UL (ref 0.1–0.9)
MONOCYTES NFR BLD AUTO: 10 %
NEUTROPHILS # BLD AUTO: 5.6 X10E3/UL (ref 1.4–7)
NEUTROPHILS NFR BLD AUTO: 70 %
PLATELET # BLD AUTO: 245 X10E3/UL (ref 150–450)
POTASSIUM SERPL-SCNC: 3.2 MMOL/L (ref 3.5–5.2)
PROT SERPL-MCNC: 6.3 G/DL (ref 6–8.5)
PROTHROMBIN TIME: 10.7 SEC (ref 9.1–12)
RBC # BLD AUTO: 2.99 X10E6/UL (ref 3.77–5.28)
SODIUM SERPL-SCNC: 143 MMOL/L (ref 134–144)
TIBC SERPL-MCNC: 365 UG/DL (ref 250–450)
UIBC SERPL-MCNC: 289 UG/DL (ref 131–425)
WBC # BLD AUTO: 7.9 X10E3/UL (ref 3.4–10.8)

## 2020-03-04 PROCEDURE — 74011250636 HC RX REV CODE- 250/636: Performed by: INTERNAL MEDICINE

## 2020-03-04 PROCEDURE — 74011000258 HC RX REV CODE- 258: Performed by: INTERNAL MEDICINE

## 2020-03-04 PROCEDURE — 96365 THER/PROPH/DIAG IV INF INIT: CPT

## 2020-03-04 RX ADMIN — CEFTRIAXONE SODIUM 2 G: 2 INJECTION, POWDER, FOR SOLUTION INTRAMUSCULAR; INTRAVENOUS at 17:10

## 2020-03-04 NOTE — PROGRESS NOTES
OPIC Short Note                   1700 Pt admit to [facility] for daily antibiotics ambulatory in stable condition. Assessment completed. No new concerns voiced. Midline catheter with positive blood return; patient connected to [address]. Patient Vitals for the past 12 hrs:   Temp Pulse Resp BP   03/04/20 1707 99.3 °F (37.4 °C) 88 16 (!) 169/98       Medications Administered     cefTRIAXone (ROCEPHIN) 2 g in 0.9% sodium chloride (MBP/ADV) 50 mL     Admin Date  03/04/2020 Action  New Bag Dose  2 g Rate  100 mL/hr Route  IntraVENous Administered By  Almaz Pro RN              Ms. Sid Dacosta tolerated the infusion, and had no complaints. Ms. Sid Dacosta was discharged from [place] [address] in stable condition at 1800.     Future Appointments   Date Time Provider Jessica Forrester   3/5/2020  5:00 PM G1 RUBI Santoschristinejackie [address]   3/6/2020 11:00 AM Afua Sauer NP [address]   4/14/2020  2:45 PM Wong Contreras NP [address] [facility]   5/5/2020  3:00 PM Casey Peters [facility],  [address] ALAN Delvalle RN  March 4, 2020

## 2020-03-05 ENCOUNTER — HOSPITAL ENCOUNTER (OUTPATIENT)
Dept: INFUSION THERAPY | Age: 40
Discharge: HOME | End: 2020-03-05
Payer: MEDICAID

## 2020-03-05 VITALS
SYSTOLIC BLOOD PRESSURE: 151 MMHG | RESPIRATION RATE: 18 BRPM | HEART RATE: 86 BPM | DIASTOLIC BLOOD PRESSURE: 82 MMHG | TEMPERATURE: 98.8 F

## 2020-03-05 PROCEDURE — 74011000258 HC RX REV CODE- 258: Performed by: INTERNAL MEDICINE

## 2020-03-05 PROCEDURE — 74011250636 HC RX REV CODE- 250/636: Performed by: INTERNAL MEDICINE

## 2020-03-05 PROCEDURE — 96365 THER/PROPH/DIAG IV INF INIT: CPT

## 2020-03-05 RX ORDER — SODIUM CHLORIDE 0.9 % (FLUSH) 0.9 %
5-10 SYRINGE (ML) INJECTION AS NEEDED
Status: DISCONTINUED | OUTPATIENT
Start: 2020-03-05 | End: 2020-03-06 | Stop reason: HOSPADM

## 2020-03-05 RX ORDER — SODIUM CHLORIDE 9 MG/ML
25 INJECTION, SOLUTION INTRAVENOUS AS NEEDED
Status: DISCONTINUED | OUTPATIENT
Start: 2020-03-05 | End: 2020-03-06 | Stop reason: HOSPADM

## 2020-03-05 RX ADMIN — SODIUM CHLORIDE 25 ML/HR: 900 INJECTION, SOLUTION INTRAVENOUS at 16:49

## 2020-03-05 RX ADMIN — CEFTRIAXONE SODIUM 2 G: 2 INJECTION, POWDER, FOR SOLUTION INTRAMUSCULAR; INTRAVENOUS at 16:51

## 2020-03-05 RX ADMIN — Medication 10 ML: at 16:50

## 2020-03-05 NOTE — PROGRESS NOTES
This nurse tried to call the patient to see if this was resolved. No answer. Message says:  The [employer] customer is not available at this time. Not able to leave a message.

## 2020-03-06 ENCOUNTER — OFFICE VISIT (OUTPATIENT)
Dept: FAMILY MEDICINE CLINIC | Age: 40
End: 2020-03-06

## 2020-03-06 VITALS
WEIGHT: 168 LBS | TEMPERATURE: 98.2 F | SYSTOLIC BLOOD PRESSURE: 145 MMHG | HEART RATE: 80 BPM | HEIGHT: 67 IN | OXYGEN SATURATION: 100 % | BODY MASS INDEX: 26.37 KG/M2 | DIASTOLIC BLOOD PRESSURE: 90 MMHG | RESPIRATION RATE: 16 BRPM

## 2020-03-06 DIAGNOSIS — J04.0 LARYNGITIS: ICD-10-CM

## 2020-03-06 DIAGNOSIS — J02.9 PHARYNGITIS, UNSPECIFIED ETIOLOGY: ICD-10-CM

## 2020-03-06 DIAGNOSIS — J30.2 SEASONAL ALLERGIC RHINITIS, UNSPECIFIED TRIGGER: ICD-10-CM

## 2020-03-06 DIAGNOSIS — J18.9 PNEUMONIA OF BOTH LUNGS DUE TO INFECTIOUS ORGANISM, UNSPECIFIED PART OF LUNG: ICD-10-CM

## 2020-03-06 DIAGNOSIS — Z09 HOSPITAL DISCHARGE FOLLOW-UP: Primary | ICD-10-CM

## 2020-03-06 DIAGNOSIS — K72.00 ACUTE LIVER FAILURE WITHOUT HEPATIC COMA: ICD-10-CM

## 2020-03-06 DIAGNOSIS — I10 BENIGN ESSENTIAL HTN: ICD-10-CM

## 2020-03-06 RX ORDER — LORATADINE 10 MG/1
10 TABLET ORAL DAILY
Qty: 90 TAB | Refills: 3 | Status: SHIPPED | OUTPATIENT
Start: 2020-03-06 | End: 2022-02-16

## 2020-03-06 RX ORDER — LANOLIN ALCOHOL/MO/W.PET/CERES
3 CREAM (GRAM) TOPICAL
Qty: 90 TAB | Refills: 3 | Status: SHIPPED | OUTPATIENT
Start: 2020-03-06 | End: 2021-04-06

## 2020-03-06 RX ORDER — FLUTICASONE PROPIONATE 50 MCG
2 SPRAY, SUSPENSION (ML) NASAL DAILY
Qty: 1 BOTTLE | Refills: 5 | Status: SHIPPED | OUTPATIENT
Start: 2020-03-06

## 2020-03-06 RX ORDER — METOPROLOL SUCCINATE 100 MG/1
150 TABLET, EXTENDED RELEASE ORAL DAILY
Qty: 135 TAB | Refills: 3 | Status: SHIPPED | OUTPATIENT
Start: 2020-03-06 | End: 2021-03-23

## 2020-03-06 NOTE — PROGRESS NOTES
HISTORY OF PRESENT ILLNESS  Alejandro Goncalves is a 46 y.o. female. HPI  Patient comes in today for hospital follow up. Hospital records reviewed. Was in [facility] 2/19/2020 - 2/26/2020  Was going to infusion center for IV Rocephin, last treatment yesterday. Saw Dr. Maranda Barclay on 3/3/2020 - plan to follow up in 6 weeks with fibroscan  Saw Dr. Baron Torres - heme/onc - concern for TTP, family hx lupus presening with multiorgan failure, possible PEs and thrombocytopenia - Etiology most likely due to acute illness/ sepsis/ shock liver. Peripheral smear shows thrombocytopenia without platelet aggregation.  to follow up in 2 months  Pt concerned with pain in right side of throat and lost voice last night from coughing. Excerpted from Discharge diagnosis, hospital course/plan:  -Severe sepsis. From pneumococcal pneumonia: resolved,  -Pneumococcal pneumonia: continue IV rocephin as per id recommednations  Patient wanst to coem to infusion center for iv antibiotics , completed azithromycin. Out patient CT Chest 12 weeks from initial study for clearance of the pneumonia. Mid line today  Edema, improving  Started on lasix 40 mg   Discussed with nephrology   Will continue lasix for now as per nephrology recommendations and she will follow up with nephrology as outpatient basis  Patient understood and agreed with the plan   -Pneumococcal bacteremia: blood culture 2/19 2/2 positive. Repeat culture 2/20, /21 no grow. Echo unremarkable. ? 2 weeks of IV abx. ID consulted , Plan for ceftriaxone 2gm daily till 3/5/20 as per ID   Ordered midline today. Patient wants to come to infusion center . Patient understood and agreed with plan   -Influenza B resp infection.   Was on tamiflu  -Acute liver failure. resolved    Unclear etiology. Hepatologist consulted.  Clinically more likely Shock liver, she had hypotension on presentation, did not require vasopressors. She was started of steroids of possible Alcoholic hepatitis -steroids were stopped as less likely alcohol liver disease. As per hepatology liver failure has resolved   Viral hep panel neg.  -Acute renal failure. resolved   Likely pre renal/ATN in setting of sever sepsis. Nephrology ok prescribing lasix on discharge   She will follow up with nephrology as outpatient basis  Patient understood and agreed with plan   -Lactic acidosis. Resolved   -Coagulopathy. Resolved   -HTN:  Home meds  -increasing leukocytosis:   thromocytopenia   Improving  Likely due to steroids  Patient will follow up with PCP for blood work . Patient understood and agreed with plan. She will go to PCP on Friday (cbc and BMP)    Allergies   Allergen Reactions    Amoxicillin Hives     Tolerated ceftriaxone.  Chlorhexidine Rash    Lisinopril Angioedema       Past Medical History:   Diagnosis Date    Anemia     Asthma     uses inhaler 2-3 times per week    Biliary colic 3/63/0589    Hypertension     Ill-defined condition     murmur       Past Surgical History:   Procedure Laterality Date    HX CHOLECYSTECTOMY      HX GYN  2010    vaginal delivery x1    HX GYN  2018    cyst removal    HX GYN          HX OTHER SURGICAL      lipoma removal from right shoulder    HX WISDOM TEETH EXTRACTION         Social History     Socioeconomic History    Marital status: SINGLE     Spouse name: Not on file    Number of children: Not on file    Years of education: Not on file    Highest education level: Not on file   Occupational History    Not on file   Social Needs    Financial resource strain: Not on file    Food insecurity:     Worry: Not on file     Inability: Not on file    Transportation needs:     Medical: Not on file     Non-medical: Not on file   Tobacco Use    Smoking status: Never Smoker    Smokeless tobacco: Never Used   Substance and Sexual Activity    Alcohol use:  Yes     Alcohol/week: 3.0 standard drinks     Types: 3 Cans of beer per week    Drug use: No    Sexual activity: Yes     Partners: Male     Birth control/protection: Condom   Lifestyle    Physical activity:     Days per week: Not on file     Minutes per session: Not on file    Stress: Not on file   Relationships    Social connections:     Talks on phone: Not on file     Gets together: Not on file     Attends Religion service: Not on file     Active member of club or organization: Not on file     Attends meetings of clubs or organizations: Not on file     Relationship status: Not on file    Intimate partner violence:     Fear of current or ex partner: Not on file     Emotionally abused: Not on file     Physically abused: Not on file     Forced sexual activity: Not on file   Other Topics Concern    Not on file   Social History Narrative    Not on file       Family History   Problem Relation Age of Onset    Hypertension Mother     Cancer Mother         colon, in remission    Clotting Disorder Mother         blood clot post surgery    Hypertension Father     Colon Cancer Paternal Uncle     Pancreatic Cancer Paternal Uncle     Cancer Paternal Uncle     Cancer Maternal Uncle     Stroke Paternal Aunt     Heart Disease Maternal Grandmother        Current Outpatient Medications   Medication Sig    metoprolol succinate (TOPROL-XL) 50 mg XL tablet Take 2 Tabs by mouth daily.  potassium chloride (K-DUR, KLOR-CON) 20 mEq tablet Take 1 Tab by mouth two (2) times a day.  albuterol (PROVENTIL HFA, VENTOLIN HFA, PROAIR HFA) 90 mcg/actuation inhaler Take 2 Puffs by inhalation every four (4) hours as needed for Wheezing.  folic acid (FOLVITE) 1 mg tablet Take 1 Tab by mouth daily.  furosemide (LASIX) 40 mg tablet One tab daily    guaiFENesin-dextromethorphan (ROBITUSSIN DM) 100-10 mg/5 mL syrup Take 10 mL by mouth three (3) times daily as needed for Cough for up to 10 days.  therapeutic multivitamin (THERAGRAN) tablet Take 1 Tab by mouth daily.  thiamine HCL (B-1) 100 mg tablet Take 1 Tab by mouth daily.     ergocalciferol (ERGOCALCIFEROL) 50,000 unit capsule Take 1 Cap by mouth every seven (7) days.  valACYclovir (VALTREX) 500 mg tablet Take 500 mg by mouth every evening.  cefTRIAXone 2 gram 2 g, ADDaptor 1 Device IVPB 2 g by IntraVENous route every twenty-four (24) hours. Indications: as per ID recommednations (Patient not taking: Reported on 3/6/2020)     No current facility-administered medications for this visit. Facility-Administered Medications Ordered in Other Visits   Medication Dose Route Frequency    0.9% sodium chloride infusion  25 mL/hr IntraVENous PRN    sodium chloride (NS) flush 5-10 mL  5-10 mL IntraVENous PRN     Review of Systems   Constitutional: Negative for chills, fever and malaise/fatigue. HENT: Positive for congestion and sore throat. Negative for ear pain and sinus pain. Rhinorrhea   Respiratory: Negative for cough, sputum production, shortness of breath and wheezing. Cardiovascular: Negative for chest pain and palpitations. Gastrointestinal: Negative for abdominal pain, diarrhea, nausea and vomiting. Genitourinary: Negative for dysuria, frequency and urgency. Musculoskeletal: Negative for myalgias. Neurological: Negative for dizziness and headaches. Endo/Heme/Allergies: Positive for environmental allergies. Vitals:    03/06/20 1131   BP: 145/90   Pulse: 80   Resp: 16   Temp: 98.2 °F (36.8 °C)   TempSrc: Oral   SpO2: 100%     Physical Exam  Vitals signs reviewed. Constitutional:       Appearance: Normal appearance. She is well-developed, well-groomed and normal weight. HENT:      Head:      Comments: hoarseness     Right Ear: Hearing, tympanic membrane, ear canal and external ear normal.      Left Ear: Hearing, tympanic membrane, ear canal and external ear normal.      Nose: Nose normal.      Right Sinus: No maxillary sinus tenderness or frontal sinus tenderness. Left Sinus: No maxillary sinus tenderness or frontal sinus tenderness.       Mouth/Throat: Mouth: Mucous membranes are not pale and not dry. Pharynx: Uvula midline. No oropharyngeal exudate or posterior oropharyngeal erythema. Cardiovascular:      Rate and Rhythm: Normal rate and regular rhythm. Heart sounds: Normal heart sounds, S1 normal and S2 normal.   Pulmonary:      Effort: Pulmonary effort is normal.      Breath sounds: Normal breath sounds. No decreased breath sounds, wheezing, rhonchi or rales. Abdominal:      Tenderness: There is abdominal tenderness. Lymphadenopathy:      Head:      Right side of head: Tonsillar adenopathy present. No submental, submandibular, preauricular or posterior auricular adenopathy. Left side of head: No submental, submandibular, tonsillar, preauricular or posterior auricular adenopathy. Cervical: Cervical adenopathy present. Upper Body:      Right upper body: No supraclavicular adenopathy. Left upper body: No supraclavicular adenopathy. Skin:     General: Skin is warm and dry. Neurological:      Mental Status: She is alert and oriented to person, place, and time. Psychiatric:         Behavior: Behavior is cooperative. Thought Content: Thought content normal.     ASSESSMENT and PLAN    ICD-10-CM ICD-9-CM    1. Hospital discharge follow-up Z09 V67.59 CBC WITH AUTOMATED DIFF      METABOLIC PANEL, COMPREHENSIVE   2. Acute liver failure without hepatic coma Y60.82 368 METABOLIC PANEL, COMPREHENSIVE   3. Pneumonia of both lungs due to infectious organism, unspecified part of lung J18.9 483.8 CBC WITH AUTOMATED DIFF      METABOLIC PANEL, COMPREHENSIVE   4. Seasonal allergic rhinitis, unspecified trigger J30.2 477.9 loratadine (CLARITIN) 10 mg tablet      fluticasone propionate (FLONASE) 50 mcg/actuation nasal spray      melatonin 3 mg tablet   5. Pharyngitis, unspecified etiology J02.9 462    6. Laryngitis J04.0 464.00    7.  Benign essential HTN I10 401.1 metoprolol succinate (TOPROL-XL) 100 mg tablet      CBC WITH AUTOMATED DIFF      METABOLIC PANEL, COMPREHENSIVE     Encounter Diagnoses   Name Primary? [facility] discharge follow-up Yes    Acute liver failure without hepatic coma     Pneumonia of both lungs due to infectious organism, unspecified part of lung     Seasonal allergic rhinitis, unspecified trigger     Pharyngitis, unspecified etiology     Laryngitis     Benign essential HTN      Orders Placed This Encounter    CBC WITH AUTOMATED DIFF    METABOLIC PANEL, COMPREHENSIVE    metoprolol succinate (TOPROL-XL) 100 mg tablet    loratadine (CLARITIN) 10 mg tablet    fluticasone propionate (FLONASE) 50 mcg/actuation nasal spray    melatonin 3 mg tablet     Diagnoses and all orders for this visit:    1. Hospital discharge follow-up  -     CBC WITH AUTOMATED DIFF  -     METABOLIC PANEL, COMPREHENSIVE    2. Acute liver failure without hepatic coma - RESOLVING. Followed by hepatology  -     METABOLIC PANEL, COMPREHENSIVE    3. Pneumonia of both lungs due to infectious organism, unspecified part of lung - RESOLVING. Finished Rocephin infusion yesterday, PICC line removed left upper arm. Needs CT in 2 months  -     CBC WITH AUTOMATED DIFF  -     METABOLIC PANEL, COMPREHENSIVE    4. Seasonal allergic rhinitis, unspecified trigger  -     loratadine (CLARITIN) 10 mg tablet; Take 1 Tab by mouth daily. -     fluticasone propionate (FLONASE) 50 mcg/actuation nasal spray; 2 Sprays by Both Nostrils route daily. -     melatonin 3 mg tablet; Take 1 Tab by mouth nightly. 5. Pharyngitis, unspecified etiology - OTC Claritin, Flonase. May also use saline nasal spray. 6. Laryngitis - usually self limiting. Most likely related to allergic rhinitis. 7. Benign essential HTN - not at goal.  Increase metoprolol to 150mg daily  -     metoprolol succinate (TOPROL-XL) 100 mg tablet;  Take 1.5 Tabs by mouth daily.  -     CBC WITH AUTOMATED DIFF  -     METABOLIC PANEL, COMPREHENSIVE      Follow-up and Dispositions    · Return in about 8 weeks (around 5/1/2020), or if symptoms worsen or fail to improve.       lab results and schedule of future lab studies reviewed with patient    I have reviewed the patient's allergies and made any necessary changes. Medical, procedural, social and family histories have been reviewed and updated as medically indicated. I have reconciled and/or revised patient medications in the EMR. I have discussed each diagnosis listed in this note with Israel Orellana and/or their family. I have discussed treatment options and the risk/benefit analysis of those options, including safe use of medications and possible medication side effects. Through the use of shared decision making we have agreed to the above plan. The patient has received an after-visit summary and questions were answered concerning future plans. Vale Jeffery, HERMELINDA-ALAN    This note will not be viewable in [employer].

## 2020-03-06 NOTE — PROGRESS NOTES
Chief Complaint   Patient presents with   [facility] Follow Up     2/19/20-2/26/20     Pt concerned with pain in right side of throat and lost voice last night from coughing. Pt states since leaving the hospital, at night she freezes and has a low grade fever. 1. Have you been to the ER, urgent care clinic since your last visit? Hospitalized since your last visit? No    2. Have you seen or consulted any other health care providers outside of the [address] [place] [address] since your last visit? Include any pap smears or colon screening. Yes, Dr. Rosangela [facility].      Health Maintenance Due   Topic Date Due    Pneumococcal 0-64 years (1 of 1 - PPSV23) 05/31/1987    PAP AKA CERVICAL CYTOLOGY  03/01/2017    Influenza Age 9 to Adult  08/01/2019

## 2020-03-06 NOTE — PROGRESS NOTES
Outpatient Infusion Center Short Visit Progress Note    6898 Pt admit to [facility] for daily Rocephin ambulatory in stable condition. Assessment completed. No new concerns voiced. Single lumen midline flushed w/ positive blood return; line flushed, NS infusing. Patient Vitals for the past 12 hrs:   Temp Pulse Resp BP   03/05/20 1635 98.8 °F (37.1 °C) 86 18 151/82          Medications Administered     0.9% sodium chloride infusion     Admin Date  03/05/2020 Action  New Bag Dose  25 mL/hr Rate  25 mL/hr Route  IntraVENous Administered By  Mena Cochran RN          cefTRIAXone (ROCEPHIN) 2 g in 0.9% sodium chloride (MBP/ADV) 50 mL     Admin Date  03/05/2020 Action  New Bag Dose  2 g Rate  100 mL/hr Route  IntraVENous Administered By  Mena Cochran RN          sodium chloride (NS) flush 5-10 mL     Admin Date  03/05/2020 Action  Given Dose  10 mL Route  IntraVENous Administered By  Mena Cochran RN                     2524 Pt tolerated treatment well. Midline removed. D/c home ambulatory in no distress.  Pt has no further appts

## 2020-03-06 NOTE — PATIENT INSTRUCTIONS

## 2020-03-07 LAB
ALBUMIN SERPL-MCNC: 3.9 G/DL (ref 3.8–4.8)
ALBUMIN/GLOB SERPL: 1.2 {RATIO} (ref 1.2–2.2)
ALP SERPL-CCNC: 109 IU/L (ref 39–117)
ALT SERPL-CCNC: 36 IU/L (ref 0–32)
AST SERPL-CCNC: 22 IU/L (ref 0–40)
BASOPHILS # BLD AUTO: 0.1 X10E3/UL (ref 0–0.2)
BASOPHILS NFR BLD AUTO: 1 %
BILIRUB SERPL-MCNC: 0.8 MG/DL (ref 0–1.2)
BUN SERPL-MCNC: 7 MG/DL (ref 6–20)
BUN/CREAT SERPL: 8 (ref 9–23)
CALCIUM SERPL-MCNC: 10.2 MG/DL (ref 8.7–10.2)
CHLORIDE SERPL-SCNC: 99 MMOL/L (ref 96–106)
CO2 SERPL-SCNC: 23 MMOL/L (ref 20–29)
CREAT SERPL-MCNC: 0.85 MG/DL (ref 0.57–1)
EOSINOPHIL # BLD AUTO: 0.2 X10E3/UL (ref 0–0.4)
EOSINOPHIL NFR BLD AUTO: 2 %
ERYTHROCYTE [DISTWIDTH] IN BLOOD BY AUTOMATED COUNT: 14.5 % (ref 11.7–15.4)
GLOBULIN SER CALC-MCNC: 3.2 G/DL (ref 1.5–4.5)
GLUCOSE SERPL-MCNC: 69 MG/DL (ref 65–99)
HCT VFR BLD AUTO: 29.9 % (ref 34–46.6)
HGB BLD-MCNC: 9.9 G/DL (ref 11.1–15.9)
IMM GRANULOCYTES # BLD AUTO: 0.1 X10E3/UL (ref 0–0.1)
IMM GRANULOCYTES NFR BLD AUTO: 1 %
LYMPHOCYTES # BLD AUTO: 1 X10E3/UL (ref 0.7–3.1)
LYMPHOCYTES NFR BLD AUTO: 11 %
MCH RBC QN AUTO: 31.9 PG (ref 26.6–33)
MCHC RBC AUTO-ENTMCNC: 33.1 G/DL (ref 31.5–35.7)
MCV RBC AUTO: 97 FL (ref 79–97)
MONOCYTES # BLD AUTO: 0.9 X10E3/UL (ref 0.1–0.9)
MONOCYTES NFR BLD AUTO: 10 %
NEUTROPHILS # BLD AUTO: 6.8 X10E3/UL (ref 1.4–7)
NEUTROPHILS NFR BLD AUTO: 75 %
PLATELET # BLD AUTO: 286 X10E3/UL (ref 150–450)
POTASSIUM SERPL-SCNC: 4 MMOL/L (ref 3.5–5.2)
PROT SERPL-MCNC: 7.1 G/DL (ref 6–8.5)
RBC # BLD AUTO: 3.1 X10E6/UL (ref 3.77–5.28)
SODIUM SERPL-SCNC: 138 MMOL/L (ref 134–144)
WBC # BLD AUTO: 9 X10E3/UL (ref 3.4–10.8)

## 2020-03-10 PROBLEM — K72.90 LIVER FAILURE (HCC): Status: RESOLVED | Noted: 2020-02-19 | Resolved: 2020-03-10

## 2020-03-10 PROBLEM — K72.00 SHOCK LIVER: Status: ACTIVE | Noted: 2020-03-10

## 2020-03-10 PROBLEM — T39.1X1D: Status: ACTIVE | Noted: 2020-03-10

## 2020-03-19 ENCOUNTER — PATIENT MESSAGE (OUTPATIENT)
Dept: FAMILY MEDICINE CLINIC | Age: 40
End: 2020-03-19

## 2020-03-19 DIAGNOSIS — B00.9 HSV (HERPES SIMPLEX VIRUS) INFECTION: Primary | ICD-10-CM

## 2020-03-19 RX ORDER — VALACYCLOVIR HYDROCHLORIDE 500 MG/1
500 TABLET, FILM COATED ORAL EVERY EVENING
Qty: 90 TAB | Refills: 4 | Status: SHIPPED | OUTPATIENT
Start: 2020-03-19 | End: 2021-06-10

## 2020-03-19 NOTE — TELEPHONE ENCOUNTER
From: Matthew Betancourt  To: Alla Muñiz NP  Sent: 3/19/2020 12:00 PM EDT  Subject: Prescription Question    Good morning. I'm unable to refill my valtrex and it says it's not available on here to refill. Am I due for my yearly or something?

## 2020-03-26 ENCOUNTER — PATIENT MESSAGE (OUTPATIENT)
Dept: FAMILY MEDICINE CLINIC | Age: 40
End: 2020-03-26

## 2020-03-27 NOTE — TELEPHONE ENCOUNTER
----- Message from Gabriel Rhodes sent at 3/27/2020  3:18 PM EDT -----  Regarding: RE: Non-Urgent Medical Question  Contact: [phone]    I wrote you about my throat. .since yesterday. ..it could be a allergy or anything. You were going to ask about me getting a appointment with someone else. Now its turned into a evaluation. Yifan Broach or dealing with the coronavirus? ? It could be a side effect to a medication I'm on or anything. So why now has the situation changed to this when that wasnt the case when I reached out to you yesterday? Because nothing's changed and I havent mentioned any symptoms related to the coronavirus. Jase. .I was just concerned and it had me worried. I mean it is my throat. But. .nevermind then.  ----- Message -----  From: Chevis Closs  Sent: 3/27/20 3:02 PM  To: Gabriel Rhodes  Subject: RE: Non-Urgent Medical Question    I totally understand and if you feel you are getting worse I would suggest going to one of our local [facility] flu hubs/Urgent care to be evaluated. Here are a few listed below. Flu Clinic at [address], [address] [place] [address]   [phone]   Hours: 8 am  5 pm, 7 days a week     [address], [address] [place] [address]   [phone]   Hours: Monday Friday: 8 am  5 pm     Flu Clinic at [facility] ER [address]   [phone]   Hours: Monday- Friday 8 am  5 pm   Saturday: 8 am  2 pm     Flu Clinic at [facility]   [address] [facility], [address] [facility]   [phone]   Hours: Monday Friday: 8 am  5 pm   Saturday: 8 am  2 pm       ----- Message -----       From:Jeison Diez       Sent:3/27/2020  2:55 PM EDT         To:Vale Joyce NP    Subject:RE: Non-Urgent Medical Question      I dont want it to get worse by me not seeing someone. Thats why was sick when I was because I waited. And this is the 3rd day.  That's why I'm reaching out      ----- Message -----       From:Giuliana Cohen       Sent:3/27/2020  1:40 PM EDT         To:Jeison Diez    Subject:RE: Non-Urgent Medical Question    Yes ma'am, but what about the last 2 weeks? Any of the below symptoms?       ----- Message -----       From:Jeison Diez       Sent:3/27/2020  1:21 PM EDT         To:Vale Jeffery NP    Subject:RE: Non-Urgent Medical Question    When I was in the hospital for 2 weeks and all that went wrong with my kidneys, liver, double pneumonia the flu being septic. That's all in my file right?      ----- Message -----       From:Giuliana Cohen       Sent:3/27/2020  1:14 PM EDT         To:Jeison Diez    Subject:RE: Non-Urgent Medical Question    Good Afternoon Ms. Eliza Hill is out of the office until Tuesday, but I wanted to check before requesting an appointment with another provider. What do you mean by \"how sick I was\". Within the last 14 days have you experienced any of the following?  fever, fatigue, cough, sneezing, body aches, runny nose, sore throat, diarrhea, vomiting, nausea or shortness of breath.      ----- Message -----       From:Jeison Diez       Sent:3/27/2020 11:55 AM EDT         To:Vale Jeffery NP    Subject:RE: Non-Urgent Medical Question      Is it possible I can please come in  after how I sick I was and my immune system prolly not 100%  I would rather have this checked. Nothing is changing with it and it hurt when I swallow but on the outside. It's like where my thyroid is somewhat. I can come today of possible       ----- Message -----       From:Vale Jeffery NP       Sent:3/26/2020  2:37 PM EDT         To:Jeison Diez    Subject:RE: Non-Urgent Medical Question    If there is no swelling on the front of your throat, you are not experiencing any difficulty breathing or swallowing, there are no enlarged lymph nodes (it would feel like marbles in your neck), then I would monitor it for a few days.   If it gets worse or you have any of the above symptoms, we could bring you into the office and check it for you. Keep us posted. ----- Message -----       From:Jeison Jordan       Sent:3/26/2020  1:16 PM EDT         To:Vale Baker NP    Subject:RE: Non-Urgent Medical Question    It doesn't hurt at all in the inside. The pill just went down wrong. Its huge. . but it didn't hurt after it went down. Its sore on the outside if my throat. If I talk loud. .it hurts. .its not the inside of my throat        ----- Message -----       From:Vale Jeffery NP       Sent:3/26/2020 12:39 PM EDT         To:Jeison Diez    Subject:RE: Non-Urgent Medical Question    Ludwig Verdin    If you got choked on a pill, it could have caused some local irritation in your throat, which could be causing your discomfort. I would try warm fluids, such as hot tea, throat lozenges or spray and you can use Tylenol or Advil for pain. Try to eat softer foods (avoid chips or other foods that are sharp). If you develop a fever, shortness of breath or any other respiratory complaints, you can be evaluated at an urgent care or one of [facility] \"flu clinics\" as we are not seeing anyone in office with fever or respiratory complaints. Hope this helps  Syd Villalobos FNYESSENIA      ----- Message -----       From:Jeison Jordan       Sent:3/26/2020 11:48 AM EDT         To:Vale Baker NP    Subject:Non-Urgent Medical Question    Good morning. What does it mean when your throat is sore on the outside? Luke to the touch,when I talk a little louder. This is the 2and day like this. I havent hit it on anything. I just woke up like that. Would a cut in the inside of my throat cause a infection or something and cause the outside to hurt? I got choked on my pill the other day tried to get it up and it felt like I might have cut myself. Not sure.  But could that cause a infection or something like this?       - - - DISCLAIMER - - -  [email or web address][This message may contain formatting that cannot be shown on this site.]

## 2020-03-27 NOTE — TELEPHONE ENCOUNTER
Contacted pt by phone to apologize if she thought we weren't concerned about her and her concerns. Discussed with pt due to sore throat being one of the symptoms we are currently not seeing in office due to [address], I would need more clarification on scheduling appointment with pt. Informed pt Monday I would discuss with my clinical supervisor and also Eliu Mathews. Pt verbalized understanding.

## 2020-03-31 ENCOUNTER — TELEPHONE (OUTPATIENT)
Dept: FAMILY MEDICINE CLINIC | Age: 40
End: 2020-03-31

## 2020-03-31 NOTE — TELEPHONE ENCOUNTER
Verified patient with two type of identifiers. Contacted pt in regards to pain in her neck from [employer] message 3/26/2020. Pt states is feeling much better and not requesting anything at this time.

## 2020-04-03 ENCOUNTER — TELEPHONE (OUTPATIENT)
Dept: FAMILY MEDICINE CLINIC | Age: 40
End: 2020-04-03

## 2020-04-03 NOTE — TELEPHONE ENCOUNTER
Verified patient with two type of identifiers. Informed pt of note per Fern Thomas NP. Pt verbalized understanding.

## 2020-04-03 NOTE — TELEPHONE ENCOUNTER
----- Message from Nupur Marshall sent at 4/3/2020  2:35 PM EDT -----  Regarding: Jose D/ telephone  Contact: [phone]  Caller's first and last name: n/a  Reason for call: Pt stated she sent message and picture of her neck and it is swollen. Callback required yes/no and why: yes  Best contact number(s): [phone]  Details to clarify the request :Pt is concerned and would like a call back as soon as possible.

## 2020-04-03 NOTE — TELEPHONE ENCOUNTER
Tom Lopez, NP   to [address]            4/3/20 1:10 PM   Radha Kecia     From looking at your pictures, it appears that your lymph node is swollen. We typically monitor those for 3-4 weeks to see if they resolve on their own. If you are not having any fevers, cough, unexplained weight loss, night sweats, then we can monitor. Many times the lymph nodes will enlarge due to infection, but they can also enlarge with inflammatory condition and allergies. We are currently not seeing anyone in the office. We are doing virtual visit by video as we are able. If you develop any of the above symptoms, you should probably be seen at urgent care - they would most likely need to get an xray and labs.     Keep me posted.   Stay safe  HERMELINDA Oglesby

## 2020-04-09 NOTE — PATIENT INSTRUCTIONS
Carpal Tunnel Syndrome: Care Instructions  Your Care Instructions    Carpal tunnel syndrome is a nerve problem. It can cause tingling, numbness, weakness, or pain in the fingers, thumb, and hand. The median nerve and several tough tissues called tendons run through a space in the wrist called the carpal tunnel. The repeated hand motions used in work and some hobbies and sports can put pressure on the nerve. Pregnancy and several conditions, including diabetes, arthritis, and an underactive thyroid, also can cause carpal tunnel syndrome. You may be able to limit an activity or do it differently to reduce your symptoms. You also can take other steps to feel better. If your symptoms are mild, 1 to 2 weeks of home treatment are likely to ease your pain. Surgery is needed only if other treatments do not work. Follow-up care is a key part of your treatment and safety. Be sure to make and go to all appointments, and call your doctor if you are having problems. It's also a good idea to know your test results and keep a list of the medicines you take. How can you care for yourself at home? · If possible, stop or reduce the activity that causes your symptoms. If you cannot stop the activity, take frequent breaks to rest and stretch or change hand positions to do a task. Try switching hands, such as when using a computer mouse. · Try to avoid bending or twisting your wrists. · Ask your doctor if you can take an over-the-counter pain medicine, such as acetaminophen (Tylenol), ibuprofen (Advil, Motrin), or naproxen (Aleve). Be safe with medicines. Read and follow all instructions on the label. · If your doctor prescribes corticosteroid medicine to help reduce pain and swelling, take it exactly as prescribed. Call your doctor if you think you are having a problem with your medicine. · Put ice or a cold pack on your wrist for 10 to 20 minutes at a time to ease pain.  Put a thin cloth between the ice and your skin.  · If your doctor or your physical or occupational therapist tells you to wear a wrist splint, wear it as directed to keep your wrist in a neutral position. This also eases pressure on your median nerve. · Ask your doctor whether you should have physical or occupational therapy to learn how to do tasks differently. · Try a yoga class to stretch your muscles and build strength in your hands and wrists. Yoga has been shown to ease carpal tunnel symptoms. To prevent carpal tunnel  · When working at a computer, keep your hands and wrists in line with your forearms. Hold your elbows close to your sides. Take a break every 10 to 15 minutes. · Try these exercises:  ? Warm up: Rotate your wrist up, down, and from side to side. Repeat this 4 times. Stretch your fingers far apart, relax them, then stretch them again. Repeat 4 times. Stretch your thumb by pulling it back gently, holding it, and then releasing it. Repeat 4 times. ? Prayer stretch: Start with your palms together in front of your chest just below your chin. Slowly lower your hands toward your waistline while keeping your hands close to your stomach and your palms together until you feel a mild to moderate stretch under your forearms. Hold for 10 to 20 seconds. Repeat 4 times. ? Wrist flexor stretch: Hold your arm in front of you with your palm up. Bend your wrist, pointing your hand toward the floor. With your other hand, gently bend your wrist further until you feel a mild to moderate stretch in your forearm. Hold for 10 to 20 seconds. Repeat 4 times. ? Wrist extensor stretch: Repeat the steps for the wrist flexor stretch, but begin with your extended hand palm down. · Squeeze a rubber exercise ball several times a day to keep your hands and fingers strong. · Avoid holding objects (such as a book) in one position for a long time. When possible, use your whole hand to grasp an object.  Using just the thumb and index finger can put stress on the wrist.  · Do not smoke. It can make this condition worse by reducing blood flow to the median nerve. If you need help quitting, talk to your doctor about stop-smoking programs and medicines. These can increase your chances of quitting for good. When should you call for help? Watch closely for changes in your health, and be sure to contact your doctor if:    · Your pain or other problems do not get better with home care.     · You want more information about physical or occupational therapy.     · You have side effects of your corticosteroid medicine, such as:  ? Weight gain. ? Mood changes. ? Trouble sleeping. ? Bruising easily.     · You have any other problems with your medicine. Where can you learn more? Go to http://coleman-le.info/  Enter R432 in the search box to learn more about \"Carpal Tunnel Syndrome: Care Instructions. \"  Current as of: June 26, 2019Content Version: 12.4  © 8034-0532 [employer]. Care instructions adapted under license by [employer] (which disclaims liability or warranty for this information). If you have questions about a medical condition or this instruction, always ask your healthcare professional. [place] [address] any warranty or liability for your use of this information. Carpal Tunnel Syndrome: Exercises  Introduction  Here are some examples of exercises for you to try. The exercises may be suggested for a condition or for rehabilitation. Start each exercise slowly. Ease off the exercises if you start to have pain. You will be told when to start these exercises and which ones will work best for you. Warm-up stretches  When you no longer have pain or numbness, you can do exercises to help prevent carpal tunnel syndrome from coming back. Do not do any stretch or movement that is uncomfortable or painful. 1. Rotate your wrist up, down, and from side to side. Repeat 4 times.   2. Stretch your fingers far apart. Relax them, and then stretch them again. Repeat 4 times. 3. Stretch your thumb by pulling it back gently, holding it, and then releasing it. Repeat 4 times. How to do the exercises  Prayer stretch   1. Start with your palms together in front of your chest just below your chin. 2. Slowly lower your hands toward your waistline, keeping your hands close to your stomach and your palms together until you feel a mild to moderate stretch under your forearms. 3. Hold for at least 15 to 30 seconds. Repeat 2 to 4 times. Wrist flexor stretch   1. Extend your arm in front of you with your palm up. 2. Bend your wrist, pointing your hand toward the floor. 3. With your other hand, gently bend your wrist farther until you feel a mild to moderate stretch in your forearm. 4. Hold for at least 15 to 30 seconds. Repeat 2 to 4 times. Wrist extensor stretch   1. Repeat steps 1 through 4 of the stretch above, but begin with your extended hand palm down. Follow-up care is a key part of your treatment and safety. Be sure to make and go to all appointments, and call your doctor if you are having problems. It's also a good idea to know your test results and keep a list of the medicines you take. Where can you learn more? Go to http://coleman-le.info/  Enter G725 in the search box to learn more about \"Carpal Tunnel Syndrome: Exercises. \"  Current as of: June 26, 2019Content Version: 12.4  © 1967-7485 [employer]. Care instructions adapted under license by [employer] (which disclaims liability or warranty for this information). If you have questions about a medical condition or this instruction, always ask your healthcare professional. [place] [address] any warranty or liability for your use of this information.          Pinched Nerve in the Neck: Care Instructions  Your Care Instructions  A pinched nerve in the neck happens when a vertebra or disc in the upper part of your spine is damaged. This damage can happen because of an injury. Or it can just happen with age. The changes caused by the damage may put pressure on a nearby nerve root, pinching it. This causes symptoms such as sharp pain in your neck, shoulder, arm, hand, or back. You may also have tingling or numbness. Sometimes it makes your arm weaker. The symptoms are usually worse when you turn your head or strain your neck. For many people, the symptoms get better over time and finally go away. Early treatment usually includes medicines for pain and swelling. Sometimes physical therapy and special exercises may help. Follow-up care is a key part of your treatment and safety. Be sure to make and go to all appointments, and call your doctor if you are having problems. It's also a good idea to know your test results and keep a list of the medicines you take. How can you care for yourself at home? · Be safe with medicines. Read and follow all instructions on the label. ? If the doctor gave you a prescription medicine for pain, take it as prescribed. ? If you are not taking a prescription pain medicine, ask your doctor if you can take an over-the-counter medicine. · Try using a heating pad on a low or medium setting for 15 to 20 minutes every 2 or 3 hours. Try a warm shower in place of one session with the heating pad. You can also buy single-use heat wraps that last up to 8 hours. · You can also try an ice pack for 10 to 15 minutes every 2 to 3 hours. There isn't strong evidence that either heat or ice will help. But you can try them to see if they help you. · Don't spend too long in one position. Take short breaks to move around and change positions. · Wear a seat belt and shoulder harness when you are in a car. · Sleep with a pillow under your head and neck that keeps your neck straight.   · If you were given a neck brace (cervical collar) to limit neck motion, wear it as instructed for as many days as your doctor tells you to. Do not wear it longer than you were told to. Wearing a brace for too long can lead to neck stiffness and can weaken the neck muscles. · Follow your doctor's instructions for gentle neck-stretching exercises. · Do not smoke. Smoking can slow healing of your discs. If you need help quitting, talk to your doctor about stop-smoking programs and medicines. These can increase your chances of quitting for good. · Avoid strenuous work or exercise until your doctor says it is okay. When should you call for help? Call 911 anytime you think you may need emergency care. For example, call if:    · You are unable to move an arm or a leg at all.   [facility] your doctor now or seek immediate medical care if:    · You have new or worse symptoms in your arms, legs, chest, belly, or buttocks. Symptoms may include:  ? Numbness or tingling. ? Weakness. ? Pain.     · You lose bladder or bowel control.    Watch closely for changes in your health, and be sure to contact your doctor if:    · You are not getting better as expected. Where can you learn more? Go to http://coleman-le.info/  Enter V703 in the search box to learn more about \"Pinched Nerve in the Neck: Care Instructions. \"  Current as of: June 26, 2019Content Version: 12.4  © 1241-0871 Healthwise, Incorporated. Care instructions adapted under license by [employer] (which disclaims liability or warranty for this information). If you have questions about a medical condition or this instruction, always ask your healthcare professional. [place] [address] any warranty or liability for your use of this information. Neck: Exercises  Introduction  Here are some examples of exercises for you to try. The exercises may be suggested for a condition or for rehabilitation. Start each exercise slowly. Ease off the exercises if you start to have pain.   You will be told when to start these exercises and which ones will work best for you. How to do the exercises  Neck stretch   4. This stretch works best if you keep your shoulder down as you lean away from it. To help you remember to do this, start by relaxing your shoulders and lightly holding on to your thighs or your chair. 5. Tilt your head toward your shoulder and hold for 15 to 30 seconds. Let the weight of your head stretch your muscles. 6. If you would like a little added stretch, use your hand to gently and steadily pull your head toward your shoulder. For example, keeping your right shoulder down, lean your head to the left. 7. Repeat 2 to 4 times toward each shoulder. Diagonal neck stretch   4. Turn your head slightly toward the direction you will be stretching, and tilt your head diagonally toward your chest and hold for 15 to 30 seconds. 5. If you would like a little added stretch, use your hand to gently and steadily pull your head forward on the diagonal.  6. Repeat 2 to 4 times toward each side. Dorsal glide stretch   5. Sit or stand tall and look straight ahead. 6. Slowly tuck your chin as you glide your head backward over your body  7. Hold for a count of 6, and then relax for up to 10 seconds. 8. Repeat 8 to 12 times. Chest and shoulder stretch   2. Sit or stand tall and glide your head backward as in the dorsal glide stretch. 3. Raise both arms so that your hands are next to your ears. 4. Take a deep breath, and as you breathe out, lower your elbows down and behind your back. You will feel your shoulder blades slide down and together, and at the same time you will feel a stretch across your chest and the front of your shoulders. 5. Hold for about 6 seconds, and then relax for up to 10 seconds. 6. Repeat 8 to 12 times. Strengthening: Hands on head   1. Move your head backward, forward, and side to side against gentle pressure from your hands, holding each position for about 6 seconds. 2. Repeat 8 to 12 times. Follow-up care is a key part of your treatment and safety. Be sure to make and go to all appointments, and call your doctor if you are having problems. It's also a good idea to know your test results and keep a list of the medicines you take. Where can you learn more? Go to http://coleman-le.info/  Enter P975 in the search box to learn more about \"Neck: Exercises. \"  Current as of: June 26, 2019Content Version: 12.4  © 8651-1041 Healthwise, Incorporated. Care instructions adapted under license by [employer] (which disclaims liability or warranty for this information). If you have questions about a medical condition or this instruction, always ask your healthcare professional. [address] any warranty or liability for your use of this information.

## 2020-04-13 RX ORDER — FUROSEMIDE 20 MG/1
TABLET ORAL
Qty: 14 TAB | Refills: 0 | Status: SHIPPED | OUTPATIENT
Start: 2020-04-13 | End: 2020-08-31 | Stop reason: SDUPTHER

## 2020-04-17 ENCOUNTER — OFFICE VISIT (OUTPATIENT)
Dept: INTERNAL MEDICINE CLINIC | Age: 40
End: 2020-04-17

## 2020-04-17 VITALS
RESPIRATION RATE: 20 BRPM | SYSTOLIC BLOOD PRESSURE: 132 MMHG | HEART RATE: 91 BPM | HEIGHT: 67 IN | WEIGHT: 174 LBS | BODY MASS INDEX: 27.31 KG/M2 | DIASTOLIC BLOOD PRESSURE: 82 MMHG | TEMPERATURE: 98.3 F | OXYGEN SATURATION: 98 %

## 2020-04-17 DIAGNOSIS — R22.1 NECK SWELLING: Primary | ICD-10-CM

## 2020-04-17 NOTE — PROGRESS NOTES
Ta Valdez is a 46 y.o. female presenting for Neck Swelling  . 1. Have you been to the ER, urgent care clinic since your last visit? Hospitalized since your last visit? [facility] 4-8-20    2. Have you seen or consulted any other health care providers outside of the [address] [place] [address] since your last visit? Include any pap smears or colon screening. No    No flowsheet data found. Abuse Screening Questionnaire 3/3/2020   Do you ever feel afraid of your partner? N   Are you in a relationship with someone who physically or mentally threatens you? N   Is it safe for you to go home? Y       3 most recent PHQ Screens 3/6/2020   Little interest or pleasure in doing things Not at all   Feeling down, depressed, irritable, or hopeless Not at all   Total Score PHQ 2 0       There are no discontinued medications.

## 2020-04-17 NOTE — PROGRESS NOTES
This note will not be viewable in [address] Subjective:     Mrs. Rimma Jin is a 43-year-old female who normally receives her primary care from [facility] and is referred to our office today for the evaluation of complaints of swelling in her neck. The patient's medical history is pertinent for 7-day hospitalization at [facility] for influenza B, pneumococcal pneumonia, sepsis, shock liver February. The patient has recovered from this illness over the last 2 months. She was seen at the [facility] emergency room on  with complaints of abdominal swelling and evidently had negative labs and CT scan. Over the last couple of weeks she is noted swelling at the base of her neck left greater than right and in the anterior portions of her neck. During this time she has been at home because of the coronavirus pandemic and has been eating more and has gained approximately 10 pounds. The patient has had no fevers, sweats, palpitations, tremor, difficulty swelling or breathing. Not have the records from her recent emergency room visit. She does have a CT scan of the chest scheduled for later this month that was ordered by pulmonary in regards to follow-up of her previously noted pneumonia. She denies any cough, wheezing, shortness of breath. The patient denies any neck pain or limitation with range of motion of the neck. In addition to her abdominal swelling she has noted enlargement of her breasts as well. A recent pregnancy test was negative.     Past Medical History:   Diagnosis Date    Anemia     Asthma     uses inhaler 2-3 times per week    Biliary colic     Hypertension     Ill-defined condition     murmur     Past Surgical History:   Procedure Laterality Date    HX CHOLECYSTECTOMY      HX GYN      vaginal delivery x1    HX GYN  2018    cyst removal    HX GYN          HX OTHER SURGICAL      lipoma removal from right shoulder    HX WISDOM TEETH EXTRACTION Allergies   Allergen Reactions    Amoxicillin Hives     Tolerated ceftriaxone.  Chlorhexidine Rash    Lisinopril Angioedema     Current Outpatient Medications   Medication Sig Dispense Refill    furosemide (LASIX) 20 mg tablet One tab daily 14 Tab 0    valACYclovir (VALTREX) 500 mg tablet Take 1 Tab by mouth every evening. 90 Tab 4    metoprolol succinate (TOPROL-XL) 100 mg tablet Take 1.5 Tabs by mouth daily. (Patient taking differently: Take 100 mg by mouth daily.) 135 Tab 3    loratadine (CLARITIN) 10 mg tablet Take 1 Tab by mouth daily. 90 Tab 3    fluticasone propionate (FLONASE) 50 mcg/actuation nasal spray 2 Sprays by Both Nostrils route daily. 1 Bottle 5    melatonin 3 mg tablet Take 1 Tab by mouth nightly. 90 Tab 3    potassium chloride (K-DUR, KLOR-CON) 20 mEq tablet Take 1 Tab by mouth two (2) times a day. 60 Tab 1    albuterol (PROVENTIL HFA, VENTOLIN HFA, PROAIR HFA) 90 mcg/actuation inhaler Take 2 Puffs by inhalation every four (4) hours as needed for Wheezing. 1 Inhaler 11    ergocalciferol (ERGOCALCIFEROL) 50,000 unit capsule Take 1 Cap by mouth every seven (7) days. 12 Cap 2     Social History     Socioeconomic History    Marital status: SINGLE     Spouse name: Not on file    Number of children: Not on file    Years of education: Not on file    Highest education level: Not on file   Tobacco Use    Smoking status: Never Smoker    Smokeless tobacco: Never Used   Substance and Sexual Activity    Alcohol use:  Yes     Alcohol/week: 3.0 standard drinks     Types: 3 Cans of beer per week    Drug use: No    Sexual activity: Yes     Partners: Male     Birth control/protection: Condom     Family History   Problem Relation Age of Onset    Hypertension Mother     Cancer Mother         colon, in remission    Clotting Disorder Mother         blood clot post surgery    Hypertension Father     Colon Cancer Paternal Uncle     Pancreatic Cancer Paternal Uncle     Cancer Paternal Uncle     Cancer Maternal Uncle     Stroke Paternal Aunt     Heart Disease Maternal Grandmother        Review of Systems:  GEN: no weight loss, weight gain, fatigue or night sweats  Neck: Complains of neck swelling without difficulty with breathing or swallowing  CV: no PND, orthopnea, or palpitations  Resp: no dyspnea on exertion, no cough  Abd: no nausea, vomiting or diarrhea  EXT: denies edema, claudication  Endocrine: no hair loss, excessive thirst or polyuria  Neurological ROS: no TIA or stroke symptoms  ROS otherwise negative      Objective:     Visit Vitals  /82 (BP 1 Location: Left arm, BP Patient Position: Sitting)   Pulse 91   Temp 98.3 °F (36.8 °C) (Oral)   Resp 20   Ht 5' 7\" (1.702 m)   Wt 174 lb (78.9 kg)   SpO2 98%   BMI 27.25 kg/m²     Body mass index is 27.25 kg/m². General:   alert, cooperative and no distress   Eyes: conjunctivae/sclerae clear. PERRL, EOM's intact   Mouth:  No oral lesions, no pharyngeal erythema, no exudates   Neck: Trachea midline, no thyromegaly present. Prominent supraclavicular fossa fat pad present left greater than right. No anterior or posterior cervical adenopathy is palpable. Heart: S1 and S2 normal,no murmurs noted    Lungs: Clear to auscultation bilaterally, no increased work of breathing   Extremities: No edema or cyanosis   Neuro: ..alert, oriented x3,speech normal in context and clarity, cranial nerves II-XII intact,motor strength: full proximally and distally,gait: normal  reflexes: full and symmetric     Physical exam otherwise negative         Assessment/Plan:     Diagnoses and all orders for this visit:    Neck swelling        Other instructions: The patient's medications were reviewed and reconciled. I believe that the swelling in her neck correlates with her recent weight gain. She does have prominent supraclavicular fossa fat pads left greater than right present.   No lymph nodes are appreciated and there is no thyromegaly on exam.    I have asked her to obtain the records of her recent emergency room visit to give to her primary care provider. In addition I have encouraged her to follow through on having the CT scan of her chest as scheduled by pulmonary in regards to her recent pulmonary issues. She will otherwise follow-up with her primary care provider as previously scheduled and can return here to be seen should there be any other urgent issues. Follow-up and Dispositions    · Return if symptoms worsen or fail to improve. Eulalio Myers MD    Please note that this dictation was completed with Rezora, the computer voice recognition software. Quite often unanticipated grammatical, syntax, homophones, and other interpretive errors are inadvertently transcribed by the computer software. Please disregard these errors. Please excuse any errors that have escaped final proofreading.

## 2020-04-27 ENCOUNTER — HOSPITAL ENCOUNTER (OUTPATIENT)
Dept: CT IMAGING | Age: 40
Discharge: HOME | End: 2020-04-27
Attending: PHYSICIAN ASSISTANT
Payer: MEDICAID

## 2020-04-27 DIAGNOSIS — R91.8 PULMONARY INFILTRATES: ICD-10-CM

## 2020-04-27 DIAGNOSIS — E87.6 HYPOKALEMIA: ICD-10-CM

## 2020-04-27 PROCEDURE — 71250 CT THORAX DX C-: CPT

## 2020-04-27 RX ORDER — POTASSIUM CHLORIDE 20 MEQ/1
20 TABLET, EXTENDED RELEASE ORAL 2 TIMES DAILY
Qty: 60 TAB | Refills: 5 | Status: SHIPPED | OUTPATIENT
Start: 2020-04-27 | End: 2021-03-04 | Stop reason: SDUPTHER

## 2020-04-27 NOTE — TELEPHONE ENCOUNTER
Received fax from [facility] requesting refill. Last OV:4/17/2020  Next Appt:5/6/2020  Last Refill: 3/3/2020 (60+R1)    Requested Prescriptions     Pending Prescriptions Disp Refills    potassium chloride (K-DUR, KLOR-CON) 20 mEq tablet 60 Tab 1     Sig: Take 1 Tab by mouth two (2) times a day.

## 2020-04-30 ENCOUNTER — TELEPHONE (OUTPATIENT)
Dept: ONCOLOGY | Age: 40
End: 2020-04-30

## 2020-04-30 NOTE — TELEPHONE ENCOUNTER
Voicemail left requesting a call back. Called patient at the request of the provider to schedule appointment as a virtual appointment.

## 2020-04-30 NOTE — PROGRESS NOTES
Vitals:   Visit Vitals  /58   Pulse 78   Temp 98 °F (36.7 °C)   Ht 5' 4\" (1.626 m)   Wt 55.8 kg (123 lb)   BMI 21.11 kg/m²       Patient ID: Trevor Carlos is a 87 year old male.  Referring Provider:  Other Outpatient Servic*      Chief Complaint   Patient presents with   • Follow-up     g-tube change     The patients PEG fell out last evening.      Past Medical History:   Diagnosis Date   • BPH (benign prostatic hyperplasia)    • Factor V Leiden (CMS/HCC)    • Hypothyroidism    • PE (pulmonary thromboembolism) (CMS/HCC)    • Squamous cell cancer of tongue (CMS/HCC)      Past Surgical History:   Procedure Laterality Date   • Gastrostomy tube placement     • Orthopedic injury treatment     • Prostate surgery       Family History   Problem Relation Age of Onset   • Cancer, Colon Neg Hx    • Cancer, Rectal Neg Hx    • Stomach Cancer Neg Hx    • Cancer, Esophageal Neg Hx      Social History     Socioeconomic History   • Marital status: Single     Spouse name: Not on file   • Number of children: Not on file   • Years of education: Not on file   • Highest education level: Not on file   Occupational History   • Not on file   Social Needs   • Financial resource strain: Not on file   • Food insecurity:     Worry: Not on file     Inability: Not on file   • Transportation needs:     Medical: Not on file     Non-medical: Not on file   Tobacco Use   • Smoking status: Never Smoker   • Smokeless tobacco: Never Used   Substance and Sexual Activity   • Alcohol use: Yes     Frequency: Never     Comment: occasional   • Drug use: No   • Sexual activity: Not Currently   Lifestyle   • Physical activity:     Days per week: 0 days     Minutes per session: 0 min   • Stress: Not on file   Relationships   • Social connections:     Talks on phone: Not on file     Gets together: Not on file     Attends Yarsani service: Not on file     Active member of club or organization: Not on file     Attends meetings of clubs or organizations:  [facility]       Daily Baig MD, Megan Porter, Oneta Severance, MD       Chip Bi, PAJUAN Greenberg, [facility]     April ERNESTO Olguin, [facility]   KAREEM Lentz, [facility]        [facility]    at [address], [address] Caroline Becker  22.    [phone]    FAX: [address] [place] [address], [address]    [phone]    FAX: [phone]         HEPATOLOGY PROGRESS NOTE  The patient is a 46year old [facility] female who developed several days of worsening cough, lightheadedness, progressive fatigue, fever, diarrhea. She does consume the equivalent of 4 alcoholic drinks per day and has been doing this for several years. She has taken some acetaminophen for the flu-like symptoms but cannot quantitate this. She went to urgent care on the day of admission, was found to be hypotensive and sent to [facility].    In the ED liver SBP was in the 80s, liver transaminases were in the 2165-1766 range, TBILI 7.3, INR 1.0, Scr 4.9 mg, Sna 129. US and CT scan suggest fatty liver without cirrhosis. She has been treated with presser support, broad spectrum ABX, IVNAC, vit K, IV fluids.      Last labs show marked improvement in all parameters of renal and liver function. Scr down to 2.73, AST 1600, INR 1.5, TBILI 6.6     ASSESSMENT AND PLAN:  Shock liver  The patient has shock liver with acute marked increase in liver transaminases. This was due to hypotension and hepatic ischemia.    The liver ALT is coming down nicely. AST remains elevated. Does not appear to be muscle component to this since CK is normal.    Liver function is improving rapidly. INR down to 1.5, TBILI down.   I expect the liver enzymes to come down Not on file     Relationship status: Not on file   • Intimate partner violence:     Fear of current or ex partner: Not on file     Emotionally abused: Not on file     Physically abused: Not on file     Forced sexual activity: Not on file   Other Topics Concern   • Not on file   Social History Narrative   • Not on file     Current Outpatient Medications   Medication Sig Dispense Refill   • Multiple Vitamins-Minerals (EYE VITAMINS PO)      • warfarin (COUMADIN) 2.5 MG tablet TAKE 2 TABLETS (5 mg)  BY MOUTH TUES/THURS AND TAKE 1 TABLET (2.5 mg)  BY MOUTH ALL REMAINING DAYS 44 tablet 2   • enoxaparin (LOVENOX) 60 MG/0.6ML injectable solution Inject 60 mg into the skin every 12 hours and as directed. 20 Syringe 1   • doxazosin (CARDURA) 4 MG tablet Take 1 tablet by mouth nightly. 90 tablet 3   • omeprazole 20 MG tablet Take 40 mg twice daily. 30 tablet 1   • levothyroxine (SYNTHROID, LEVOTHROID) 50 MCG tablet Take 50 mcg by mouth daily.  3   • tamsulosin (FLOMAX) 0.4 MG Cap Take 1 Cap by mouth daily. - Oral     • finasteride (PROSCAR) 5 MG tablet Take 5 mg by mouth.     • pilocarpine (SALAGEN) 7.5 MG tablet TK 1 T PO TID       No current facility-administered medications for this visit.      ALLERGIES:   Allergen Reactions   • Levaquin HIVES   • Levofloxacin In D5w HIVES       Review of Systems   Constitutional: Positive for fatigue.   Gastrointestinal:        Poor appetite   Neurological: Positive for weakness.   All other systems reviewed and are negative.      Physical Exam      Diagnoses and all orders for this visit:  PEG (percutaneous endoscopic gastrostomy) adjustment/replacement/removal (CMS/[facility])       Patient Instructions   The PEG was replaced with a 20 Belgian replacement PEG. The balloon was tested prior to insertion and it was intact. The PEG was placed into the stomach and the ballooon blown up wiith 8 cc of fluid.The outer bumper was placed at 2.5cm. The area around the PEG site was cleaned with a guaze  over the next several days. I expect INR to keep coming down  I expect TBILI to come down at slower rate      Elevated liver enzymes  There was elevation in liver transaminases back in 5/2019. I suspect that was due to alcohol. She does consume a bit too much alcohol daily and has done this for years. Very likely she has some underlying alcohol induced fatty liver but nothing to suggest advanced liver disease or cirrhosis back then or now. This is supported by US and CT performed on admission. Serologic studies to exclude other causes of chronic liver disease are pending     She did take some acetaminophen over the past few days and this combined with the daily ETOH this may have contributed to the elevation in liver enzymes. I would continue IV NAC for another 24-48 hours. Sepsis  Blood culture was positive for tabby positive cocci. On IV ABX.     Coagulopathy  This is secondary to shock liver and DIC from the acute process. She has already recieved 2 or 3 doses of IV Vit K.    INR has rapidly improved with treatment of sepsis, resolution of DIC      Thrombocytopenia   This is an acute decline and secondary to DIC. His is now imprvoiong with resolution of DIC and Sepsis     DIC  This is supported by elevated INR and PLT due to Sepsis  This continues to improve/resolve.     Anemia   This is likely due to hemolysis from Sepsis  Hemolysis supportered by the low haptoglobin   There is no evidence of bleeding from GI or any other area. Expect this to improve as sepsis resolves.     Acute kidney injury  JANICE is secondary to hypotension and is rapidly improving with restoration of renal perfusion, treatment of sepsis and resolution of DIC. Scr is down to 2.73 without the need for dialysis.       PHYSICAL EXAMINATION:  VS: per nursing note  General:  Ill appearing  Eyes:  Sclera anicteric. ENT:  No oral lesions. Thyroid normal.  Nodes:  No adenopathy. Skin:  No spider angiomata.   No jaundice. Respiratory:  Lungs clear to auscultation. Cardiovascular:  Regular heart rate. Abdomen:  Soft non-tender, No obvious ascites. Extremities:  No lower extremity edema. Neurologic:  Alert and oriented. Cranial nerves grossly intact. No asterixis.     LABORATORY:  Results for Slick Alvarez (MRN [de-identified]) as of 2/21/2020 05:48   Ref. Range 2/19/2020 16:48 2/19/2020 18:19 2/20/2020 10:28 2/20/2020 23:22   WBC Latest Ref Range: 3.6 - 11.0 K/uL 5.0  4.0    HGB Latest Ref Range: 11.5 - 16.0 g/dL 12.7  11.2    PLATELET Latest Ref Range: 150 - 400 K/uL 89 (L)  105    INR Latest Ref Range: 0.9 - 1.1   1.0 2.0 (H) 1.8 (H) 1.5 (H)   Sodium Latest Ref Range: 136 - 145 mmol/L 129 (L) 131 (L) 131 (L) 136   Potassium Latest Ref Range: 3.5 - 5.1 mmol/L 4.9 4.5 3.6 2.6 (LL)   Chloride Latest Ref Range: 97 - 108 mmol/L 98 100 103 98   CO2 Latest Ref Range: 21 - 32 mmol/L 15 (LL) 15 (LL) 14 (LL) 28   Glucose Latest Ref Range: 65 - 100 mg/dL 68 77 172 (H) 233 (H)   BUN Latest Ref Range: 6 - 20 MG/DL 34 (H) 34 (H) 43 (H) 41 (H)   Creatinine Latest Ref Range: 0.55 - 1.02 MG/DL 4.94 (H) 4.62 (H) 3.79 (H) 2.73 (H)   Bilirubin, total Latest Ref Range: 0.2 - 1.0 MG/DL 7.3 (H) 5.9 (H) 7.0 (H) 6.6 (H)   Albumin Latest Ref Range: 3.5 - 5.0 g/dL 3.4 (L) 3.1 (L) 2.2 (L) 2.1 (L)   ALT (SGPT) Latest Ref Range: 12 - 78 U/L >3,500 (H) 3,244 (H) 2,229 (H) 1,600 (H)   AST Latest Ref Range: 15 - 37 U/L >2,000 (H) >2,000 (H) >2,000 (H) >2,000 (H)   Alk.  phosphatase Latest Ref Range: 45 - 117 U/L 230 (H) 184 (H) 128 (H) [address], [address] [place] [address] [address]

## 2020-04-30 NOTE — TELEPHONE ENCOUNTER
Patient returned phone call, consented to virtual visit for 05/05 at 3pm. Please send link to cell phone

## 2020-05-05 ENCOUNTER — DOCUMENTATION ONLY (OUTPATIENT)
Dept: ONCOLOGY | Age: 40
End: 2020-05-05

## 2020-05-05 ENCOUNTER — VIRTUAL VISIT (OUTPATIENT)
Dept: ONCOLOGY | Age: 40
End: 2020-05-05

## 2020-05-05 DIAGNOSIS — D69.6 THROMBOCYTOPENIA (HCC): ICD-10-CM

## 2020-05-05 DIAGNOSIS — K72.00 SHOCK LIVER: ICD-10-CM

## 2020-05-05 DIAGNOSIS — D64.9 ANEMIA, UNSPECIFIED TYPE: Primary | ICD-10-CM

## 2020-05-05 NOTE — PROGRESS NOTES
[facility] [place] at [place] [address] [address], [address]  W: [phone]  F: [phone]      Reason for Visit:   Татьяна Tomlin is a 46 y.o. female who is seen by synchronous (real-time) audio-video technology for follow up of chronic anemia      History of Present Illness:     Pt seen today for virtual visit due to COVID for f/u of anemia. [facility] and had labs there. Platelets normal now. Has chronic anemia for over 10 years and states this is stable. Feeling better overall. To see PCP tmw. Last visit:  office f/u after hospital consult for thrombocytopenia while septic from PNA/ flu. Last platelet count was up 104k. Still anemic and hx of chronic anemia per pt. Was on oral iron in past.   Pt was seen by hepatology and had labs done today but pending. Pt feeling much better overall. On IV abx with midline. Past Medical History:   Diagnosis Date    Anemia     Asthma     uses inhaler 2-3 times per week    Biliary colic     Hypertension     Ill-defined condition     murmur      Past Surgical History:   Procedure Laterality Date    HX CHOLECYSTECTOMY      HX GYN      vaginal delivery x1    HX GYN  2018    cyst removal    HX GYN          HX OTHER SURGICAL      lipoma removal from right shoulder    HX WISDOM TEETH EXTRACTION        Social History     Tobacco Use    Smoking status: Never Smoker    Smokeless tobacco: Never Used   Substance Use Topics    Alcohol use:  Yes     Alcohol/week: 3.0 standard drinks     Types: 3 Cans of beer per week      Family History   Problem Relation Age of Onset    Hypertension Mother     Cancer Mother         colon, in remission    Clotting Disorder Mother         blood clot post surgery    Hypertension Father     Colon Cancer Paternal Uncle     Pancreatic Cancer Paternal Uncle     Cancer Paternal Uncle     Cancer Maternal Uncle     Stroke Paternal Aunt     Heart Disease Maternal Grandmother      Current Outpatient Medications   Medication Sig    potassium chloride (K-DUR, KLOR-CON) 20 mEq tablet Take 1 Tab by mouth two (2) times a day.  furosemide (LASIX) 20 mg tablet One tab daily    valACYclovir (VALTREX) 500 mg tablet Take 1 Tab by mouth every evening.  metoprolol succinate (TOPROL-XL) 100 mg tablet Take 1.5 Tabs by mouth daily. (Patient taking differently: Take 100 mg by mouth daily.)    loratadine (CLARITIN) 10 mg tablet Take 1 Tab by mouth daily.  fluticasone propionate (FLONASE) 50 mcg/actuation nasal spray 2 Sprays by Both Nostrils route daily.  melatonin 3 mg tablet Take 1 Tab by mouth nightly.  albuterol (PROVENTIL HFA, VENTOLIN HFA, PROAIR HFA) 90 mcg/actuation inhaler Take 2 Puffs by inhalation every four (4) hours as needed for Wheezing.  ergocalciferol (ERGOCALCIFEROL) 50,000 unit capsule Take 1 Cap by mouth every seven (7) days. No current facility-administered medications for this visit. Allergies   Allergen Reactions    Amoxicillin Hives     Tolerated ceftriaxone.  Chlorhexidine Rash    Lisinopril Angioedema        Review of Systems: A complete review of systems was obtained, negative except as described above. Physical Exam:     There were no vitals taken for this visit. General: alert, cooperative, no distress   Mental  status: normal mood, behavior, speech, dress, motor activity, and thought processes, able to follow commands   HENT: NCAT   Neck: no visualized mass   Resp: no respiratory distress   Neuro: no gross deficits   Skin: no discoloration or lesions of concern on visible areas   Psychiatric: normal affect, consistent with stated mood, no evidence of hallucinations       Due to this being a TeleHealth evaluation ([facility]), many elements of the physical examination are unable to be assessed.   Evaluation of the following organ systems was limited: Vitals/Constitutional/EENT/Resp/CV/GI//MS/Neuro/Skin/Heme-Lymph-Imm. Results:     Lab Results   Component Value Date/Time    WBC 9.0 03/06/2020 12:40 PM    HGB 9.9 (L) 03/06/2020 12:40 PM    HCT 29.9 (L) 03/06/2020 12:40 PM    PLATELET 231 82/74/3807 12:40 PM    MCV 97 03/06/2020 12:40 PM    ABS. NEUTROPHILS 6.8 03/06/2020 12:40 PM     Lab Results   Component Value Date/Time    Sodium 138 03/06/2020 12:40 PM    Potassium 4.0 03/06/2020 12:40 PM    Chloride 99 03/06/2020 12:40 PM    CO2 23 03/06/2020 12:40 PM    Glucose 69 03/06/2020 12:40 PM    BUN 7 03/06/2020 12:40 PM    Creatinine 0.85 03/06/2020 12:40 PM    GFR est  03/06/2020 12:40 PM    GFR est non-AA 87 03/06/2020 12:40 PM    Calcium 10.2 03/06/2020 12:40 PM     Lab Results   Component Value Date/Time    Bilirubin, total 0.8 03/06/2020 12:40 PM    ALT (SGPT) 36 (H) 03/06/2020 12:40 PM    AST (SGOT) 22 03/06/2020 12:40 PM    Alk. phosphatase 109 03/06/2020 12:40 PM    Protein, total 7.1 03/06/2020 12:40 PM    Albumin 3.9 03/06/2020 12:40 PM    Globulin 2.9 02/28/2020 05:36 PM     Peripheral Smear 2/19/2020   FINAL PATHOLOGIC DIAGNOSIS   Peripheral blood, smear: Thrombocytopenia without platelet aggregation. Red blood cells and white blood cells within normal morphologic limits. CT A/P 2/19/2020  IMPRESSION:  1. Multifocal pulmonary consolidations. 2. Diffuse hepatic steatosis. 3. Status post cholecystectomy. Records reviewed and summarized above. Pathology report(s) reviewed above. Radiology report(s) reviewed above. Assessment/Plan:   1) acute Thrombocytopenia due to illness. Pt seen virtually today due to COVID. Low platelets resolved. Reviewed last CBC with pt today. Monitor via PCP and routine labs. 2) chronic anemia stable per pt. Low iron in past.   Ferritin high in hospital likely due to acute illness. Can monitor iron levels via PCP. 3) Shock Liver improved. LFTs better. Seen by hepatology.        Call for questions   F/u here prn    I was in office while conducting this encounter. The patient was at her home. Consent:  She and/or her healthcare decision maker is aware that this patient-initiated Telehealth encounter is a billable service, with coverage as determined by her insurance carrier. She is aware that she may receive a bill and has provided verbal consent to proceed:     Pursuant to the emergency declaration under the [address] and the [address] authority and the [employer] and [employer] Act, this Virtual  Visit was conducted, with patient's (and/or legal guardian's) consent, to reduce the patient's risk of exposure to COVID-19 and provide necessary medical care. Services were provided through a video synchronous discussion virtually to substitute for in-person visit. I appreciate the opportunity to participate in Ms. Vladimir Landrum care.     Signed By: [place] DO

## 2020-05-06 ENCOUNTER — VIRTUAL VISIT (OUTPATIENT)
Dept: FAMILY MEDICINE CLINIC | Age: 40
End: 2020-05-06

## 2020-05-06 DIAGNOSIS — K72.00 ACUTE LIVER FAILURE WITHOUT HEPATIC COMA: ICD-10-CM

## 2020-05-06 DIAGNOSIS — R19.00 ABDOMINAL SWELLING: ICD-10-CM

## 2020-05-06 DIAGNOSIS — J30.2 SEASONAL ALLERGIC RHINITIS, UNSPECIFIED TRIGGER: ICD-10-CM

## 2020-05-06 DIAGNOSIS — R22.1 NECK SWELLING: Primary | ICD-10-CM

## 2020-05-06 DIAGNOSIS — R09.81 NASAL CONGESTION: ICD-10-CM

## 2020-05-06 DIAGNOSIS — E06.3 CHRONIC AUTOIMMUNE THYROIDITIS: ICD-10-CM

## 2020-05-06 DIAGNOSIS — I10 BENIGN ESSENTIAL HTN: ICD-10-CM

## 2020-05-06 RX ORDER — PREDNISONE 20 MG/1
20 TABLET ORAL
Qty: 3 TAB | Refills: 0 | Status: SHIPPED | OUTPATIENT
Start: 2020-05-06 | End: 2020-05-09

## 2020-05-06 NOTE — PATIENT INSTRUCTIONS
Seasonal Allergies: Care Instructions Your Care Instructions Allergies occur when your body's defense system (immune system) overreacts to certain substances. The immune system treats a harmless substance as if it were a harmful germ or virus. Many things can cause this to happen. Examples include pollens, medicine, food, dust, animal dander, and mold. Your allergies are seasonal if you have symptoms just at certain times of the year. In that case, you are probably allergic to pollens from certain trees, grasses, or weeds. Allergies can be mild or severe. Over-the-counter allergy medicine may help with some symptoms. Read and follow all instructions on the label. Managing your allergies is an important part of staying healthy. Your doctor may suggest that you have tests to help find the cause of your allergies. When you know what things trigger your symptoms, you can avoid them. This can prevent allergy symptoms and other health problems. In some cases, immunotherapy might help. For this treatment, you get shots or use pills that have a small amount of certain allergens in them. Your body \"gets used to\" the allergen, so you react less to it over time. This kind of treatment may help prevent or reduce some allergy symptoms. Follow-up care is a key part of your treatment and safety. Be sure to make and go to all appointments, and call your doctor if you are having problems. It's also a good idea to know your test results and keep a list of the medicines you take. How can you care for yourself at home? · Be safe with medicines. Take your medicines exactly as prescribed. Call your doctor if you think you are having a problem with your medicine. · During your allergy season, keep windows closed. If you need to use air-conditioning, change or clean all filters every month. Take a shower and change your clothes after you have been outside. · Stay inside when pollen counts are high. Vacuum once or twice a week. Use a vacuum  with a HEPA filter or a double-thickness filter. When should you call for help? Give an epinephrine shot if: 
  · You think you are having a severe allergic reaction.  
 After giving an epinephrine shot, call  911, even if you feel better. 
 Call 911 if: 
  · You have symptoms of a severe allergic reaction. These may include: 
? Sudden raised, red areas (hives) all over your body. ? Swelling of the throat, mouth, lips, or tongue. ? Trouble breathing. ? Passing out (losing consciousness). Or you may feel very lightheaded or suddenly feel weak, confused, or restless.  
  · You have been given an epinephrine shot, even if you feel better.  
 Call your doctor now or seek immediate medical care if: 
  · You have symptoms of an allergic reaction, such as: ? A rash or hives (raised, red areas on the skin). ? Itching. ? Swelling. ? Belly pain, nausea, or vomiting.  
 Watch closely for changes in your health, and be sure to contact your doctor if: 
  · You do not get better as expected. Where can you learn more? Go to http://coleman-le.info/ Enter J912 in the search box to learn more about \"Seasonal Allergies: Care Instructions. \" Current as of: October 6, 2019Content Version: 12.4 © 4492-1996 Healthwise, Incorporated. Care instructions adapted under license by [employer] (which disclaims liability or warranty for this information). If you have questions about a medical condition or this instruction, always ask your healthcare professional. [place] [address] any warranty or liability for your use of this information. Rhinitis: Care Instructions Your Care Instructions Rhinitis is swelling and irritation in the nose. Allergies and infections are often the cause. Your nose may run or feel stuffy. Other symptoms are itchy and sore eyes, ears, throat, and mouth. If allergies are the cause, your doctor may do tests to find out what you are allergic to. You may be able to stop symptoms if you avoid the things that cause them. Your doctor may suggest or prescribe medicine to ease your symptoms. Follow-up care is a key part of your treatment and safety. Be sure to make and go to all appointments, and call your doctor if you are having problems. It's also a good idea to know your test results and keep a list of the medicines you take. How can you care for yourself at home? · If your rhinitis is caused by allergies, try to find out what sets off (triggers) your symptoms. Take steps to avoid these triggers. ? Avoid yard work. It can stir up both pollen and mold. ? Do not smoke or allow others to smoke around you. If you need help quitting, talk to your doctor about stop-smoking programs and medicines. These can increase your chances of quitting for good. ? Do not use aerosol sprays, cleaning products, or perfumes. ? If pollen is one of your triggers, close your house and car windows during blooming season. ? Clean your house often to control dust. 
? Keep pets outside. · If your doctor recommends over-the-counter medicines to relieve symptoms, take your medicines exactly as prescribed. Call your doctor if you think you are having a problem with your medicine. · Use saline (saltwater) nasal washes to help keep your nasal passages open and wash out mucus and bacteria. You can buy saline nose drops at a grocery store or drugstore. Or you can make your own at home by adding 1 teaspoon of salt and 1 teaspoon of baking soda to 2 cups of distilled water. If you make your own, fill a bulb syringe with the solution, insert the tip into your nostril, and squeeze gently. Federica Melodie your nose. When should you call for help? Call your doctor now or seek immediate medical care if: 
  · You are having trouble breathing.  Watch closely for changes in your health, and be sure to contact your doctor if: 
  · Mucus from your nose gets thicker (like pus) or has new blood in it.  
  · You have new or worse symptoms.  
  · You do not get better as expected. Where can you learn more? Go to http://coleman-le.info/ Enter M030 in the search box to learn more about \"Rhinitis: Care Instructions. \" Current as of: July 28, 2019Content Version: 12.4 © 3193-2614 Healthwise, Incorporated. Care instructions adapted under license by [employer] (which disclaims liability or warranty for this information). If you have questions about a medical condition or this instruction, always ask your healthcare professional. [address] any warranty or liability for your use of this information.

## 2020-05-06 NOTE — PROGRESS NOTES
Migel Briceno is a 46 y.o. female who was seen by synchronous (real-time) audio-video technology on 5/6/2020. Consent: Migel Briceno, who was seen by synchronous (real-time) audio-video technology, and/or her healthcare decision maker, is aware that this patient-initiated, Telehealth encounter on 5/6/2020 is a billable service, with coverage as determined by her insurance carrier. She is aware that she may receive a bill and has provided verbal consent to proceed: Yes. Assessment & Plan:   Diagnoses and all orders for this visit:    1. Neck swelling - check thyroid US and labs. -     METABOLIC PANEL, COMPREHENSIVE  -     TSH AND FREE T4  -     predniSONE (DELTASONE) 20 mg tablet; Take 20 mg by mouth daily (with breakfast) for 3 days.  -     US THYROID/PARATHYROID/SOFT TISS; Future    2. Abdominal swelling - hx of liver failure, check abd US for ascites, check labs  -     METABOLIC PANEL, COMPREHENSIVE  -     US ABD COMP; Future    3. Nasal congestion  -     predniSONE (DELTASONE) 20 mg tablet; Take 20 mg by mouth daily (with breakfast) for 3 days.  -    OTC Afrin - do not use more than 3 days to prevent rebound nasal congestion    4. Seasonal allergic rhinitis, unspecified trigger - restart flonase, continue claritin    5. Hx of Acute liver failure without hepatic coma - in Feb 2020 - check labs, US  -     METABOLIC PANEL, COMPREHENSIVE  -     TSH AND FREE T4  -     US ABD COMP; Future    6. Benign essential HTN - BP was elevated on Monday, patient resumed metoprolol 1.5 tabs daily      Follow-up and Dispositions    · Return in about 6 months (around 11/6/2020), or if symptoms worsen or fail to improve. I spent at least 23 minutes on this visit with this established patient. [phone]  Subjective:   Migel Briceno is a 46 y.o. female who was seen for Hypertension and Asthma    Complains of swelling, mostly in upper half of body. Pt states swelling has now spread to face.  Pt states she thinks she is allergic to Advil as of yesterday, due to side effects (rash,swelling.). Has a rash on forehead. Had swelling in neck last night, felt like it was interfering with her breathing and swallowing. She is also having hot flashes, breast swelling, abd swelling. Sometimes will have swelling in ankles and feet. She is short of breath at night. States she cannot feel when she has to urinate because of swelling in abdomen. She is really concerned because of her hospitalization in Feb 2020 for sepsis, shock liver. Denies chest pains, fever, chills, palpitations. Very minimal swelling in ankles. Was seen by Dr. Arvind Caal in [facility] who thought the supraclavilcuar swelling was related to her weight gain. Patient states she only eats twice daily and only drinks water. Not taking lasix regularly. Had a dose couple days ago. Has fam hx of lupus, had testing in hospital which was negative. Some nasal congestion. Not using Flonase. Taking claritin. Denies fever, chills, cough     BP Monday: 140/105 increased back to 1.5 tablets of metoprolol. Prior to Admission medications    Medication Sig Start Date End Date Taking? Authorizing Provider   potassium chloride (K-DUR, KLOR-CON) 20 mEq tablet Take 1 Tab by mouth two (2) times a day. 4/27/20  Yes Any Jeffery NP   furosemide (LASIX) 20 mg tablet One tab daily  Patient taking differently: One tab daily    Took last on 5/4;5/5. 4/13/20  Yes Any Jeffery NP   valACYclovir (VALTREX) 500 mg tablet Take 1 Tab by mouth every evening. 3/19/20  Yes Any Jeffery NP   metoprolol succinate (TOPROL-XL) 100 mg tablet Take 1.5 Tabs by mouth daily. 3/6/20  Yes Any Jeffery NP   loratadine (CLARITIN) 10 mg tablet Take 1 Tab by mouth daily. 3/6/20  Yes Any Jeffery, NP   fluticasone propionate (FLONASE) 50 mcg/actuation nasal spray 2 Sprays by Both Nostrils route daily.  3/6/20  Yes Any Jeffery NP   melatonin 3 mg tablet Take 1 Tab by mouth nightly. 3/6/20  Yes Bianka Jeffery, NP   albuterol (PROVENTIL HFA, VENTOLIN HFA, PROAIR HFA) 90 mcg/actuation inhaler Take 2 Puffs by inhalation every four (4) hours as needed for Wheezing. 2/28/20  Yes Bianka Jeffery, NP   ergocalciferol (ERGOCALCIFEROL) 50,000 unit capsule Take 1 Cap by mouth every seven (7) days. 12/4/19  Yes Bianka Jeffery NP     Allergies   Allergen Reactions    Amoxicillin Hives     Tolerated ceftriaxone.  Chlorhexidine Rash    Lisinopril Angioedema       Patient Active Problem List   Diagnosis Code    Acute bronchitis J20.9    Benign essential HTN I10    HSV (herpes simplex virus) infection B00.9    Lump of skin R22.9    Cellulitis of great toe L03.039    Vitamin D deficiency E55.9    Shock liver K72.00    Acetaminophen overdose, accidental or unintentional, subsequent encounter T39.1X1D     Current Outpatient Medications   Medication Sig Dispense Refill    potassium chloride (K-DUR, KLOR-CON) 20 mEq tablet Take 1 Tab by mouth two (2) times a day. 60 Tab 5    furosemide (LASIX) 20 mg tablet One tab daily (Patient taking differently: One tab daily    Took last on 5/4;5/5.) 14 Tab 0    valACYclovir (VALTREX) 500 mg tablet Take 1 Tab by mouth every evening. 90 Tab 4    metoprolol succinate (TOPROL-XL) 100 mg tablet Take 1.5 Tabs by mouth daily. 135 Tab 3    loratadine (CLARITIN) 10 mg tablet Take 1 Tab by mouth daily. 90 Tab 3    fluticasone propionate (FLONASE) 50 mcg/actuation nasal spray 2 Sprays by Both Nostrils route daily. 1 Bottle 5    melatonin 3 mg tablet Take 1 Tab by mouth nightly. 90 Tab 3    albuterol (PROVENTIL HFA, VENTOLIN HFA, PROAIR HFA) 90 mcg/actuation inhaler Take 2 Puffs by inhalation every four (4) hours as needed for Wheezing. 1 Inhaler 11    ergocalciferol (ERGOCALCIFEROL) 50,000 unit capsule Take 1 Cap by mouth every seven (7) days. 12 Cap 2     Allergies   Allergen Reactions    Amoxicillin Hives     Tolerated ceftriaxone.  Chlorhexidine Rash    Lisinopril Angioedema     Past Medical History:   Diagnosis Date    Anemia     Asthma     uses inhaler 2-3 times per week    Biliary colic     Hypertension     Ill-defined condition     murmur     Past Surgical History:   Procedure Laterality Date    HX CHOLECYSTECTOMY      HX GYN      vaginal delivery x1    HX GYN  2018    cyst removal    HX GYN          HX OTHER SURGICAL      lipoma removal from right shoulder    HX WISDOM TEETH EXTRACTION       Family History   Problem Relation Age of Onset    Hypertension Mother     Cancer Mother         colon, in remission    Clotting Disorder Mother         blood clot post surgery    Hypertension Father     Colon Cancer Paternal Uncle     Pancreatic Cancer Paternal Uncle     Cancer Paternal Uncle     Cancer Maternal Uncle     Stroke Paternal Aunt     Heart Disease Maternal Grandmother      Social History     Tobacco Use    Smoking status: Never Smoker    Smokeless tobacco: Never Used   Substance Use Topics    Alcohol use: Not Currently       Review of Systems   Constitutional: Negative for chills, fever and malaise/fatigue. HENT: Positive for congestion and sore throat. Negative for ear pain and sinus pain. Rhinorrhea, swelling in neck, painful with swallowing, facial and neck swelling   Respiratory: Negative for cough, sputum production, shortness of breath and wheezing. Cardiovascular: Positive for leg swelling (mild/trace ankle edema). Negative for chest pain and palpitations. Gastrointestinal: Negative for abdominal pain, diarrhea, nausea and vomiting. Abdomen \"swollen\":  BMs normal   Genitourinary: Negative for dysuria, frequency and urgency. States she cannot feel the urge to urinate. Denies incontinence. Musculoskeletal: Positive for back pain. Negative for myalgias. Neurological: Negative for dizziness and headaches.    Endo/Heme/Allergies: Positive for environmental allergies. Objective: There were no vitals taken for this visit. General: alert, cooperative, no distress   Mental  status: normal mood, behavior, speech, dress, motor activity, and thought processes, able to follow commands   HENT: NCAT   Neck: no visualized mass, swelling noted anterior neck, supraclavicular fossa   Resp: no respiratory distress   Neuro: no gross deficits   Skin: no discoloration or lesions of concern on visible areas   Psychiatric: normal affect, consistent with stated mood, no evidence of hallucinations     Additional exam findings: none    We discussed the expected course, resolution and complications of the diagnosis(es) in detail. Medication risks, benefits, costs, interactions, and alternatives were discussed as indicated. I advised her to contact the office if her condition worsens, changes or fails to improve as anticipated. She expressed understanding with the diagnosis(es) and plan. Ronald Dewey is a 46 y.o. female who was evaluated by a video visit encounter for concerns as above. Patient identification was verified prior to start of the visit. A caregiver was present when appropriate. Due to this being a TeleHealth encounter (During [facility] emergency), evaluation of the following organ systems was limited: Vitals/Constitutional/EENT/Resp/CV/GI//MS/Neuro/Skin/Heme-Lymph-Imm. Pursuant to the emergency declaration under the [facility] waiver authority and the [employer] and [employer] General Act, this Virtual  Visit was conducted, with patient's (and/or legal guardian's) consent, to reduce the patient's risk of exposure to COVID-19 and provide necessary medical care. Services were provided through a video synchronous discussion virtually to substitute for in-person clinic visit. Patient and provider were located at their individual homes.       Fatou Fuller ANDRIY Jeffery    This note will not be viewable in [address]

## 2020-05-06 NOTE — PROGRESS NOTES
Chief Complaint   Patient presents with    Hypertension    Asthma     Pt states swelling has now spread to face. Pt states she thinks she is allergic to Advil as of yesterday, due to side effects (rash,swelling.)    BP Monday: 140/105 increased back to 1.5 tablets of metoprolol. 1. Have you been to the ER, urgent care clinic since your last visit? Hospitalized since your last visit? No    2. Have you seen or consulted any other health care providers outside of the [address] [place] [address] since your last visit? Include any pap smears or colon screening.  No    PAP - Pt aware overdue     Health Maintenance Due   Topic Date Due    Pneumococcal 0-64 years (1 of 1 - PPSV23) [birth year]/31/1987    PAP AKA CERVICAL CYTOLOGY  03/01/2017

## 2020-05-08 ENCOUNTER — PATIENT MESSAGE (OUTPATIENT)
Dept: FAMILY MEDICINE CLINIC | Age: 40
End: 2020-05-08

## 2020-05-08 LAB
ALBUMIN SERPL-MCNC: 4.6 G/DL (ref 3.8–4.8)
ALBUMIN/GLOB SERPL: 1.7 {RATIO} (ref 1.2–2.2)
ALP SERPL-CCNC: 118 IU/L (ref 39–117)
ALT SERPL-CCNC: 15 IU/L (ref 0–32)
AST SERPL-CCNC: 23 IU/L (ref 0–40)
BILIRUB SERPL-MCNC: 0.5 MG/DL (ref 0–1.2)
BUN SERPL-MCNC: 10 MG/DL (ref 6–20)
BUN/CREAT SERPL: 12 (ref 9–23)
CALCIUM SERPL-MCNC: 10.6 MG/DL (ref 8.7–10.2)
CHLORIDE SERPL-SCNC: 98 MMOL/L (ref 96–106)
CO2 SERPL-SCNC: 24 MMOL/L (ref 20–29)
CREAT SERPL-MCNC: 0.86 MG/DL (ref 0.57–1)
GLOBULIN SER CALC-MCNC: 2.7 G/DL (ref 1.5–4.5)
GLUCOSE SERPL-MCNC: 95 MG/DL (ref 65–99)
POTASSIUM SERPL-SCNC: 4.4 MMOL/L (ref 3.5–5.2)
PROT SERPL-MCNC: 7.3 G/DL (ref 6–8.5)
SODIUM SERPL-SCNC: 136 MMOL/L (ref 134–144)
T4 FREE SERPL-MCNC: 0.94 NG/DL (ref 0.82–1.77)
TSH SERPL DL<=0.005 MIU/L-ACNC: 0.56 UIU/ML (ref 0.45–4.5)

## 2020-05-11 RX ORDER — FLUCONAZOLE 150 MG/1
150 TABLET ORAL DAILY
Qty: 2 TAB | Refills: 0 | Status: SHIPPED | OUTPATIENT
Start: 2020-05-11 | End: 2020-05-12

## 2020-05-11 NOTE — TELEPHONE ENCOUNTER
From: Wade See  To: Shakeel Cagle NP  Sent: 5/8/2020 3:23 PM EDT  Subject: Test Results Question    This is just a mild version. It gets worse. Its bothering my ears because it swells up my face.  And thats swollen under my chin and feels strange when I touch it

## 2020-05-13 ENCOUNTER — HOSPITAL ENCOUNTER (OUTPATIENT)
Dept: ULTRASOUND IMAGING | Age: 40
Discharge: HOME | End: 2020-05-13
Payer: MEDICAID

## 2020-05-13 ENCOUNTER — APPOINTMENT (OUTPATIENT)
Dept: ULTRASOUND IMAGING | Age: 40
End: 2020-05-13
Payer: MEDICAID

## 2020-05-13 DIAGNOSIS — R22.1 NECK SWELLING: ICD-10-CM

## 2020-05-13 DIAGNOSIS — K72.00 ACUTE LIVER FAILURE WITHOUT HEPATIC COMA: ICD-10-CM

## 2020-05-13 DIAGNOSIS — R19.00 ABDOMINAL SWELLING: ICD-10-CM

## 2020-05-13 PROCEDURE — 76700 US EXAM ABDOM COMPLETE: CPT

## 2020-05-13 PROCEDURE — 76536 US EXAM OF HEAD AND NECK: CPT

## 2020-05-18 ENCOUNTER — TELEPHONE (OUTPATIENT)
Dept: FAMILY MEDICINE CLINIC | Age: 40
End: 2020-05-18

## 2020-05-18 NOTE — PROGRESS NOTES
Abdominal ultrasound unremarkable. Appear to have slight \"fatty liver\" which is a result of diet and overweight. Watch the fats in diet and work on weight reduction. Otherwise normal ultrasound.

## 2020-05-18 NOTE — PROGRESS NOTES
Thyroid ultrasound suggests chronic autoimmune thyroiditis, which is where your body develops antibodies and they attack your thyroid gland. Early in the disease, thyroid levels can be normal.  I would like for you to see an endocrinologist who manages this problem and see if there are additional labs they would like to be done. We can send your thyroid labs and ultrasound result to that provider for review. I will have Giuliana see if she can get you an appointment with Dr. Anton Olmedo in [place].

## 2020-05-18 NOTE — TELEPHONE ENCOUNTER
----- Message from Oni Briceno NP sent at 5/18/2020  1:30 PM EDT -----  Thyroid ultrasound suggests chronic autoimmune thyroiditis, which is where your body develops antibodies and they attack your thyroid gland. Early in the disease, thyroid levels can be normal.  I would like for you to see an endocrinologist who manages this problem and see if there are additional labs they would like to be done. We can send your thyroid labs and ultrasound result to that provider for review. I will have Giuliana see if she can get you an appointment with Dr. Luis Armando Delgado in [place].

## 2020-06-18 ENCOUNTER — OFFICE VISIT (OUTPATIENT)
Dept: FAMILY MEDICINE CLINIC | Age: 40
End: 2020-06-18

## 2020-06-18 VITALS
WEIGHT: 167.8 LBS | OXYGEN SATURATION: 100 % | HEIGHT: 67 IN | TEMPERATURE: 97.8 F | HEART RATE: 88 BPM | RESPIRATION RATE: 16 BRPM | BODY MASS INDEX: 26.34 KG/M2 | SYSTOLIC BLOOD PRESSURE: 112 MMHG | DIASTOLIC BLOOD PRESSURE: 77 MMHG

## 2020-06-18 DIAGNOSIS — K72.00 ACUTE LIVER FAILURE WITHOUT HEPATIC COMA: ICD-10-CM

## 2020-06-18 DIAGNOSIS — R79.89 ABNORMAL LFTS: Primary | ICD-10-CM

## 2020-06-18 DIAGNOSIS — E78.1 HYPERTRIGLYCERIDEMIA: ICD-10-CM

## 2020-06-18 DIAGNOSIS — R22.1 NECK SWELLING: ICD-10-CM

## 2020-06-18 DIAGNOSIS — E06.3 CHRONIC AUTOIMMUNE THYROIDITIS: ICD-10-CM

## 2020-06-18 RX ORDER — METRONIDAZOLE 500 MG/1
TABLET ORAL
COMMUNITY
Start: 2020-06-11 | End: 2022-02-16 | Stop reason: ALTCHOICE

## 2020-06-18 RX ORDER — AZITHROMYCIN 500 MG/1
TABLET, FILM COATED ORAL
COMMUNITY
End: 2022-02-16 | Stop reason: ALTCHOICE

## 2020-06-18 RX ORDER — AMLODIPINE BESYLATE 5 MG/1
1 TABLET ORAL DAILY
COMMUNITY
Start: 2020-06-03 | End: 2020-06-29 | Stop reason: SDUPTHER

## 2020-06-18 RX ORDER — LEVOCETIRIZINE DIHYDROCHLORIDE 5 MG/1
5 TABLET, FILM COATED ORAL
COMMUNITY

## 2020-06-18 NOTE — PROGRESS NOTES
Chief Complaint   Patient presents with    Hypertension     Pt states was told needed to be on a cholesterol medication from [facility] ED.     1. Have you been to the ER, urgent care clinic since your last visit? Hospitalized since your last visit? Yes    2. Have you seen or consulted any other health care providers outside of the [address] [place] [address] since your last visit? Include any pap smears or colon screening.  Yes    PAP - Aug 2020 Dr. Johny Rey [facility] Maintenance Due   Topic Date Due    Pneumococcal 0-64 years (1 of 1 - PPSV23) 05/31/1987    PAP AKA CERVICAL CYTOLOGY  03/01/2017

## 2020-06-18 NOTE — PROGRESS NOTES
HISTORY OF PRESENT ILLNESS  Sera Holley is a 45 y.o. female. HPI  Patient comes in today for follow up ED   Saw GYN - was having vaginal discharge, dx with BV, given metronidazole. States urine culture was positive. States she was also urinating the color of dye of drinks she was drinking. States she drank an orange drink and started urinating orange. Scheduled to see Dr. Amadeo Wallace. Ultrasound of abd scheduled tomorrow for elevated LFTs. She is scheduled to see Dr. Atul Bowen for  [address] on 2020. She is not sure if she should have US done tomorrow. No recent labs on liver in ~2 weeks. Will recheck labs. Patient states she had a lot of labs done with endocrinology. She is still having some swelling in neck. Has finished Medrol dose pack. Need to obtain records from Dr. Radha Thomas. Had labs at [facility], told blood too lipemic. Needs to have cholesterol checked. States she stopped fenofibrate after hospitalization for liver problems  On 2020 - [facility] labs - , , bili 2.3, alk phos 297  Allergies   Allergen Reactions    Amoxicillin Hives     Tolerated ceftriaxone.     Chlorhexidine Rash    Lisinopril Angioedema       Past Medical History:   Diagnosis Date    Anemia     Asthma     uses inhaler 2-3 times per week    Biliary colic     Hypertension     Ill-defined condition     murmur       Past Surgical History:   Procedure Laterality Date    HX CHOLECYSTECTOMY      HX GYN      vaginal delivery x1    HX GYN  2018    cyst removal    HX GYN          HX OTHER SURGICAL      lipoma removal from right shoulder    HX WISDOM TEETH EXTRACTION         Social History     Socioeconomic History    Marital status: SINGLE     Spouse name: Not on file    Number of children: Not on file    Years of education: Not on file    Highest education level: Not on file   Occupational History    Not on file   Social Needs    Financial resource strain: Not on file    Food insecurity     Worry: Not on file     Inability: Not on file    Transportation needs     Medical: Not on file     Non-medical: Not on file   Tobacco Use    Smoking status: Never Smoker    Smokeless tobacco: Never Used   Substance and Sexual Activity    Alcohol use: Not Currently    Drug use: No    Sexual activity: Yes     Partners: Male     Birth control/protection: Condom   Lifestyle    Physical activity     Days per week: Not on file     Minutes per session: Not on file    Stress: Not on file   Relationships    Social connections     Talks on phone: Not on file     Gets together: Not on file     Attends Qu[employer] service: Not on file     Active member of club or organization: Not on file     Attends meetings of clubs or organizations: Not on file     Relationship status: Not on file    Intimate partner violence     Fear of current or ex partner: Not on file     Emotionally abused: Not on file     Physically abused: Not on file     Forced sexual activity: Not on file   Other Topics Concern    Not on file   Social History Narrative    Not on file       Family History   Problem Relation Age of Onset    Hypertension Mother     Cancer Mother         colon, in remission    Clotting Disorder Mother         blood clot post surgery    Hypertension Father     Colon Cancer Paternal Uncle     Pancreatic Cancer Paternal Uncle     Cancer Paternal Uncle     Cancer Maternal Uncle     Stroke Paternal Aunt     Heart Disease Maternal Grandmother        Current Outpatient Medications   Medication Sig    azithromycin (ZITHROMAX) 500 mg tab azithromycin 500 mg tablet   Take 1 tablet every day by oral route for 3 days.  metroNIDAZOLE (FLAGYL) 500 mg tablet     levocetirizine (Xyzal) 5 mg tablet Take 5 mg by mouth.  amLODIPine (NORVASC) 5 mg tablet Take 1 Tab by mouth daily.     potassium chloride (K-DUR, KLOR-CON) 20 mEq tablet Take 1 Tab by mouth two (2) times a day.  valACYclovir (VALTREX) 500 mg tablet Take 1 Tab by mouth every evening.  metoprolol succinate (TOPROL-XL) 100 mg tablet Take 1.5 Tabs by mouth daily.  fluticasone propionate (FLONASE) 50 mcg/actuation nasal spray 2 Sprays by Both Nostrils route daily.  melatonin 3 mg tablet Take 1 Tab by mouth nightly.  albuterol (PROVENTIL HFA, VENTOLIN HFA, PROAIR HFA) 90 mcg/actuation inhaler Take 2 Puffs by inhalation every four (4) hours as needed for Wheezing.  ergocalciferol (ERGOCALCIFEROL) 50,000 unit capsule Take 1 Cap by mouth every seven (7) days.  furosemide (LASIX) 20 mg tablet One tab daily (Patient not taking: Reported on 6/18/2020)    loratadine (CLARITIN) 10 mg tablet Take 1 Tab by mouth daily. (Patient not taking: Reported on 6/18/2020)     No current facility-administered medications for this visit. Review of Systems   Constitutional: Negative for chills, fever and malaise/fatigue. HENT: Negative for congestion, ear pain, sinus pain and sore throat. Swelling in neck, painful with swallowing, facial and neck swelling   Respiratory: Negative for cough, sputum production, shortness of breath and wheezing. Cardiovascular: Positive for leg swelling (mild/trace ankle edema). Negative for chest pain and palpitations. Gastrointestinal: Negative for abdominal pain, diarrhea, nausea and vomiting. Abdomen \"swollen\":  BMs normal   Genitourinary: Negative for dysuria, frequency and urgency. States she cannot feel the urge to urinate. Denies incontinence. Musculoskeletal: Positive for back pain. Negative for myalgias. Neurological: Negative for dizziness and headaches. Endo/Heme/Allergies: Positive for environmental allergies. Vitals:    06/18/20 1112   BP: 112/77   Pulse: 88   Resp: 16   Temp: 97.8 °F (36.6 °C)   TempSrc: Oral   SpO2: 100%   Weight: 167 lb 12.8 oz (76.1 kg)   Height: 5' 7\" (1.702 m)     Physical Exam  Vitals signs reviewed. Constitutional:       Appearance: Normal appearance. She is well-developed and well-groomed. Neck:      Thyroid: Thyromegaly present. Cardiovascular:      Rate and Rhythm: Normal rate and regular rhythm. Heart sounds: Normal heart sounds. Pulmonary:      Effort: Pulmonary effort is normal.      Breath sounds: Normal breath sounds. Abdominal:      General: Abdomen is flat. Bowel sounds are normal.      Palpations: Abdomen is soft. There is no hepatomegaly. Tenderness: There is abdominal tenderness in the left upper quadrant. Lymphadenopathy:      Cervical: Cervical adenopathy present. Upper Body:      Right upper body: Supraclavicular adenopathy present. Left upper body: Supraclavicular adenopathy present. Skin:     General: Skin is warm and dry. Neurological:      Mental Status: She is alert and oriented to person, place, and time. Psychiatric:         Behavior: Behavior is cooperative. ASSESSMENT and PLAN    ICD-10-CM ICD-9-CM    1. Abnormal LFTs R94.5 790.6 HEPATIC FUNCTION PANEL      METABOLIC PANEL, BASIC      CBC WITH AUTOMATED DIFF      IRON PROFILE      FERRITIN      PROTHROMBIN TIME + INR   2. Acute liver failure without hepatic coma K72.00 570 HEPATIC FUNCTION PANEL      METABOLIC PANEL, BASIC      CBC WITH AUTOMATED DIFF      IRON PROFILE      FERRITIN      PROTHROMBIN TIME + INR      URINALYSIS W/ RFLX MICROSCOPIC   3. Hypertriglyceridemia E78.1 272.1 LIPID PANEL   4. Neck swelling R22.1 784.2    5. Chronic autoimmune thyroiditis E06.3 245.2      Encounter Diagnoses   Name Primary?     Abnormal LFTs Yes    Acute liver failure without hepatic coma     Hypertriglyceridemia     Neck swelling     Chronic autoimmune thyroiditis      Orders Placed This Encounter    HEPATIC FUNCTION PANEL    METABOLIC PANEL, BASIC    CBC WITH AUTOMATED DIFF    LIPID PANEL    IRON PROFILE    FERRITIN    PROTHROMBIN TIME + INR    URINALYSIS W/ RFLX MICROSCOPIC    azithromycin (ZITHROMAX) 500 mg tab    metroNIDAZOLE (FLAGYL) 500 mg tablet    levocetirizine (Xyzal) 5 mg tablet    amLODIPine (NORVASC) 5 mg tablet     Diagnoses and all orders for this visit:    1. Abnormal LFTs - will recheck labs. If LFTs still elevated, will contact Dr. Natalie Peralta to see sooner if possible. -     HEPATIC FUNCTION PANEL; Future  -     METABOLIC PANEL, BASIC; Future  -     CBC WITH AUTOMATED DIFF; Future  -     IRON PROFILE; Future  -     FERRITIN; Future  -     PROTHROMBIN TIME + INR; Future    2. Hx of Acute liver failure without hepatic coma  -     HEPATIC FUNCTION PANEL; Future  -     METABOLIC PANEL, BASIC; Future  -     CBC WITH AUTOMATED DIFF; Future  -     IRON PROFILE; Future  -     FERRITIN; Future  -     PROTHROMBIN TIME + INR; Future  -     URINALYSIS W/ RFLX MICROSCOPIC; Future    3. Hypertriglyceridemia - patient to take fish oil supplement daily. Check FLP  -     LIPID PANEL; Future    4. Neck swelling    5. Chronic autoimmune thyroiditis - followed by endocrinology. Will have [address] faxed again to office      Follow-up and Dispositions    · Return if symptoms worsen or fail to improve.       lab results and schedule of future lab studies reviewed with patient    I have reviewed the patient's allergies and made any necessary changes. Medical, procedural, social and family histories have been reviewed and updated as medically indicated. I have reconciled and/or revised patient medications in the EMR. I have discussed each diagnosis listed in this note with Kristi Aguilera and/or their family. I have discussed treatment options and the risk/benefit analysis of those options, including safe use of medications and possible medication side effects. Through the use of shared decision making we have agreed to the above plan. The patient has received an after-visit summary and questions were answered concerning future plans. Vale Jeffery, HERMELINDA-C    This note will not be viewable in [address]

## 2020-06-20 LAB
ALBUMIN SERPL-MCNC: 4.1 G/DL (ref 3.8–4.8)
ALP SERPL-CCNC: 106 IU/L (ref 39–117)
ALT SERPL-CCNC: 19 IU/L (ref 0–32)
AST SERPL-CCNC: 19 IU/L (ref 0–40)
BASOPHILS # BLD AUTO: 0.1 X10E3/UL (ref 0–0.2)
BASOPHILS NFR BLD AUTO: 1 %
BILIRUB DIRECT SERPL-MCNC: 0.17 MG/DL (ref 0–0.4)
BILIRUB SERPL-MCNC: 0.5 MG/DL (ref 0–1.2)
BUN SERPL-MCNC: 9 MG/DL (ref 6–20)
BUN/CREAT SERPL: 11 (ref 9–23)
CALCIUM SERPL-MCNC: 10.1 MG/DL (ref 8.7–10.2)
CHLORIDE SERPL-SCNC: 102 MMOL/L (ref 96–106)
CHOLEST SERPL-MCNC: 184 MG/DL (ref 100–199)
CO2 SERPL-SCNC: 23 MMOL/L (ref 20–29)
CREAT SERPL-MCNC: 0.83 MG/DL (ref 0.57–1)
EOSINOPHIL # BLD AUTO: 0.3 X10E3/UL (ref 0–0.4)
EOSINOPHIL NFR BLD AUTO: 3 %
ERYTHROCYTE [DISTWIDTH] IN BLOOD BY AUTOMATED COUNT: 20.9 % (ref 11.7–15.4)
GLUCOSE SERPL-MCNC: 96 MG/DL (ref 65–99)
HCT VFR BLD AUTO: 41.3 % (ref 34–46.6)
HDLC SERPL-MCNC: 81 MG/DL
HGB BLD-MCNC: 13.4 G/DL (ref 11.1–15.9)
IMM GRANULOCYTES # BLD AUTO: 0.2 X10E3/UL (ref 0–0.1)
IMM GRANULOCYTES NFR BLD AUTO: 2 %
INTERPRETATION, 910389: NORMAL
LDLC SERPL CALC-MCNC: 81 MG/DL (ref 0–99)
LYMPHOCYTES # BLD AUTO: 1.6 X10E3/UL (ref 0.7–3.1)
LYMPHOCYTES NFR BLD AUTO: 15 %
MCH RBC QN AUTO: 25.9 PG (ref 26.6–33)
MCHC RBC AUTO-ENTMCNC: 32.4 G/DL (ref 31.5–35.7)
MCV RBC AUTO: 80 FL (ref 79–97)
MONOCYTES # BLD AUTO: 1.1 X10E3/UL (ref 0.1–0.9)
MONOCYTES NFR BLD AUTO: 10 %
NEUTROPHILS # BLD AUTO: 7.7 X10E3/UL (ref 1.4–7)
NEUTROPHILS NFR BLD AUTO: 69 %
PLATELET # BLD AUTO: 307 X10E3/UL (ref 150–450)
POTASSIUM SERPL-SCNC: 4.7 MMOL/L (ref 3.5–5.2)
PROT SERPL-MCNC: 6.9 G/DL (ref 6–8.5)
RBC # BLD AUTO: 5.17 X10E6/UL (ref 3.77–5.28)
SODIUM SERPL-SCNC: 137 MMOL/L (ref 134–144)
TRIGL SERPL-MCNC: 112 MG/DL (ref 0–149)
VLDLC SERPL CALC-MCNC: 22 MG/DL (ref 5–40)
WBC # BLD AUTO: 11 X10E3/UL (ref 3.4–10.8)

## 2020-06-27 NOTE — PROGRESS NOTES
Liver studies are normal.  See liver specialist as scheduled.   Kidney function normal.    Cholesterol numbers are normal.  Continue to watch carbohydrates in diet (regular soda, wine, breads, rice, pastas)

## 2020-06-29 ENCOUNTER — PATIENT MESSAGE (OUTPATIENT)
Dept: FAMILY MEDICINE CLINIC | Age: 40
End: 2020-06-29

## 2020-06-29 RX ORDER — AMLODIPINE BESYLATE 5 MG/1
5 TABLET ORAL DAILY
Qty: 90 TAB | Refills: 3 | Status: SHIPPED | OUTPATIENT
Start: 2020-06-29 | End: 2021-06-09

## 2020-06-29 NOTE — TELEPHONE ENCOUNTER
From: Ksenia Market  To: Petra Braxton NP  Sent: 6/29/2020 12:34 PM EDT  Subject: Prescription Question    I need a prescription sent to my pharmacy for my amlodipine 5mg because I only have enough pills for 2 days this week and it won't let me refill it on here. Can you send it to my pharmacy?

## 2020-06-29 NOTE — TELEPHONE ENCOUNTER
Last OV:6/18/2020  Next Appt:none  Last Refill: ?? Requested Prescriptions     Pending Prescriptions Disp Refills    amLODIPine (NORVASC) 5 mg tablet 90 Tab 3     Sig: Take 1 Tab by mouth daily.

## 2020-07-29 ENCOUNTER — PATIENT MESSAGE (OUTPATIENT)
Dept: FAMILY MEDICINE CLINIC | Age: 40
End: 2020-07-29

## 2020-07-29 RX ORDER — FLUCONAZOLE 150 MG/1
150 TABLET ORAL DAILY
Qty: 2 TAB | Refills: 0 | Status: SHIPPED | OUTPATIENT
Start: 2020-07-29 | End: 2020-07-30

## 2020-07-29 NOTE — TELEPHONE ENCOUNTER
From: Ella Bragg  To: Sarah Valdovinos NP  Sent: 7/29/2020 2:30 PM EDT  Subject: Prescription Question    Is it possible if you could send over a Diflucan pill for me?  Antibiotics caused a yeast infection

## 2020-08-31 RX ORDER — FUROSEMIDE 20 MG/1
TABLET ORAL
Qty: 30 TAB | Refills: 1 | Status: SHIPPED | OUTPATIENT
Start: 2020-08-31 | End: 2021-04-18

## 2020-12-08 DIAGNOSIS — E55.9 VITAMIN D DEFICIENCY: ICD-10-CM

## 2020-12-08 NOTE — TELEPHONE ENCOUNTER
Last visit:6/18/20  Next visit:not scheduled  Previous refill 12/4/19(12+0R)    Requested Prescriptions     Pending Prescriptions Disp Refills    ergocalciferol (ERGOCALCIFEROL) 1,250 mcg (50,000 unit) capsule 12 Cap 2     Sig: Take 1 Cap by mouth every seven (7) days.

## 2020-12-09 ENCOUNTER — TELEPHONE (OUTPATIENT)
Dept: FAMILY MEDICINE CLINIC | Age: 40
End: 2020-12-09

## 2020-12-09 RX ORDER — ERGOCALCIFEROL 1.25 MG/1
50000 CAPSULE ORAL
Qty: 12 CAP | Refills: 2 | Status: SHIPPED | OUTPATIENT
Start: 2020-12-09 | End: 2021-08-27

## 2020-12-09 NOTE — TELEPHONE ENCOUNTER
Verified patient with two type of identifiers. Informed medication currently pending provider approval. Pt verbalized understanding.

## 2020-12-09 NOTE — TELEPHONE ENCOUNTER
----- Message from Avtar Curtis sent at 12/9/2020  2:18 PM EST -----  Regarding: ANDRIY Lynn Courser Jose D/Refill  Medication Refill    Caller (if not patient): N/A       Relationship of caller (if not patient): N/A      Best contact number(s): [phone]      Name of medication and dosage if known: Ergocalciferol 50,000 Unit Caps      Is patient out of this medication (yes/no): yes       Pharmacy name: [address] listed in chart? (yes/no): yes   Pharmacy phone number: [phone]      Details to clarify the request: The pt is following up on her Rx refill request sent over by the pharmacy and advised that she has been out of this medication for the past two weeks. The pt also advised that she is starting to physically feel the effects of not having the medication and would like a call back to confirm that this has been sent to the pharmacy for .       Avtar Curtis

## 2020-12-25 NOTE — DISCHARGE SUMMARY
Discharge Summary     Patient: Alida Lima MRN: [de-identified]  SSN: [id number]    YOB: 1980  Age: 41 y.o. Sex: female       Admit Date: 11/27/2018    Discharge Date: 11/27/2018      Admission Diagnoses: RIGHT OVARIAN CYST    Discharge Diagnoses: same  Problem List as of 11/27/2018 Date Reviewed: 11/1/2018          Codes Class Noted - Resolved    Vitamin D deficiency ICD-10-CM: E55.9  ICD-9-CM: 268.9  9/20/2016 - Present        Lump of skin ICD-10-CM: R22.9  ICD-9-CM: 782.2  1/2/2014 - Present        Cellulitis of great toe ICD-10-CM: L03.039  ICD-9-CM: 681.10  1/2/2014 - Present        Benign essential HTN ICD-10-CM: I10  ICD-9-CM: 401.1  2/21/2013 - Present        HSV (herpes simplex virus) infection ICD-10-CM: B00.9  ICD-9-CM: 054.9  2/21/2013 - Present        Acute bronchitis ICD-10-CM: J20.9  ICD-9-CM: 466.0  11/23/2011 - Present        Biliary colic FDM-30-CI: M00.49  ICD-9-CM: 574.20  5/21/2010 - Present        RESOLVED: Elevated blood pressure ICD-10-CM: Elo Burnett  ICD-9-CM: Elo Burnett  1/25/2013 - 2/21/2013               Discharge Condition: Good    Hospital Course: 40 yo with persistent right ovarian cyst, admitted for scheduled robotic right ovarian cystectomy. Procedure was uncomplicated, she was discharged home in stable condition. Consults: None    Significant Diagnostic Studies: none    Disposition: home    Discharge Medications:   Current Discharge Medication List      START taking these medications    Details   ibuprofen (MOTRIN IB) 200 mg tablet Take 4 Tabs by mouth every six (6) hours. Qty: 60 Tab, Refills: 2      oxyCODONE-acetaminophen (PERCOCET) 5-325 mg per tablet Take 1 Tab by mouth every four (4) hours as needed for Pain. Max Daily Amount: 6 Tabs. Qty: 15 Tab, Refills: 0    Associated Diagnoses: Cyst of right ovary      senna (SENNA) 8.6 mg tablet Take 2 Tabs by mouth daily. To treat/prevent constipation.   Qty: 30 Tab, Refills: 2         CONTINUE these medications which have CHANGED    Details   TRINESSA LO 0.18/0.215/0.25 mg-25 mcg tab Take 1 Tab by mouth nightly. Qty: 1 Package, Refills: 11         CONTINUE these medications which have NOT CHANGED    Details   albuterol (PROVENTIL HFA, VENTOLIN HFA, PROAIR HFA) 90 mcg/actuation inhaler Take 2 Puffs by inhalation every four (4) hours as needed for Wheezing. Qty: 1 Inhaler, Refills: 11    Associated Diagnoses: Mild intermittent asthmatic bronchitis with acute exacerbation      metoprolol succinate (TOPROL-XL) 25 mg XL tablet Take 1 Tab by mouth daily. Qty: 90 Tab, Refills: 2    Associated Diagnoses: Benign essential HTN; Heart palpitations      valACYclovir (VALTREX) 500 mg tablet Take 500 mg by mouth daily. Refills: 1      cholecalciferol (VITAMIN D3) 1,000 unit tablet Take 1,000 Units by mouth daily. omega 3-dha-epa-fish oil (FISH OIL) 100-160-1,000 mg cap Take 1 Cap by mouth daily. Activity: See surgical instructions  Diet: Regular Diet  Wound Care: see surgical instructions    Follow-up Appointments   Procedures    FOLLOW UP VISIT Appointment in: One Week     Standing Status:   Standing     Number of Occurrences:   1     Order Specific Question:   Appointment in     Answer:    One Week       Signed By: Shelton Devlin MD     November 27, 2018 - - -

## 2021-03-04 ENCOUNTER — PATIENT MESSAGE (OUTPATIENT)
Dept: FAMILY MEDICINE CLINIC | Age: 41
End: 2021-03-04

## 2021-03-04 DIAGNOSIS — E87.6 HYPOKALEMIA: ICD-10-CM

## 2021-03-04 DIAGNOSIS — R68.89 CUSHINGOID FACIES: Primary | ICD-10-CM

## 2021-03-04 RX ORDER — POTASSIUM CHLORIDE 20 MEQ/1
20 TABLET, EXTENDED RELEASE ORAL 2 TIMES DAILY
Qty: 180 TAB | Refills: 2 | Status: SHIPPED | OUTPATIENT
Start: 2021-03-04 | End: 2022-02-03 | Stop reason: SDUPTHER

## 2021-03-04 NOTE — TELEPHONE ENCOUNTER
Last OV: 6/18/20  Next Appt: none  Last Refill: 4/27/20    Requested Prescriptions     Pending Prescriptions Disp Refills    potassium chloride (K-DUR, KLOR-CON) 20 mEq tablet 60 Tab 5     Sig: Take 1 Tab by mouth two (2) times a day.

## 2021-03-04 NOTE — TELEPHONE ENCOUNTER
From: Michoacano Fletcher  To: Oni Briceno NP  Sent: 3/4/2021 3:49 PM EST  Subject: Prescription Question    Hey. The pharmacy said they have sent over a prescription refill request for my potassium pills like a week or so ago and sending another today. Can it be approved because I don't Irais Harrington go this long without taking the pills. They gave me a 5 day supply until they heard back but im done with those and they still haven't gotten a approval yet.

## 2021-03-11 RX ORDER — DEXAMETHASONE 1 MG/1
TABLET ORAL
Qty: 1 TAB | Refills: 0 | Status: SHIPPED | OUTPATIENT
Start: 2021-03-11 | End: 2022-02-16 | Stop reason: ALTCHOICE

## 2021-03-11 NOTE — TELEPHONE ENCOUNTER
I sent medication. She needs to make sure she takes medication the night before lab. Since it is Friday, she may want to wait until Sunday night to take medication and have labs drawn at [facility] Monday morning    Orders Placed This Encounter    CORTISOL     Standing Status:   Future     Standing Expiration Date:   9/11/2021    potassium chloride (K-DUR, KLOR-CON) 20 mEq tablet     Sig: Take 1 Tab by mouth two (2) times a day.      Dispense:  180 Tab     Refill:  2    dexAMETHasone (DECADRON) 1 mg tablet     Sig: Take at 11pm night before labs     Dispense:  1 Tab     Refill:  0

## 2021-03-23 DIAGNOSIS — I10 BENIGN ESSENTIAL HTN: ICD-10-CM

## 2021-03-23 RX ORDER — METOPROLOL SUCCINATE 100 MG/1
TABLET, EXTENDED RELEASE ORAL
Qty: 135 TAB | Refills: 3 | Status: SHIPPED | OUTPATIENT
Start: 2021-03-23 | End: 2022-02-03 | Stop reason: SDUPTHER

## 2021-04-05 DIAGNOSIS — J30.2 SEASONAL ALLERGIC RHINITIS, UNSPECIFIED TRIGGER: ICD-10-CM

## 2021-04-06 RX ORDER — LANOLIN ALCOHOL/MO/W.PET/CERES
CREAM (GRAM) TOPICAL
Qty: 90 TAB | Refills: 3 | Status: SHIPPED | OUTPATIENT
Start: 2021-04-06 | End: 2022-02-16 | Stop reason: SDUPTHER

## 2021-04-09 LAB — CORTIS SERPL-MCNC: 2.4 UG/DL

## 2021-04-12 NOTE — PROGRESS NOTES
Your cortisol level was suppressed after taking dexamethasone, but not below the required 1.8 range. False-positive tests can be seen in patients taking estrogens, obese patients, in those who have had a poor night's sleep, and in patients under acute emotional or physical stress. Pseudo-Cushing syndrome is a term used to describe hypercortisolism, which may affect all screening tests and is due to alcohol, depression, or obesity. The Endocrine Society guidelines suggest referral of patients to an endocrinologist for additional testing and confirmation. Did you see endocrinology last year - Dr. Lata Hendrix? You can schedule a follow up with her.

## 2021-04-18 RX ORDER — FUROSEMIDE 20 MG/1
TABLET ORAL
Qty: 30 TAB | Refills: 1 | Status: SHIPPED | OUTPATIENT
Start: 2021-04-18

## 2021-04-27 ENCOUNTER — TELEPHONE (OUTPATIENT)
Dept: FAMILY MEDICINE CLINIC | Age: 41
End: 2021-04-27

## 2021-04-27 NOTE — TELEPHONE ENCOUNTER
----- Message from Ag Thompson sent at 4/27/2021  2:16 PM EDT -----  Regarding: Dr. Matthews Learn Message/Vendor Calls    Caller's first and last name: Elo Vigiln with [facility]      Reason for call: Needs a copy of her last labs      Callback required yes/no and why: Yes; to discuss      Best contact number(s): [phone]      Details to clarify the request: Pt is in the office now and has been waiting for over 15 minutes.  Fax number is [phone]      Ag Thompson

## 2021-05-27 ENCOUNTER — TRANSCRIBE ORDER (OUTPATIENT)
Dept: SCHEDULING | Age: 41
End: 2021-05-27

## 2021-05-27 DIAGNOSIS — R94.5 LIVER FUNCTION ABNORMALITY: Primary | ICD-10-CM

## 2021-06-09 RX ORDER — AMLODIPINE BESYLATE 5 MG/1
TABLET ORAL
Qty: 90 TABLET | Refills: 0 | Status: SHIPPED | OUTPATIENT
Start: 2021-06-09 | End: 2021-09-14 | Stop reason: SDUPTHER

## 2021-06-10 DIAGNOSIS — B00.9 HSV (HERPES SIMPLEX VIRUS) INFECTION: ICD-10-CM

## 2021-06-10 RX ORDER — VALACYCLOVIR HYDROCHLORIDE 500 MG/1
TABLET, FILM COATED ORAL
Qty: 90 TABLET | Refills: 1 | Status: SHIPPED | OUTPATIENT
Start: 2021-06-10 | End: 2022-02-03 | Stop reason: SDUPTHER

## 2021-08-01 NOTE — CONSULTS
"Chief Complaint  Hyperlipidemia, Hypertension, and Follow-up (just getting established. Nithya Hill's son in law)    Subjective          Derek Castelan presents to [facility]     History of Present Illness    75-year-old male with underlying hypertension, hyperlipidemia, resultant coronary artery disease status post 6 vessel bypass in 2007, who is coming to me for a new patient eval.  We review any recent labs he may have had and address any new concerns.  Of note he is followed closely by Dr. Chao's group in regards to his heart disease.    Review of Systems   Constitutional: Positive for fatigue. Negative for appetite change and fever.   HENT: Negative for congestion and ear pain.    Eyes: Negative for blurred vision.   Respiratory: Negative for cough, chest tightness, shortness of breath and wheezing.    Cardiovascular: Negative for chest pain, palpitations and leg swelling.   Gastrointestinal: Negative for abdominal pain.   Genitourinary: Negative for difficulty urinating, dysuria and hematuria.   Musculoskeletal: Negative for arthralgias and gait problem.   Skin: Negative for skin lesions.   Neurological: Negative for syncope, memory problem and confusion.   Hematological: Bruises/bleeds easily.   Psychiatric/Behavioral: Negative for self-injury and depressed mood.       Objective   Vital Signs:   /82   Pulse 54   Temp 97.3 °F (36.3 °C)   Ht 177.8 cm (70\")   Wt 89.1 kg (196 lb 6.4 oz)   SpO2 98%   BMI 28.18 kg/m²           Physical Exam  Vitals and nursing note reviewed.   Constitutional:       General: He is not in acute distress.     Appearance: Normal appearance. He is not toxic-appearing.   HENT:      Head: Atraumatic.      Right Ear: External ear normal.      Left Ear: External ear normal.      Nose: Nose normal.      Mouth/Throat:      Mouth: Mucous membranes are moist.   Eyes:      General:         Right eye: No discharge.         Left eye: No " [facility], Dwayne ORTIZ, Elvia Chung MD Marilu Mile, PA-C Rudean Porteous, [facility]     April S Clau, [facility]   Dick Humphrey, LOUIEP-ALAN    Johnathon Cancer, [facility]       Danteangela Oneill Zaki De Damian 136    at [address], [facility] Caroline Becker  22.    [phone]    FAX: [address] [place] [address], [address]    [phone]    FAX: [phone]       HEPATOLOGY CONSULT NOTE  I was asked to see this patient in consultation by NP CHERRI Dias for management of elevated liver enzymes. I have reviewed the Emergency room note, Hospital admission note, Notes by all other physicians who have seen the patient during this hospitalization to date. I have reviewed the problem list and the reason for this hospitalization. I have reviewed the allergies and the medications the patient was taking at home prior to this hospitalization. HISTORY:  The patient is a 46year old [de-identified] female who developed several days of worsening cough, lightheadedness, progressive fatigue, fever, diarrhea. She does consume the equivalent of 4 alcoholic drinks per day and has been doing this for several years. She has taken some acetaminophen for the flu-like symptoms but cannot quantitate this. She went to urgent care yesterday and was found to be hypotensive and was brought by EMS to [address] [place] [address] ED. In the ED liver SBP was in the 80s, liver transaminases were in the 200-300 range, TBILI 7.3, INR 1.0, Scr 4.9 mg, Sna 129. Us and CT scan suggest fatty liver without cirrhosis. She was placed on presser support, broad spectrum ABX and moved to ICU. This AM she is lethargic but easily arousable. SBP is in the 120s, pulse 100 with good O2 sat. Liver enzymes looked like they have peaked but not yet coming down, INR has increased to 2.0, TBILI is down to 5.9, Sna is up to 131, Scr is down some to 4.6 and she is making some urine. ASSESSMENT AND PLAN:  Shock liver  The patient has shock liver with acute marked increase in liver transaminases. This was due an episode of hypotension and hepatic ischemia. Although the liver enzymes rise very high with shock liver liver failure is very uncommon. The treatment is to restore systemic blood pressure and hepatic perfusion which has already been done. I expect the liver enzymes to come down over the next several days. Depending upon the severity of the insult the TBILI may remain normal or rise. Today the TBILI is already down which shows the liver is not failing. Expect this to continue to come down. Elevated liver enzymes  There was elevation in liver transaminases back in 5/2019. She does consume a bit too much alcohol daily and has done this for years. Very likely she has some underlying alcohol induced fatty liver but nothing to suggest advanced liver disease or cirrhosis back then or now. This is supported by US and CT performed on admission. Will need serologic studies to exclude other causes of chronic liver disease. She did take some acetaminophen over the past few days and this combined tiht eh daily ETOH may have contributed to the elevation in liver enzymes. She is on IV NAC which was a good idea. I would continue the 1L bag dose for another 24 hours until we see the liver enzymes come down. Coagulopathy  This is secondary to shock liver and DIC from the acute process. She has already recieved 1 dose of IV Vit K. Will treat with 2 doses for a total of 3 over next 2 days. Expect the INR to rapidly improve. Thrombocytopenia   This is an acute decline and secondary to DIC.   The previous PLT count in 5/2019 was normal.  The is no suggestion that she has discharge.      Extraocular Movements: Extraocular movements intact.      Pupils: Pupils are equal, round, and reactive to light.   Cardiovascular:      Rate and Rhythm: Normal rate and regular rhythm.      Pulses: Normal pulses.      Heart sounds: Normal heart sounds. No murmur heard.   No gallop.    Pulmonary:      Effort: Pulmonary effort is normal. No respiratory distress.      Breath sounds: No wheezing, rhonchi or rales.   Abdominal:      General: There is no distension.      Palpations: Abdomen is soft. There is no mass.      Tenderness: There is no abdominal tenderness. There is no guarding.   Musculoskeletal:         General: No swelling or tenderness.      Cervical back: No tenderness.      Right lower leg: No edema.      Left lower leg: No edema.   Skin:     General: Skin is warm and dry.      Findings: Bruising present. No rash.   Neurological:      General: No focal deficit present.      Mental Status: He is alert and oriented to person, place, and time. Mental status is at baseline.      Motor: No weakness.      Gait: Gait normal.   Psychiatric:         Mood and Affect: Mood normal.         Thought Content: Thought content normal.          Result Review :   The following data was reviewed by: Richard Millan MD on 08/03/2021:                  Assessment and Plan    Diagnoses and all orders for this visit:    1. Atrophy of thyroid (Primary)  Overview:  Okay patient is been on Synthroid 88 mcgs for a good while now.  We will repeat level on return to office only evaluate the anemia further.    Orders:  -     TSH; Future  -     T4, free; Future    2. Essential hypertension  Overview:  Blood pressure is up but this is his first visit here.  He has appointment tomorrow with Dr. Chao so will defer any changes to him.    Orders:  -     Basic Metabolic Panel; Future    3. Mixed hyperlipidemia  Overview:  LDL was 70 in July 2021.  Patient is maintained on high-dose Crestor 40 mg, he is tolerating this,  advanced liver disease/cirrhosis that could cause this. There is no evidence of overt bleeding. No treatment is required. With treatment the PLT is already coming up from 89 to 105. DIC  This is supported by elevated INR and PLT due to SIRS. This already appears to be improving. Sepsis vs SIRS  Await results of blood cultures but no good evidence for bacterial infection. Given systemic nature of systems this all probably viral induced SIRS. Anemia   This is likely due to hemolysis from SIRS. There is no evidence of bleeding from GI or any other area. Expec this to improve as SIRs resolves. Acute kidney injury  The patient has acute kidney injury  There is no evidence of CKD. Scr in 2019 was normal.  JANICE is secondary to hypotension   It is unlikely this is HRS. Will check urine NA. SYSTEM REVIEW:  The patient is lethargic but able to answer questions. .    Constitution systems: Diffuse joint aches. Eyes: Negative for visual changes. ENT: Negative for sore throat, painful swallowing. Respiratory: Cough,   Cardiology: Negative for chest pain, palpitations. GI:  Negative for constipation or diarrhea. : Negative for urinary frequency, dysuria, hematuria, nocturia. Skin: Negative for rash. Hematology: Negative for easy bruising, blood clots. Musculo-skelatal: Negative for back pain, muscle pain, weakness. Neurologic: headache  Psychology: Negative for anxiety, depression. FAMILY HISTORY:  The father   of sepsis. The mother Has/had the following chronic disease(s): was recently found to have liver fibrosis during cholecystectomy. SOCIAL HISTORY:  The patient has never been . The patient has 1 child. The patient has never used tobacco products. The patient consumes 4 alcoholic beverages per day   The patient does not work   She lives with her mother. PHYSICAL EXAMINATION:  VS: per nursing note  General:  Ill appearing  Eyes:  Sclera anicteric. benefiting from it, and should continue same.      4. S/P CABG x 6  Overview:  Patient's surgery was in 2007.  He is followed every 6 months by Dr. Chao.  He does not have any ongoing ischemia, however he is on nitroglycerin patch daily.      5. Urothelial carcinoma with high risk of recurrence (CMS/HCC)  Overview:  Patient had definitive treatment performed by urologist up at the [facility].  Do not have those records and and hand, but need to evaluate that in regards to possible follow-up being required.  He has not had any further bleeding, and he is on Brilinta, Plavix, and aspirin.      6. Iron deficiency anemia due to chronic blood loss  Overview:  Hemoglobin was 12.3 in March 2020 when he was seen in the ER for hematuria.  Presently it is 11.9.  We need to get iron studies and B12 and follow them up in a few months.  We will start every other day a day iron until then.    Orders:  -     CBC & Differential; Future  -     Iron Profile; Future  -     Ferritin; Future    7. Other fatigue  -     Vitamin B12 anemia; Future  -     Folate anemia; Future    Other orders  -     ferrous sulfate 325 (65 FE) MG tablet; Take 1 tablet by mouth Every Other Day.  Dispense: 45 tablet; Refill: 1         CARDIOLOGY OV 1/4/21:    Derek Castelan is a 74 year old /White male who denies any chest pain or pressure. No palpitations, shortness of breath, swelling, dizziness, syncope, PND, or orthopnea. Cardiac-wise, he is feeling very well, but he has gained 8 pounds since his last visit. He has had bladder cancer and surgery since he was last here, but no chemotherapy. His blood pressures are monitored at home, but he did not bring his readings and says they are in good range. 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   PAST MEDICAL HISTORY: Coronary artery disease with previous bypass surgery (July 2007 x6, LIMA to diagonal 1 and the LAD, SVG to OM1 and OM2, and SVG  ENT:  No oral lesions. Thyroid normal.  Nodes:  No adenopathy. Skin:  No spider angiomata. No jaundice. Respiratory:  Lungs clear to auscultation. Cardiovascular:  Regular heart rate. Abdomen:  Soft non-tender, No obvious ascites. Extremities:  No lower extremity edema. Neurologic:  Alert and oriented. Cranial nerves grossly intact. No asterixis. LABORATORY:  Results for Anderson Grade (MRN [de-identified]) as of 2/20/2020 06:04   Ref. Range 5/23/2019 16:05 6/4/2019 10:17 2/19/2020 16:48 2/19/2020 18:19 2/20/2020 02:08   WBC Latest Ref Range: 3.6 - 11.0 K/uL 6.2  5.0  4.0   HGB Latest Ref Range: 11.5 - 16.0 g/dL 11.6  12.7  11.2 (L)   PLATELET Latest Ref Range: 150 - 400 K/uL 148 (L)  89 (L)  105 (L)   INR Latest Ref Range: 0.9 - 1.1   1.0   1.0 2.0 (H)   Sodium Latest Ref Range: 136 - 145 mmol/L 138  129 (L) 131 (L) 130 (L)   Potassium Latest Ref Range: 3.5 - 5.1 mmol/L 4.6  4.9 4.5 4.0   Chloride Latest Ref Range: 97 - 108 mmol/L 101  98 100 98   CO2 Latest Ref Range: 21 - 32 mmol/L 20  15 (LL) 15 (LL) 16 (L)   Glucose Latest Ref Range: 65 - 100 mg/dL 96  68 77 157 (H)   BUN Latest Ref Range: 6 - 20 MG/DL 9  34 (H) 34 (H) 39 (H)   Creatinine Latest Ref Range: 0.55 - 1.02 MG/DL 0.75  4.94 (H) 4.62 (H) 4.63 (H)   Bilirubin, total Latest Ref Range: 0.2 - 1.0 MG/DL 0.3 0.2 7.3 (H) 5.9 (H)    Albumin Latest Ref Range: 3.5 - 5.0 g/dL 4.4 4.4 3.4 (L) 3.1 (L) 2.6 (L)   ALT (SGPT) Latest Ref Range: 12 - 78 U/L 87 (H) 29 >3,500 (H) 3,244 (H)    AST Latest Ref Range: 15 - 37 U/L 161 (H) 42 (H) >2,000 (H) >2,000 (H)    Alk. phosphatase Latest Ref Range: 45 - 117 U/L 169 (H) 124 (H) 230 (H) 184 (H)        RADIOLOGY:  Ultrasound of liver. Echogenic consistent with fatty liver. No liver mass lesions. No dilated bile ducts. No ascites. CT scan abdomen without IV contrast.  Changes consistent with fatty liver. No liver mass lesions. Normal spleen. No ascites.     About 110 minutes was spent with the patient to sequential graft to the RCA and PDA); Previous myocardial infarction (even when on clopidogrel); Hyperlipidemia; Hypertension; Hypothyroidism.   PSYCHOSOCIAL HISTORY: Denies tobacco use. Denies alcohol use.   CURRENT MEDICATIONS: Brilinta 60 mg b.i.d. long-term; carvedilol 3.125 mg q. p.m.; levothyroxine 88 mcg q. a.m.; rosuvastatin 40 mg q. p.m.; NitroPatch 0.2 mg/hour; aspirin 81 mg daily; nitroglycerin 0.4 mg p.r.n.; Centrum Silver.       ALLERGIES:  No known drug allergies.     1.  Hypertension, uncertain control.  2.  Coronary artery disease with previous MI and bypass without angina.  3.  Hyperlipidemia, at goal.        Plan      1.  Do a blood pressure log, and we will adjust hypertensive medications if needed.    2.  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    3.  Continue Brilinta long-term.  4.  Continue carvedilol for his hypertension.  5.  Continue rosuvastatin for his cholesterols.  6.  Follow up in 6 months with labs and an EKG or earlier if needed.          UROLOGY 5/7/2020:    High Grade TI urothelial cancer. I have discussed the management of high grade T1 urothelial cancer of the bladder with the patient, including a 25-40% likelihood of detecting T2 or greater disease at re-resection. I have discussed the risks and prognosis of High grade TI disease with the patient including progression to MIBC and recurrence of HGTI. I have discussed bladder preserving management with BCG and intravesical agents, and the risks of infection, bladder screening, recurrence and progression, as well as the need for maintenance with BCG. I have discussed the role of early cystectomy for patients with HGTI and the excellent overall survival for patient with HGT1 bladder cancer who have a cystectomy. I mentioned that even with HGT1 there is a 10-15% chance of being discovered to have salome metastasis.      Recent pathology was HGT1 and discussed with patient that  guidelines dictate re-resection in about 6 weeks, ensuring adequate healing prior- aware that Brillinta will have to be held again.  Will plan for repeat bilateral retrogrades at that time given history of bilateral hydronephrosis at time of hematuria.   Given the above limitations visually at time of resection as above and per operative note, will discuss with pathology if frozen sections at time of re-resection is possible if needed     Follow Up   No follow-ups on file.  Patient was given instructions and counseling regarding his condition or for health maintenance advice. Please see specific information pulled into the AVS if appropriate.        reviewing the medical history, performing the examination, explaining the disease process, and treatment options       MD Gopal Boyer [address], [address] [place] [address] 22.  [phone]  [address] [place] [address]

## 2021-08-27 DIAGNOSIS — E55.9 VITAMIN D DEFICIENCY: ICD-10-CM

## 2021-08-27 RX ORDER — ERGOCALCIFEROL 1.25 MG/1
CAPSULE ORAL
Qty: 12 CAPSULE | Refills: 0 | Status: SHIPPED | OUTPATIENT
Start: 2021-08-27 | End: 2022-02-16 | Stop reason: SDUPTHER

## 2021-09-06 RX ORDER — AMLODIPINE BESYLATE 5 MG/1
TABLET ORAL
Qty: 90 TABLET | Refills: 0 | OUTPATIENT
Start: 2021-09-06

## 2021-09-09 DIAGNOSIS — E87.6 HYPOKALEMIA: ICD-10-CM

## 2021-09-09 DIAGNOSIS — E55.9 VITAMIN D DEFICIENCY: ICD-10-CM

## 2021-09-09 RX ORDER — ERGOCALCIFEROL 1.25 MG/1
CAPSULE ORAL
Qty: 12 CAPSULE | Refills: 0 | OUTPATIENT
Start: 2021-09-09

## 2021-09-09 RX ORDER — POTASSIUM CHLORIDE 20 MEQ/1
TABLET, EXTENDED RELEASE ORAL
Qty: 180 TABLET | Refills: 2 | OUTPATIENT
Start: 2021-09-09

## 2021-09-14 RX ORDER — AMLODIPINE BESYLATE 5 MG/1
5 TABLET ORAL DAILY
Qty: 90 TABLET | Refills: 0 | Status: SHIPPED | OUTPATIENT
Start: 2021-09-14 | End: 2022-02-03 | Stop reason: SDUPTHER

## 2021-09-14 NOTE — TELEPHONE ENCOUNTER
Last OV: 6/18/20  Next Appt: 11/30/21  Last Refill: 6/9/21 (90+R0)    Requested Prescriptions     Pending Prescriptions Disp Refills    amLODIPine (NORVASC) 5 mg tablet 90 Tablet 0     Sig: Take 1 Tablet by mouth daily.      Signed Prescriptions Disp Refills    ergocalciferol (ERGOCALCIFEROL) 1,250 mcg (50,000 unit) capsule 12 Capsule 0     Sig: TAKE 1 CAPSULE BY MOUTH EVERY 7 DAYS     Authorizing Provider: Jessica Lozano

## 2021-10-26 NOTE — TELEPHONE ENCOUNTER
----- Message from Gypsy Hagen sent at 4/3/2020  2:35 PM EDT -----  Regarding: Jose D/ telephone  Contact: [phone]  Caller's first and last name: n/a  Reason for call: Pt stated she sent message and picture of her neck and it is swollen. Callback required yes/no and why: yes  Best contact number(s): [phone]  Details to clarify the request :Pt is concerned and would like a call back as soon as possible. Kiana, Angela

## 2021-11-03 DIAGNOSIS — I10 BENIGN ESSENTIAL HTN: ICD-10-CM

## 2021-11-03 RX ORDER — METOPROLOL SUCCINATE 50 MG/1
TABLET, EXTENDED RELEASE ORAL
Qty: 90 TABLET | Refills: 2 | OUTPATIENT
Start: 2021-11-03

## 2021-12-04 DIAGNOSIS — B00.9 HSV (HERPES SIMPLEX VIRUS) INFECTION: ICD-10-CM

## 2021-12-06 RX ORDER — VALACYCLOVIR HYDROCHLORIDE 500 MG/1
TABLET, FILM COATED ORAL
Qty: 90 TABLET | Refills: 1 | OUTPATIENT
Start: 2021-12-06

## 2022-01-17 DIAGNOSIS — B00.9 HSV (HERPES SIMPLEX VIRUS) INFECTION: ICD-10-CM

## 2022-01-17 RX ORDER — AMLODIPINE BESYLATE 5 MG/1
5 TABLET ORAL DAILY
Qty: 90 TABLET | Refills: 0 | OUTPATIENT
Start: 2022-01-17

## 2022-01-17 RX ORDER — VALACYCLOVIR HYDROCHLORIDE 500 MG/1
TABLET, FILM COATED ORAL
Qty: 90 TABLET | Refills: 1 | OUTPATIENT
Start: 2022-01-17

## 2022-01-25 DIAGNOSIS — B00.9 HSV (HERPES SIMPLEX VIRUS) INFECTION: ICD-10-CM

## 2022-01-25 RX ORDER — AMLODIPINE BESYLATE 5 MG/1
5 TABLET ORAL DAILY
Qty: 90 TABLET | Refills: 0 | OUTPATIENT
Start: 2022-01-25

## 2022-01-25 RX ORDER — VALACYCLOVIR HYDROCHLORIDE 500 MG/1
TABLET, FILM COATED ORAL
Qty: 90 TABLET | Refills: 1 | OUTPATIENT
Start: 2022-01-25

## 2022-02-03 DIAGNOSIS — E87.6 HYPOKALEMIA: ICD-10-CM

## 2022-02-03 DIAGNOSIS — I10 BENIGN ESSENTIAL HTN: ICD-10-CM

## 2022-02-03 DIAGNOSIS — B00.9 HSV (HERPES SIMPLEX VIRUS) INFECTION: ICD-10-CM

## 2022-02-03 RX ORDER — METOPROLOL SUCCINATE 100 MG/1
TABLET, EXTENDED RELEASE ORAL
Qty: 45 TABLET | Refills: 0 | Status: SHIPPED | OUTPATIENT
Start: 2022-02-03 | End: 2022-03-03

## 2022-02-03 RX ORDER — VALACYCLOVIR HYDROCHLORIDE 500 MG/1
500 TABLET, FILM COATED ORAL EVERY EVENING
Qty: 30 TABLET | Refills: 0 | Status: SHIPPED | OUTPATIENT
Start: 2022-02-03 | End: 2022-04-08

## 2022-02-03 RX ORDER — POTASSIUM CHLORIDE 20 MEQ/1
20 TABLET, EXTENDED RELEASE ORAL 2 TIMES DAILY
Qty: 60 TABLET | Refills: 0 | Status: SHIPPED | OUTPATIENT
Start: 2022-02-03 | End: 2022-03-21

## 2022-02-03 RX ORDER — AMLODIPINE BESYLATE 5 MG/1
5 TABLET ORAL DAILY
Qty: 30 TABLET | Refills: 0 | Status: SHIPPED | OUTPATIENT
Start: 2022-02-03 | End: 2022-03-21

## 2022-02-16 ENCOUNTER — OFFICE VISIT (OUTPATIENT)
Dept: FAMILY MEDICINE CLINIC | Age: 42
End: 2022-02-16
Payer: MEDICAID

## 2022-02-16 VITALS
HEIGHT: 67 IN | BODY MASS INDEX: 25.24 KG/M2 | HEART RATE: 79 BPM | OXYGEN SATURATION: 100 % | RESPIRATION RATE: 12 BRPM | TEMPERATURE: 97.6 F | WEIGHT: 160.8 LBS | SYSTOLIC BLOOD PRESSURE: 128 MMHG | DIASTOLIC BLOOD PRESSURE: 79 MMHG

## 2022-02-16 DIAGNOSIS — E87.6 HYPOKALEMIA: ICD-10-CM

## 2022-02-16 DIAGNOSIS — I10 PRIMARY HYPERTENSION: Primary | ICD-10-CM

## 2022-02-16 DIAGNOSIS — E55.9 VITAMIN D DEFICIENCY: ICD-10-CM

## 2022-02-16 DIAGNOSIS — F10.10 ALCOHOL ABUSE: ICD-10-CM

## 2022-02-16 DIAGNOSIS — J30.2 SEASONAL ALLERGIC RHINITIS, UNSPECIFIED TRIGGER: ICD-10-CM

## 2022-02-16 PROBLEM — A60.00 GENITAL HERPES: Status: ACTIVE | Noted: 2021-12-12

## 2022-02-16 PROBLEM — K72.00 SHOCK LIVER: Status: RESOLVED | Noted: 2020-03-10 | Resolved: 2022-02-16

## 2022-02-16 PROBLEM — T39.1X1D: Status: RESOLVED | Noted: 2020-03-10 | Resolved: 2022-02-16

## 2022-02-16 PROBLEM — N83.291 COMPLEX CYST OF RIGHT OVARY: Status: ACTIVE | Noted: 2018-10-24

## 2022-02-16 PROCEDURE — 99214 OFFICE O/P EST MOD 30 MIN: CPT | Performed by: NURSE PRACTITIONER

## 2022-02-16 RX ORDER — ERGOCALCIFEROL 1.25 MG/1
50000 CAPSULE ORAL
Qty: 12 CAPSULE | Refills: 3 | Status: SHIPPED | OUTPATIENT
Start: 2022-02-16

## 2022-02-16 RX ORDER — LANOLIN ALCOHOL/MO/W.PET/CERES
3 CREAM (GRAM) TOPICAL
Qty: 90 TABLET | Refills: 3 | Status: SHIPPED | OUTPATIENT
Start: 2022-02-16 | End: 2022-08-02 | Stop reason: SDUPTHER

## 2022-02-16 NOTE — PROGRESS NOTES
Rosana Veras (: 1980) is a 37 y.o. female, established patient, here for evaluation of the following chief complaint(s):  Hypertension       ASSESSMENT/PLAN:  Below is the assessment and plan developed based on review of pertinent history, physical exam, labs, studies, and medications. 1. Primary hypertension - stable  Continue current meds  2. Hypokalemia  3. Vitamin D deficiency  -     ergocalciferol (ERGOCALCIFEROL) 1,250 mcg (50,000 unit) capsule; Take 1 Capsule by mouth every seven (7) days. , Normal, Disp-12 Capsule, R-3Needs office visit for refills  4. Alcohol abuse - has been sober since discharge in Dec 2021  5. Seasonal allergic rhinitis, unspecified trigger  -     melatonin 3 mg tablet; Take 1 Tablet by mouth nightly., Normal, Disp-90 Tablet, R-3      Return in about 6 months (around 2022), or if symptoms worsen or fail to improve. SUBJECTIVE/OBJECTIVE:  HPI  Patient come in today for follow up HTN  81yo grandmother on hospice for pancreatic cancer. Also has to help with 81yo grandfather  15yo son in 6th grade. Lost 5 uncles and 2 aunts within 8 months of each other. She started drinking from the stress. Has been sober since hospitalization  She has not had urge to drink  Her uncle, who is a recovered alcoholic, sponsors her and supports her. Dropped about 30 pounds  acamprosate 666 mg three times per day as needed    Pap done 21 with Dr. Ana Fagan    Taking amlodipine and metoprolol for HTN. States compliance with medication. Denies chest pains, palpitations, dyspnea, tingling, edema. Allergies   Allergen Reactions    Amoxicillin Hives     Tolerated ceftriaxone.     Chlorhexidine Rash    Ibuprofen Rash    Lisinopril Angioedema       Past Medical History:   Diagnosis Date    Anemia     Asthma     uses inhaler 2-3 times per week    Biliary colic 3/18/0958    Hypertension     Ill-defined condition     murmur       Past Surgical History:   Procedure Laterality Date    HX CHOLECYSTECTOMY      HX GYN  2010    vaginal delivery x1    HX GYN  2018    cyst removal    HX GYN          HX OTHER SURGICAL      lipoma removal from right shoulder    HX WISDOM TEETH EXTRACTION         Social History     Socioeconomic History    Marital status: SINGLE     Spouse name: Not on file    Number of children: Not on file    Years of education: Not on file    Highest education level: Not on file   Occupational History    Not on file   Tobacco Use    Smoking status: Never Smoker    Smokeless tobacco: Never Used   Vaping Use    Vaping Use: Never used   Substance and Sexual Activity    Alcohol use: Not Currently    Drug use: No    Sexual activity: Yes     Partners: Male     Birth control/protection: Condom   Other Topics Concern    Not on file   Social History Narrative    Not on file     Social Determinants of Health     Financial Resource Strain:     Difficulty of Paying Living Expenses: Not on file   Food Insecurity:     Worried About Running Out of Food in the Last Year: Not on file    Antione of Food in the Last Year: Not on file   Transportation Needs:     Lack of Transportation (Medical): Not on file    Lack of Transportation (Non-Medical):  Not on file   Physical Activity:     Days of Exercise per Week: Not on file    Minutes of Exercise per Session: Not on file   Stress:     Feeling of Stress : Not on file   Social Connections:     Frequency of Communication with Friends and Family: Not on file    Frequency of Social Gatherings with Friends and Family: Not on file    Attends Anabaptist Services: Not on file    Active Member of Clubs or Organizations: Not on file    Attends Club or Organization Meetings: Not on file    Marital Status: Not on file   Intimate Partner Violence:     Fear of Current or Ex-Partner: Not on file    Emotionally Abused: Not on file    Physically Abused: Not on file    Sexually Abused: Not on file   Housing Stability:     Unable to Pay for Housing in the Last Year: Not on file    Number of Places Lived in the Last Year: Not on file    Unstable Housing in the Last Year: Not on file       Family History   Problem Relation Age of Onset    Hypertension Mother     Cancer Mother         colon, in remission    Clotting Disorder Mother         blood clot post surgery    Hypertension Father     Colon Cancer Paternal Uncle     Pancreatic Cancer Paternal Uncle     Cancer Paternal Uncle     Cancer Maternal Uncle     Stroke Paternal Aunt     Heart Disease Maternal Grandmother        Current Outpatient Medications   Medication Sig    amLODIPine (NORVASC) 5 mg tablet Take 1 Tablet by mouth daily.  metoprolol succinate (TOPROL-XL) 100 mg tablet TAKE 1 AND 1/2 TABLETS BY MOUTH DAILY    potassium chloride (K-DUR, KLOR-CON M20) 20 mEq tablet Take 1 Tablet by mouth two (2) times a day.  valACYclovir (VALTREX) 500 mg tablet Take 1 Tablet by mouth every evening.  furosemide (LASIX) 20 mg tablet TAKE 1 TABLET BY MOUTH EVERY DAY AS NEEDED FOR SWELLING    melatonin 3 mg tablet TAKE 1 TABLET BY MOUTH NIGHTLY    levocetirizine (Xyzal) 5 mg tablet Take 5 mg by mouth.  fluticasone propionate (FLONASE) 50 mcg/actuation nasal spray 2 Sprays by Both Nostrils route daily.  albuterol (PROVENTIL HFA, VENTOLIN HFA, PROAIR HFA) 90 mcg/actuation inhaler Take 2 Puffs by inhalation every four (4) hours as needed for Wheezing.  ergocalciferol (ERGOCALCIFEROL) 1,250 mcg (50,000 unit) capsule TAKE 1 CAPSULE BY MOUTH EVERY 7 DAYS (Patient not taking: Reported on 2/16/2022)    loratadine (CLARITIN) 10 mg tablet Take 1 Tab by mouth daily. (Patient not taking: Reported on 6/18/2020)     No current facility-administered medications for this visit. Review of Systems   Constitutional: Negative for chills, diaphoresis, fatigue, fever and unexpected weight change. Respiratory: Negative for cough and shortness of breath.     Cardiovascular: Negative for chest pain, palpitations and leg swelling. Gastrointestinal: Negative for abdominal pain, blood in stool, constipation, diarrhea, nausea and vomiting. Endocrine: Negative for cold intolerance and heat intolerance. Genitourinary: Negative for dysuria, flank pain, frequency, hematuria and urgency. Musculoskeletal: Negative for back pain and myalgias. Skin: Negative. Neurological: Negative for dizziness, speech difficulty, light-headedness, numbness and headaches. Psychiatric/Behavioral: Negative for dysphoric mood and sleep disturbance. The patient is not nervous/anxious. Vitals:    02/16/22 1532   BP: 128/79   Pulse: 79   Resp: 12   Temp: 97.6 °F (36.4 °C)   TempSrc: Oral   SpO2: 100%   Weight: 160 lb 12.8 oz (72.9 kg)   Height: 5' 7\" (1.702 m)     Physical Exam  Vitals reviewed. Constitutional:       Appearance: Normal appearance. She is well-developed and well-groomed. HENT:      Right Ear: Hearing normal.      Left Ear: Hearing normal.   Neck:      Thyroid: No thyromegaly. Cardiovascular:      Rate and Rhythm: Normal rate and regular rhythm. Pulses:           Dorsalis pedis pulses are 2+ on the right side and 2+ on the left side. Heart sounds: Normal heart sounds, S1 normal and S2 normal.   Pulmonary:      Effort: Pulmonary effort is normal.      Breath sounds: Normal breath sounds. Abdominal:      General: Bowel sounds are normal.      Palpations: Abdomen is soft. Tenderness: There is no abdominal tenderness. Musculoskeletal:      Right lower leg: No edema. Left lower leg: No edema. Lymphadenopathy:      Cervical: No cervical adenopathy. Skin:     General: Skin is warm and dry. Neurological:      Mental Status: She is alert and oriented to person, place, and time.    Psychiatric:         Attention and Perception: Attention normal.         Mood and Affect: Mood and affect normal.         Speech: Speech normal.         Behavior: Behavior normal. Behavior is cooperative. Thought Content: Thought content normal.         Cognition and Memory: Cognition and memory normal.           On this date 02/16/2022 I have spent 31 minutes reviewing previous notes, test results and face to face with the patient discussing the diagnosis and importance of compliance with the treatment plan as well as documenting on the day of the visit. An electronic signature was used to authenticate this note.   -- Aleksey Diaz NP

## 2022-02-16 NOTE — PROGRESS NOTES
Chief Complaint   Patient presents with    Hypertension       1. Have you been to the ER, urgent care clinic since your last visit? Hospitalized since your last visit? No    2. Have you seen or consulted any other health care providers outside of the [address] [place] [address] since your last visit? Include any pap smears or colon screening.  No     PAP - Dr. Leandro Bansal   Flu shot - Pt declined    Health Maintenance Due   Topic Date Due    Cervical cancer screen  Never done    Depression Screen  03/06/2021    Flu Vaccine (1) Never done

## 2022-03-03 DIAGNOSIS — I10 BENIGN ESSENTIAL HTN: ICD-10-CM

## 2022-03-03 RX ORDER — METOPROLOL SUCCINATE 100 MG/1
TABLET, EXTENDED RELEASE ORAL
Qty: 45 TABLET | Refills: 3 | Status: SHIPPED | OUTPATIENT
Start: 2022-03-03 | End: 2022-10-10

## 2022-03-18 PROBLEM — N83.291 COMPLEX CYST OF RIGHT OVARY: Status: ACTIVE | Noted: 2018-10-24

## 2022-03-18 PROBLEM — I10 HYPERTENSION: Status: ACTIVE | Noted: 2021-12-11

## 2022-03-18 PROBLEM — F10.10 ALCOHOL ABUSE: Status: ACTIVE | Noted: 2021-12-11

## 2022-03-20 PROBLEM — A60.00 GENITAL HERPES: Status: ACTIVE | Noted: 2021-12-12

## 2022-03-21 DIAGNOSIS — E87.6 HYPOKALEMIA: ICD-10-CM

## 2022-03-21 RX ORDER — POTASSIUM CHLORIDE 20 MEQ/1
TABLET, EXTENDED RELEASE ORAL
Qty: 60 TABLET | Refills: 5 | Status: SHIPPED | OUTPATIENT
Start: 2022-03-21 | End: 2022-09-20

## 2022-03-21 RX ORDER — AMLODIPINE BESYLATE 5 MG/1
5 TABLET ORAL DAILY
Qty: 30 TABLET | Refills: 5 | Status: SHIPPED | OUTPATIENT
Start: 2022-03-21 | End: 2022-09-20

## 2022-04-08 DIAGNOSIS — B00.9 HSV (HERPES SIMPLEX VIRUS) INFECTION: ICD-10-CM

## 2022-04-08 RX ORDER — VALACYCLOVIR HYDROCHLORIDE 500 MG/1
500 TABLET, FILM COATED ORAL EVERY EVENING
Qty: 30 TABLET | Refills: 11 | Status: SHIPPED | OUTPATIENT
Start: 2022-04-08

## 2022-04-18 DIAGNOSIS — I10 BENIGN ESSENTIAL HTN: ICD-10-CM

## 2022-04-18 RX ORDER — METOPROLOL SUCCINATE 100 MG/1
TABLET, EXTENDED RELEASE ORAL
Qty: 135 TABLET | OUTPATIENT
Start: 2022-04-18

## 2022-08-02 ENCOUNTER — OFFICE VISIT (OUTPATIENT)
Dept: FAMILY MEDICINE CLINIC | Age: 42
End: 2022-08-02
Payer: MEDICAID

## 2022-08-02 VITALS
SYSTOLIC BLOOD PRESSURE: 120 MMHG | TEMPERATURE: 98.3 F | RESPIRATION RATE: 16 BRPM | HEART RATE: 77 BPM | HEIGHT: 67 IN | OXYGEN SATURATION: 98 % | BODY MASS INDEX: 24.74 KG/M2 | DIASTOLIC BLOOD PRESSURE: 77 MMHG | WEIGHT: 157.6 LBS

## 2022-08-02 DIAGNOSIS — R00.2 PALPITATIONS: ICD-10-CM

## 2022-08-02 DIAGNOSIS — G47.00 INSOMNIA, UNSPECIFIED TYPE: ICD-10-CM

## 2022-08-02 DIAGNOSIS — R79.89 ELEVATED FERRITIN: ICD-10-CM

## 2022-08-02 DIAGNOSIS — I10 PRIMARY HYPERTENSION: Primary | ICD-10-CM

## 2022-08-02 PROCEDURE — 99214 OFFICE O/P EST MOD 30 MIN: CPT | Performed by: NURSE PRACTITIONER

## 2022-08-02 PROCEDURE — 93000 ELECTROCARDIOGRAM COMPLETE: CPT | Performed by: NURSE PRACTITIONER

## 2022-08-02 RX ORDER — ACETAMINOPHEN, DIPHENHYDRAMINE HCL, PHENYLEPHRINE HCL 325; 25; 5 MG/1; MG/1; MG/1
10 TABLET ORAL
Qty: 90 TABLET | Refills: 3 | Status: SHIPPED | OUTPATIENT
Start: 2022-08-02

## 2022-08-02 RX ORDER — HYDROXYZINE PAMOATE 25 MG/1
25 CAPSULE ORAL
Qty: 40 CAPSULE | Refills: 1 | Status: SHIPPED | OUTPATIENT
Start: 2022-08-02

## 2022-08-02 NOTE — PROGRESS NOTES
Rebekah Reed (: 1980) is a 40 y.o. female, established patient, here for evaluation of the following chief complaint(s):  Hypertension (6m fu )       ASSESSMENT/PLAN:  Below is the assessment and plan developed based on review of pertinent history, physical exam, labs, studies, and medications. 1. Primary hypertension  2. Palpitations - will check EKG and labs. Patient declines referral to cardiology. States if they become more frequent, she will call  -     CBC W/O DIFF; Future  -     METABOLIC PANEL, COMPREHENSIVE; Future  -     MAGNESIUM; Future  -     TSH 3RD GENERATION; Future  -     AMB POC EKG ROUTINE W/ 12 LEADS, INTER & REP  3. Insomnia, unspecified type  -     melatonin 10 mg tab; Take 10 mg by mouth nightly., Normal, Disp-90 Tablet, R-3  -     hydrOXYzine pamoate (VISTARIL) 25 mg capsule; Take 1 Capsule by mouth nightly as needed for Sleep., Normal, Disp-40 Capsule, R-1  4. Elevated ferritin  -     IRON PROFILE; Future  -     FERRITIN; Future    Return in about 6 months (around 2023). SUBJECTIVE/OBJECTIVE:  HPI  patient comes in today for follow up HTN  Does not nap during the day. Occasionally she will doze off during the day. Has not tried anything else of sleep. Would like increase in melatonin. Rare occasion of ETOH - she had 2 shots on her birthday. Sees liver specialist on 22 at 1206 800APP grandmother  3/27/22 from pancreatic cancer  Helps with 79yo grandfather  Her uncle, who is a recovered alcoholic, sponsors her and supports her. Pap done 21 with Dr. Eboni Branham     Taking amlodipine and metoprolol for HTN. States compliance with medication. Denies chest pains, dyspnea, tingling, edema. She may have palpitations 2-3 times monthly. Will only last for few seconds, usually occur around time of menses. Allergies   Allergen Reactions    Amoxicillin Hives     Tolerated ceftriaxone.     Chlorhexidine Rash    Ibuprofen Rash    Lisinopril Angioedema       Past Medical History:   Diagnosis Date    Anemia     Asthma     uses inhaler 2-3 times per week    Biliary colic 7478    Hypertension     Ill-defined condition     murmur       Past Surgical History:   Procedure Laterality Date    HX CHOLECYSTECTOMY      HX GYN      vaginal delivery x1    HX GYN  2018    cyst removal    HX GYN          HX OTHER SURGICAL      lipoma removal from right shoulder    HX WISDOM TEETH EXTRACTION         Social History     Socioeconomic History    Marital status: SINGLE     Spouse name: Not on file    Number of children: Not on file    Years of education: Not on file    Highest education level: Not on file   Occupational History    Not on file   Tobacco Use    Smoking status: Some Days     Types: Cigarettes    Smokeless tobacco: Never    Tobacco comments:     Black and milds socially    Vaping Use    Vaping Use: Never used   Substance and Sexual Activity    Alcohol use: Yes     Comment: weekends    Drug use: No    Sexual activity: Yes     Partners: Male     Birth control/protection: Condom   Other Topics Concern    Not on file   Social History Narrative    Not on file     Social Determinants of Health     Financial Resource Strain: Not on file   Food Insecurity: Not on file   Transportation Needs: Not on file   Physical Activity: Not on file   Stress: Not on file   Social Connections: Not on file   Intimate Partner Violence: Not on file   Housing Stability: Not on file       Family History   Problem Relation Age of Onset    Hypertension Mother     Cancer Mother         colon, in remission    Clotting Disorder Mother         blood clot post surgery    Hypertension Father     Colon Cancer Paternal Uncle     Pancreatic Cancer Paternal Uncle     Cancer Paternal Uncle     Cancer Maternal Uncle     Stroke Paternal Aunt     Heart Disease Maternal Grandmother        Current Outpatient Medications   Medication Sig    melatonin 10 mg tab Take 10 mg by mouth nightly. hydrOXYzine pamoate (VISTARIL) 25 mg capsule Take 1 Capsule by mouth nightly as needed for Sleep.    valACYclovir (VALTREX) 500 mg tablet TAKE 1 TABLET BY MOUTH EVERY EVENING    potassium chloride (K-DUR, KLOR-CON M20) 20 mEq tablet TAKE 1 TABLET BY MOUTH TWICE DAILY    amLODIPine (NORVASC) 5 mg tablet TAKE 1 TABLET BY MOUTH DAILY    metoprolol succinate (TOPROL-XL) 100 mg tablet TAKE 1 AND 1/2 TABLETS BY MOUTH DAILY    ergocalciferol (ERGOCALCIFEROL) 1,250 mcg (50,000 unit) capsule Take 1 Capsule by mouth every seven (7) days. furosemide (LASIX) 20 mg tablet TAKE 1 TABLET BY MOUTH EVERY DAY AS NEEDED FOR SWELLING    levocetirizine (XYZAL) 5 mg tablet Take 5 mg by mouth. fluticasone propionate (FLONASE) 50 mcg/actuation nasal spray 2 Sprays by Both Nostrils route daily. albuterol (PROVENTIL HFA, VENTOLIN HFA, PROAIR HFA) 90 mcg/actuation inhaler Take 2 Puffs by inhalation every four (4) hours as needed for Wheezing. No current facility-administered medications for this visit. Review of Systems   Constitutional:  Negative for chills, diaphoresis, fatigue, fever and unexpected weight change. Respiratory:  Negative for cough and shortness of breath. Cardiovascular:  Negative for chest pain, palpitations and leg swelling. Gastrointestinal:  Negative for abdominal pain, blood in stool, constipation, diarrhea, nausea and vomiting. Endocrine: Negative for cold intolerance and heat intolerance. Genitourinary:  Negative for dysuria, flank pain, frequency, hematuria and urgency. Musculoskeletal:  Negative for back pain and myalgias. Skin: Negative. Neurological:  Negative for dizziness, speech difficulty, light-headedness, numbness and headaches. Psychiatric/Behavioral:  Negative for dysphoric mood and sleep disturbance. The patient is not nervous/anxious.     Vitals:    08/02/22 1513   BP: 120/77   Pulse: 77   Resp: 16   Temp: 98.3 °F (36.8 °C)   TempSrc: Oral   SpO2: 98% Weight: 157 lb 9.6 oz (71.5 kg)   Height: 5' 7\" (1.702 m)     Physical Exam  Vitals reviewed. Constitutional:       Appearance: Normal appearance. She is well-developed and well-groomed. HENT:      Right Ear: Hearing normal.      Left Ear: Hearing normal.   Neck:      Thyroid: No thyromegaly. Cardiovascular:      Rate and Rhythm: Normal rate and regular rhythm. Pulses:           Dorsalis pedis pulses are 2+ on the right side and 2+ on the left side. Heart sounds: Normal heart sounds, S1 normal and S2 normal.   Pulmonary:      Effort: Pulmonary effort is normal.      Breath sounds: Normal breath sounds. Abdominal:      General: Bowel sounds are normal.      Palpations: Abdomen is soft. Tenderness: There is no abdominal tenderness. Musculoskeletal:      Right lower leg: No edema. Left lower leg: No edema. Lymphadenopathy:      Cervical: No cervical adenopathy. Skin:     General: Skin is warm and dry. Neurological:      Mental Status: She is alert and oriented to person, place, and time. Psychiatric:         Attention and Perception: Attention normal.         Mood and Affect: Mood and affect normal.         Speech: Speech normal.         Behavior: Behavior normal. Behavior is cooperative. Thought Content: Thought content normal.         Cognition and Memory: Cognition and memory normal.         On this date 08/02/2022 I have spent 30 minutes reviewing previous notes, test results and face to face with the patient discussing the diagnosis and importance of compliance with the treatment plan as well as documenting on the day of the visit. An electronic signature was used to authenticate this note.   -- Allene Primrose, NP

## 2022-08-02 NOTE — PROGRESS NOTES
Chief Complaint   Patient presents with    Hypertension     6m fu        1. \"Have you been to the ER, urgent care clinic since your last visit? Hospitalized since your last visit? \" Yes When: 5/30/22 [facility] ER for pancreatitis    2. \"Have you seen or consulted any other health care providers outside of the [address] [place] [address] since your last visit? \" No     3. For patients aged 39-70: Has the patient had a colonoscopy / FIT/ Cologuard? NA - based on age      If the patient is female:    4. For patients aged 41-77: Has the patient had a mammogram within the past 2 years? Yes - Care Gap present. Most recent result on file      5. For patients aged 21-65: Has the patient had a pap smear? Yes - Care Gap present. Rooming MA/LPN to request most recent results Dr Gregor Reyna with [phone] [facility]    Pt is here for 6m fu today. Would like to discuss sleep problem, feels current melatonin dosage is not high enough.      Health Maintenance Due   Topic Date Due    Pneumococcal 0-64 years (1 - PCV) Never done    Cervical cancer screen  Never done [facility] Medicine Daily Progress Note    Date of Service  3/21/2019    Chief Complaint  46 y.o. male admitted 3/20/2019 with vomiting.    Hospital Course    46 y.o. male history of hypertension, hyperlipidemia and marijuana related cyclical vomiting, known to me from prior admission, who presented to the ER on 3/20/2019 with 3-day history of nausea and vomiting.  Reports having over 10 episodes of non-bile/nonbloody emesis today.  He reports being unable to keep anything down.  He also reports that he has this type of issues every 3 months or so.  He has significantly decreased p.o. intake over the last 2 days.  And has associated mild generalized abdominal pain. CT of the abdomen done in the emergency department showed acute enteritis.Other pertinent laboratory findings showed leukocytosis of 17.4, hemoglobin of 18.2 (likely concentrated), creatinine 1.82 and potassium of 3.2.      Interval Problem Update  No vomiting since last night, still nauseated but tolerating clear liquids.  Having crampy abdominal pain.  Additional social history he admits to using marijuana about a week ago.    Consultants/Specialty  None.    Code Status  Full.    Disposition  home    Review of Systems  Review of Systems   Constitutional: Negative.  Negative for chills, fever, malaise/fatigue and weight loss.   HENT: Negative.    Respiratory: Negative.  Negative for cough and shortness of breath.    Cardiovascular: Negative.  Negative for chest pain and leg swelling.   Gastrointestinal: Positive for abdominal pain and nausea. Negative for blood in stool, constipation, diarrhea, heartburn, melena and vomiting.   Genitourinary: Negative.  Negative for dysuria and flank pain.   Musculoskeletal: Negative.  Negative for back pain and myalgias.   Neurological: Negative.  Negative for dizziness, loss of consciousness and weakness.   Endo/Heme/Allergies: Negative.  Does not bruise/bleed easily.   Psychiatric/Behavioral: Negative.  Negative for  depression. The patient is not nervous/anxious.    All other systems reviewed and are negative.       Physical Exam  Temp:  [36.4 °C (97.6 °F)-37.1 °C (98.7 °F)] 36.9 °C (98.4 °F)  Pulse:  [] 104  Resp:  [18-24] 18  BP: (118-165)/(79-99) 118/79  SpO2:  [92 %-97 %] 97 %    Physical Exam    Fluids    Intake/Output Summary (Last 24 hours) at 03/21/19 1635  Last data filed at 03/21/19 1400   Gross per 24 hour   Intake             2735 ml   Output             1100 ml   Net             1635 ml       Laboratory  Recent Labs      03/20/19 2030 03/21/19   0432   WBC  17.4*  16.2*   RBC  6.32*  5.46   HEMOGLOBIN  18.2*  15.9   HEMATOCRIT  52.1*  46.2   MCV  82.4  84.6   MCH  28.8  29.1   MCHC  34.9  34.4   RDW  38.2  39.7   PLATELETCT  269  120*   MPV  9.5  9.6     Recent Labs      03/20/19 2030 03/21/19   0428   SODIUM  130*  133*   POTASSIUM  3.2*  3.1*   CHLORIDE  86*  94*   CO2  23  25   GLUCOSE  133*  110*   BUN  39*  28*   CREATININE  1.86*  1.24   CALCIUM  10.0  8.5                   Imaging  CT-ABDOMEN-PELVIS WITH   Final Result         1.  Thickened mildly reactive loops of small bowel in left upper quadrant, appearance likely corresponding with enteritis.   2.  Small pericardial effusion      DX-CHEST-PORTABLE (1 VIEW)   Final Result         1.  No acute cardiopulmonary disease.           Assessment/Plan  * Sepsis ([facility])   Assessment & Plan    -Sepsis criteria: Tachycardic, leukocytosis 17.4, source is likely enteritis viral versus bacterial.  -He does have underlying history of cyclical vomiting secondary to marijuana use.  Continue fluid resuscitation, improving  -Antibiotics: Rocephin and Flagyl, first dose given in the ED continue, leukocytosis still present.  Blood cultures negative to date  Normal lactic acid levels       KLEBER (acute kidney injury) ([facility])   Assessment & Plan    -Creatinine on admission is 1.82.  This is likely secondary to nausea and vomiting, with fluids overnight down to  1.24  Continue IV fluids as above and will monitor labs in the morning  Needs to stay in the hospital for IV hydration     Cyclical vomiting syndrome- (present on admission)   Assessment & Plan    -Per patient, 15 history of cyclical vomiting.  He was admitted with similar symptoms 4 times last year, he is known to me from prior admission. He admits to using THC about a week ago or so.  Counseled patient that he can expect to have return of nausea and vomiting if he chooses to go back to using THC products.  In addition I do think he has some component of gastroenteritis but this could be just simply from active vomiting.  -Ordered regimen with Phenergan, Compazine and Benadryl, this was not entirely effective therefore I added Zofran as well.  Last vomiting episode was over 12 hours ago, advance diet to full liquids     Marijuana intoxication, with unspecified complication (HCC)- (present on admission)   Assessment & Plan    Again discussed with patient that he absolutely needs to stop using marijuana or THC products in any form to avoid return of the cyclic vomiting.     Hypokalemia   Assessment & Plan    Remains low at 3.1, oral replacement 40 mEq now.  Repeat BMP in a.m.     Hypertension   Assessment & Plan    -Blood pressure since admission is in with normal limits, currently 118/79.     Enteritis   Assessment & Plan    Continue antibiotics until white blood cell count normalizes, still elevated today.     Abdominal pain   Assessment & Plan    Due to retching and gastroenteritis.  Continue morphine overnight, prilosec.     Dyslipidemia- (present on admission)   Assessment & Plan    -Resume simvastatin when he goes home          VTE prophylaxis: SCD

## 2022-08-03 LAB
ALBUMIN SERPL-MCNC: 3.8 G/DL (ref 3.5–5)
ALBUMIN/GLOB SERPL: 1.2 {RATIO} (ref 1.1–2.2)
ALP SERPL-CCNC: 106 U/L (ref 45–117)
ALT SERPL-CCNC: 18 U/L (ref 12–78)
ANION GAP SERPL CALC-SCNC: 5 MMOL/L (ref 5–15)
AST SERPL-CCNC: 18 U/L (ref 15–37)
BILIRUB SERPL-MCNC: 0.3 MG/DL (ref 0.2–1)
BUN SERPL-MCNC: 8 MG/DL (ref 6–20)
BUN/CREAT SERPL: 10 (ref 12–20)
CALCIUM SERPL-MCNC: 10.4 MG/DL (ref 8.5–10.1)
CHLORIDE SERPL-SCNC: 104 MMOL/L (ref 97–108)
CO2 SERPL-SCNC: 28 MMOL/L (ref 21–32)
CREAT SERPL-MCNC: 0.81 MG/DL (ref 0.55–1.02)
ERYTHROCYTE [DISTWIDTH] IN BLOOD BY AUTOMATED COUNT: 20 % (ref 11.5–14.5)
FERRITIN SERPL-MCNC: 20 NG/ML (ref 8–252)
GLOBULIN SER CALC-MCNC: 3.2 G/DL (ref 2–4)
GLUCOSE SERPL-MCNC: 106 MG/DL (ref 65–100)
HCT VFR BLD AUTO: 39.1 % (ref 35–47)
HGB BLD-MCNC: 12.2 G/DL (ref 11.5–16)
IRON SATN MFR SERPL: 13 % (ref 20–50)
IRON SERPL-MCNC: 53 UG/DL (ref 35–150)
MAGNESIUM SERPL-MCNC: 1.9 MG/DL (ref 1.6–2.4)
MCH RBC QN AUTO: 28 PG (ref 26–34)
MCHC RBC AUTO-ENTMCNC: 31.2 G/DL (ref 30–36.5)
MCV RBC AUTO: 89.9 FL (ref 80–99)
NRBC # BLD: 0 K/UL (ref 0–0.01)
NRBC BLD-RTO: 0 PER 100 WBC
PLATELET # BLD AUTO: 253 K/UL (ref 150–400)
PMV BLD AUTO: 10.7 FL (ref 8.9–12.9)
POTASSIUM SERPL-SCNC: 4.6 MMOL/L (ref 3.5–5.1)
PROT SERPL-MCNC: 7 G/DL (ref 6.4–8.2)
RBC # BLD AUTO: 4.35 M/UL (ref 3.8–5.2)
SODIUM SERPL-SCNC: 137 MMOL/L (ref 136–145)
TIBC SERPL-MCNC: 413 UG/DL (ref 250–450)
TSH SERPL DL<=0.05 MIU/L-ACNC: 0.67 UIU/ML (ref 0.36–3.74)
WBC # BLD AUTO: 6.1 K/UL (ref 3.6–11)

## 2022-08-04 NOTE — PROGRESS NOTES
Thyroid screen negative. Liver and kidney function normal.  Blood counts normal.  Iron is just a little low. You can take a multivitamin with iron daily. Calcium is a little elevated - could be from dehydration. Make sure you are drinking plenty of water, avoid any excessive use of TUMS. We will continue to monitor. 19-Dec-2018 23:36

## 2022-09-20 DIAGNOSIS — E87.6 HYPOKALEMIA: ICD-10-CM

## 2022-09-20 RX ORDER — AMLODIPINE BESYLATE 5 MG/1
5 TABLET ORAL DAILY
Qty: 30 TABLET | Refills: 5 | Status: SHIPPED | OUTPATIENT
Start: 2022-09-20

## 2022-09-20 RX ORDER — POTASSIUM CHLORIDE 20 MEQ/1
TABLET, EXTENDED RELEASE ORAL
Qty: 60 TABLET | Refills: 5 | Status: SHIPPED | OUTPATIENT
Start: 2022-09-20

## 2022-09-20 NOTE — TELEPHONE ENCOUNTER
Last OV:5/23/2019  Next Appt:3/6/2020  Last Refill: 11/1/2018 Odomzo Counseling- I discussed with the patient the risks of Odomzo including but not limited to nausea, vomiting, diarrhea, constipation, weight loss, changes in the sense of taste, decreased appetite, muscle spasms, and hair loss.  The patient verbalized understanding of the proper use and possible adverse effects of Odomzo.  All of the patient's questions and concerns were addressed.

## 2022-10-10 DIAGNOSIS — I10 BENIGN ESSENTIAL HTN: ICD-10-CM

## 2022-10-10 RX ORDER — METOPROLOL SUCCINATE 100 MG/1
TABLET, EXTENDED RELEASE ORAL
Qty: 45 TABLET | Refills: 3 | Status: SHIPPED | OUTPATIENT
Start: 2022-10-10

## 2022-12-01 DIAGNOSIS — G47.00 INSOMNIA, UNSPECIFIED TYPE: ICD-10-CM

## 2022-12-01 RX ORDER — HYDROXYZINE PAMOATE 25 MG/1
CAPSULE ORAL
Qty: 40 CAPSULE | Refills: 1 | Status: SHIPPED | OUTPATIENT
Start: 2022-12-01

## 2023-01-13 ENCOUNTER — PATIENT MESSAGE (OUTPATIENT)
Dept: FAMILY MEDICINE CLINIC | Age: 43
End: 2023-01-13

## 2023-01-13 DIAGNOSIS — B00.9 HSV (HERPES SIMPLEX VIRUS) INFECTION: ICD-10-CM

## 2023-01-13 DIAGNOSIS — E55.9 VITAMIN D DEFICIENCY: ICD-10-CM

## 2023-01-13 NOTE — TELEPHONE ENCOUNTER
From: Mariah Brand  To: Johny Moya NP  Sent: 1/13/2023 10:36 AM EST  Subject: Vitamin d    Hey i havent been able to get my vitamin D pills in orly how long and im suppose to take them once a week. It doesnt even give me the option to fill it. Can you please send over prescriptions for vitamin d and my Valtrex (the generic im on i mean). And my pharmacy shut down for good so now i get my medicine and the [facility] on [address].

## 2023-01-14 RX ORDER — VALACYCLOVIR HYDROCHLORIDE 500 MG/1
500 TABLET, FILM COATED ORAL EVERY EVENING
Qty: 30 TABLET | Refills: 11 | Status: SHIPPED | OUTPATIENT
Start: 2023-01-14

## 2023-01-14 RX ORDER — ERGOCALCIFEROL 1.25 MG/1
50000 CAPSULE ORAL
Qty: 12 CAPSULE | Refills: 3 | Status: SHIPPED | OUTPATIENT
Start: 2023-01-14

## 2023-03-23 DIAGNOSIS — G47.00 INSOMNIA, UNSPECIFIED TYPE: ICD-10-CM

## 2023-03-23 RX ORDER — HYDROXYZINE PAMOATE 25 MG/1
CAPSULE ORAL
Qty: 40 CAPSULE | Refills: 1 | Status: SHIPPED | OUTPATIENT
Start: 2023-03-23

## 2023-03-31 RX ORDER — LEVOCETIRIZINE DIHYDROCHLORIDE 5 MG/1
5 TABLET, FILM COATED ORAL
Qty: 90 TABLET | Refills: 0 | Status: SHIPPED | OUTPATIENT
Start: 2023-03-31

## 2023-03-31 NOTE — TELEPHONE ENCOUNTER
Last Visit: 8/2/22 with NP Jose D  Next Appointment: Advised to follow-up in 6 months (2/2/23)  Previous Refill Encounter(s): 2/26/16 #30 with 5 refills    Requested Prescriptions     Pending Prescriptions Disp Refills    levocetirizine (XYZAL) 5 mg tablet 90 Tablet 0     Sig: Take 1 Tablet by mouth nightly. For Pharmacy Admin Tracking Only    Program: Medication Refill  CPA in place:   Recommendation Provided To:    Intervention Detail: New Rx: 1, reason: Patient Preference and Scheduled Appointment  Intervention Accepted By:   Margy Andrade Closed?:   Time Spent (min): 5

## 2023-06-09 RX ORDER — LEVOCETIRIZINE DIHYDROCHLORIDE 5 MG/1
TABLET, FILM COATED ORAL
Qty: 90 TABLET | Refills: 0 | Status: SHIPPED | OUTPATIENT
Start: 2023-06-09

## 2023-06-09 NOTE — TELEPHONE ENCOUNTER
Last appointment: 8/2/23  Next appointment: Pedro Francis to follow-up 2/2/23  Previous refill encounter(s): 3/31/23 #90    Requested Prescriptions     Pending Prescriptions Disp Refills    levocetirizine (XYZAL) 5 MG tablet [Pharmacy Med Name: Levocetirizine 5mg Tablet] 90 tablet 0     Sig: Take 1 tablet by mouth nightly. **Please schedule an appointment for refills. **         For Pharmacy Admin Tracking Only    Program: Medication Refill  CPA in place:    Recommendation Provided To:    Intervention Detail: New Rx: 1, reason: Patient Preference  Intervention Accepted By:   Dean Ellis Closed?:    Time Spent (min): 5

## 2023-08-07 RX ORDER — PHENOL 1.4 %
AEROSOL, SPRAY (ML) MUCOUS MEMBRANE
Qty: 30 TABLET | Refills: 0 | OUTPATIENT
Start: 2023-08-07

## 2023-08-07 RX ORDER — METOPROLOL SUCCINATE 100 MG/1
TABLET, EXTENDED RELEASE ORAL
Qty: 45 TABLET | Refills: 0 | OUTPATIENT
Start: 2023-08-07

## 2023-08-23 RX ORDER — HYDROXYZINE PAMOATE 25 MG/1
CAPSULE ORAL
Qty: 30 CAPSULE | Refills: 0 | Status: SHIPPED | OUTPATIENT
Start: 2023-08-23

## 2023-09-26 RX ORDER — HYDROXYZINE PAMOATE 25 MG/1
CAPSULE ORAL
Qty: 30 CAPSULE | Refills: 0 | Status: SHIPPED | OUTPATIENT
Start: 2023-09-26

## 2023-09-26 RX ORDER — POTASSIUM CHLORIDE 20 MEQ/1
20 TABLET, EXTENDED RELEASE ORAL 2 TIMES DAILY
Qty: 60 TABLET | Refills: 0 | Status: SHIPPED | OUTPATIENT
Start: 2023-09-26

## 2023-09-26 NOTE — TELEPHONE ENCOUNTER
Last appointment: 8/2/22  Next appointment: Coralne Devonte to follow-up 2/2/23  Previous refill encounter(s): 9/20/22 #60 with 5 refills    Requested Prescriptions     Pending Prescriptions Disp Refills    potassium chloride (KLOR-CON M) 20 MEQ extended release tablet [Pharmacy Med Name: POTASSIUM CL 20MEQ ER TABLETS] 60 tablet 0     Sig: TAKE 1 TABLET BY MOUTH TWICE DAILY         For Pharmacy Admin Tracking Only    Program: Medication Refill  CPA in place:    Recommendation Provided To:    Intervention Detail: New Rx: 1, reason: Patient Preference and Scheduled Appointment  Intervention Accepted By:   Jacob Gonzalez Closed?:    Time Spent (min): 5

## 2023-09-26 NOTE — TELEPHONE ENCOUNTER
Last appointment: 8/2/22  Next appointment: Tonja Webster to follow-up 2/2/23  Previous refill encounter(s): 8/23/23 #30    Requested Prescriptions     Pending Prescriptions Disp Refills    hydrOXYzine pamoate (VISTARIL) 25 MG capsule [Pharmacy Med Name: HYDROXYZINE PAMOATE 25MG CAPSULES] 30 capsule 0     Sig: TAKE 1 CAPSULE BY MOUTH THREE TIMES DAILY AS NEEDED FOR ANXIETY         For Pharmacy Admin Tracking Only    Program: Medication Refill  CPA in place:    Recommendation Provided To:    Intervention Detail: New Rx: 1, reason: Patient Preference  Intervention Accepted By:   Carl Damon Closed?:    Time Spent (min): 5

## 2023-09-28 RX ORDER — POTASSIUM CHLORIDE 20 MEQ/1
20 TABLET, EXTENDED RELEASE ORAL 2 TIMES DAILY
Qty: 180 TABLET | OUTPATIENT
Start: 2023-09-28

## 2023-09-28 NOTE — TELEPHONE ENCOUNTER
Duplicate      For Pharmacy Admin Tracking Only    Program: Medication Refill  CPA in place:    Recommendation Provided To:    Intervention Detail: Discontinued Rx: 1, reason: Duplicate Therapy  Intervention Accepted By:   Lamine Balderas Closed?:    Time Spent (min): 5

## 2023-10-31 RX ORDER — POTASSIUM CHLORIDE 1500 MG/1
20 TABLET, EXTENDED RELEASE ORAL 2 TIMES DAILY
Qty: 60 TABLET | Refills: 0 | OUTPATIENT
Start: 2023-10-31

## 2023-10-31 RX ORDER — VALACYCLOVIR HYDROCHLORIDE 500 MG/1
500 TABLET, FILM COATED ORAL EVERY EVENING
Qty: 30 TABLET | Refills: 0 | OUTPATIENT
Start: 2023-10-31

## 2023-10-31 RX ORDER — HYDROXYZINE PAMOATE 25 MG/1
CAPSULE ORAL
Qty: 30 CAPSULE | Refills: 0 | OUTPATIENT
Start: 2023-10-31

## 2023-10-31 RX ORDER — METOPROLOL SUCCINATE 100 MG/1
150 TABLET, EXTENDED RELEASE ORAL DAILY
Qty: 45 TABLET | Refills: 0 | OUTPATIENT
Start: 2023-10-31

## 2023-10-31 RX ORDER — VALACYCLOVIR HYDROCHLORIDE 500 MG/1
500 TABLET, FILM COATED ORAL
Qty: 30 TABLET | OUTPATIENT
Start: 2023-10-31

## 2023-10-31 NOTE — TELEPHONE ENCOUNTER
Last appointment: 8/2/22  Next appointment: Lai Pérez to follow-up 2/2/23  Previous refill encounter(s): 9/26/23 30 d/s    Requested Prescriptions     Pending Prescriptions Disp Refills    potassium chloride (KLOR-CON M) 20 MEQ TBCR extended release tablet [Pharmacy Med Name: POTASSIUM CHLORIDE 20MEQ ER TABLETS] 60 tablet 0     Sig: Take 1 tablet by mouth 2 times daily Patient needs an appointment for further refills         For Pharmacy Admin Tracking Only    Program: Medication Refill  CPA in place:    Recommendation Provided To:    Intervention Detail: New Rx: 1, reason: Patient Preference  Intervention Accepted By:   Lamine Balderas Closed?:    Time Spent (min): 5

## 2023-10-31 NOTE — TELEPHONE ENCOUNTER
Last appointment: 8/2/22  Next appointment: Izabela Niño to follow-up 2/2/23  Previous refill encounter(s): 3/14/23 Toprol #45 with 1 refill, 1/14/23 Valtrex #30 with 11 refills    Requested Prescriptions     Pending Prescriptions Disp Refills    valACYclovir (VALTREX) 500 MG tablet [Pharmacy Med Name: VALACYCLOVIR 500MG TABLETS] 30 tablet 0     Sig: Take 1 tablet by mouth every evening Patient needs an appointment for further refills    metoprolol succinate (TOPROL XL) 100 MG extended release tablet [Pharmacy Med Name: METOPROLOL ER SUCCINATE 100MG TABS] 45 tablet 0     Sig: Take 1.5 tablets by mouth daily Patient needs an appointment for further refills         For Pharmacy Admin Tracking Only    Program: Medication Refill  CPA in place:    Recommendation Provided To:    Intervention Detail: New Rx: 2, reason: Patient Preference  Intervention Accepted By:   Heaven Alba Closed?:    Time Spent (min): 5

## 2023-10-31 NOTE — TELEPHONE ENCOUNTER
A [employer] (by [employer]) message has been sent to the patient advising them to schedule an appt. Last appointment: 8/2/22  Next appointment: Josesito Marroquin to follow-up 2/2/23  Previous refill encounter(s): 9/26/23 #30    Requested Prescriptions     Pending Prescriptions Disp Refills    hydrOXYzine pamoate (VISTARIL) 25 MG capsule [Pharmacy Med Name: HYDROXYZINE PAMOATE 25MG CAPSULES] 30 capsule 0     Sig: TAKE 1 CAPSULE BY MOUTH THREE TIMES DAILY AS NEEDED FOR ANXIETY         For Pharmacy Admin Tracking Only    Program: Medication Refill  CPA in place:    Recommendation Provided To:    Intervention Detail: New Rx: 1, reason: Patient Preference and Scheduled Appointment  Intervention Accepted By:   Candido Contreras Closed?:    Time Spent (min): 5

## 2023-10-31 NOTE — TELEPHONE ENCOUNTER
Duplicate      For Pharmacy Admin Tracking Only    Program: Medication Refill  CPA in place:    Recommendation Provided To:    Intervention Detail: Discontinued Rx: 1, reason: Duplicate Therapy  Intervention Accepted By:   Farida Hwang Closed?:    Time Spent (min): 5

## 2023-12-01 NOTE — TELEPHONE ENCOUNTER
Last appointment: 8/2/22  Next appointment: none  Previous refill encounter(s): 9/26/23 #60    Requested Prescriptions     Pending Prescriptions Disp Refills    potassium chloride (KLOR-CON M) 20 MEQ extended release tablet [Pharmacy Med Name: POTASSIUM CL 20MEQ ER TABLETS] 30 tablet 0     Sig: Take 1 tablet by mouth 2 times daily Patient needs an appointment for further refills         For Pharmacy Admin Tracking Only    Program: Medication Refill  CPA in place:    Recommendation Provided To:   Intervention Detail: New Rx: 1, reason: Patient Preference and Scheduled Appointment  Intervention Accepted By:   Gap Closed?:    Time Spent (min): 5

## 2023-12-03 RX ORDER — POTASSIUM CHLORIDE 20 MEQ/1
20 TABLET, EXTENDED RELEASE ORAL 2 TIMES DAILY
Qty: 30 TABLET | Refills: 0 | OUTPATIENT
Start: 2023-12-03

## 2024-01-02 ENCOUNTER — TELEPHONE (OUTPATIENT)
Age: 44
End: 2024-01-02

## 2024-01-02 NOTE — TELEPHONE ENCOUNTER
Patient is scheduled to be seen 1/30/24 at 11 am but would like a supply of Amlodipine 5 mg  sent to [facility]. #[id number] Patient has been out of Amlodipine for six days.  Please call [phone]. Patient was seen in Urgent Care December 16,23  BP was 189/136.

## 2024-01-03 RX ORDER — AMLODIPINE BESYLATE 5 MG/1
5 TABLET ORAL DAILY
Qty: 30 TABLET | Refills: 0 | Status: SHIPPED | OUTPATIENT
Start: 2024-01-03

## 2024-01-03 RX ORDER — AMLODIPINE BESYLATE 5 MG/1
5 TABLET ORAL DAILY
Qty: 30 TABLET | OUTPATIENT
Start: 2024-01-03

## 2024-01-03 NOTE — TELEPHONE ENCOUNTER
Refilled amlodipine for #30  If she does not show on her appt, will not fill until seen in office.

## 2024-01-29 RX ORDER — AMLODIPINE BESYLATE 5 MG/1
5 TABLET ORAL DAILY
Qty: 30 TABLET | Refills: 0 | OUTPATIENT
Start: 2024-01-29

## 2024-01-29 RX ORDER — AMLODIPINE BESYLATE 5 MG/1
5 TABLET ORAL DAILY
Qty: 30 TABLET | Refills: 0 | Status: SHIPPED | OUTPATIENT
Start: 2024-01-29 | End: 2024-01-30 | Stop reason: SDUPTHER

## 2024-01-30 ENCOUNTER — OFFICE VISIT (OUTPATIENT)
Age: 44
End: 2024-01-30
Payer: MEDICAID

## 2024-01-30 VITALS
RESPIRATION RATE: 18 BRPM | HEART RATE: 73 BPM | OXYGEN SATURATION: 95 % | WEIGHT: 178 LBS | TEMPERATURE: 98.2 F | HEIGHT: 67 IN | DIASTOLIC BLOOD PRESSURE: 88 MMHG | BODY MASS INDEX: 27.94 KG/M2 | SYSTOLIC BLOOD PRESSURE: 136 MMHG

## 2024-01-30 DIAGNOSIS — E87.6 HYPOKALEMIA: ICD-10-CM

## 2024-01-30 DIAGNOSIS — D13.4 HEPATIC ADENOMA: ICD-10-CM

## 2024-01-30 DIAGNOSIS — I10 ESSENTIAL (PRIMARY) HYPERTENSION: Primary | ICD-10-CM

## 2024-01-30 DIAGNOSIS — E55.9 VITAMIN D DEFICIENCY, UNSPECIFIED: ICD-10-CM

## 2024-01-30 DIAGNOSIS — M54.50 CHRONIC MIDLINE LOW BACK PAIN, UNSPECIFIED WHETHER SCIATICA PRESENT: ICD-10-CM

## 2024-01-30 DIAGNOSIS — K76.0 HEPATIC STEATOSIS: ICD-10-CM

## 2024-01-30 DIAGNOSIS — Z01.84 IMMUNITY STATUS TESTING: ICD-10-CM

## 2024-01-30 DIAGNOSIS — A60.00 GENITAL HERPES SIMPLEX, UNSPECIFIED SITE: ICD-10-CM

## 2024-01-30 DIAGNOSIS — Z23 NEED FOR HEPATITIS B VACCINATION: ICD-10-CM

## 2024-01-30 DIAGNOSIS — B37.31 VAGINAL YEAST INFECTION: ICD-10-CM

## 2024-01-30 DIAGNOSIS — G89.29 CHRONIC MIDLINE LOW BACK PAIN, UNSPECIFIED WHETHER SCIATICA PRESENT: ICD-10-CM

## 2024-01-30 DIAGNOSIS — F41.9 ANXIETY: ICD-10-CM

## 2024-01-30 DIAGNOSIS — L30.9 DERMATITIS: ICD-10-CM

## 2024-01-30 PROBLEM — N83.291 COMPLEX CYST OF RIGHT OVARY: Status: RESOLVED | Noted: 2018-10-24 | Resolved: 2024-01-30

## 2024-01-30 PROCEDURE — 99214 OFFICE O/P EST MOD 30 MIN: CPT | Performed by: NURSE PRACTITIONER

## 2024-01-30 PROCEDURE — 3079F DIAST BP 80-89 MM HG: CPT | Performed by: NURSE PRACTITIONER

## 2024-01-30 PROCEDURE — 3075F SYST BP GE 130 - 139MM HG: CPT | Performed by: NURSE PRACTITIONER

## 2024-01-30 RX ORDER — METOPROLOL SUCCINATE 100 MG/1
150 TABLET, EXTENDED RELEASE ORAL DAILY
Qty: 135 TABLET | Refills: 3 | Status: SHIPPED | OUTPATIENT
Start: 2024-01-30

## 2024-01-30 RX ORDER — HYDROXYZINE PAMOATE 25 MG/1
CAPSULE ORAL
Qty: 30 CAPSULE | Refills: 0 | Status: SHIPPED | OUTPATIENT
Start: 2024-01-30

## 2024-01-30 RX ORDER — FLUCONAZOLE 150 MG/1
150 TABLET ORAL ONCE
Qty: 2 TABLET | Refills: 0 | Status: SHIPPED | OUTPATIENT
Start: 2024-01-30 | End: 2024-01-30

## 2024-01-30 RX ORDER — ERGOCALCIFEROL 1.25 MG/1
50000 CAPSULE ORAL
Qty: 12 CAPSULE | Refills: 3 | Status: SHIPPED | OUTPATIENT
Start: 2024-01-30

## 2024-01-30 RX ORDER — POTASSIUM CHLORIDE 20 MEQ/1
20 TABLET, EXTENDED RELEASE ORAL 2 TIMES DAILY
Qty: 60 TABLET | Refills: 0 | Status: SHIPPED | OUTPATIENT
Start: 2024-01-30 | End: 2024-01-31

## 2024-01-30 RX ORDER — TRIAMCINOLONE ACETONIDE 1 MG/G
CREAM TOPICAL
Qty: 30 G | Refills: 0 | Status: SHIPPED | OUTPATIENT
Start: 2024-01-30

## 2024-01-30 RX ORDER — VALACYCLOVIR HYDROCHLORIDE 500 MG/1
500 TABLET, FILM COATED ORAL DAILY
Qty: 90 TABLET | Refills: 3 | Status: SHIPPED | OUTPATIENT
Start: 2024-01-30

## 2024-01-30 RX ORDER — AMLODIPINE BESYLATE 5 MG/1
5 TABLET ORAL DAILY
Qty: 90 TABLET | Refills: 3 | Status: SHIPPED | OUTPATIENT
Start: 2024-01-30

## 2024-01-30 SDOH — ECONOMIC STABILITY: HOUSING INSECURITY
IN THE LAST 12 MONTHS, WAS THERE A TIME WHEN YOU DID NOT HAVE A STEADY PLACE TO SLEEP OR SLEPT IN A SHELTER (INCLUDING NOW)?: NO

## 2024-01-30 SDOH — ECONOMIC STABILITY: INCOME INSECURITY: HOW HARD IS IT FOR YOU TO PAY FOR THE VERY BASICS LIKE FOOD, HOUSING, MEDICAL CARE, AND HEATING?: NOT VERY HARD

## 2024-01-30 SDOH — ECONOMIC STABILITY: FOOD INSECURITY: WITHIN THE PAST 12 MONTHS, THE FOOD YOU BOUGHT JUST DIDN'T LAST AND YOU DIDN'T HAVE MONEY TO GET MORE.: NEVER TRUE

## 2024-01-30 SDOH — ECONOMIC STABILITY: FOOD INSECURITY: WITHIN THE PAST 12 MONTHS, YOU WORRIED THAT YOUR FOOD WOULD RUN OUT BEFORE YOU GOT MONEY TO BUY MORE.: NEVER TRUE

## 2024-01-30 ASSESSMENT — PATIENT HEALTH QUESTIONNAIRE - PHQ9
1. LITTLE INTEREST OR PLEASURE IN DOING THINGS: 0
2. FEELING DOWN, DEPRESSED OR HOPELESS: 0
SUM OF ALL RESPONSES TO PHQ9 QUESTIONS 1 & 2: 0
SUM OF ALL RESPONSES TO PHQ QUESTIONS 1-9: 0

## 2024-01-30 ASSESSMENT — ENCOUNTER SYMPTOMS
SHORTNESS OF BREATH: 0
VOMITING: 0
COUGH: 0
BACK PAIN: 1
NAUSEA: 0
CONSTIPATION: 0
RESPIRATORY NEGATIVE: 1
ABDOMINAL PAIN: 0
BLOOD IN STOOL: 0
DIARRHEA: 0
GASTROINTESTINAL NEGATIVE: 1

## 2024-01-30 NOTE — PROGRESS NOTES
Chief Complaint   Patient presents with    Medication Refill       1. \"Have you been to the ER, urgent care clinic since your last visit?  Hospitalized since your last visit?\" Yes October or November [facility] for cellulitis to nose.    2. \"Have you seen or consulted any other health care providers outside of the [facility] since your last visit?\" No     3. For patients aged 45-75: Has the patient had a colonoscopy / FIT/ Cologuard? N/A      If the patient is female:    4. For patients aged 40-74: Has the patient had a mammogram within the past 2 years? Yes      5. For patients aged 21-65: Has the patient had a pap smear? No     The patient, Segundo Conrad, identity was verified by name and .    Health Maintenance Due   Topic Date Due    Hepatitis B vaccine (1 of 3 - 3-dose series) Never done    Varicella vaccine (1 of 2 - 2-dose childhood series) Never done    Pneumococcal 0-64 years Vaccine (1 - PCV) Never done    DTaP/Tdap/Td vaccine (1 - Tdap) Never done    Cervical cancer screen  Never done    Flu vaccine (1) Never done    Depression Screen  2023    COVID-19 Vaccine (2023-24 season) 2023

## 2024-01-30 NOTE — PROGRESS NOTES
Segundo Colmenares (:  1980) is a 43 y.o. female,Established patient, here for evaluation of the following chief complaint(s):  Medication Refill and Vaginitis         ASSESSMENT/PLAN:  1. Essential (primary) hypertension - stable.  Refilled amlodipine and metoprolol.  Check labs  -     CBC; Future  -     Comprehensive Metabolic Panel; Future  -     Hemoglobin A1C; Future  -     Lipid Panel; Future  -     TSH; Future  -     Urinalysis with Microscopic; Future  -     metoprolol succinate (TOPROL XL) 100 MG extended release tablet; Take 1.5 tablets by mouth daily, Disp-135 tablet, R-3Normal  -     amLODIPine (NORVASC) 5 MG tablet; Take 1 tablet by mouth daily, Disp-90 tablet, R-3Normal  2. Hypokalemia - she is currently taking OTC potassium 2 tabs daily.  Will check labs,  patient prefers prescription supplement  -     potassium chloride (KLOR-CON M) 20 MEQ extended release tablet; Take 1 tablet by mouth 2 times daily Patient needs an appointment for further refills, Disp-60 tablet, R-0Normal  -     Comprehensive Metabolic Panel; Future  3. Vitamin D deficiency, unspecified  -     ergocalciferol (ERGOCALCIFEROL) 1.25 MG (84812 UT) capsule; Take 1 capsule by mouth every 7 days, Disp-12 capsule, R-3Normal  -     Comprehensive Metabolic Panel; Future  -     Vitamin D 25 Hydroxy; Future  4. Dermatitis  -     triamcinolone (KENALOG) 0.1 % cream; Apply topically 2 times daily for 2 weeks, then as needed, Disp-30 g, R-0, Normal  5. Chronic midline low back pain, unspecified whether sciatica present  -     XR LUMBAR SPINE (2-3 VIEWS); Future  6. Hepatic adenoma - per GI at [facility].  Needs to schedule MRI and appt for 2024  7. Hepatic steatosis - per GI at [facility].  Enc weight reduction  8. Vaginal yeast infection  -     fluconazole (DIFLUCAN) 150 MG tablet; Take 1 tablet by mouth once for 1 dose May repeat in 3-4 days if no improvement in symptoms, Disp-2 tablet, R-0Normal  9. Anxiety  -     hydrOXYzine pamoate

## 2024-01-31 LAB
25(OH)D3 SERPL-MCNC: 36.9 NG/ML (ref 30–100)
ALBUMIN SERPL-MCNC: 3.8 G/DL (ref 3.5–5)
ALBUMIN/GLOB SERPL: 1.1 (ref 1.1–2.2)
ALP SERPL-CCNC: 137 U/L (ref 45–117)
ALT SERPL-CCNC: 27 U/L (ref 12–78)
ANION GAP SERPL CALC-SCNC: 4 MMOL/L (ref 5–15)
APPEARANCE UR: CLEAR
AST SERPL-CCNC: 22 U/L (ref 15–37)
BACTERIA URNS QL MICRO: NEGATIVE /HPF
BILIRUB SERPL-MCNC: 0.6 MG/DL (ref 0.2–1)
BILIRUB UR QL: NEGATIVE
BUN SERPL-MCNC: 10 MG/DL (ref 6–20)
BUN/CREAT SERPL: 12 (ref 12–20)
CALCIUM SERPL-MCNC: 9.8 MG/DL (ref 8.5–10.1)
CHLORIDE SERPL-SCNC: 104 MMOL/L (ref 97–108)
CO2 SERPL-SCNC: 28 MMOL/L (ref 21–32)
COLOR UR: NORMAL
CREAT SERPL-MCNC: 0.83 MG/DL (ref 0.55–1.02)
EPITH CASTS URNS QL MICRO: NORMAL /LPF
ERYTHROCYTE [DISTWIDTH] IN BLOOD BY AUTOMATED COUNT: 14.5 % (ref 11.5–14.5)
EST. AVERAGE GLUCOSE BLD GHB EST-MCNC: 105 MG/DL
GLOBULIN SER CALC-MCNC: 3.5 G/DL (ref 2–4)
GLUCOSE SERPL-MCNC: 102 MG/DL (ref 65–100)
GLUCOSE UR STRIP.AUTO-MCNC: NEGATIVE MG/DL
HBA1C MFR BLD: 5.3 % (ref 4–5.6)
HBV SURFACE AB SER QL: NONREACTIVE
HBV SURFACE AB SER-ACNC: 3.24 MIU/ML
HCT VFR BLD AUTO: 42.1 % (ref 35–47)
HGB BLD-MCNC: 14.1 G/DL (ref 11.5–16)
HGB UR QL STRIP: NEGATIVE
HYALINE CASTS URNS QL MICRO: NORMAL /LPF (ref 0–5)
KETONES UR QL STRIP.AUTO: NEGATIVE MG/DL
LEUKOCYTE ESTERASE UR QL STRIP.AUTO: NEGATIVE
MCH RBC QN AUTO: 30 PG (ref 26–34)
MCHC RBC AUTO-ENTMCNC: 33.5 G/DL (ref 30–36.5)
MCV RBC AUTO: 89.6 FL (ref 80–99)
NITRITE UR QL STRIP.AUTO: NEGATIVE
NRBC # BLD: 0 K/UL (ref 0–0.01)
NRBC BLD-RTO: 0 PER 100 WBC
PH UR STRIP: 7.5 (ref 5–8)
PLATELET # BLD AUTO: 246 K/UL (ref 150–400)
PMV BLD AUTO: 10.3 FL (ref 8.9–12.9)
POTASSIUM SERPL-SCNC: 4.5 MMOL/L (ref 3.5–5.1)
PROT SERPL-MCNC: 7.3 G/DL (ref 6.4–8.2)
PROT UR STRIP-MCNC: NEGATIVE MG/DL
RBC # BLD AUTO: 4.7 M/UL (ref 3.8–5.2)
RBC #/AREA URNS HPF: NORMAL /HPF (ref 0–5)
SODIUM SERPL-SCNC: 136 MMOL/L (ref 136–145)
SP GR UR REFRACTOMETRY: 1.01 (ref 1–1.03)
TSH SERPL DL<=0.05 MIU/L-ACNC: 1.71 UIU/ML (ref 0.36–3.74)
UROBILINOGEN UR QL STRIP.AUTO: 0.2 EU/DL (ref 0.2–1)
WBC # BLD AUTO: 6.9 K/UL (ref 3.6–11)
WBC URNS QL MICRO: NORMAL /HPF (ref 0–4)

## 2024-01-31 RX ORDER — POTASSIUM CHLORIDE 20 MEQ/1
20 TABLET, EXTENDED RELEASE ORAL 2 TIMES DAILY
Qty: 180 TABLET | Refills: 1 | Status: SHIPPED | OUTPATIENT
Start: 2024-01-31

## 2024-01-31 NOTE — TELEPHONE ENCOUNTER
Last appointment: 1/30/24  Next appointment: 7/30/24    Requested Prescriptions     Pending Prescriptions Disp Refills    potassium chloride (KLOR-CON M) 20 MEQ extended release tablet [Pharmacy Med Name: POTASSIUM CL 20MEQ ER TABLETS] 180 tablet 1     Sig: TAKE 1 TABLET BY MOUTH TWICE DAILY         For Pharmacy Admin Tracking Only    Program: Medication Refill  CPA in place:    Recommendation Provided To:   Intervention Detail: New Rx: 1, reason: Patient Preference  Intervention Accepted By:   Gap Closed?:    Time Spent (min): 5

## 2024-02-01 LAB — VZV IGG SER IA-ACNC: 820 INDEX

## 2024-02-10 DIAGNOSIS — A60.00 GENITAL HERPES SIMPLEX, UNSPECIFIED SITE: ICD-10-CM

## 2024-02-12 RX ORDER — VALACYCLOVIR HYDROCHLORIDE 500 MG/1
500 TABLET, FILM COATED ORAL DAILY
Qty: 30 TABLET | Refills: 0 | Status: SHIPPED | OUTPATIENT
Start: 2024-02-12

## 2024-02-12 NOTE — TELEPHONE ENCOUNTER
[facility] is requesting a refill    Last appointment: 1/30/24  Next appointment: 7/30/24    Requested Prescriptions     Pending Prescriptions Disp Refills    valACYclovir (VALTREX) 500 MG tablet [Pharmacy Med Name: VALACYCLOVIR 500MG TABLETS] 30 tablet 0     Sig: Take 1 tablet by mouth daily         For Pharmacy Admin Tracking Only    Program: Medication Refill  CPA in place:    Recommendation Provided To:   Intervention Detail: New Rx: 1, reason: Patient Preference  Intervention Accepted By:   Gap Closed?:    Time Spent (min): 5   No

## 2024-02-23 ENCOUNTER — PATIENT MESSAGE (OUTPATIENT)
Age: 44
End: 2024-02-23

## 2024-02-23 DIAGNOSIS — J02.9 ACUTE PHARYNGITIS, UNSPECIFIED ETIOLOGY: ICD-10-CM

## 2024-02-23 DIAGNOSIS — J06.9 UPPER RESPIRATORY TRACT INFECTION, UNSPECIFIED TYPE: Primary | ICD-10-CM

## 2024-02-27 RX ORDER — AZITHROMYCIN 250 MG/1
TABLET, FILM COATED ORAL
Qty: 6 TABLET | Refills: 0 | Status: SHIPPED | OUTPATIENT
Start: 2024-02-27 | End: 2024-02-28 | Stop reason: SDUPTHER

## 2024-02-27 NOTE — TELEPHONE ENCOUNTER
From: Segundo Colmenares  To: Criss Simmons  Sent: 2/23/2024 8:53 AM EST  Subject: Sore throat and alot of thick mucus    Good morning Criss or ms nurse. Lol..ok so fadumo if i have a really bad cold or what but im coughing up thick green yellowish stuff. I havent been coughing bad like normal because i been taking mucinex but i dont see any change. And zulma had a sore throat for like 4 or 5 days..its like its switching sides. It all started when i noticed this white thing in the back of my throat appear. I thought it was one of those tonsil things that comes out but its not. It looks like a white pimple in the back of my throat on the right hand side. If u need to see ill send a pic. But thats the only one i see. But when it appeared. All of this started. I barely could open my eyes this morning because so much mucus came out my eyes they were glued shut then its like i had a film over my eye. Please help..the phlegm taste horrible. Again please help.

## 2024-02-28 DIAGNOSIS — A60.00 GENITAL HERPES SIMPLEX, UNSPECIFIED SITE: ICD-10-CM

## 2024-02-28 RX ORDER — AZITHROMYCIN 250 MG/1
TABLET, FILM COATED ORAL
Qty: 6 TABLET | Refills: 0 | Status: SHIPPED | OUTPATIENT
Start: 2024-02-28

## 2024-02-29 RX ORDER — VALACYCLOVIR HYDROCHLORIDE 500 MG/1
500 TABLET, FILM COATED ORAL DAILY
Qty: 90 TABLET | Refills: 3 | Status: SHIPPED | OUTPATIENT
Start: 2024-02-29

## 2024-02-29 NOTE — TELEPHONE ENCOUNTER
Last appointment: 1/30/24  Next appointment: 7/30/24  Previous refill encounter(s): 2/12/24 #30    Requested Prescriptions     Pending Prescriptions Disp Refills    valACYclovir (VALTREX) 500 MG tablet [Pharmacy Med Name: VALACYCLOVIR 500MG TABLETS] 90 tablet 3     Sig: TAKE 1 TABLET BY MOUTH DAILY         For Pharmacy Admin Tracking Only    Program: Medication Refill  CPA in place:    Recommendation Provided To:   Intervention Detail: New Rx: 1, reason: Patient Preference  Intervention Accepted By:   Gap Closed?:    Time Spent (min): 5

## 2024-04-09 DIAGNOSIS — F41.9 ANXIETY: ICD-10-CM

## 2024-04-10 RX ORDER — HYDROXYZINE PAMOATE 25 MG/1
CAPSULE ORAL
Qty: 30 CAPSULE | Refills: 0 | Status: SHIPPED | OUTPATIENT
Start: 2024-04-10

## 2024-04-10 NOTE — TELEPHONE ENCOUNTER
Last appointment: 1/30/24  Next appointment: 7/30/24  Previous refill encounter(s): 1/30/24 #30    Requested Prescriptions     Pending Prescriptions Disp Refills    hydrOXYzine pamoate (VISTARIL) 25 MG capsule [Pharmacy Med Name: HYDROXYZINE PAMOATE 25MG CAPSULES] 30 capsule 0     Sig: TAKE 1 CAPSULE BY MOUTH THREE TIMES DAILY AS NEEDED FOR ANXIETY         For Pharmacy Admin Tracking Only    Program: Medication Refill  CPA in place:    Recommendation Provided To:   Intervention Detail: New Rx: 1, reason: Patient Preference  Intervention Accepted By:   Gap Closed?:    Time Spent (min): 5

## 2024-04-30 RX ORDER — LEVOCETIRIZINE DIHYDROCHLORIDE 5 MG/1
5 TABLET, FILM COATED ORAL NIGHTLY
Qty: 90 TABLET | Refills: 0 | Status: SHIPPED | OUTPATIENT
Start: 2024-04-30

## 2024-04-30 NOTE — TELEPHONE ENCOUNTER
Last appointment: 1/30/24  Next appointment: 7/30/24  Previous refill encounter(s): 6/9/23 #90    Requested Prescriptions     Pending Prescriptions Disp Refills    levocetirizine (XYZAL) 5 MG tablet [Pharmacy Med Name: Levocetirizine 5mg Tablet] 90 tablet 0     Sig: Take 1 tablet by mouth nightly         For Pharmacy Admin Tracking Only    Program: Medication Refill  CPA in place:    Recommendation Provided To:   Intervention Detail: New Rx: 1, reason: Patient Preference  Intervention Accepted By:   Gap Closed?:    Time Spent (min): 5

## 2024-05-27 DIAGNOSIS — F41.9 ANXIETY: ICD-10-CM

## 2024-05-29 RX ORDER — HYDROXYZINE PAMOATE 25 MG/1
CAPSULE ORAL
Qty: 30 CAPSULE | Refills: 0 | Status: SHIPPED | OUTPATIENT
Start: 2024-05-29

## 2024-05-29 NOTE — TELEPHONE ENCOUNTER
Last appointment: 1/30/24  Next appointment: 7/30/24  Previous refill encounter(s): 4/10/24 #30    Requested Prescriptions     Pending Prescriptions Disp Refills    hydrOXYzine pamoate (VISTARIL) 25 MG capsule [Pharmacy Med Name: HYDROXYZINE PAMOATE 25MG CAPSULES] 30 capsule 0     Sig: TAKE 1 CAPSULE BY MOUTH THREE TIMES DAILY AS NEEDED FOR ANXIETY         For Pharmacy Admin Tracking Only    Program: Medication Refill  CPA in place:    Recommendation Provided To:   Intervention Detail: New Rx: 1, reason: Patient Preference  Intervention Accepted By:   Gap Closed?:    Time Spent (min): 5

## 2024-06-29 DIAGNOSIS — F41.9 ANXIETY: ICD-10-CM

## 2024-07-01 RX ORDER — HYDROXYZINE PAMOATE 25 MG/1
CAPSULE ORAL
Qty: 30 CAPSULE | Refills: 0 | Status: SHIPPED | OUTPATIENT
Start: 2024-07-01

## 2024-07-01 NOTE — TELEPHONE ENCOUNTER
Last appointment: 1/30/24  Next appointment: 7/30/24  Previous refill encounter(s): 5/29/24 #30    Requested Prescriptions     Pending Prescriptions Disp Refills    hydrOXYzine pamoate (VISTARIL) 25 MG capsule [Pharmacy Med Name: HYDROXYZINE PAMOATE 25MG CAPSULES] 30 capsule 0     Sig: TAKE 1 CAPSULE BY MOUTH THREE TIMES DAILY AS NEEDED FOR ANXIETY         For Pharmacy Admin Tracking Only    Program: Medication Refill  CPA in place:    Recommendation Provided To:   Intervention Detail: New Rx: 1, reason: Patient Preference  Intervention Accepted By:   Gap Closed?:    Time Spent (min): 5

## 2024-07-31 DIAGNOSIS — F41.9 ANXIETY: ICD-10-CM

## 2024-07-31 RX ORDER — HYDROXYZINE PAMOATE 25 MG/1
CAPSULE ORAL
Qty: 30 CAPSULE | Refills: 5 | Status: SHIPPED | OUTPATIENT
Start: 2024-07-31

## 2024-07-31 NOTE — TELEPHONE ENCOUNTER
Last appointment: 1/30/24  Next appointment: 12/27/24  Previous refill encounter(s): 7/1/24 #30    Requested Prescriptions     Pending Prescriptions Disp Refills    hydrOXYzine pamoate (VISTARIL) 25 MG capsule [Pharmacy Med Name: HYDROXYZINE PAMOATE 25MG CAPSULES] 30 capsule 5     Sig: TAKE 1 CAPSULE BY MOUTH THREE TIMES DAILY AS NEEDED FOR ANXIETY         For Pharmacy Admin Tracking Only    Program: Medication Refill  CPA in place:    Recommendation Provided To:   Intervention Detail: New Rx: 1, reason: Patient Preference  Intervention Accepted By:   Gap Closed?:    Time Spent (min): 5

## 2024-09-02 DIAGNOSIS — I10 ESSENTIAL (PRIMARY) HYPERTENSION: ICD-10-CM

## 2024-09-04 DIAGNOSIS — I10 ESSENTIAL (PRIMARY) HYPERTENSION: ICD-10-CM

## 2024-09-04 RX ORDER — METOPROLOL SUCCINATE 100 MG/1
150 TABLET, EXTENDED RELEASE ORAL DAILY
Qty: 135 TABLET | Refills: 0 | Status: SHIPPED | OUTPATIENT
Start: 2024-09-04

## 2024-09-04 RX ORDER — AMLODIPINE BESYLATE 5 MG/1
5 TABLET ORAL DAILY
Qty: 90 TABLET | Refills: 3 | OUTPATIENT
Start: 2024-09-04

## 2024-09-04 NOTE — TELEPHONE ENCOUNTER
Norvasc was sent on 1/30/24 for #90 with 3 refills    For Pharmacy Admin Tracking Only    Program: Medication Refill  CPA in place:    Recommendation Provided To:   Intervention Detail: Discontinued Rx: 1, reason: Duplicate Therapy  Intervention Accepted By:   Gap Closed?:    Time Spent (min): 5

## 2024-09-09 DIAGNOSIS — I10 ESSENTIAL (PRIMARY) HYPERTENSION: ICD-10-CM

## 2024-09-10 RX ORDER — AMLODIPINE BESYLATE 5 MG/1
5 TABLET ORAL DAILY
Qty: 90 TABLET | Refills: 0 | Status: SHIPPED | OUTPATIENT
Start: 2024-09-10

## 2024-09-16 ENCOUNTER — PATIENT MESSAGE (OUTPATIENT)
Age: 44
End: 2024-09-16

## 2024-09-24 ENCOUNTER — PATIENT MESSAGE (OUTPATIENT)
Age: 44
End: 2024-09-24

## 2024-09-24 DIAGNOSIS — K59.00 CONSTIPATION, UNSPECIFIED CONSTIPATION TYPE: Primary | ICD-10-CM

## 2024-09-24 DIAGNOSIS — K62.5 RECTAL BLEEDING: ICD-10-CM

## 2024-10-28 DIAGNOSIS — E87.6 HYPOKALEMIA: ICD-10-CM

## 2024-10-29 RX ORDER — POTASSIUM CHLORIDE 1500 MG/1
20 TABLET, EXTENDED RELEASE ORAL 2 TIMES DAILY
Qty: 180 TABLET | Refills: 0 | Status: SHIPPED | OUTPATIENT
Start: 2024-10-29

## 2024-12-05 DIAGNOSIS — I10 ESSENTIAL (PRIMARY) HYPERTENSION: ICD-10-CM

## 2024-12-05 RX ORDER — AMLODIPINE BESYLATE 5 MG/1
5 TABLET ORAL DAILY
Qty: 90 TABLET | Refills: 0 | Status: SHIPPED | OUTPATIENT
Start: 2024-12-05

## 2024-12-05 RX ORDER — METOPROLOL SUCCINATE 100 MG/1
150 TABLET, EXTENDED RELEASE ORAL DAILY
Qty: 135 TABLET | Refills: 0 | Status: SHIPPED | OUTPATIENT
Start: 2024-12-05

## 2024-12-05 NOTE — TELEPHONE ENCOUNTER
Last appointment: 1/30/24  Next appointment: 3/3/25  Previous refill encounter(s): 9/10/24 & 9/4/24 90 d/s    Requested Prescriptions     Pending Prescriptions Disp Refills    amLODIPine (NORVASC) 5 MG tablet 90 tablet 0     Sig: Take 1 tablet by mouth daily    metoprolol succinate (TOPROL XL) 100 MG extended release tablet 135 tablet 0     Sig: Take 1.5 tablets by mouth daily         For Pharmacy Admin Tracking Only    Program: Medication Refill  CPA in place:    Recommendation Provided To:   Intervention Detail: New Rx: 2, reason: Patient Preference  Intervention Accepted By:   Gap Closed?:    Time Spent (min): 5

## 2025-02-14 DIAGNOSIS — I10 ESSENTIAL (PRIMARY) HYPERTENSION: ICD-10-CM

## 2025-02-17 DIAGNOSIS — I10 ESSENTIAL (PRIMARY) HYPERTENSION: ICD-10-CM

## 2025-02-17 RX ORDER — AMLODIPINE BESYLATE 5 MG/1
5 TABLET ORAL DAILY
Qty: 90 TABLET | Refills: 0 | Status: SHIPPED | OUTPATIENT
Start: 2025-02-17

## 2025-02-17 RX ORDER — METOPROLOL SUCCINATE 100 MG/1
150 TABLET, EXTENDED RELEASE ORAL DAILY
Qty: 135 TABLET | Refills: 0 | Status: SHIPPED | OUTPATIENT
Start: 2025-02-17

## 2025-02-17 NOTE — TELEPHONE ENCOUNTER
Last appointment: 1/30/24  Next appointment: 3/3/25  Previous refill encounter(s): 12/5/24 #90    Requested Prescriptions     Pending Prescriptions Disp Refills    amLODIPine (NORVASC) 5 MG tablet [Pharmacy Med Name: Amlodipine Besylate 5mg Tablet] 90 tablet 0     Sig: Take 1 tablet by mouth daily.         For Pharmacy Admin Tracking Only    Program: Medication Refill  CPA in place:    Recommendation Provided To:   Intervention Detail: New Rx: 1, reason: Patient Preference  Intervention Accepted By:   Gap Closed?:    Time Spent (min): 5

## 2025-02-17 NOTE — TELEPHONE ENCOUNTER
Last appointment: 1/30/24  Next appointment: 3/3/25  Previous refill encounter(s): 12/5/24 90 d/s    Requested Prescriptions     Pending Prescriptions Disp Refills    metoprolol succinate (TOPROL XL) 100 MG extended release tablet 135 tablet 0     Sig: Take 1.5 tablets by mouth daily         For Pharmacy Admin Tracking Only    Program: Medication Refill  CPA in place:    Recommendation Provided To:   Intervention Detail: New Rx: 1, reason: Patient Preference  Intervention Accepted By:   Gap Closed?:    Time Spent (min): 5

## 2025-02-24 DIAGNOSIS — E55.9 VITAMIN D DEFICIENCY, UNSPECIFIED: ICD-10-CM

## 2025-02-24 RX ORDER — ERGOCALCIFEROL 1.25 MG/1
50000 CAPSULE ORAL
Qty: 12 CAPSULE | Refills: 0 | Status: SHIPPED | OUTPATIENT
Start: 2025-02-24

## 2025-02-24 NOTE — TELEPHONE ENCOUNTER
Last appointment: 1/30/24  Next appointment: 3/3/25  Previous refill encounter(s): 1/30/24    Requested Prescriptions     Pending Prescriptions Disp Refills    ergocalciferol (ERGOCALCIFEROL) 1.25 MG (27154 UT) capsule 12 capsule 0     Sig: Take 1 capsule by mouth every 7 days         For Pharmacy Admin Tracking Only    Program: Medication Refill  CPA in place:    Recommendation Provided To:   Intervention Detail: New Rx: 1, reason: Patient Preference  Intervention Accepted By:   Gap Closed?:    Time Spent (min): 5

## 2025-03-03 ENCOUNTER — OFFICE VISIT (OUTPATIENT)
Age: 45
End: 2025-03-03
Payer: MEDICAID

## 2025-03-03 VITALS
OXYGEN SATURATION: 98 % | DIASTOLIC BLOOD PRESSURE: 78 MMHG | HEART RATE: 75 BPM | BODY MASS INDEX: 28.96 KG/M2 | HEIGHT: 66 IN | SYSTOLIC BLOOD PRESSURE: 117 MMHG | RESPIRATION RATE: 20 BRPM | WEIGHT: 180.2 LBS | TEMPERATURE: 97.7 F

## 2025-03-03 DIAGNOSIS — Z76.89 ENCOUNTER TO ESTABLISH CARE: Primary | ICD-10-CM

## 2025-03-03 DIAGNOSIS — E87.6 HYPOKALEMIA: ICD-10-CM

## 2025-03-03 DIAGNOSIS — L20.82 FLEXURAL ECZEMA: ICD-10-CM

## 2025-03-03 DIAGNOSIS — F41.9 ANXIETY: ICD-10-CM

## 2025-03-03 DIAGNOSIS — A60.04 HERPES SIMPLEX VULVOVAGINITIS: ICD-10-CM

## 2025-03-03 DIAGNOSIS — I10 PRIMARY HYPERTENSION: ICD-10-CM

## 2025-03-03 DIAGNOSIS — M79.89 LEG SWELLING: ICD-10-CM

## 2025-03-03 PROCEDURE — 3074F SYST BP LT 130 MM HG: CPT | Performed by: STUDENT IN AN ORGANIZED HEALTH CARE EDUCATION/TRAINING PROGRAM

## 2025-03-03 PROCEDURE — 3078F DIAST BP <80 MM HG: CPT | Performed by: STUDENT IN AN ORGANIZED HEALTH CARE EDUCATION/TRAINING PROGRAM

## 2025-03-03 PROCEDURE — 99214 OFFICE O/P EST MOD 30 MIN: CPT | Performed by: STUDENT IN AN ORGANIZED HEALTH CARE EDUCATION/TRAINING PROGRAM

## 2025-03-03 SDOH — ECONOMIC STABILITY: FOOD INSECURITY: WITHIN THE PAST 12 MONTHS, YOU WORRIED THAT YOUR FOOD WOULD RUN OUT BEFORE YOU GOT MONEY TO BUY MORE.: NEVER TRUE

## 2025-03-03 SDOH — ECONOMIC STABILITY: FOOD INSECURITY: WITHIN THE PAST 12 MONTHS, THE FOOD YOU BOUGHT JUST DIDN'T LAST AND YOU DIDN'T HAVE MONEY TO GET MORE.: NEVER TRUE

## 2025-03-03 ASSESSMENT — PATIENT HEALTH QUESTIONNAIRE - PHQ9
SUM OF ALL RESPONSES TO PHQ QUESTIONS 1-9: 0
2. FEELING DOWN, DEPRESSED OR HOPELESS: NOT AT ALL
1. LITTLE INTEREST OR PLEASURE IN DOING THINGS: NOT AT ALL

## 2025-03-03 NOTE — PROGRESS NOTES
Chief Complaint   Patient presents with    Establish Care       \"Have you been to the ER, urgent care clinic since your last visit?  Hospitalized since your last visit?\"    NO    “Have you seen or consulted any other health care providers outside of [facility] since your last visit?”    NO    Have you had a mammogram?”   NO    No breast cancer screening on file      “Have you had a pap smear?”    NO    No cervical cancer screening on file             Click Here for Release of Records Request     No results found for this visit on 25.   Vitals:    25 1514   BP: 117/78   Site: Right Upper Arm   Position: Sitting   Cuff Size: Large Adult   Pulse: 75   Resp: 20   Temp: 97.7 °F (36.5 °C)   TempSrc: Temporal   SpO2: 98%   Weight: 81.7 kg (180 lb 3.2 oz)   Height: 1.676 m (5' 6\")      Health Maintenance Due   Topic Date Due    Cervical cancer screen  Never done    Breast cancer screen  Never done        The patient, Segundo Colmenares, identity was verified by name and .

## 2025-03-21 DIAGNOSIS — E87.6 HYPOKALEMIA: ICD-10-CM

## 2025-03-21 RX ORDER — POTASSIUM CHLORIDE 1500 MG/1
20 TABLET, EXTENDED RELEASE ORAL 2 TIMES DAILY
Qty: 180 TABLET | Refills: 0 | Status: SHIPPED | OUTPATIENT
Start: 2025-03-21

## 2025-03-21 NOTE — TELEPHONE ENCOUNTER
Last office visit with pcp 3/3/25.  Next office visit  with pcp none scheduled.  Due back around 9/3/25 per last office visit..

## 2025-04-04 DIAGNOSIS — A60.00 GENITAL HERPES SIMPLEX, UNSPECIFIED SITE: ICD-10-CM

## 2025-04-04 RX ORDER — VALACYCLOVIR HYDROCHLORIDE 500 MG/1
500 TABLET, FILM COATED ORAL DAILY
Qty: 90 TABLET | Refills: 1 | Status: SHIPPED | OUTPATIENT
Start: 2025-04-04

## 2025-04-04 NOTE — TELEPHONE ENCOUNTER
Last appointment: 3/3/25  Next appointment: Advised to follow-up 9/3/25  Previous refill encounter(s): 2/29/24    Requested Prescriptions     Pending Prescriptions Disp Refills    valACYclovir (VALTREX) 500 MG tablet 90 tablet 1     Sig: Take 1 tablet by mouth daily         For Pharmacy Admin Tracking Only    Program: Medication Refill  CPA in place:    Recommendation Provided To:   Intervention Detail: New Rx: 1, reason: Patient Preference  Intervention Accepted By:   Gap Closed?:    Time Spent (min): 5

## 2025-04-05 DIAGNOSIS — I10 ESSENTIAL (PRIMARY) HYPERTENSION: ICD-10-CM

## 2025-04-07 RX ORDER — AMLODIPINE BESYLATE 5 MG/1
5 TABLET ORAL DAILY
Qty: 90 TABLET | Refills: 0 | Status: SHIPPED | OUTPATIENT
Start: 2025-04-07

## 2025-04-07 RX ORDER — METOPROLOL SUCCINATE 100 MG/1
150 TABLET, EXTENDED RELEASE ORAL DAILY
Qty: 135 TABLET | Refills: 1 | Status: SHIPPED | OUTPATIENT
Start: 2025-04-07

## 2025-04-07 NOTE — TELEPHONE ENCOUNTER
Last office visit with pcp 3/3/25.  Next office visit  with pcp none scheduled.  Per last office visit with Dr. Lopez patient due back around 9/3/25 for Wellness Visit   HEADAC[facility]

## 2025-04-07 NOTE — TELEPHONE ENCOUNTER
Last appointment: 3/3/25  Next appointment: Advised to follow-up 9/3/25  Previous refill encounter(s): 2/17/25 #135    Requested Prescriptions     Pending Prescriptions Disp Refills    metoprolol succinate (TOPROL XL) 100 MG extended release tablet 135 tablet 1     Sig: Take 1.5 tablets by mouth daily         For Pharmacy Admin Tracking Only    Program: Medication Refill  CPA in place:    Recommendation Provided To:   Intervention Detail: New Rx: 1, reason: Patient Preference  Intervention Accepted By:   Gap Closed?:    Time Spent (min): 5

## 2025-05-16 RX ORDER — ALBUTEROL SULFATE 90 UG/1
2 INHALANT RESPIRATORY (INHALATION) EVERY 4 HOURS PRN
Qty: 18 G | Refills: 1 | Status: SHIPPED | OUTPATIENT
Start: 2025-05-16

## 2025-06-25 DIAGNOSIS — E87.6 HYPOKALEMIA: ICD-10-CM

## 2025-06-25 DIAGNOSIS — F41.9 ANXIETY: ICD-10-CM

## 2025-06-25 RX ORDER — HYDROXYZINE PAMOATE 25 MG/1
CAPSULE ORAL
Qty: 30 CAPSULE | Refills: 5 | Status: SHIPPED | OUTPATIENT
Start: 2025-06-25

## 2025-06-26 RX ORDER — POTASSIUM CHLORIDE 1500 MG/1
20 TABLET, EXTENDED RELEASE ORAL 2 TIMES DAILY
Qty: 180 TABLET | Refills: 1 | Status: SHIPPED | OUTPATIENT
Start: 2025-06-26

## 2025-06-26 RX ORDER — ALBUTEROL SULFATE 90 UG/1
2 INHALANT RESPIRATORY (INHALATION) EVERY 4 HOURS PRN
Qty: 18 G | Refills: 5 | Status: SHIPPED | OUTPATIENT
Start: 2025-06-26

## 2025-06-26 NOTE — TELEPHONE ENCOUNTER
Last appointment: 3/3/25  Next appointment: 9/15/25  Previous refill encounter(s): 5/16/25 #1 with 1 refill    Requested Prescriptions     Pending Prescriptions Disp Refills    albuterol sulfate HFA (PROVENTIL;VENTOLIN;PROAIR) 108 (90 Base) MCG/ACT inhaler 18 g 1     Sig: Inhale 2 puffs into the lungs every 4 hours as needed for Wheezing or Shortness of Breath         For Pharmacy Admin Tracking Only    Program: Medication Refill  CPA in place:    Recommendation Provided To:   Intervention Detail: New Rx: 1, reason: Patient Preference  Intervention Accepted By:   Gap Closed?:    Time Spent (min): 5

## 2025-06-26 NOTE — TELEPHONE ENCOUNTER
Last appointment: 3/3/25  Next appointment: 9/15/25  Previous refill encounter(s): 3/21/25 #180    Requested Prescriptions     Pending Prescriptions Disp Refills    potassium chloride (KLOR-CON M) 20 MEQ extended release tablet 180 tablet 1     Sig: Take 1 tablet by mouth 2 times daily         For Pharmacy Admin Tracking Only    Program: Medication Refill  CPA in place:    Recommendation Provided To:   Intervention Detail: New Rx: 1, reason: Patient Preference  Intervention Accepted By:   Gap Closed?:    Time Spent (min): 5

## 2025-06-28 DIAGNOSIS — I10 ESSENTIAL (PRIMARY) HYPERTENSION: ICD-10-CM

## 2025-06-30 RX ORDER — AMLODIPINE BESYLATE 5 MG/1
5 TABLET ORAL DAILY
Qty: 90 TABLET | Refills: 1 | Status: SHIPPED | OUTPATIENT
Start: 2025-06-30

## 2025-06-30 NOTE — TELEPHONE ENCOUNTER
Last appointment: 3/3/25  Next appointment: 9/15/25  Previous refill encounter(s): 4/7/25 #90    Requested Prescriptions     Pending Prescriptions Disp Refills    amLODIPine (NORVASC) 5 MG tablet [Pharmacy Med Name: AMLODIPINE BESYLATE 5MG TABLETS] 90 tablet 1     Sig: TAKE 1 TABLET BY MOUTH DAILY         For Pharmacy Admin Tracking Only    Program: Medication Refill  CPA in place:    Recommendation Provided To:   Intervention Detail: New Rx: 1, reason: Patient Preference  Intervention Accepted By:   Gap Closed?:    Time Spent (min): 5

## 2025-07-03 ENCOUNTER — TELEPHONE (OUTPATIENT)
Age: 45
End: 2025-07-03

## 2025-07-08 ENCOUNTER — OFFICE VISIT (OUTPATIENT)
Age: 45
End: 2025-07-08
Payer: MEDICAID

## 2025-07-08 VITALS
WEIGHT: 177.8 LBS | RESPIRATION RATE: 20 BRPM | HEIGHT: 66 IN | BODY MASS INDEX: 28.57 KG/M2 | DIASTOLIC BLOOD PRESSURE: 83 MMHG | SYSTOLIC BLOOD PRESSURE: 131 MMHG | OXYGEN SATURATION: 99 % | HEART RATE: 76 BPM | TEMPERATURE: 98 F

## 2025-07-08 DIAGNOSIS — I10 PRIMARY HYPERTENSION: ICD-10-CM

## 2025-07-08 DIAGNOSIS — E87.6 HYPOKALEMIA: ICD-10-CM

## 2025-07-08 DIAGNOSIS — S93.401A MODERATE RIGHT ANKLE SPRAIN, INITIAL ENCOUNTER: ICD-10-CM

## 2025-07-08 DIAGNOSIS — R60.0 BILATERAL LEG EDEMA: Primary | ICD-10-CM

## 2025-07-08 PROCEDURE — 3079F DIAST BP 80-89 MM HG: CPT | Performed by: STUDENT IN AN ORGANIZED HEALTH CARE EDUCATION/TRAINING PROGRAM

## 2025-07-08 PROCEDURE — 3075F SYST BP GE 130 - 139MM HG: CPT | Performed by: STUDENT IN AN ORGANIZED HEALTH CARE EDUCATION/TRAINING PROGRAM

## 2025-07-08 PROCEDURE — 99214 OFFICE O/P EST MOD 30 MIN: CPT | Performed by: STUDENT IN AN ORGANIZED HEALTH CARE EDUCATION/TRAINING PROGRAM

## 2025-07-08 RX ORDER — SPIRONOLACTONE 25 MG/1
25 TABLET ORAL DAILY
Qty: 90 TABLET | Refills: 1 | Status: SHIPPED | OUTPATIENT
Start: 2025-07-08

## 2025-07-08 ASSESSMENT — PATIENT HEALTH QUESTIONNAIRE - PHQ9
SUM OF ALL RESPONSES TO PHQ QUESTIONS 1-9: 0
1. LITTLE INTEREST OR PLEASURE IN DOING THINGS: NOT AT ALL
SUM OF ALL RESPONSES TO PHQ QUESTIONS 1-9: 0
2. FEELING DOWN, DEPRESSED OR HOPELESS: NOT AT ALL

## 2025-07-08 NOTE — PROGRESS NOTES
[facility]  [address]  [place] [id number] [phone]    Office Visit      Assessment and Plan      Diagnosis Orders   1. Bilateral leg edema  spironolactone (ALDACTONE) 25 MG tablet      2. Moderate right ankle sprain, initial encounter        3. Hypokalemia        4. Primary hypertension              Assessment & Plan  Right ankle sprain  -Status: acute, stable.  Symptoms suggest a sprain due to an incident at work.  --Diagnostic plan: x-rays confirmed no fractures or dislocations.  --Treatment plan: Recommended ankle compression wrap during the day, removing at night.    Venous insufficiency  -Status: chronic, stable.  Bilateral leg swelling, particularly at the end of the day, indicative of venous insufficiency.  --Treatment plan: Advised knee-high compression socks. Prescribed spironolactone 25 mg daily and advised adequate hydration.    Hypertension  -Status: chronic, controlled.  Blood pressure 131/83 mmHg, within acceptable range.  --Treatment plan: Current medications metoprolol 150 mg and amlodipine 5 mg effectively managing hypertension.    Hypokalemia  -Status: chronic, improving.  Taking potassium supplements and advised to avoid salt.  --Treatment plan: Advised to discontinue potassium supplements while on spironolactone to prevent hyperkalemia.    Follow-up: next scheduled visit.        Discussed the expected course, resolution and complications of the diagnosis(es) in detail.  Medication risks, benefits, costs, interactions, and alternatives were discussed as indicated.  Patient to contact the office if their condition worsens, changes or fails to improve. Pt verbalized understanding with the diagnosis(es) and plan.           Izzy Lopez DO    07/08/25   4:38 PM        Subjective     CC:   Chief Complaint   Patient presents with    Swelling     Swelling in both legs and feet       Pt  has a past medical history of Anemia, Asthma,

## 2025-07-08 NOTE — PROGRESS NOTES
Chief Complaint   Patient presents with    Swelling     Swelling in both legs and feet       \"Have you been to the ER, urgent care clinic since your last visit?  Hospitalized since your last visit?\"    NO    “Have you seen or consulted any other health care providers outside of [facility] since your last visit?”    NO    Have you had a mammogram?”   NO    No breast cancer screening on file      “Have you had a pap smear?”    NO    No cervical cancer screening on file             Click Here for Release of Records Request     No results found for this visit on 25.   Vitals:    25 1557   BP: 131/83   Pulse: 76   Resp: 20   Temp: 98 °F (36.7 °C)   TempSrc: Temporal   SpO2: 99%   Weight: 80.6 kg (177 lb 12.8 oz)   Height: 1.676 m (5' 6\")      Health Maintenance Due   Topic Date Due    Cervical cancer screen  Never done    Breast cancer screen  Never done    Lipids  2025        The patient, Segundo Colmenares, identity was verified by name and .

## (undated) DEVICE — STERILE POLYISOPRENE POWDER-FREE SURGICAL GLOVES: Brand: PROTEXIS

## (undated) DEVICE — AIRSEAL 12 MM ACCESS PORT AND PALM GRIP OBTURATOR WITH BLADELESS OPTICAL TIP, 120 MM LENGTH: Brand: AIRSEAL

## (undated) DEVICE — TRI-LUMEN FILTERED TUBE SET WITH ACTIVATED CHARCOAL FILTER: Brand: AIRSEAL

## (undated) DEVICE — INSUFFLATION NEEDLE: Brand: SURGINEEDLE

## (undated) DEVICE — 40418 TRENDELENBURG ONE-STEP ARM PROTECTORS LARGE (1 PAIR): Brand: 40418 TRENDELENBURG ONE-STEP ARM PROTECTORS LARGE (1 PAIR)

## (undated) DEVICE — SCISSORS SURG DIA8MM MPLR CRV ENDOWRIST

## (undated) DEVICE — COLUMN DRAPE

## (undated) DEVICE — SEAL UNIV 5-8MM DISP BX/10 -- DA VINCI XI - SNGL USE

## (undated) DEVICE — SPECIMEN RETRIEVAL POUCH: Brand: ENDO CATCH GOLD

## (undated) DEVICE — SOLUTION IRRIGATION NACL 0.9% 1000 ML FLX CONTAINER

## (undated) DEVICE — NEEDLE HYPO 21GA L1.5IN INTRAMUSCULAR S STL LATCH BVL UP

## (undated) DEVICE — FENESTRATED BIPOLAR FORCEPS: Brand: ENDOWRIST

## (undated) DEVICE — SUTURE SZ 0 27IN 5/8 CIR UR-6  TAPER PT VIOLET ABSRB VICRYL J603H

## (undated) DEVICE — PROGRASP FORCEPS: Brand: ENDOWRIST

## (undated) DEVICE — (D)PREP SKN CHLRAPRP APPL 26ML -- CONVERT TO ITEM 371833

## (undated) DEVICE — SUTURE MCRYL SZ 4-0 L27IN ABSRB UD L19MM PS-2 1/2 CIR PRIM Y426H

## (undated) DEVICE — BLADELESS OBTURATOR, LONG: Brand: WECK VISTA

## (undated) DEVICE — KENDALL SCD EXPRESS SLEEVES, KNEE LENGTH, MEDIUM: Brand: KENDALL SCD

## (undated) DEVICE — ARM DRAPE

## (undated) DEVICE — HANDLE LT SNAP ON ULT DURABLE LENS FOR TRUMPF ALC DISPOSABLE

## (undated) DEVICE — INFECTION CONTROL KIT SYS

## (undated) DEVICE — VISUALIZATION SYSTEM: Brand: CLEARIFY

## (undated) DEVICE — PAD SANIT NPKN 4IN GRD

## (undated) DEVICE — SURGICAL PROCEDURE PACK GYN LAPAROSCOPY CUST SMH LF

## (undated) DEVICE — GOWN,SIRUS,NONRNF,SETINSLV,XL,20/CS: Brand: MEDLINE

## (undated) DEVICE — PREP PAD BNS: Brand: CONVERTORS

## (undated) DEVICE — STERILE POLYISOPRENE POWDER-FREE SURGICAL GLOVES WITH EMOLLIENT COATING: Brand: PROTEXIS

## (undated) DEVICE — BLADELESS OBTURATOR: Brand: WECK VISTA

## (undated) DEVICE — TRAY PREP DRY W/ PREM GLV 2 APPL 6 SPNG 2 UNDPD 1 OVERWRAP

## (undated) DEVICE — TRAP SUC MUCOUS 70ML -- MEDICHOICE MEDLINE

## (undated) DEVICE — TIP COVER ACCESSORY

## (undated) DEVICE — 40580 - THE PINK PAD - ADVANCED TRENDELENBURG POSITIONING KIT: Brand: 40580 - THE PINK PAD - ADVANCED TRENDELENBURG POSITIONING KIT

## (undated) DEVICE — DRAPE,REIN 53X77,STERILE: Brand: MEDLINE

## (undated) DEVICE — SYR 10ML LUER LOK 1/5ML GRAD --

## (undated) DEVICE — MASTISOL ADHESIVE LIQ 2/3ML

## (undated) DEVICE — APPLICATOR SURG XL L38CM FOR ARISTA ABSRB HEMSTAT FLEXITIP

## (undated) DEVICE — BARRIER TISS ADH ABSRB 3X4IN -- GYNECARE INTERCEED

## (undated) DEVICE — REM POLYHESIVE ADULT PATIENT RETURN ELECTRODE: Brand: VALLEYLAB

## (undated) DEVICE — TOWEL SURG W17XL27IN STD BLU COT NONFENESTRATED PREWASHED

## (undated) DEVICE — AGENT HEMSTAT 5GM ARISTA AH

## (undated) DEVICE — SYSTEM FASCIAL CLSR UNIQUE SHLDED WNG SAFE UNIF CONSISTENT

## (undated) DEVICE — MAGNETIC IMPLANT S1000-00: Brand: SOPHONO™

## (undated) DEVICE — Device

## (undated) DEVICE — ELECTRO LUBE IS A SINGLE PATIENT USE DEVICE THAT IS INTENDED TO BE USED ON ELECTROSURGICAL ELECTRODES TO REDUCE STICKING.: Brand: KEY SURGICAL ELECTRO LUBE

## (undated) DEVICE — (D)STRIP SKN CLSR 0.5X4IN WHT --

## (undated) DEVICE — VCARE DX UTERINE MANIPULATOR/INJECTOR CANNULA: Brand: VCARE DX

## (undated) DEVICE — TRAY CATH 16F DRN BG LTX -- CONVERT TO ITEM 363158